# Patient Record
Sex: FEMALE | Race: WHITE | NOT HISPANIC OR LATINO | Employment: OTHER | URBAN - METROPOLITAN AREA
[De-identification: names, ages, dates, MRNs, and addresses within clinical notes are randomized per-mention and may not be internally consistent; named-entity substitution may affect disease eponyms.]

---

## 2017-03-14 ENCOUNTER — TELEPHONE (OUTPATIENT)
Dept: FAMILY MEDICINE | Facility: CLINIC | Age: 47
End: 2017-03-14

## 2017-03-14 ENCOUNTER — OFFICE VISIT (OUTPATIENT)
Dept: FAMILY MEDICINE | Facility: CLINIC | Age: 47
End: 2017-03-14
Payer: MEDICARE

## 2017-03-14 DIAGNOSIS — J01.90 ACUTE BACTERIAL SINUSITIS: Primary | ICD-10-CM

## 2017-03-14 DIAGNOSIS — B96.89 ACUTE BACTERIAL SINUSITIS: Primary | ICD-10-CM

## 2017-03-14 PROCEDURE — 99999 PR PBB SHADOW E&M-EST. PATIENT-LVL III: CPT | Mod: PBBFAC,,, | Performed by: FAMILY MEDICINE

## 2017-03-14 PROCEDURE — 99202 OFFICE O/P NEW SF 15 MIN: CPT | Mod: S$GLB,,, | Performed by: FAMILY MEDICINE

## 2017-03-14 RX ORDER — FLUTICASONE PROPIONATE 50 MCG
1 SPRAY, SUSPENSION (ML) NASAL
COMMUNITY
End: 2017-03-14 | Stop reason: ALTCHOICE

## 2017-03-14 RX ORDER — EXEMESTANE 25 MG/1
25 TABLET ORAL
COMMUNITY
Start: 2015-11-17 | End: 2018-07-01

## 2017-03-14 RX ORDER — MOMETASONE FUROATE 50 UG/1
2 SPRAY, METERED NASAL DAILY
Qty: 17 G | Refills: 0 | Status: SHIPPED | OUTPATIENT
Start: 2017-03-14 | End: 2017-04-06

## 2017-03-14 RX ORDER — DOXYCYCLINE 100 MG/1
100 CAPSULE ORAL EVERY 12 HOURS
Qty: 20 CAPSULE | Refills: 0 | Status: SHIPPED | OUTPATIENT
Start: 2017-03-14 | End: 2017-03-24

## 2017-03-14 RX ORDER — AMOXICILLIN AND CLAVULANATE POTASSIUM 875; 125 MG/1; MG/1
TABLET, FILM COATED ORAL
COMMUNITY
Start: 2017-01-14 | End: 2017-04-06

## 2017-03-14 NOTE — TELEPHONE ENCOUNTER
----- Message from Marlyn Rodrigues sent at 3/14/2017  3:52 PM CDT -----  Contact: Patient   Patient request a call back at [phone], Regards to insurance will cover one of these medication and she would like to know which one she can get called in. Rx Optivar, Aerospan     Thanks  Td

## 2017-03-14 NOTE — PROGRESS NOTES
Subjective:       Patient ID: Daniela Costa is a 47 y.o. female.    Chief Complaint: Otalgia      HPI  Ms. Costa presents to clinic today for complaints of cold symptoms.   She states she has had fever of 102, 101 , 100.6.   The symptoms have been going on for 1 week.   She has tried mucinex DM for this and she also tried some Augmentin for this and it is not helping.   She denies any sick contacts.     Review of Systems   Constitutional: Positive for fatigue and fever.   HENT: Positive for congestion, ear pain, sinus pressure and sore throat. Negative for rhinorrhea.    Respiratory: Positive for cough.    Gastrointestinal: Negative for abdominal pain, diarrhea, nausea and vomiting.   Musculoskeletal: Positive for myalgias.       Medication List with Changes/Refills   New Medications    AZELASTINE (ASTELIN) 137 MCG (0.1 %) NASAL SPRAY    1 spray (137 mcg total) by Nasal route 2 (two) times daily.    DOXYCYCLINE (VIBRAMYCIN) 100 MG CAP    Take 1 capsule (100 mg total) by mouth every 12 (twelve) hours.    MOMETASONE (NASONEX) 50 MCG/ACTUATION NASAL SPRAY    2 sprays by Nasal route once daily.   Current Medications    ALBUTEROL (ACCUNEB) 0.63 MG/3 ML NEBU    Take 0.63 mg by nebulization every 6 (six) hours as needed.    AMOXICILLIN-CLAVULANATE 875-125MG (AUGMENTIN) 875-125 MG PER TABLET    TAKE 1 TABLET BY MOUTH 2 TIMES PER DAY    BUDESONIDE-FORMOTEROL 160-4.5 MCG (SYMBICORT) 160-4.5 MCG/ACTUATION HFAA    Inhale into the lungs every 12 (twelve) hours.    EXEMESTANE (AROMASIN) 25 MG TABLET    Take 25 mg by mouth.    LAMOTRIGINE (LAMICTAL) 100 MG TABLET    Take 100 mg by mouth once daily.    LAMOTRIGINE (LAMICTAL) 200 MG TABLET    Take 200 mg by mouth once daily.    LEVALBUTEROL (XOPENEX) 1.25 MG/0.5 ML NEBULIZER SOLUTION    Take 1 ampule by nebulization every 4 (four) hours as needed for Wheezing.    TIOTROPIUM (SPIRIVA) 18 MCG INHALATION CAPSULE    Inhale 18 mcg into the lungs once daily.    VENLAFAXINE  (EFFEXOR-XR) 150 MG CP24    Take 75 mg by mouth once daily.   Discontinued Medications    FLUTICASONE (FLONASE) 50 MCG/ACTUATION NASAL SPRAY    1 spray by Nasal route.    LORAZEPAM (ATIVAN) 1 MG TABLET    Take 1 mg by mouth every 6 (six) hours as needed for Anxiety.    PREDNISONE (DELTASONE) 20 MG TABLET    3 tab for 5 days. 2 tab for 5 days. 1 tab for 5 days. 1/2 tab for 5 days    TAMOXIFEN (NOLVADEX) 20 MG TAB    Take 20 mg by mouth once daily.    TRAZODONE (DESYREL) 50 MG TABLET    Take 50 mg by mouth every evening.     Family History   Problem Relation Age of Onset    Diabetes Mother     Hyperlipidemia Mother     Diabetes Father     Hyperlipidemia Father      There is no problem list on file for this patient.    Social History     Social History    Marital status:      Spouse name: N/A    Number of children: N/A    Years of education: N/A     Social History Main Topics    Smoking status: Former Smoker    Smokeless tobacco: Never Used    Alcohol use Yes      Comment: twice a month - wine     Drug use: No    Sexual activity: Yes     Other Topics Concern    None     Social History Narrative    None         Objective:     Physical Exam   Constitutional: She is oriented to person, place, and time. She appears well-developed and well-nourished. No distress.   HENT:   Head: Normocephalic and atraumatic.   Right Ear: External ear normal.   Left Ear: External ear normal.   Mouth/Throat: Oropharynx is clear and moist. No oropharyngeal exudate.   Pain on palpation of sinuses   Turbinate hypertrophy    Eyes: EOM are normal. Right eye exhibits no discharge. Left eye exhibits no discharge.   Cardiovascular: Normal rate and regular rhythm.    Pulmonary/Chest: Effort normal and breath sounds normal. No respiratory distress. She has no wheezes.   Abdominal: Bowel sounds are normal.   Musculoskeletal: She exhibits no edema.   Neurological: She is alert and oriented to person, place, and time.   Skin: Skin is  warm and dry. She is not diaphoretic. No erythema.   Psychiatric: She has a normal mood and affect.   Vitals reviewed.    Vitals:    03/14/17 1323   BP: 116/80   Pulse: (!) 115   Resp: 18   Temp: 99.8 °F (37.7 °C)       Assessment/  PLAN     Acute bacterial sinusitis  -     doxycycline (VIBRAMYCIN) 100 MG Cap; Take 1 capsule (100 mg total) by mouth every 12 (twelve) hours.  Dispense: 20 capsule; Refill: 0  -     mometasone (NASONEX) 50 mcg/actuation nasal spray; 2 sprays by Nasal route once daily.  Dispense: 17 g; Refill: 0  - continue supportive care, rest, hydration     Plan as above   rtc if not better       Arianna Webster MD  [facility]

## 2017-03-14 NOTE — PATIENT INSTRUCTIONS

## 2017-03-14 NOTE — TELEPHONE ENCOUNTER
Patient states insurance will only cover medication once she has tried an preferred drug  Optivar or Aerospan  Would like to get a RX for one of the preferred drugs  Please advise

## 2017-03-15 DIAGNOSIS — J30.9 ALLERGIC RHINITIS, UNSPECIFIED ALLERGIC RHINITIS TRIGGER, UNSPECIFIED RHINITIS SEASONALITY: Primary | ICD-10-CM

## 2017-03-15 RX ORDER — AZELASTINE 1 MG/ML
1 SPRAY, METERED NASAL 2 TIMES DAILY
Qty: 30 ML | Refills: 0 | Status: SHIPPED | OUTPATIENT
Start: 2017-03-15 | End: 2017-04-06

## 2017-03-16 VITALS
HEIGHT: 66 IN | DIASTOLIC BLOOD PRESSURE: 80 MMHG | HEART RATE: 115 BPM | RESPIRATION RATE: 18 BRPM | BODY MASS INDEX: 31.36 KG/M2 | OXYGEN SATURATION: 98 % | SYSTOLIC BLOOD PRESSURE: 116 MMHG | WEIGHT: 195.13 LBS | TEMPERATURE: 100 F

## 2017-04-06 ENCOUNTER — LAB VISIT (OUTPATIENT)
Dept: LAB | Facility: HOSPITAL | Age: 47
End: 2017-04-06
Attending: FAMILY MEDICINE
Payer: MEDICARE

## 2017-04-06 ENCOUNTER — OFFICE VISIT (OUTPATIENT)
Dept: INTERNAL MEDICINE | Facility: CLINIC | Age: 47
End: 2017-04-06
Payer: MEDICARE

## 2017-04-06 VITALS
HEART RATE: 114 BPM | BODY MASS INDEX: 31.89 KG/M2 | WEIGHT: 191.38 LBS | DIASTOLIC BLOOD PRESSURE: 80 MMHG | TEMPERATURE: 98 F | HEIGHT: 65 IN | SYSTOLIC BLOOD PRESSURE: 126 MMHG

## 2017-04-06 DIAGNOSIS — E78.5 HYPERLIPIDEMIA, UNSPECIFIED HYPERLIPIDEMIA TYPE: ICD-10-CM

## 2017-04-06 DIAGNOSIS — H92.02 EAR PAIN, LEFT: ICD-10-CM

## 2017-04-06 DIAGNOSIS — F31.9 BIPOLAR I DISORDER: ICD-10-CM

## 2017-04-06 DIAGNOSIS — J30.9 ALLERGIC RHINITIS, UNSPECIFIED ALLERGIC RHINITIS TRIGGER, UNSPECIFIED RHINITIS SEASONALITY: ICD-10-CM

## 2017-04-06 DIAGNOSIS — E66.09 OBESITY DUE TO EXCESS CALORIES, UNSPECIFIED OBESITY SEVERITY: ICD-10-CM

## 2017-04-06 DIAGNOSIS — J98.9 REACTIVE AIRWAY DISEASE THAT IS NOT ASTHMA: ICD-10-CM

## 2017-04-06 DIAGNOSIS — C50.911 MALIGNANT NEOPLASM OF RIGHT FEMALE BREAST, UNSPECIFIED SITE OF BREAST: ICD-10-CM

## 2017-04-06 DIAGNOSIS — Z00.00 ROUTINE GENERAL MEDICAL EXAMINATION AT A HEALTH CARE FACILITY: ICD-10-CM

## 2017-04-06 DIAGNOSIS — Z00.00 ROUTINE GENERAL MEDICAL EXAMINATION AT A HEALTH CARE FACILITY: Primary | ICD-10-CM

## 2017-04-06 PROBLEM — C50.919 MALIGNANT NEOPLASM OF BREAST: Status: ACTIVE | Noted: 2017-04-06

## 2017-04-06 LAB
ALBUMIN SERPL BCP-MCNC: 4.1 G/DL
ALP SERPL-CCNC: 107 U/L
ALT SERPL W/O P-5'-P-CCNC: 29 U/L
ANION GAP SERPL CALC-SCNC: 10 MMOL/L
AST SERPL-CCNC: 22 U/L
BILIRUB SERPL-MCNC: 0.6 MG/DL
BUN SERPL-MCNC: 14 MG/DL
CALCIUM SERPL-MCNC: 9.7 MG/DL
CHLORIDE SERPL-SCNC: 103 MMOL/L
CHOLEST/HDLC SERPL: 3.8 {RATIO}
CO2 SERPL-SCNC: 29 MMOL/L
CREAT SERPL-MCNC: 1 MG/DL
EST. GFR  (AFRICAN AMERICAN): >60 ML/MIN/1.73 M^2
EST. GFR  (NON AFRICAN AMERICAN): >60 ML/MIN/1.73 M^2
GLUCOSE SERPL-MCNC: 113 MG/DL
HDL/CHOLESTEROL RATIO: 26.6 %
HDLC SERPL-MCNC: 177 MG/DL
HDLC SERPL-MCNC: 47 MG/DL
LDLC SERPL CALC-MCNC: 104.4 MG/DL
NONHDLC SERPL-MCNC: 130 MG/DL
POTASSIUM SERPL-SCNC: 4.6 MMOL/L
PROT SERPL-MCNC: 7.4 G/DL
SODIUM SERPL-SCNC: 142 MMOL/L
T4 FREE SERPL-MCNC: 0.77 NG/DL
TRIGL SERPL-MCNC: 128 MG/DL
TSH SERPL DL<=0.005 MIU/L-ACNC: 2.06 UIU/ML

## 2017-04-06 PROCEDURE — 84439 ASSAY OF FREE THYROXINE: CPT

## 2017-04-06 PROCEDURE — 80061 LIPID PANEL: CPT

## 2017-04-06 PROCEDURE — 99396 PREV VISIT EST AGE 40-64: CPT | Mod: S$GLB,,, | Performed by: FAMILY MEDICINE

## 2017-04-06 PROCEDURE — 84443 ASSAY THYROID STIM HORMONE: CPT

## 2017-04-06 PROCEDURE — 36415 COLL VENOUS BLD VENIPUNCTURE: CPT | Mod: PO

## 2017-04-06 PROCEDURE — 99999 PR PBB SHADOW E&M-EST. PATIENT-LVL III: CPT | Mod: PBBFAC,,, | Performed by: FAMILY MEDICINE

## 2017-04-06 PROCEDURE — 80053 COMPREHEN METABOLIC PANEL: CPT

## 2017-04-06 RX ORDER — FLUTICASONE PROPIONATE 50 MCG
2 SPRAY, SUSPENSION (ML) NASAL DAILY
Qty: 16 G | Refills: 12
Start: 2017-04-06 | End: 2017-05-06

## 2017-04-06 RX ORDER — MONTELUKAST SODIUM 10 MG/1
10 TABLET ORAL DAILY
Qty: 30 TABLET | Refills: 6 | Status: SHIPPED | OUTPATIENT
Start: 2017-04-06 | End: 2018-04-15 | Stop reason: SDUPTHER

## 2017-04-06 RX ORDER — CHLORHEXIDINE GLUCONATE ORAL RINSE 1.2 MG/ML
SOLUTION DENTAL
COMMUNITY
Start: 2017-04-01 | End: 2018-03-12

## 2017-04-06 NOTE — PROGRESS NOTES
Subjective:      Patient ID: Daniela Costa is a 47 y.o. female.    Chief Complaint: Establish Care and Annual Exam    HPI Comments: Patient's coming in today to establish care.  Her previous PCP was Dr. Martinez and then Dr. Mcdermott.  She states she really just didn't get along with Dr. Mcdermott into that she's switching primary care physicians.  She was diagnosed with breast cancer of the right breast multiple lesions in January 22, 2013.  She's currently followed by Dr. Larios and Dr. Love every 6 months.  She ended up having to have a right mastectomy and then prophylactically had a left mastectomy in January 2016.  She's currently on aromatase inhibitor therapy.  She sees her breast specialist every 6 months for chest x-rays.  She tested negative for the Brca gene.  She ended up with a diagnosis on June 1, 2012 had a port placed in July 2012 in August had for a CT treatments 3 weeks apart with 12 Taxol weekly treatments and in February 2013 through May 2013 had daily radiation..  Her mother and her maternal grandmother also had breast cancer.  She does go through hot flashes.  She has a gynecologist Dr. Alka Araiza her last Pap smear was in October 2016.  He was normal.  Dr. Servin was her reconstructive surgeon for plastic surgery.    She's been disabled for quite some time because of her back.  She currently sees Dr. Vignesh Maxwell as his specialist.  She had an neck C6-C7 fusion in 2005 by Dr. Harjit bates.  Then she had anterior posterior lumbar fusion of L4 L5 S1 in 2005 by Dr. Celina Birch.  She did a revision to the AP fusion again by Dr. dixon Birch in December 2005.  She has had another lumbar fusion and removal of the previous hardware done on August 26 a 2015 by Dr. Vignesh Maxwell.  She had a pneumonia shot in 2015 as well.    She also has a history of bipolar disorder for which she is followed by Dr. Flor.  She's been controlled on Lamictal and Effexor.  She has been seeing him for several  years now she believes she's been on Lamictal now for over since 2008.    She is stating that she needs to see an allergist and possibly an pulmonary specialist.  She has a history of some reactive airway disease.  She's been battling a lot of ALLERGY issues as well.  She does occasionally use albuterol but she's never been diagnosed with asthma.    I have reviewed her outside records from the [place] [facility] [place] most recent 2 years worth of notes from her primary care physician's through the [facility] everywhere.      Lab Results   Component Value Date    WBC 8.52 08/03/2006    HGB 12.7 08/03/2006    HCT 38.9 08/03/2006     08/03/2006       Review of Systems   Constitutional: Negative for chills, fatigue and fever.   HENT: Positive for congestion and ear pain. Negative for trouble swallowing.    Eyes: Negative for pain and visual disturbance.   Respiratory: Negative for cough and shortness of breath.    Cardiovascular: Negative for chest pain and leg swelling.   Gastrointestinal: Negative for abdominal pain, blood in stool, nausea and vomiting.   Endocrine: Negative for cold intolerance and heat intolerance.   Genitourinary: Negative for dysuria and frequency.   Musculoskeletal: Positive for arthralgias, back pain, neck pain and neck stiffness. Negative for joint swelling and myalgias.   Skin: Negative for color change and rash.   Neurological: Negative for dizziness, weakness and headaches.   Psychiatric/Behavioral: Negative for behavioral problems, decreased concentration, dysphoric mood and sleep disturbance. The patient is not nervous/anxious.      Objective:     Physical Exam   Constitutional: She is oriented to person, place, and time. She appears well-developed and well-nourished.   HENT:   Head: Normocephalic and atraumatic.   Right Ear: External ear normal. Tympanic membrane is injected.   Left Ear: External ear normal. Tympanic membrane is injected.   Mouth/Throat: Oropharynx is clear and moist.   Eyes:  EOM are normal.   Neck: Normal range of motion. Neck supple. No thyromegaly present.   Cardiovascular: Normal rate and regular rhythm.  Exam reveals no gallop and no friction rub.    No murmur heard.  Pulmonary/Chest: Effort normal. No respiratory distress. She has no wheezes. She has no rales.   Abdominal: Soft. Bowel sounds are normal. She exhibits no distension. There is no tenderness. There is no rebound.   Musculoskeletal: She exhibits no edema.        Cervical back: She exhibits decreased range of motion, tenderness and bony tenderness. She exhibits no pain and no spasm.        Lumbar back: She exhibits decreased range of motion, tenderness and bony tenderness. She exhibits no pain and no spasm.   Lymphadenopathy:     She has no cervical adenopathy.   Neurological: She is alert and oriented to person, place, and time.   Skin: Skin is warm and dry. No rash noted.   Psychiatric: She has a normal mood and affect. Her speech is normal and behavior is normal. Judgment and thought content normal. Cognition and memory are normal.   Vitals reviewed.    Assessment:     1. Routine general medical examination at a health care facility    2. Allergic rhinitis, unspecified allergic rhinitis trigger, unspecified rhinitis seasonality    3. Bipolar I disorder    4. Malignant neoplasm of right female breast, unspecified site of breast    5. Ear pain, left    6. Reactive airway disease that is not asthma    7. Hyperlipidemia, unspecified hyperlipidemia type    8. Obesity due to excess calories, unspecified obesity severity      Plan:   Daniela was seen today for establish care and annual exam.    Diagnoses and all orders for this visit:    Routine general medical examination at a health care facility-discussed health maintenance issues continue gynecology continue with breast specialist continue with back specialist continue with her psychiatrist.  Lab work today.  -     TSH; Future  -     T4, free; Future  -     Lipid panel;  Future  -     Comprehensive metabolic panel; Future    Allergic rhinitis, unspecified allergic rhinitis trigger, unspecified rhinitis seasonality-will do trial of Singulair will refer to ALLERGY and pulmonology as she has a history of some reactive airway disease and recurrent ALLERGY symptoms.  -     montelukast (SINGULAIR) 10 mg tablet; Take 1 tablet (10 mg total) by mouth once daily.  -     Ambulatory referral to Allergy  -     Ambulatory consult to Pulmonology  -     TSH; Future  -     T4, free; Future  -     Lipid panel; Future  -     Comprehensive metabolic panel; Future    Bipolar I disorder-continue to follow-up with her psychiatrist Dr. Flor currently managing her medications.  -     TSH; Future  -     T4, free; Future  -     Lipid panel; Future  -     Comprehensive metabolic panel; Future    Malignant neoplasm of right female breast, unspecified site of breast  Comments:  3 spots on Right breast, s/p mas, chem, rad, and then proph on left for masectomy, now in remission.  Still being followed by Dr. Love and Dr. Larios every 6 months.  Doing chest x-rays every 6 months for her prevention.  Orders:  -     TSH; Future  -     T4, free; Future  -     Lipid panel; Future  -     Comprehensive metabolic panel; Future    Ear pain, left-adding Singulair continue with Flonase, refer to ALLERGY specialist  -     montelukast (SINGULAIR) 10 mg tablet; Take 1 tablet (10 mg total) by mouth once daily.  -     Ambulatory referral to Allergy  -     TSH; Future  -     T4, free; Future  -     Lipid panel; Future  -     Comprehensive metabolic panel; Future    Reactive airway disease that is not asthma-recurrent adding Singulair refer to pulmonary  -     montelukast (SINGULAIR) 10 mg tablet; Take 1 tablet (10 mg total) by mouth once daily.  -     Ambulatory referral to Allergy  -     Ambulatory consult to Pulmonology  -     TSH; Future  -     T4, free; Future  -     Lipid panel; Future  -     Comprehensive metabolic panel;  Future    Hyperlipidemia, unspecified hyperlipidemia type-checking lab work today.  -     Lipid panel; Future    Other orders  -     fluticasone (FLONASE) 50 mcg/actuation nasal spray; 2 sprays by Each Nare route once daily.            Return in about 1 year (around 4/6/2018) for physical.

## 2017-04-06 NOTE — MR AVS SNAPSHOT
[facility]  [address] [place] [address]  Phone: [phone]  Fax: [phone]                  Daniela Costa   2017 1:20 PM   Office Visit    Description:  Female : 1970   Provider:  Lucía Terrell MD   Department:  [place]           Reason for Visit     Establish Care     Annual Exam           Diagnoses this Visit        Comments    Routine general medical examination at a health care facility    -  Primary     Allergic rhinitis, unspecified allergic rhinitis trigger, unspecified rhinitis seasonality         Bipolar I disorder         Malignant neoplasm of right female breast, unspecified site of breast     3 spots on Right breast, s/p mas, chem, rad, and then proph on left for masectomy    Ear pain, left         Reactive airway disease that is not asthma         Hyperlipidemia, unspecified hyperlipidemia type                To Do List           Future Appointments        Provider Department Dept Phone    4/10/2017 2:00 PM Leeroy Rosario MD [facility] - Allergy/ Immunology [phone]    2017 3:20 PM Everette John MD [facility] - Pulmonary Services [phone]      Goals (5 Years of Data)     None      Follow-Up and Disposition     Return in about 1 year (around 2018) for physical.       These Medications        Disp Refills Start End    fluticasone (FLONASE) 50 mcg/actuation nasal spray 16 g 12 2017    2 sprays by Each Nare route once daily. - Each Nare    Pharmacy: [facility]/pharmacy #[id number] - [place] - [address] [address] Ph #: [phone]       montelukast (SINGULAIR) 10 mg tablet 30 tablet 6 2017    Take 1 tablet (10 mg total) by mouth once daily. - Oral    Pharmacy: [facility]/pharmacy #[id number]  [place] - [address] [address] Ph #: [phone]         [facility]     [facility] Nurse Care Line -  Assistance  Unless otherwise directed by your provider, please contact  [facility], our nurse care line that is available for  assistance.     Registered nurses in the [facility] provide: appointment scheduling, clinical advisement, health education, and other advisory services.  Call: [phone] (toll free)               Medications           Message regarding Medications     Verify the changes and/or additions to your medication regime listed below are the same as discussed with your clinician today.  If any of these changes or additions are incorrect, please notify your healthcare provider.        START taking these NEW medications        Refills    fluticasone (FLONASE) 50 mcg/actuation nasal spray 12    Si sprays by Each Nare route once daily.    Class: No Print    Route: Each Nare    montelukast (SINGULAIR) 10 mg tablet 6    Sig: Take 1 tablet (10 mg total) by mouth once daily.    Class: Normal    Route: Oral      STOP taking these medications     amoxicillin-clavulanate 875-125mg (AUGMENTIN) 875-125 mg per tablet TAKE 1 TABLET BY MOUTH 2 TIMES PER DAY    budesonide-formoterol 160-4.5 mcg (SYMBICORT) 160-4.5 mcg/actuation HFAA Inhale into the lungs every 12 (twelve) hours.    tiotropium (SPIRIVA) 18 mcg inhalation capsule Inhale 18 mcg into the lungs once daily.    mometasone (NASONEX) 50 mcg/actuation nasal spray 2 sprays by Nasal route once daily.    levalbuterol (XOPENEX) 1.25 mg/0.5 mL nebulizer solution Take 1 ampule by nebulization every 4 (four) hours as needed for Wheezing.    azelastine (ASTELIN) 137 mcg (0.1 %) nasal spray 1 spray (137 mcg total) by Nasal route 2 (two) times daily.           Verify that the below list of medications is an accurate representation of the medications you are currently taking.  If none reported, the list may be blank. If incorrect, please contact your healthcare provider. Carry this list with you in case of emergency.           Current Medications     albuterol (ACCUNEB) 0.63 mg/3 mL Nebu Take 0.63 mg by  "nebulization every 6 (six) hours as needed.    chlorhexidine (PERIDEX) 0.12 % solution     exemestane (AROMASIN) 25 mg tablet Take 25 mg by mouth.    fluticasone (FLONASE) 50 mcg/actuation nasal spray 2 sprays by Each Nare route once daily.    lamotrigine (LAMICTAL) 100 MG tablet Take 100 mg by mouth once daily.    lamotrigine (LAMICTAL) 200 MG tablet Take 200 mg by mouth once daily.    montelukast (SINGULAIR) 10 mg tablet Take 1 tablet (10 mg total) by mouth once daily.    venlafaxine (EFFEXOR-XR) 150 MG Cp24 Take 75 mg by mouth once daily.           Clinical Reference Information           Your Vitals Were     BP Pulse Temp Height Weight Last Period    126/80 114 97.8 °F (36.6 °C) 5' 5" (1.651 m) 86.8 kg (191 lb 5.8 oz) 08/14/2012    BMI                31.84 kg/m2          Blood Pressure          Most Recent Value    BP  126/80      Allergies as of 4/6/2017     Cefdinir    Levofloxacin      Immunizations Administered on Date of Encounter - 4/6/2017     None      Orders Placed During Today's Visit      Normal Orders This Visit    Ambulatory consult to Pulmonology     Ambulatory referral to Allergy     Future Labs/Procedures Expected by Expires    Comprehensive metabolic panel  4/6/2017 6/5/2018    Lipid panel  4/6/2017 6/5/2018    T4, free  4/6/2017 6/5/2018    TSH  4/6/2017 6/5/2018      Language Assistance Services     ATTENTION: Language assistance services are available, free of charge. Please call [phone].      ATENCIÓN: Si habla español, tiene a montesinos disposición servicios gratuitos de asistencia lingüística. Llame al [phone].     [facility] Ý: N?u b?n nói Ti?ng Vi?t, có các d?ch v? h? tr? ngôn ng? mi?n phí dành cho b?n. G?i s? [phone].         [facility] complies with applicable Federal civil rights laws and does not discriminate on the basis of race, color, national origin, age, disability, or sex.        "

## 2017-04-07 ENCOUNTER — PATIENT MESSAGE (OUTPATIENT)
Dept: INTERNAL MEDICINE | Facility: CLINIC | Age: 47
End: 2017-04-07

## 2017-04-07 ENCOUNTER — TELEPHONE (OUTPATIENT)
Dept: INTERNAL MEDICINE | Facility: CLINIC | Age: 47
End: 2017-04-07

## 2017-04-07 DIAGNOSIS — R73.09 ABNORMAL GLUCOSE: Primary | ICD-10-CM

## 2017-04-07 NOTE — TELEPHONE ENCOUNTER
Let's get a1c first to see if it is only prediabetes vs diabetes before trying to get glucose monitor. Please schedule.

## 2017-04-10 ENCOUNTER — HOSPITAL ENCOUNTER (OUTPATIENT)
Dept: RADIOLOGY | Facility: HOSPITAL | Age: 47
Discharge: HOME | End: 2017-04-10
Attending: ALLERGY & IMMUNOLOGY
Payer: MEDICARE

## 2017-04-10 ENCOUNTER — OFFICE VISIT (OUTPATIENT)
Dept: ALLERGY | Facility: CLINIC | Age: 47
End: 2017-04-10
Payer: MEDICARE

## 2017-04-10 VITALS
TEMPERATURE: 98 F | SYSTOLIC BLOOD PRESSURE: 110 MMHG | HEIGHT: 65 IN | BODY MASS INDEX: 32.69 KG/M2 | WEIGHT: 196.19 LBS | HEART RATE: 74 BPM | RESPIRATION RATE: 15 BRPM | DIASTOLIC BLOOD PRESSURE: 62 MMHG

## 2017-04-10 DIAGNOSIS — H69.93 EUSTACHIAN TUBE DYSFUNCTION, BILATERAL: ICD-10-CM

## 2017-04-10 DIAGNOSIS — J31.0 CHRONIC RHINITIS: ICD-10-CM

## 2017-04-10 DIAGNOSIS — R51.9 FACIAL PAIN: ICD-10-CM

## 2017-04-10 DIAGNOSIS — J31.0 CHRONIC RHINITIS: Primary | ICD-10-CM

## 2017-04-10 PROCEDURE — 70220 X-RAY EXAM OF SINUSES: CPT | Mod: TC,PO

## 2017-04-10 PROCEDURE — 99204 OFFICE O/P NEW MOD 45 MIN: CPT | Mod: 25,S$GLB,, | Performed by: ALLERGY & IMMUNOLOGY

## 2017-04-10 PROCEDURE — 1160F RVW MEDS BY RX/DR IN RCRD: CPT | Mod: S$GLB,,, | Performed by: ALLERGY & IMMUNOLOGY

## 2017-04-10 PROCEDURE — 95004 PERQ TESTS W/ALRGNC XTRCS: CPT | Mod: S$GLB,,, | Performed by: ALLERGY & IMMUNOLOGY

## 2017-04-10 PROCEDURE — 99999 PR PBB SHADOW E&M-EST. PATIENT-LVL III: CPT | Mod: PBBFAC,,, | Performed by: ALLERGY & IMMUNOLOGY

## 2017-04-10 PROCEDURE — 70220 X-RAY EXAM OF SINUSES: CPT | Mod: 26,,, | Performed by: RADIOLOGY

## 2017-04-10 RX ORDER — VENLAFAXINE 75 MG/1
75 TABLET ORAL 2 TIMES DAILY
COMMUNITY
End: 2018-03-12

## 2017-04-10 NOTE — LETTER
April 11, 2017      Lucía Terrell MD  [address]  [address]  [place] [address]           [facility] - Allergy/ Immunology  [address] [place] [address]  Phone: [phone]  Fax: [phone]          Patient: Daniela Costa   MR Number: [id number]   YOB: 1970   Date of Visit: 4/10/2017       Dear Dr. Lucía Terrell:    Thank you for referring Daniela Costa to me for evaluation. Attached you will find relevant portions of my assessment and plan of care.    If you have questions, please do not hesitate to call me. I look forward to following Daniela Costa along with you.    Sincerely,    Leeroy Rosario MD    Enclosure  CC:  No Recipients    If you would like to receive this communication electronically, please contact [email or web address] or [phone] to request more information on [employer] Link access.    For providers and/or their staff who would like to refer a patient to [facility], please contact us through our one-stop-shop provider referral line, [facility], at [phone].    If you feel you have received this communication in error or would no longer like to receive these types of communications, please e-mail [email or web address]

## 2017-04-10 NOTE — MR AVS SNAPSHOT
[facility] - Allergy/ Immunology  [address] [facility] [address] [place] [address]  Phone: [phone]  Fax: [phone]                  Daniela Costa   4/10/2017 2:00 PM   Office Visit    Description:  Female : 1970   Provider:  Leeroy Rosario MD   Department:  [facility]           Reason for Visit     Other           Diagnoses this Visit        Comments    Chronic rhinitis    -  Primary     Facial pain         Eustachian tube dysfunction, bilateral                To Do List           Future Appointments        Provider Department Dept Phone    4/10/2017 3:45 PM [facility] XR2 [facility] [phone]    2017 3:20 PM Everette John MD [facility] [phone]      Goals (5 Years of Data)     None      [facility]     [facility] -  Assistance  Unless otherwise directed by your provider, please contact [facility], our nurse care line that is available for  assistance.     Registered nurses in the [facility] provide: appointment scheduling, clinical advisement, health education, and other advisory services.  Call: [phone] (toll free)               Medications           Message regarding Medications     Verify the changes and/or additions to your medication regime listed below are the same as discussed with your clinician today.  If any of these changes or additions are incorrect, please notify your healthcare provider.             Verify that the below list of medications is an accurate representation of the medications you are currently taking.  If none reported, the list may be blank. If incorrect, please contact your healthcare provider. Carry this list with you in case of emergency.           Current Medications     albuterol (ACCUNEB) 0.63 mg/3 mL Nebu Take 0.63 mg by nebulization every 6 (six) hours as needed.    chlorhexidine (PERIDEX) 0.12 % solution     exemestane (AROMASIN) 25 mg tablet Take 25  "mg by mouth.    fluticasone (FLONASE) 50 mcg/actuation nasal spray 2 sprays by Each Nare route once daily.    lamotrigine (LAMICTAL) 200 MG tablet Take 200 mg by mouth once daily.    montelukast (SINGULAIR) 10 mg tablet Take 1 tablet (10 mg total) by mouth once daily.    venlafaxine (EFFEXOR) 75 MG tablet Take 75 mg by mouth 2 (two) times daily.    venlafaxine (EFFEXOR-XR) 150 MG Cp24 Take 75 mg by mouth once daily.           Clinical Reference Information           Your Vitals Were     BP Pulse Temp Resp Height Weight    110/62 74 97.8 °F (36.6 °C) 15 5' 5" (1.651 m) 89 kg (196 lb 3.4 oz)    Last Period BMI             08/14/2012 32.65 kg/m2         Blood Pressure          Most Recent Value    BP  110/62      Allergies as of 4/10/2017     Cefdinir    Levofloxacin      Immunizations Administered on Date of Encounter - 4/10/2017     None      Orders Placed During Today's Visit     Future Labs/Procedures Expected by Expires    X-Ray Sinuses Min 3 Views  4/10/2017 4/10/2018      Instructions    Per discussion.       Language Assistance Services     ATTENTION: Language assistance services are available, free of charge. Please call [phone].      ATENCIÓN: Si shalonda goodwin, tiene a montesinos disposición servicios gratuitos de asistencia lingüística. Llame al [phone].     [facility] Ý: N?u b?n nói Ti?ng Vi?t, có các d?ch v? h? tr? ngôn ng? mi?n phí dành cho b?n. G?i s? [phone].         [facility] complies with applicable Federal civil rights laws and does not discriminate on the basis of race, color, national origin, age, disability, or sex.        "

## 2017-04-11 NOTE — PROGRESS NOTES
Chief complaint: ALLERGIES    This note was dictated using voice recognition software may contain errors.    History:    She had a 2 PM appointment on Monday, April 10, 2017    She was raised in the [facility].  She stated since childhood she is experienced perennial nasal symptoms.  She stated during the past 1-1/2 years she is been especially symptomatic.    Please read and refer to the note of  dated April 6, 2017.  Information in this note was read and reviewed while she was here today.  Significant additions if any are as noted above or below.  Please read and refer to the office note of Elizabeth Lejeune, NP, dated January 28, 2015.  Information in this note was read and reviewed while she was here today.  Significant additions if any are as noted above or below.    Information in her medical record regarding her past medical history family history and social history was reviewed and updated today.  Significant additions if any are as noted above or below.    In the past she did not considered herself to be ALLERGIC to any animals.  When she ate fresh strawberries she developed hives.    Past medical history she has no history of nose or sinus surgery or facial fractures.  She has no history of hypertension diabetes heart liver or kidney thyroid disease.  She has a history of PE tubes, 2 or 3 sets.  She had her tonsils and adenoids removed at about age 8.    Family history is positive for hayfever.    She lives in a house.  She is home with her children.  In her bedroom there is a HEPA filter.  No one smokes indoors.  There is carpeting in her bedroom.  She has 3 dogs indoors.  The dogs and only been present for the past 2 years.    She has a history of breast cancer status post treatment.  She is receiving ongoing treatment on a day-to-day basis with oral agents.    Review of systems: She experiences nasal obstruction anterior and posterior rhinorrhea, sneezing, itching of the nose and eyes  palate and pharynx.  She experiences symptoms suggestive of eustachian tube dysfunction.  She also develops facial pain and pressure and headache discomfort.    She stated in her childhood that an ALLERGY evaluation was performed.  She does not recall who supervised the evaluation.    Recently it was suggested she evaluate Singulair.  She had not previously evaluated Singulair.  In the past she is not evaluated Astelin nasal spray.  She has used Nasacort and Flonase nasal sprays.    She is interested in pursuing reevaluation as it relates to a possible ALLERGIC contribution to her ongoing symptoms.    Exam: In general she is in no distress she is alert oriented well-developed in good mood and attentive  Gait steady  Skin no rash noted  Head no swelling noted  Eyes sclera white conjunctiva pink  Nose patent no polyps seen  Ears not inflamed tympanic membranes not inflamed  Mouth no swelling or inflammation of the lips tongue or in the throat noted  No thyromegaly or masses noted lymph nodes no significant cervical or epitrochlear lymphadenopathy noted  Lungs clear to auscultation  Heart no murmurs heard regular rhythm  Extremities no swelling or inflammation of the hands or legs noted    ALLERGY testing:    She elected to have diagnostic immediate hypersensitivity skin tests performed.  Prick skin tests were performed on the volar forearms.  36 airborne allergens were tested along with histamine and saline.  These tests were personally evaluated and interpreted by myself 15 minutes after they were performed.  The histamine control was positive.  The saline control was negative.  All 36 allergens tested revealed negative results.  I reviewed the results of the tests with her.    Impression:    #1 chronic rhinitis  #2 eustachian tube dysfunction  #3 facial pain and pressure  #4 breast cancer  #5 other health concerns as noted in her medical record    Assessment and plan:    I recommended that sinus x-rays be performed  to evaluate for possible sinusitis or sinus disease.  This is agreeable with her.  Arrangements were made to have the x-rays performed after her appointment with me today.  When the results are available to review with her I will do so.  Additional recommendations may be made at that time.    I requested that she be certain that her HEPA air filter does not generate ozone in the course of its operation.    Based upon the negative skin test noted today for those allergens tested I told her the negative results would suggest that she is not highly ALLERGIC to the allergens evaluated at this time.    I was able to obtain the report of her sinus x-rays.  I called her at her residence at 7:17 PM on Monday, April 10, 2017 and spoke with her.  No air fluid levels were noted.  I did not recommend the institution of antibiotic treatment.  I did recommend that she resume taking Singulair therapy and evaluated.  It Singulair is not helpful consideration may be given to other options for treatment.    She was given the office phone number.  Should she have additional questions or concerns she was instructed to call.    Her appointment was 45 minutes in duration spent entirely in face-to-face contact.  More than 50% of the visit was spent in counseling and coordination of care.  An additional 15 minutes were required for the performance and evaluation of her immediate hypersensitivity skin tests.

## 2017-04-17 ENCOUNTER — LAB VISIT (OUTPATIENT)
Dept: LAB | Facility: HOSPITAL | Age: 47
End: 2017-04-17
Attending: FAMILY MEDICINE
Payer: MEDICARE

## 2017-04-17 DIAGNOSIS — R73.09 ABNORMAL GLUCOSE: ICD-10-CM

## 2017-04-17 PROCEDURE — 36415 COLL VENOUS BLD VENIPUNCTURE: CPT | Mod: PO

## 2017-04-17 PROCEDURE — 83036 HEMOGLOBIN GLYCOSYLATED A1C: CPT

## 2017-04-18 LAB
ESTIMATED AVG GLUCOSE: 111 MG/DL
HBA1C MFR BLD HPLC: 5.5 %

## 2017-05-01 ENCOUNTER — OFFICE VISIT (OUTPATIENT)
Dept: PULMONOLOGY | Facility: CLINIC | Age: 47
End: 2017-05-01
Payer: MEDICARE

## 2017-05-01 ENCOUNTER — TELEPHONE (OUTPATIENT)
Dept: PULMONOLOGY | Facility: CLINIC | Age: 47
End: 2017-05-01

## 2017-05-01 ENCOUNTER — HOSPITAL ENCOUNTER (OUTPATIENT)
Dept: RADIOLOGY | Facility: HOSPITAL | Age: 47
Discharge: HOME | End: 2017-05-01
Attending: INTERNAL MEDICINE
Payer: MEDICARE

## 2017-05-01 VITALS
OXYGEN SATURATION: 99 % | HEIGHT: 65 IN | WEIGHT: 199.94 LBS | HEART RATE: 94 BPM | DIASTOLIC BLOOD PRESSURE: 78 MMHG | BODY MASS INDEX: 33.31 KG/M2 | SYSTOLIC BLOOD PRESSURE: 122 MMHG

## 2017-05-01 DIAGNOSIS — G47.00 INSOMNIA, UNSPECIFIED TYPE: ICD-10-CM

## 2017-05-01 DIAGNOSIS — R07.89 CHEST TIGHTNESS: Primary | ICD-10-CM

## 2017-05-01 DIAGNOSIS — R07.89 CHEST TIGHTNESS: ICD-10-CM

## 2017-05-01 DIAGNOSIS — J45.20 MILD INTERMITTENT ASTHMA WITHOUT COMPLICATION: Primary | ICD-10-CM

## 2017-05-01 DIAGNOSIS — J70.1 RADIATION FIBROSIS OF LUNG: ICD-10-CM

## 2017-05-01 PROCEDURE — 71020 XR CHEST PA AND LATERAL: CPT | Mod: TC,PO

## 2017-05-01 PROCEDURE — 1160F RVW MEDS BY RX/DR IN RCRD: CPT | Mod: S$GLB,,, | Performed by: INTERNAL MEDICINE

## 2017-05-01 PROCEDURE — 71020 XR CHEST PA AND LATERAL: CPT | Mod: 26,,, | Performed by: RADIOLOGY

## 2017-05-01 PROCEDURE — 99999 PR PBB SHADOW E&M-EST. PATIENT-LVL III: CPT | Mod: PBBFAC,,, | Performed by: INTERNAL MEDICINE

## 2017-05-01 PROCEDURE — 99215 OFFICE O/P EST HI 40 MIN: CPT | Mod: S$GLB,,, | Performed by: INTERNAL MEDICINE

## 2017-05-01 RX ORDER — IBUPROFEN AND FAMOTIDINE 26.6; 8 MG/1; MG/1
TABLET ORAL 3 TIMES DAILY
COMMUNITY
End: 2018-01-26

## 2017-05-01 RX ORDER — ALBUTEROL SULFATE 90 UG/1
2 AEROSOL, METERED RESPIRATORY (INHALATION) EVERY 6 HOURS
Qty: 1 INHALER | Refills: 12 | Status: SHIPPED | OUTPATIENT
Start: 2017-05-01 | End: 2018-05-01

## 2017-05-01 RX ORDER — BUDESONIDE AND FORMOTEROL FUMARATE DIHYDRATE 160; 4.5 UG/1; UG/1
2 AEROSOL RESPIRATORY (INHALATION) EVERY 12 HOURS
Qty: 1 INHALER | Refills: 12 | Status: SHIPPED | OUTPATIENT
Start: 2017-05-01 | End: 2017-06-12 | Stop reason: SDUPTHER

## 2017-05-01 RX ORDER — CYCLOBENZAPRINE HCL 10 MG
TABLET ORAL
Refills: 0 | COMMUNITY
Start: 2017-04-18 | End: 2017-06-12 | Stop reason: ALTCHOICE

## 2017-05-01 NOTE — MR AVS SNAPSHOT
[facility]  [address] [place] [address]  Phone: [phone]  Fax: [phone]                  Daniela Costa   2017 3:20 PM   Office Visit    Description:  Female : 1970   Provider:  Everette John MD   Department:  [facility]           Diagnoses this Visit        Comments    Mild intermittent asthma without complication    -  Primary     Radiation fibrosis of lung                To Do List           Future Appointments        Provider Department Dept Phone    2017 1:00 PM PULMONARY LAB, [facility] Pulmonary Function [facility] [phone]    2017 3:10 PM [facility] CT1 LIMIT 500 LBS [facility] [phone]    2017 4:00 PM Everette John MD [facility] [phone]      Goals (5 Years of Data)     None      Follow-Up and Disposition     Return in about 4 weeks (around 2017) for review of CT and PFT.       These Medications        Disp Refills Start End    albuterol 90 mcg/actuation inhaler 1 Inhaler 12 2017    Inhale 2 puffs into the lungs every 6 (six) hours. - Inhalation    Pharmacy: [facility]/pharmacy #[facility] [place]  [address] [facility] Ph #: [phone]       budesonide-formoterol 160-4.5 mcg (SYMBICORT) 160-4.5 mcg/actuation HFAA 1 Inhaler 12 2017    Inhale 2 puffs into the lungs every 12 (twelve) hours. Wash out mouth after using - Inhalation    Pharmacy: [facility]/pharmacy #[id number]  [place] - [address] [facility] Ph #: [phone]       inhalation spacing device (BREATHERITE VALVED MDI CHAMBER) 1 Device prn 2017     Use as directed for inhalation.    Pharmacy: [facility]/pharmacy #[facility] [place] - [address] [facility] Ph #: [phone]         [facility]     [facility] Care [facility] -  Assistance  Unless otherwise directed by your provider, please contact [facility], our nurse care line that is available for  assistance.      Registered nurses in the [facility] provide: appointment scheduling, clinical advisement, health education, and other advisory services.  Call: [phone] (toll free)               Medications           Message regarding Medications     Verify the changes and/or additions to your medication regime listed below are the same as discussed with your clinician today.  If any of these changes or additions are incorrect, please notify your healthcare provider.        START taking these NEW medications        Refills    albuterol 90 mcg/actuation inhaler 12    Sig: Inhale 2 puffs into the lungs every 6 (six) hours.    Class: Normal    Route: Inhalation    budesonide-formoterol 160-4.5 mcg (SYMBICORT) 160-4.5 mcg/actuation HFAA 12    Sig: Inhale 2 puffs into the lungs every 12 (twelve) hours. Wash out mouth after using    Class: Normal    Route: Inhalation    inhalation spacing device (BREATHERITE VALVED MDI CHAMBER) prn    Sig: Use as directed for inhalation.    Class: Normal           Verify that the below list of medications is an accurate representation of the medications you are currently taking.  If none reported, the list may be blank. If incorrect, please contact your healthcare provider. Carry this list with you in case of emergency.           Current Medications     albuterol (ACCUNEB) 0.63 mg/3 mL Nebu Take 0.63 mg by nebulization every 6 (six) hours as needed.    chlorhexidine (PERIDEX) 0.12 % solution     cyclobenzaprine (FLEXERIL) 10 MG tablet TAKE 1 TABLET(S) BY MOUTH 3 TIMES A DAY    exemestane (AROMASIN) 25 mg tablet Take 25 mg by mouth.    fluticasone (FLONASE) 50 mcg/actuation nasal spray 2 sprays by Each Nare route once daily.    ibuprofen-famotidine (DUEXIS) 800-26.6 mg Tab Take by mouth 3 (three) times daily.    lamotrigine (LAMICTAL) 200 MG tablet Take 200 mg by mouth once daily.    montelukast (SINGULAIR) 10 mg tablet Take 1 tablet (10 mg total) by mouth once daily.    venlafaxine  "(EFFEXOR) 75 MG tablet Take 75 mg by mouth 2 (two) times daily.    venlafaxine (EFFEXOR-XR) 150 MG Cp24 Take 75 mg by mouth once daily.    albuterol 90 mcg/actuation inhaler Inhale 2 puffs into the lungs every 6 (six) hours.    budesonide-formoterol 160-4.5 mcg (SYMBICORT) 160-4.5 mcg/actuation HFAA Inhale 2 puffs into the lungs every 12 (twelve) hours. Wash out mouth after using    inhalation spacing device (BREATHERITE VALVED MDI CHAMBER) Use as directed for inhalation.           Clinical Reference Information           Your Vitals Were     BP Pulse Height Weight SpO2 BMI    122/78 94 5' 5" (1.651 m) 90.7 kg (199 lb 15.3 oz) 99% 33.27 kg/m2      Blood Pressure          Most Recent Value    BP  122/78      Allergies as of 5/1/2017     Cefdinir    Levofloxacin      Immunizations Administered on Date of Encounter - 5/1/2017     None      Orders Placed During Today's Visit     Future Labs/Procedures Expected by Expires    CT Chest Without Contrast  5/1/2017 5/1/2018    Complete PFT with bronchodilator  As directed 5/1/2018      Instructions      Budesonide; Formoterol Inhalation  What is this medicine?  BUDESONIDE; FORMOTEROL (byantonia haile; for [facility] ange quiroga) inhalation is a combination of two medicines that decrease inflammation and help to open up the airways in your lungs. It is used to treat asthma. Do NOT use for an acute asthma attack.  How should I use this medicine?  This medicine is inhaled through the mouth. Rinse your mouth with water after use. Make sure not to swallow the water. Follow the directions on your prescription label. Do not use more often than directed. Do not stop taking except on your doctor's advice. Make sure that you are using your inhaler correctly. Ask your doctor or health care provider if you have any questions.  A special MedGuide will be given to you by the pharmacist with each prescription and refill. Be sure to read this information carefully each time.  Talk to your pediatrician " regarding the use of this medicine in children. While this drug may be prescribed for children as young as 12 years of age for selected conditions, precautions do apply.  What side effects may I notice from receiving this medicine?  Side effects that you should report to your doctor or health care professional as soon as possible:  · allergic reactions such as skin rash or itching, hives, swelling of the face, lips or tongue  · breathing problems  · changes in vision  · chest pain  · fast, irregular heartbeat  · feeling faint or lightheaded, falls  · fever  · high blood pressure  · nervousness  · tremors  · white patches or sores in mouth  Side effects that usually do not require medical attention (report to your doctor or health care professional if they continue or are bothersome):  · cough  · different taste in mouth  · headache  · sore throat  · stuffy nose  · stomach upset  What may interact with this medicine?  Do not take this medicine with any of the following medications:  · MAOIs like Carbex, Eldepryl, Marplan, Nardil, and Parnate  · mifepristone  · probucol  · procarbazine  · some other medicines for asthma like formoterol, salmeterol  This medicine may also interact with the following medications:  · antibiotics like clarithromycin, erythromycin  · cimetidine  · diuretics  · grapefruit juice  · itraconazole  · ketoconazole  · medicines for depression, anxiety, or psychotic disturbances  · medicines for irregular heartbeat  · methadone  · some heart medicines like atenolol, metoprolol  · some other medicines for breathing problems  · some vaccines  What if I miss a dose?  If you miss a dose, use it as soon as you remember. If it is almost time for your next dose, use only that dose and continue with your regular schedule, spacing doses evenly. Do not use double or extra doses.  Where should I keep my medicine?  Keep out of the reach of children.  Store in a dry place at room temperature between 20 and 25  degrees C (68 and 77 degrees F). Do not get the inhaler wet. Keep track of the number of doses used. Throw away the inhaler after using the marked number of inhalations or after the expiration date, whichever comes first. Do not burn or puncture canister.  What should I tell my health care provider before I take this medicine?  They need to know if you have any of these conditions:  · bone problems  · diabetes  · heart disease or irregular heartbeat  · high blood pressure  · immune system problems  · infection  · liver disease  · worsening asthma  · an unusual or allergic reaction to budesonide, formoterol, medicines, foods, dyes, or preservatives  · pregnant or trying to get pregnant  · breast-feeding  What should I watch for while using this medicine?  Tell your doctor or health care professional if your symptoms do not improve or get worse. If you need to use your short-acting inhalers more often, call your doctor right away. Do not use more than every 12 hours.  If you have asthma, be aware that using this medicine may increase your risk of dying from asthma related problems. Talk to your doctor about the risks and benefits of taking this medicine. NEVER use this medicine for an acute asthma attack.  This medicine may increase your risk of getting an infection. Tell your doctor or health care professional if you are around anyone with measles or chickenpox, or if you develop sores or blisters that do not heal properly.  Date Last Reviewed:   NOTE:This sheet is a summary. It may not cover all possible information. If you have questions about this medicine, talk to your doctor, pharmacist, or health care provider. Copyright© 2016 [employer]        Albuterol inhalation aerosol  What is this medicine?  ALBUTEROL (al BYOO ter ole) is a bronchodilator. It helps open up the airways in your lungs to make it easier to breathe. This medicine is used to treat and to prevent bronchospasm.  How should I use this  medicine?  This medicine is for inhalation through the mouth. Follow the directions on your prescription label. Take your medicine at regular intervals. Do not use more often than directed. Make sure that you are using your inhaler correctly. Ask you doctor or health care provider if you have any questions.  Talk to your pediatrician regarding the use of this medicine in children. Special care may be needed.  What side effects may I notice from receiving this medicine?  Side effects that you should report to your doctor or health care professional as soon as possible:  · allergic reactions like skin rash, itching or hives, swelling of the face, lips, or tongue  · breathing problems  · chest pain  · feeling faint or lightheaded, falls  · high blood pressure  · irregular heartbeat  · fever  · muscle cramps or weakness  · pain, tingling, numbness in the hands or feet  · vomiting  Side effects that usually do not require medical attention (report to your doctor or health care professional if they continue or are bothersome):  · cough  · difficulty sleeping  · headache  · nervousness or trembling  · stomach upset  · stuffy or runny nose  · throat irritation  · unusual taste  What may interact with this medicine?  · anti-infectives like chloroquine and pentamidine  · caffeine  · cisapride  · diuretics  · medicines for colds  · medicines for depression or for emotional or psychotic conditions  · medicines for weight loss including some herbal products  · methadone  · some antibiotics like clarithromycin, erythromycin, levofloxacin, and linezolid  · some heart medicines  · steroid hormones like dexamethasone, cortisone, hydrocortisone  · theophylline  · thyroid hormones  What if I miss a dose?  If you miss a dose, use it as soon as you can. If it is almost time for your next dose, use only that dose. Do not use double or extra doses.  Where should I keep my medicine?  Keep out of the reach of children.  Store at room  temperature between 15 and 30 degrees C (59 and 86 degrees F). The contents are under pressure and may burst when exposed to heat or flame. Do not freeze. This medicine does not work as well if it is too cold. Throw away any unused medicine after the expiration date. Inhalers need to be thrown away after the labeled number of puffs have been used or by the expiration date; whichever comes first. Ventolin HFA should be thrown away 12 months after removing from foil pouch. Check the instructions that come with your medicine.  What should I tell my health care provider before I take this medicine?  They need to know if you have any of the following conditions:  · diabetes  · heart disease or irregular heartbeat  · high blood pressure  · pheochromocytoma  · seizures  · thyroid disease  · an unusual or allergic reaction to albuterol, levalbuterol, sulfites, other medicines, foods, dyes, or preservatives  · pregnant or trying to get pregnant  · breast-feeding  What should I watch for while using this medicine?  Tell your doctor or health care professional if your symptoms do not improve. Do not use extra albuterol. If your asthma or bronchitis gets worse while you are using this medicine, call your doctor right away.  If your mouth gets dry try chewing sugarless gum or sucking hard candy. Drink water as directed.  Date Last Reviewed:   NOTE:This sheet is a summary. It may not cover all possible information. If you have questions about this medicine, talk to your doctor, pharmacist, or health care provider. Copyright© 2016 [employer]        Step-by-Step:  Using a Steroid Inhaler    Date Last Reviewed: 10/1/2016  © 0764-6621 [employer]. [address] [place] [address], [place] [address]. All rights reserved. This information is not intended as a substitute for professional medical care. Always follow your healthcare professional's instructions.        Step-by-Step  Using an Inhaler (in Mouth) Without a  Spacer    Date Last Reviewed: 5/29/2015  © 8391-6970 [employer]. [address] [place] [address]. All rights reserved. This information is not intended as a substitute for professional medical care. Always follow your healthcare professional's instructions.        Step-by-Step  Using an Inhaler with a Spacer    Date Last Reviewed: 5/29/2015  © 3772-8099 [employer]. [address] [place] [address]. All rights reserved. This information is not intended as a substitute for professional medical care. Always follow your healthcare professional's instructions.        Using an Inhaler with a Spacer  To control asthma, you need to use your medicines the right way. Some medicines are inhaled using a device called a metered-dose inhaler (MDI). Metered-dose inhalers deliver medicine with a fine spray. You may be asked to use a spacer (holding tube) with your inhaler. The spacer helps make sure all the medicine you need goes into your lungs.   Steps for using an inhaler with a spacer  Step 1:  · Remove the caps from the inhaler and spacer.  · Shake the inhaler well and attach the spacer. If the inhaler is being used for the first time or has not been used for a while, prime it as directed by the product maker.  Step 2:  · Breathe out normally.  · Put the spacer between your teeth. Close your lips tightly around it.  · Keep your chin up.  Step 3:  · Spray 1 puff into the spacer by pressing down on the inhaler.  · Then breathe in through your mouth as slowly and deeply as you can. This should take about 5-10 seconds. If you breathe too quickly, you may hear a whistling sound in certain spacers.  Step 4:  · Take the spacer out of your mouth.  · Hold your breath for a count of 10.  · Then hold your lips together and slowly breathe out through your mouth.          If youre prescribed more than 1 puff of medicine at a time, wait at least 30 seconds between puffs. This number may be  different for different medicines. Shake the inhaler again. Then repeat steps 2 to 4.   Date Last Reviewed: 10/1/2016  © 9953-1217 [employer]. [address] [place] [address], [place] [address]. All rights reserved. This information is not intended as a substitute for professional medical care. Always follow your healthcare professional's instructions.             Language Assistance Services     ATTENTION: Language assistance services are available, free of charge. Please call [phone].      ATENCIÓN: Si shalonda goodwin, tiene a montesinos disposición servicios gratuitos de asistencia lingüística. Llame al [phone].     CHÚ Ý: N?u b?n nói Ti?ng Vi?t, có các d?ch v? h? tr? ngôn ng? mi?n phí dành cho b?n. G?i s? [phone].         [facility] complies with applicable Federal civil rights laws and does not discriminate on the basis of race, color, national origin, age, disability, or sex.

## 2017-05-01 NOTE — LETTER
May 4, 2017      Lucía Terrell MD  [address]  [address]  [place] [address]           [facility]  [address]  [place] [address]  Phone: [phone]  Fax: [phone]          Patient: Daniela Costa   MR Number: [id number]   YOB: 1970   Date of Visit: 5/1/2017       Dear Dr. Lucía Terrell:    Thank you for referring Daniela Costa to me for evaluation. Attached you will find relevant portions of my assessment and plan of care.    If you have questions, please do not hesitate to call me. I look forward to following Daniela Costa along with you.    Sincerely,    Everette John MD    Enclosure  CC:  No Recipients    If you would like to receive this communication electronically, please contact [email or web address] or [phone] to request more information on [employer] access.    For providers and/or their staff who would like to refer a patient to [facility], please contact us through our one-stop-shop provider referral line, [facility], at [phone].    If you feel you have received this communication in error or would no longer like to receive these types of communications, please e-mail [email or web address]

## 2017-05-01 NOTE — PATIENT INSTRUCTIONS
Budesonide; Formoterol Inhalation  What is this medicine?  BUDESONIDE; FORMOTEROL (byoo JEANNETTE oh nide; for [facility] ange quiroga) inhalation is a combination of two medicines that decrease inflammation and help to open up the airways in your lungs. It is used to treat asthma. Do NOT use for an acute asthma attack.  How should I use this medicine?  This medicine is inhaled through the mouth. Rinse your mouth with water after use. Make sure not to swallow the water. Follow the directions on your prescription label. Do not use more often than directed. Do not stop taking except on your doctor's advice. Make sure that you are using your inhaler correctly. Ask your doctor or health care provider if you have any questions.  A special MedGuide will be given to you by the pharmacist with each prescription and refill. Be sure to read this information carefully each time.  Talk to your pediatrician regarding the use of this medicine in children. While this drug may be prescribed for children as young as 12 years of age for selected conditions, precautions do apply.  What side effects may I notice from receiving this medicine?  Side effects that you should report to your doctor or health care professional as soon as possible:  · allergic reactions such as skin rash or itching, hives, swelling of the face, lips or tongue  · breathing problems  · changes in vision  · chest pain  · fast, irregular heartbeat  · feeling faint or lightheaded, falls  · fever  · high blood pressure  · nervousness  · tremors  · white patches or sores in mouth  Side effects that usually do not require medical attention (report to your doctor or health care professional if they continue or are bothersome):  · cough  · different taste in mouth  · headache  · sore throat  · stuffy nose  · stomach upset  What may interact with this medicine?  Do not take this medicine with any of the following medications:  · MAOIs like Carbex, Eldepryl, Marplan, Nardil, and  Parnate  · mifepristone  · probucol  · procarbazine  · some other medicines for asthma like formoterol, salmeterol  This medicine may also interact with the following medications:  · antibiotics like clarithromycin, erythromycin  · cimetidine  · diuretics  · grapefruit juice  · itraconazole  · ketoconazole  · medicines for depression, anxiety, or psychotic disturbances  · medicines for irregular heartbeat  · methadone  · some heart medicines like atenolol, metoprolol  · some other medicines for breathing problems  · some vaccines  What if I miss a dose?  If you miss a dose, use it as soon as you remember. If it is almost time for your next dose, use only that dose and continue with your regular schedule, spacing doses evenly. Do not use double or extra doses.  Where should I keep my medicine?  Keep out of the reach of children.  Store in a dry place at room temperature between 20 and 25 degrees C (68 and 77 degrees F). Do not get the inhaler wet. Keep track of the number of doses used. Throw away the inhaler after using the marked number of inhalations or after the expiration date, whichever comes first. Do not burn or puncture canister.  What should I tell my health care provider before I take this medicine?  They need to know if you have any of these conditions:  · bone problems  · diabetes  · heart disease or irregular heartbeat  · high blood pressure  · immune system problems  · infection  · liver disease  · worsening asthma  · an unusual or allergic reaction to budesonide, formoterol, medicines, foods, dyes, or preservatives  · pregnant or trying to get pregnant  · breast-feeding  What should I watch for while using this medicine?  Tell your doctor or health care professional if your symptoms do not improve or get worse. If you need to use your short-acting inhalers more often, call your doctor right away. Do not use more than every 12 hours.  If you have asthma, be aware that using this medicine may increase  your risk of dying from asthma related problems. Talk to your doctor about the risks and benefits of taking this medicine. NEVER use this medicine for an acute asthma attack.  This medicine may increase your risk of getting an infection. Tell your doctor or health care professional if you are around anyone with measles or chickenpox, or if you develop sores or blisters that do not heal properly.  Date Last Reviewed:   NOTE:This sheet is a summary. It may not cover all possible information. If you have questions about this medicine, talk to your doctor, pharmacist, or health care provider. Copyright© 2016 [employer]        Albuterol inhalation aerosol  What is this medicine?  ALBUTEROL (al BYOO ter ole) is a bronchodilator. It helps open up the airways in your lungs to make it easier to breathe. This medicine is used to treat and to prevent bronchospasm.  How should I use this medicine?  This medicine is for inhalation through the mouth. Follow the directions on your prescription label. Take your medicine at regular intervals. Do not use more often than directed. Make sure that you are using your inhaler correctly. Ask you doctor or health care provider if you have any questions.  Talk to your pediatrician regarding the use of this medicine in children. Special care may be needed.  What side effects may I notice from receiving this medicine?  Side effects that you should report to your doctor or health care professional as soon as possible:  · allergic reactions like skin rash, itching or hives, swelling of the face, lips, or tongue  · breathing problems  · chest pain  · feeling faint or lightheaded, falls  · high blood pressure  · irregular heartbeat  · fever  · muscle cramps or weakness  · pain, tingling, numbness in the hands or feet  · vomiting  Side effects that usually do not require medical attention (report to your doctor or health care professional if they continue or are  bothersome):  · cough  · difficulty sleeping  · headache  · nervousness or trembling  · stomach upset  · stuffy or runny nose  · throat irritation  · unusual taste  What may interact with this medicine?  · anti-infectives like chloroquine and pentamidine  · caffeine  · cisapride  · diuretics  · medicines for colds  · medicines for depression or for emotional or psychotic conditions  · medicines for weight loss including some herbal products  · methadone  · some antibiotics like clarithromycin, erythromycin, levofloxacin, and linezolid  · some heart medicines  · steroid hormones like dexamethasone, cortisone, hydrocortisone  · theophylline  · thyroid hormones  What if I miss a dose?  If you miss a dose, use it as soon as you can. If it is almost time for your next dose, use only that dose. Do not use double or extra doses.  Where should I keep my medicine?  Keep out of the reach of children.  Store at room temperature between 15 and 30 degrees C (59 and 86 degrees F). The contents are under pressure and may burst when exposed to heat or flame. Do not freeze. This medicine does not work as well if it is too cold. Throw away any unused medicine after the expiration date. Inhalers need to be thrown away after the labeled number of puffs have been used or by the expiration date; whichever comes first. Ventolin HFA should be thrown away 12 months after removing from foil pouch. Check the instructions that come with your medicine.  What should I tell my health care provider before I take this medicine?  They need to know if you have any of the following conditions:  · diabetes  · heart disease or irregular heartbeat  · high blood pressure  · pheochromocytoma  · seizures  · thyroid disease  · an unusual or allergic reaction to albuterol, levalbuterol, sulfites, other medicines, foods, dyes, or preservatives  · pregnant or trying to get pregnant  · breast-feeding  What should I watch for while using this medicine?  Tell your  doctor or health care professional if your symptoms do not improve. Do not use extra albuterol. If your asthma or bronchitis gets worse while you are using this medicine, call your doctor right away.  If your mouth gets dry try chewing sugarless gum or sucking hard candy. Drink water as directed.  Date Last Reviewed:   NOTE:This sheet is a summary. It may not cover all possible information. If you have questions about this medicine, talk to your doctor, pharmacist, or health care provider. Copyright© 2016 [employer]        Step-by-Step:  Using a Steroid Inhaler    Date Last Reviewed: 10/1/2016  © 0461-2203 [employer] [address] [place] [address]. All rights reserved. This information is not intended as a substitute for professional medical care. Always follow your healthcare professional's instructions.        Step-by-Step  Using an Inhaler (in Mouth) Without a Spacer    Date Last Reviewed: 5/29/2015  © 2062-5753 [employer] [address] [place] [address]. All rights reserved. This information is not intended as a substitute for professional medical care. Always follow your healthcare professional's instructions.        Step-by-Step  Using an Inhaler with a Spacer    Date Last Reviewed: 5/29/2015  © 2807-3823 [employer] [address] [place] [address]. All rights reserved. This information is not intended as a substitute for professional medical care. Always follow your healthcare professional's instructions.        Using an Inhaler with a Spacer  To control asthma, you need to use your medicines the right way. Some medicines are inhaled using a device called a metered-dose inhaler (MDI). Metered-dose inhalers deliver medicine with a fine spray. You may be asked to use a spacer (holding tube) with your inhaler. The spacer helps make sure all the medicine you need goes into your lungs.   Steps for using an inhaler with a spacer  Step  1:  · Remove the caps from the inhaler and spacer.  · Shake the inhaler well and attach the spacer. If the inhaler is being used for the first time or has not been used for a while, prime it as directed by the product maker.  Step 2:  · Breathe out normally.  · Put the spacer between your teeth. Close your lips tightly around it.  · Keep your chin up.  Step 3:  · Spray 1 puff into the spacer by pressing down on the inhaler.  · Then breathe in through your mouth as slowly and deeply as you can. This should take about 5-10 seconds. If you breathe too quickly, you may hear a whistling sound in certain spacers.  Step 4:  · Take the spacer out of your mouth.  · Hold your breath for a count of 10.  · Then hold your lips together and slowly breathe out through your mouth.          If youre prescribed more than 1 puff of medicine at a time, wait at least 30 seconds between puffs. This number may be different for different medicines. Shake the inhaler again. Then repeat steps 2 to 4.   Date Last Reviewed: 10/1/2016  © 5732-1516 [employer] [address] [place] [address], [place] [address]. All rights reserved. This information is not intended as a substitute for professional medical care. Always follow your healthcare professional's instructions.

## 2017-05-01 NOTE — PROGRESS NOTES
Subjective:       Patient ID: Daniela Costa is a 47 y.o. female.    Chief Complaint: She       No chief complaint on file.    HPI     Asthma  She presents for initial evaluation of a history of wheezing. The patient has not been previously diagnosed with asthma. The patient is currently having symptoms / an exacerbation. Current symptoms include dyspnea, non-productive cough and wheezing. Symptoms have been present since several months ago and have been gradually improving. She denies chest pain. Associated symptoms include poor exercise tolerance and shortness of breath.  This episode appears to have been triggered by no identifiable factor. Treatments tried for the current exacerbation include short-acting inhaled beta-adrenergic agonists, which have provided some relief of symptoms. The patient has been having similar episodes for approximately 2 years.    Current Disease Severity  The patient is having daytime symptoms daily. The patient is having daytime symptoms less than or equal to 2 times per month. The patient is using short-acting beta agonists for symptom control less than or equal to 2 days per week. She has exacerbations requiring oral systemic corticosteroids 2 times per year. Current limitations in activity from asthma: not walking as fast. Number of days of school or work missed in the last month: not applicable. Number of urgent/emergent visit in last year: 1.  The patient is not using a spacer with MDIs. Her best peak flow rate is na. She is not monitoring peak flow rates at home.    Insomnia - and RLS     Past Medical History:   Diagnosis Date    Allergy     Anxiety     Arthritis     Asthma     Breast cancer genetic susceptibility     Cancer     Depression     Lung disease     Pneumonia      Past Surgical History:   Procedure Laterality Date    ADENOIDECTOMY      BACK SURGERY      BREAST SURGERY      CERVICAL FUSION      CERVICAL FUSION      lumbar fusion      MASTECTOMY       MASTECTOMY      TONSILLECTOMY      TUBAL LIGATION       Social History     Social History    Marital status:      Spouse name: N/A    Number of children: 6    Years of education: N/A     Occupational History    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 Negative for fever.   HENT: Positive for postnasal drip, rhinorrhea and congestion.    Eyes: Negative for redness and itching.   Respiratory: Positive for cough, sputum production, shortness of breath, dyspnea on extertion, use of rescue inhaler and Paroxysmal Nocturnal Dyspnea.    Cardiovascular: Negative for chest pain, palpitations and leg swelling.   Genitourinary: Negative for difficulty urinating and hematuria.   Endocrine: Negative for cold intolerance and heat intolerance.    Musculoskeletal: Positive for arthralgias.        Left hip and toes   Skin: Negative for rash.   Gastrointestinal: Negative for nausea and abdominal pain.   Neurological: Negative for dizziness, syncope, weakness and light-headedness.   Hematological: Negative for adenopathy. Does not bruise/bleed easily.   Psychiatric/Behavioral: Negative for sleep disturbance. The patient is not nervous/anxious.        Objective:      Physical Exam   Constitutional: She is oriented to person, place, and time. She appears well-developed and well-nourished.   HENT:   Head: Normocephalic and atraumatic.   Eyes: Conjunctivae are normal. Pupils are equal, round, and reactive to light.   Neck: Neck supple. No JVD present. No tracheal  deviation present. No thyromegaly present.   Cardiovascular: Normal rate, regular rhythm and normal heart sounds.    Pulmonary/Chest: Effort normal and breath sounds normal. No respiratory distress. She has no wheezes. She has no rales. She exhibits no tenderness.   Abdominal: Soft. Bowel sounds are normal.   Musculoskeletal: Normal range of motion. She exhibits no edema.   Lymphadenopathy:     She has no cervical adenopathy.   Neurological: She is alert and oriented to person, place, and time.   Skin: Skin is warm and dry.   Nursing note and vitals reviewed.    Personal Diagnostic Review  Chest x-ray: scarring in right lung     No flowsheet data found.      Assessment:       1. Mild intermittent asthma without complication    2. Radiation fibrosis of lung    3. Insomnia, unspecified type        Outpatient Encounter Prescriptions as of 5/1/2017   Medication Sig Dispense Refill    chlorhexidine (PERIDEX) 0.12 % solution       cyclobenzaprine (FLEXERIL) 10 MG tablet TAKE 1 TABLET(S) BY MOUTH 3 TIMES A DAY  0    exemestane (AROMASIN) 25 mg tablet Take 25 mg by mouth.      fluticasone (FLONASE) 50 mcg/actuation nasal spray 2 sprays by Each Nare route once daily. 16 g 12    ibuprofen-famotidine (DUEXIS) 800-26.6 mg Tab Take by mouth 3 (three) times daily.      lamotrigine (LAMICTAL) 200 MG tablet Take 200 mg by mouth once daily.      montelukast (SINGULAIR) 10 mg tablet Take 1 tablet (10 mg total) by mouth once daily. 30 tablet 6    venlafaxine (EFFEXOR) 75 MG tablet Take 75 mg by mouth 2 (two) times daily.      venlafaxine (EFFEXOR-XR) 150 MG Cp24 Take 75 mg by mouth once daily.      [DISCONTINUED] albuterol (ACCUNEB) 0.63 mg/3 mL Nebu Take 0.63 mg by nebulization every 6 (six) hours as needed.      albuterol 90 mcg/actuation inhaler Inhale 2 puffs into the lungs every 6 (six) hours. 1 Inhaler 12    budesonide-formoterol 160-4.5 mcg (SYMBICORT) 160-4.5 mcg/actuation HFAA Inhale 2 puffs into the lungs every  12 (twelve) hours. Wash out mouth after using 1 Inhaler 12    inhalation spacing device (BREATHERITE VALVED MDI CHAMBER) Use as directed for inhalation. 1 Device prn     No facility-administered encounter medications on file as of 5/1/2017.      Orders Placed This Encounter   Procedures    CT Chest Without Contrast     Standing Status:   Future     Standing Expiration Date:   5/1/2018     Scheduling Instructions:      High resolution Protocol     Order Specific Question:   Reason for Exam:     Answer:   High resolution protocol    Complete PFT with bronchodilator     Standing Status:   Future     Number of Occurrences:   1     Standing Expiration Date:   5/1/2018     Plan:       Requested Prescriptions     Signed Prescriptions Disp Refills    albuterol 90 mcg/actuation inhaler 1 Inhaler 12     Sig: Inhale 2 puffs into the lungs every 6 (six) hours.    budesonide-formoterol 160-4.5 mcg (SYMBICORT) 160-4.5 mcg/actuation HFAA 1 Inhaler 12     Sig: Inhale 2 puffs into the lungs every 12 (twelve) hours. Wash out mouth after using    inhalation spacing device (BREATHERITE VALVED MDI CHAMBER) 1 Device prn     Sig: Use as directed for inhalation.     Mild intermittent asthma without complication  -     CT Chest Without Contrast; Future; Expected date: 5/1/17  -     Complete PFT with bronchodilator; Future  -     albuterol 90 mcg/actuation inhaler; Inhale 2 puffs into the lungs every 6 (six) hours.  Dispense: 1 Inhaler; Refill: 12  -     budesonide-formoterol 160-4.5 mcg (SYMBICORT) 160-4.5 mcg/actuation HFAA; Inhale 2 puffs into the lungs every 12 (twelve) hours. Wash out mouth after using  Dispense: 1 Inhaler; Refill: 12  -     inhalation spacing device (BREATHERITE VALVED MDI CHAMBER); Use as directed for inhalation.  Dispense: 1 Device; Refill: prn    Radiation fibrosis of lung  -     CT Chest Without Contrast; Future; Expected date: 5/1/17    Insomnia, unspecified type           Return in about 4 weeks (around  5/29/2017) for review of CT and PFT.    MEDICAL DECISION MAKING: Moderate to high complexity.  Overall, the multiple problems listed are of moderate to high severity that may impact quality of life and activities of daily living. Side effects of medications, treatment plan as well as options and alternatives reviewed and discussed with patient. There was counseling of patient concerning these issues.    Total time spent in face to face counseling and coordination of care - 40  minutes over 50% of time was used in discussion of prognosis, risks, benefits of treatment, instructions and compliance with regimen . Discussion with other physicians or health care providers (DME, NP, pharmacy, respiratory therapy) occurred.

## 2017-05-02 ENCOUNTER — PROCEDURE VISIT (OUTPATIENT)
Dept: PULMONOLOGY | Facility: CLINIC | Age: 47
End: 2017-05-02
Payer: MEDICARE

## 2017-05-02 DIAGNOSIS — J45.20 MILD INTERMITTENT ASTHMA WITHOUT COMPLICATION: ICD-10-CM

## 2017-05-02 PROCEDURE — 94726 PLETHYSMOGRAPHY LUNG VOLUMES: CPT | Mod: S$GLB,,, | Performed by: INTERNAL MEDICINE

## 2017-05-02 PROCEDURE — 94729 DIFFUSING CAPACITY: CPT | Mod: S$GLB,,, | Performed by: INTERNAL MEDICINE

## 2017-05-02 PROCEDURE — 94060 EVALUATION OF WHEEZING: CPT | Mod: S$GLB,,, | Performed by: INTERNAL MEDICINE

## 2017-05-03 NOTE — TELEPHONE ENCOUNTER
----- Message from Lydia Long sent at 5/3/2017  2:48 PM CDT -----  Contact: pt  Pt states she is coughing up yellow and green mucus, and she needs the nebulizer medication, pt can be reached at [phone]///[id number]

## 2017-05-03 NOTE — TELEPHONE ENCOUNTER
Spoke with pt, c/o productive cough with green mucous. She is requesting a refill on neb medications. She also requesting an Rx for an antibiotic. Pt was just in on 5/1. Please advise.

## 2017-05-04 RX ORDER — ALBUTEROL SULFATE 0.63 MG/3ML
0.63 SOLUTION RESPIRATORY (INHALATION) EVERY 6 HOURS PRN
Qty: 120 VIAL | Refills: 11 | Status: SHIPPED | OUTPATIENT
Start: 2017-05-04 | End: 2018-08-21

## 2017-05-19 ENCOUNTER — TELEPHONE (OUTPATIENT)
Dept: RADIOLOGY | Facility: HOSPITAL | Age: 47
End: 2017-05-19

## 2017-06-08 DIAGNOSIS — H92.02 EAR PAIN, LEFT: ICD-10-CM

## 2017-06-08 DIAGNOSIS — J98.9 REACTIVE AIRWAY DISEASE THAT IS NOT ASTHMA: ICD-10-CM

## 2017-06-08 DIAGNOSIS — J30.9 ALLERGIC RHINITIS, UNSPECIFIED ALLERGIC RHINITIS TRIGGER, UNSPECIFIED RHINITIS SEASONALITY: ICD-10-CM

## 2017-06-08 RX ORDER — MONTELUKAST SODIUM 10 MG/1
10 TABLET ORAL DAILY
Qty: 90 TABLET | Refills: 3 | Status: SHIPPED | OUTPATIENT
Start: 2017-06-08 | End: 2017-06-20 | Stop reason: SDUPTHER

## 2017-06-09 ENCOUNTER — TELEPHONE (OUTPATIENT)
Dept: RADIOLOGY | Facility: HOSPITAL | Age: 47
End: 2017-06-09

## 2017-06-12 ENCOUNTER — OFFICE VISIT (OUTPATIENT)
Dept: PULMONOLOGY | Facility: CLINIC | Age: 47
End: 2017-06-12
Payer: MEDICARE

## 2017-06-12 ENCOUNTER — HOSPITAL ENCOUNTER (OUTPATIENT)
Dept: RADIOLOGY | Facility: HOSPITAL | Age: 47
Discharge: HOME | End: 2017-06-12
Attending: INTERNAL MEDICINE
Payer: MEDICARE

## 2017-06-12 VITALS
SYSTOLIC BLOOD PRESSURE: 118 MMHG | WEIGHT: 203.06 LBS | DIASTOLIC BLOOD PRESSURE: 68 MMHG | OXYGEN SATURATION: 100 % | BODY MASS INDEX: 33.83 KG/M2 | HEIGHT: 65 IN | HEART RATE: 99 BPM

## 2017-06-12 DIAGNOSIS — J45.20 MILD INTERMITTENT ASTHMA WITHOUT COMPLICATION: ICD-10-CM

## 2017-06-12 DIAGNOSIS — J70.1 RADIATION FIBROSIS OF LUNG: ICD-10-CM

## 2017-06-12 DIAGNOSIS — J45.20 ASTHMA, ALLERGIC, MILD INTERMITTENT, UNCOMPLICATED: Primary | ICD-10-CM

## 2017-06-12 PROCEDURE — 71250 CT THORAX DX C-: CPT | Mod: 26,,, | Performed by: RADIOLOGY

## 2017-06-12 PROCEDURE — 99999 PR PBB SHADOW E&M-EST. PATIENT-LVL III: CPT | Mod: PBBFAC,,, | Performed by: INTERNAL MEDICINE

## 2017-06-12 PROCEDURE — 99215 OFFICE O/P EST HI 40 MIN: CPT | Mod: 25,S$GLB,, | Performed by: INTERNAL MEDICINE

## 2017-06-12 RX ORDER — TIZANIDINE 4 MG/1
TABLET ORAL
COMMUNITY
Start: 2017-06-08 | End: 2018-07-01

## 2017-06-12 RX ORDER — BUDESONIDE AND FORMOTEROL FUMARATE DIHYDRATE 160; 4.5 UG/1; UG/1
2 AEROSOL RESPIRATORY (INHALATION) EVERY 12 HOURS
Qty: 3 INHALER | Refills: 3 | Status: SHIPPED | OUTPATIENT
Start: 2017-06-12 | End: 2018-08-21 | Stop reason: ALTCHOICE

## 2017-06-12 NOTE — PROGRESS NOTES
Subjective:       Patient ID: Daniela Costa is a 47 y.o. female.    Chief Complaint: She       No chief complaint on file.    HPI       Asthma  She presents for initial evaluation of a history of wheezing. The patient has not been previously diagnosed with asthma. The patient is currently having symptoms / an exacerbation. Current symptoms include dyspnea, non-productive cough and wheezing. Symptoms have been present since several months ago and have been gradually improving. She denies chest pain. Associated symptoms include poor exercise tolerance and shortness of breath.  This episode appears to have been triggered by no identifiable factor. Treatments tried for the current exacerbation include short-acting inhaled beta-adrenergic agonists, which have provided some relief of symptoms. The patient has been having similar episodes for approximately 2 years.     Current Disease Severity  The patient is having daytime symptoms daily. The patient is having daytime symptoms less than or equal to 2 times per month. The patient is using short-acting beta agonists for symptom control less than or equal to 2 days per week. She has exacerbations requiring oral systemic corticosteroids 2 times per year. Current limitations in activity from asthma: not walking as fast. Number of days of school or work missed in the last month: not applicable. Number of urgent/emergent visit in last year: 1.  The patient is not using a spacer with MDIs. Her best peak flow rate is na. She is not monitoring peak flow rates at home.     Insomnia - and RLS     Past Medical History:   Diagnosis Date    Allergy     Anxiety     Arthritis     Asthma     Breast cancer genetic susceptibility     Cancer     Depression     Lung disease     Pneumonia      Past Surgical History:   Procedure Laterality Date    ADENOIDECTOMY      BACK SURGERY      BREAST SURGERY      CERVICAL FUSION      CERVICAL FUSION      lumbar fusion      MASTECTOMY       MASTECTOMY      TONSILLECTOMY      TUBAL LIGATION       Social History     Social History    Marital status:      Spouse name: N/A    Number of children: 6    Years of education: N/A     Occupational History    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 Negative for fever.   HENT: Positive for postnasal drip, rhinorrhea and congestion.    Eyes: Negative for redness and itching.   Respiratory: Positive for cough, sputum production, shortness of breath, dyspnea on extertion, use of rescue inhaler and Paroxysmal Nocturnal Dyspnea.    Cardiovascular: Negative for chest pain, palpitations and leg swelling.   Genitourinary: Negative for difficulty urinating and hematuria.   Endocrine: Negative for cold intolerance and heat intolerance.    Skin: Negative for rash.   Gastrointestinal: Negative for nausea and abdominal pain.   Neurological: Negative for dizziness, syncope, weakness and light-headedness.   Hematological: Negative for adenopathy. Does not bruise/bleed easily.   Psychiatric/Behavioral: Negative for sleep disturbance. The patient is not nervous/anxious.        Objective:      Physical Exam   Constitutional: She is oriented to person, place, and time. She appears well-developed and well-nourished.   HENT:   Head: Normocephalic and atraumatic.   Mouth/Throat: Oropharyngeal exudate present.   Eyes: Conjunctivae are normal. Pupils are equal, round, and reactive to light.   Neck: Neck supple. No JVD present. No tracheal deviation present. No  thyromegaly present.   Cardiovascular: Normal rate, regular rhythm and normal heart sounds.    Pulmonary/Chest: Effort normal. No respiratory distress. She has decreased breath sounds. She has wheezes in the right lower field and the left lower field. She has no rhonchi. She has no rales. She exhibits no tenderness.   Abdominal: Soft. Bowel sounds are normal.   Musculoskeletal: Normal range of motion. She exhibits no edema.   Lymphadenopathy:     She has no cervical adenopathy.   Neurological: She is alert and oriented to person, place, and time.   Skin: Skin is warm and dry.   Nursing note and vitals reviewed.    Personal Diagnostic Review  Chest x-ray: hyperinflation    Spirometry: mild obstruction    No flowsheet data found.      Assessment:       1. Asthma, allergic, mild intermittent, uncomplicated        Outpatient Encounter Prescriptions as of 6/12/2017   Medication Sig Dispense Refill    albuterol (ACCUNEB) 0.63 mg/3 mL Nebu Take 3 mLs (0.63 mg total) by nebulization every 6 (six) hours as needed. 120 vial 11    albuterol 90 mcg/actuation inhaler Inhale 2 puffs into the lungs every 6 (six) hours. 1 Inhaler 12    chlorhexidine (PERIDEX) 0.12 % solution       exemestane (AROMASIN) 25 mg tablet Take 25 mg by mouth.      ibuprofen-famotidine (DUEXIS) 800-26.6 mg Tab Take by mouth 3 (three) times daily.      inhalation spacing device (BREATHERITE VALVED MDI CHAMBER) Use as directed for inhalation. 1 Device prn    lamotrigine (LAMICTAL) 200 MG tablet Take 200 mg by mouth once daily.      montelukast (SINGULAIR) 10 mg tablet Take 1 tablet (10 mg total) by mouth once daily. 90 tablet 3    tizanidine (ZANAFLEX) 4 MG tablet       venlafaxine (EFFEXOR) 75 MG tablet Take 75 mg by mouth 2 (two) times daily.      venlafaxine (EFFEXOR-XR) 150 MG Cp24 Take 75 mg by mouth once daily.      [DISCONTINUED] budesonide-formoterol 160-4.5 mcg (SYMBICORT) 160-4.5 mcg/actuation HFAA Inhale 2 puffs into the lungs every 12  (twelve) hours. Wash out mouth after using 1 Inhaler 12    [DISCONTINUED] cyclobenzaprine (FLEXERIL) 10 MG tablet TAKE 1 TABLET(S) BY MOUTH 3 TIMES A DAY  0     No facility-administered encounter medications on file as of 6/12/2017.      Orders Placed This Encounter   Procedures    Spirometry with/without bronchodilator     Standing Status:   Future     Standing Expiration Date:   6/12/2018     Plan:       Requested Prescriptions      No prescriptions requested or ordered in this encounter     Asthma, allergic, mild intermittent, uncomplicated  -     Spirometry with/without bronchodilator; Future; Expected date: 12/13/2017           Return in about 5 months (around 11/6/2017) for ria.    MEDICAL DECISION MAKING: Moderate to high complexity.  Overall, the multiple problems listed are of moderate to high severity that may impact quality of life and activities of daily living. Side effects of medications, treatment plan as well as options and alternatives reviewed and discussed with patient. There was counseling of patient concerning these issues.    Total time spent in face to face counseling and coordination of care - 25  minutes over 50% of time was used in discussion of prognosis, risks, benefits of treatment, instructions and compliance with regimen . Discussion with other physicians or health care providers (DME, NP, pharmacy, respiratory therapy) occurred.

## 2017-06-20 DIAGNOSIS — H92.02 EAR PAIN, LEFT: ICD-10-CM

## 2017-06-20 DIAGNOSIS — J30.9 ALLERGIC RHINITIS, UNSPECIFIED ALLERGIC RHINITIS TRIGGER, UNSPECIFIED RHINITIS SEASONALITY: ICD-10-CM

## 2017-06-20 DIAGNOSIS — J98.9 REACTIVE AIRWAY DISEASE THAT IS NOT ASTHMA: ICD-10-CM

## 2017-06-20 RX ORDER — MONTELUKAST SODIUM 10 MG/1
10 TABLET ORAL DAILY
Qty: 90 TABLET | Refills: 3 | Status: SHIPPED | OUTPATIENT
Start: 2017-06-20 | End: 2018-06-20

## 2017-07-02 ENCOUNTER — NURSE TRIAGE (OUTPATIENT)
Dept: ADMINISTRATIVE | Facility: CLINIC | Age: 47
End: 2017-07-02

## 2017-07-02 ENCOUNTER — OFFICE VISIT (OUTPATIENT)
Dept: URGENT CARE | Facility: CLINIC | Age: 47
End: 2017-07-02
Payer: MEDICARE

## 2017-07-02 VITALS
WEIGHT: 200.63 LBS | RESPIRATION RATE: 17 BRPM | BODY MASS INDEX: 33.43 KG/M2 | HEART RATE: 92 BPM | TEMPERATURE: 99 F | OXYGEN SATURATION: 98 % | HEIGHT: 65 IN | SYSTOLIC BLOOD PRESSURE: 122 MMHG | DIASTOLIC BLOOD PRESSURE: 86 MMHG

## 2017-07-02 DIAGNOSIS — H57.89 IRRITATION OF LEFT EYE: Primary | ICD-10-CM

## 2017-07-02 PROCEDURE — 99499 UNLISTED E&M SERVICE: CPT | Mod: S$GLB,,, | Performed by: NURSE PRACTITIONER

## 2017-07-02 PROCEDURE — 99999 PR PBB SHADOW E&M-EST. PATIENT-LVL IV: CPT | Mod: PBBFAC,,, | Performed by: NURSE PRACTITIONER

## 2017-07-02 NOTE — PROGRESS NOTES
Subjective:       Patient ID: Daniela Costa is a 47 y.o. female.    Chief Complaint: Eye Pain    Patient presents with left eye redness and irritation that started on last night.  Thinks that she has a contact stuck in her eye.  Reported attempting to get contact out and wasn't successful.  Feels like something is in her eye.       Eye Pain    The left eye is affected. This is a new problem. The current episode started yesterday. The problem occurs rarely. The problem has been gradually worsening. The injury mechanism was contact lenses. The pain is at a severity of 8/10. The pain is moderate. There is no known exposure to pink eye. She wears contacts (first time). Associated symptoms include an eye discharge and eye redness. She has tried nothing for the symptoms.     Review of Systems   Constitutional: Negative for chills and fatigue.   Eyes: Positive for pain, discharge and redness.   Respiratory: Negative for cough and shortness of breath.    Musculoskeletal: Negative for gait problem.   Skin: Negative for color change and rash.   Neurological: Negative for dizziness.   Psychiatric/Behavioral: Positive for agitation. Negative for confusion.       Objective:      Physical Exam   Constitutional: She is oriented to person, place, and time. Vital signs are normal. She appears well-developed and well-nourished.   HENT:   Head: Normocephalic and atraumatic.   Eyes: EOM and lids are normal. Left eye exhibits discharge and exudate.   Erythematous (left eye) to the left side.  No contact lens present on my exam.  Fluorescent eye drops (2) placed in eye.  No abrasions noted with woods lamp.     Neck: Normal range of motion.   Cardiovascular: Normal rate.    Pulmonary/Chest: Effort normal.   Musculoskeletal: Normal range of motion.   Neurological: She is alert and oriented to person, place, and time.   Skin: Skin is warm.   Psychiatric: She has a normal mood and affect.       Assessment:       1. Irritation of left  eye        Plan:         Irritation of left eye  Comments:  Follow up with [facility] at 2:30 pm at the [facility].       Follow up with [employer] at 2:30 pm today.  Appointment scheduled.

## 2017-07-02 NOTE — TELEPHONE ENCOUNTER
"    Reason for Disposition   [1] Eye has been washed out > 30 minutes ago AND [2] pain persists AND [3] more than mild    Answer Assessment - Initial Assessment Questions  1. TYPE OF FOREIGN BODY: "What got in the eye?"      Trouble removing contact from left eye started last pm. Astignmatism , eye lid starting to swell. Left side of eye is red  2. ONSET: "When did it happen?"      Last pm   3. MECHANISM: "How did it happen?"      Left contact wont budge   4. VISION: "Do you have blurred vision?"      No   5. PAIN: "Is it painful?" If so, ask: "How bad is the pain?"  (Scale 1-10; or mild, moderate, severe)     Killing pt- severe   6. CONTACTS: "Do you wear contacts?"     Yes   7. OTHER SYMPTOMS: "Do you have any other symptoms?"     Tissues around eye starting to swell marcella eye lid. Lives near [facility]    Protocols used: ST EYE - FOREIGN BODY-A-  rec UC/ER due to pain level, poss abrasion, swelling of tissues around eye. Call back with questions.     "

## 2017-07-10 ENCOUNTER — LAB VISIT (OUTPATIENT)
Dept: LAB | Facility: HOSPITAL | Age: 47
End: 2017-07-10
Attending: ORTHOPAEDIC SURGERY
Payer: MEDICARE

## 2017-07-10 DIAGNOSIS — M54.16 LUMBAR RADICULOPATHY: Primary | ICD-10-CM

## 2017-07-10 LAB
APTT BLDCRRT: 24.1 SEC
BASOPHILS # BLD AUTO: 0.02 K/UL
BASOPHILS NFR BLD: 0.4 %
DIFFERENTIAL METHOD: ABNORMAL
EOSINOPHIL # BLD AUTO: 0.2 K/UL
EOSINOPHIL NFR BLD: 3.9 %
ERYTHROCYTE [DISTWIDTH] IN BLOOD BY AUTOMATED COUNT: 13.6 %
HCT VFR BLD AUTO: 45.7 %
HGB BLD-MCNC: 15 G/DL
INR PPP: 1
LYMPHOCYTES # BLD AUTO: 1 K/UL
LYMPHOCYTES NFR BLD: 21.8 %
MCH RBC QN AUTO: 31 PG
MCHC RBC AUTO-ENTMCNC: 32.8 %
MCV RBC AUTO: 94 FL
MONOCYTES # BLD AUTO: 0.5 K/UL
MONOCYTES NFR BLD: 10.6 %
NEUTROPHILS # BLD AUTO: 2.9 K/UL
NEUTROPHILS NFR BLD: 63.1 %
PLATELET # BLD AUTO: 148 K/UL
PMV BLD AUTO: 10.3 FL
PROTHROMBIN TIME: 10.4 SEC
RBC # BLD AUTO: 4.84 M/UL
WBC # BLD AUTO: 4.63 K/UL

## 2017-07-10 PROCEDURE — 85730 THROMBOPLASTIN TIME PARTIAL: CPT

## 2017-07-10 PROCEDURE — 85025 COMPLETE CBC W/AUTO DIFF WBC: CPT

## 2017-07-10 PROCEDURE — 85610 PROTHROMBIN TIME: CPT

## 2017-07-10 PROCEDURE — 36415 COLL VENOUS BLD VENIPUNCTURE: CPT | Mod: PO

## 2017-07-31 ENCOUNTER — TELEPHONE (OUTPATIENT)
Dept: PULMONOLOGY | Facility: CLINIC | Age: 47
End: 2017-07-31

## 2017-07-31 DIAGNOSIS — J44.1 COPD EXACERBATION: Primary | ICD-10-CM

## 2017-07-31 RX ORDER — METHYLPREDNISOLONE 4 MG/1
TABLET ORAL
Qty: 1 PACKAGE | Refills: 1 | Status: SHIPPED | OUTPATIENT
Start: 2017-07-31 | End: 2017-08-21

## 2017-07-31 RX ORDER — AZITHROMYCIN 250 MG/1
250 TABLET, FILM COATED ORAL DAILY
Qty: 6 TABLET | Refills: 1 | Status: SHIPPED | OUTPATIENT
Start: 2017-07-31 | End: 2018-01-26 | Stop reason: ALTCHOICE

## 2017-07-31 RX ORDER — FLUCONAZOLE 100 MG/1
100 TABLET ORAL DAILY
Qty: 7 TABLET | Refills: 1 | Status: SHIPPED | OUTPATIENT
Start: 2017-07-31 | End: 2017-08-07

## 2017-07-31 NOTE — TELEPHONE ENCOUNTER
----- Message from Lydia Long sent at 7/31/2017  4:40 PM CDT -----  Contact: pt  The pt state she needs a antibiotic called in no additional info given, pt reached at [phone]///[id number]    Patient report increased cough, thick yellow sputum, low grade fever, x 2 days request anitbiotic  Pharmacy [facility] in [facility]

## 2017-07-31 NOTE — TELEPHONE ENCOUNTER
Returned call  Patient with cough and phlegm production  SOB  No high fever or chills  No pleurisy  DX: COPD exacerbation  PLAN:  Antibiotics  Office appointment if not improved of fever  Greater than 101

## 2018-01-26 ENCOUNTER — OFFICE VISIT (OUTPATIENT)
Dept: INTERNAL MEDICINE | Facility: CLINIC | Age: 48
End: 2018-01-26
Payer: MEDICARE

## 2018-01-26 VITALS
HEART RATE: 119 BPM | WEIGHT: 192.69 LBS | BODY MASS INDEX: 32.1 KG/M2 | TEMPERATURE: 99 F | OXYGEN SATURATION: 99 % | SYSTOLIC BLOOD PRESSURE: 124 MMHG | DIASTOLIC BLOOD PRESSURE: 72 MMHG | HEIGHT: 65 IN

## 2018-01-26 DIAGNOSIS — J40 BRONCHITIS: ICD-10-CM

## 2018-01-26 DIAGNOSIS — R05.9 COUGH: ICD-10-CM

## 2018-01-26 DIAGNOSIS — F31.9 BIPOLAR I DISORDER: Primary | ICD-10-CM

## 2018-01-26 PROCEDURE — 99213 OFFICE O/P EST LOW 20 MIN: CPT | Mod: S$GLB,,, | Performed by: PHYSICIAN ASSISTANT

## 2018-01-26 PROCEDURE — 99999 PR PBB SHADOW E&M-EST. PATIENT-LVL IV: CPT | Mod: PBBFAC,,, | Performed by: PHYSICIAN ASSISTANT

## 2018-01-26 RX ORDER — IBUPROFEN 800 MG/1
800 TABLET ORAL
COMMUNITY
End: 2018-08-21

## 2018-01-26 RX ORDER — AZITHROMYCIN 250 MG/1
TABLET, FILM COATED ORAL
Qty: 6 TABLET | Refills: 0 | Status: SHIPPED | OUTPATIENT
Start: 2018-01-26 | End: 2018-02-25

## 2018-01-26 RX ORDER — VENLAFAXINE HYDROCHLORIDE 150 MG/1
150 CAPSULE, EXTENDED RELEASE ORAL DAILY
Qty: 30 CAPSULE | Refills: 0 | Status: SHIPPED | OUTPATIENT
Start: 2018-01-26 | End: 2018-05-07 | Stop reason: SDUPTHER

## 2018-01-26 RX ORDER — PROMETHAZINE HYDROCHLORIDE AND DEXTROMETHORPHAN HYDROBROMIDE 6.25; 15 MG/5ML; MG/5ML
5 SYRUP ORAL EVERY 6 HOURS PRN
Qty: 118 ML | Refills: 0 | Status: SHIPPED | OUTPATIENT
Start: 2018-01-26 | End: 2018-02-05

## 2018-01-26 RX ORDER — FAMOTIDINE 40 MG/1
40 TABLET, FILM COATED ORAL DAILY
COMMUNITY
End: 2018-07-01

## 2018-01-26 NOTE — PROGRESS NOTES
Subjective:       Patient ID: Daniela Costa is a 47 y.o. female.    Chief Complaint: Sore Throat    HPI  Patient comes in today for cough and sore throat   Has been going on for over a week, gradually worsening , having on/off fevers, fatigue. No appetite x 1 week   Also in need of effexor refill. She takes 225mg as rx'd by Dr. Flor at [facility]   She is looking for new provider, has calls out to several places, just waiting for one to accept insurance         Health Maintenance Due   Topic Date Due    Pneumococcal PCV13 (High Risk) (1 - PCV13 Required) 02[birth year]02/1973    Pneumococcal PPSV23 (High Risk) (1) 02/02/1990    Influenza Vaccine  08/01/2017       Past Medical History:   Diagnosis Date    Allergy     Anxiety     Arthritis     Asthma     Breast cancer genetic susceptibility     Cancer     Depression     Lung disease     Pneumonia        Current Outpatient Prescriptions   Medication Sig Dispense Refill    albuterol (ACCUNEB) 0.63 mg/3 mL Nebu Take 3 mLs (0.63 mg total) by nebulization every 6 (six) hours as needed. 120 vial 11    albuterol 90 mcg/actuation inhaler Inhale 2 puffs into the lungs every 6 (six) hours. 1 Inhaler 12    budesonide-formoterol 160-4.5 mcg (SYMBICORT) 160-4.5 mcg/actuation HFAA Inhale 2 puffs into the lungs every 12 (twelve) hours. Wash out mouth after using 3 Inhaler 3    chlorhexidine (PERIDEX) 0.12 % solution       exemestane (AROMASIN) 25 mg tablet Take 25 mg by mouth.      famotidine (PEPCID) 40 MG tablet Take 40 mg by mouth once daily.      ibuprofen (ADVIL,MOTRIN) 800 MG tablet Take 800 mg by mouth 3 (three) times daily.      inhalation spacing device (BREATHERITE VALVED MDI CHAMBER) Use as directed for inhalation. 1 Device prn    lamotrigine (LAMICTAL) 200 MG tablet Take 200 mg by mouth once daily.      montelukast (SINGULAIR) 10 mg tablet Take 1 tablet (10 mg total) by mouth once daily. 90 tablet 3    tizanidine (ZANAFLEX) 4 MG tablet     "   venlafaxine (EFFEXOR) 75 MG tablet Take 75 mg by mouth 2 (two) times daily.      venlafaxine (EFFEXOR-XR) 150 MG Cp24 Take 1 capsule (150 mg total) by mouth once daily. 30 capsule 0    azithromycin (ZITHROMAX Z-LISA) 250 MG tablet Take as directed, 2 tab on Day 1, 1 tab each additional day 2-4 6 tablet 0    promethazine-dextromethorphan (PROMETHAZINE-DM) 6.25-15 mg/5 mL Syrp Take 5 mLs by mouth every 6 (six) hours as needed. 118 mL 0     No current facility-administered medications for this visit.        Review of Systems   Constitutional: Positive for chills and fever.   HENT: Positive for congestion, sinus pressure and sore throat. Negative for trouble swallowing and voice change.    Eyes: Negative for photophobia and visual disturbance.   Respiratory: Positive for cough and wheezing. Negative for shortness of breath.    Cardiovascular: Negative for chest pain, palpitations and leg swelling.   Gastrointestinal: Negative for abdominal distention and abdominal pain.   Neurological: Negative for tremors and weakness.   Hematological: Negative for adenopathy. Does not bruise/bleed easily.       Objective:   /72   Pulse (!) 119   Temp 98.8 °F (37.1 °C)   Ht 5' 5" (1.651 m)   Wt 87.4 kg (192 lb 10.9 oz)   SpO2 99%   BMI 32.06 kg/m²      Physical Exam   Constitutional: She is oriented to person, place, and time. She appears well-developed and well-nourished. No distress.   HENT:   Head: Normocephalic and atraumatic.   Right Ear: Hearing, tympanic membrane, external ear and ear canal normal.   Left Ear: Hearing, tympanic membrane, external ear and ear canal normal.   Nose: No sinus tenderness. Right sinus exhibits no maxillary sinus tenderness and no frontal sinus tenderness. Left sinus exhibits no maxillary sinus tenderness and no frontal sinus tenderness.   Mouth/Throat: Uvula is midline, oropharynx is clear and moist and mucous membranes are normal. No oropharyngeal exudate, posterior oropharyngeal " edema, posterior oropharyngeal erythema or tonsillar abscesses.   Eyes: Conjunctivae are normal. Pupils are equal, round, and reactive to light.   Neck: Normal range of motion. Neck supple.   Cardiovascular: Normal rate, regular rhythm and normal heart sounds.    Pulmonary/Chest: Effort normal and breath sounds normal. No respiratory distress.   Neurological: She is alert and oriented to person, place, and time.   Skin: Skin is warm.   Psychiatric: She has a normal mood and affect. Her behavior is normal. Judgment and thought content normal.   Vitals reviewed.        Lab Results   Component Value Date    WBC 4.63 07/10/2017    HGB 15.0 07/10/2017    HCT 45.7 07/10/2017     (L) 07/10/2017    CHOL 177 04/06/2017    TRIG 128 04/06/2017    HDL 47 04/06/2017    ALT 29 04/06/2017    AST 22 04/06/2017     04/06/2017    K 4.6 04/06/2017     04/06/2017    CREATININE 1.0 04/06/2017    BUN 14 04/06/2017    CO2 29 04/06/2017    TSH 2.057 04/06/2017    INR 1.0 07/10/2017    HGBA1C 5.5 04/17/2017       Assessment:       1. Bipolar I disorder    2. Cough    3. Bronchitis        Plan:   Bipolar I disorder  -     venlafaxine (EFFEXOR-XR) 150 MG Cp24; Take 1 capsule (150 mg total) by mouth once daily.  Dispense: 30 capsule; Refill: 0   30d supply sent, rx dosage confirmed, she has enough of her 75mg dose    Further refills have to come from psych   She verbalized understanding   bronchitis   HR elevated, clinical concern for PNA so will go ahead and treat  -     azithromycin (ZITHROMAX Z-LISA) 250 MG tablet; Take as directed, 2 tab on Day 1, 1 tab each additional day 2-4  Dispense: 6 tablet; Refill: 0  -     promethazine-dextromethorphan (PROMETHAZINE-DM) 6.25-15 mg/5 mL Syrp; Take 5 mLs by mouth every 6 (six) hours as needed.  Dispense: 118 mL; Refill: 0        No Follow-up on file.

## 2018-01-26 NOTE — PATIENT INSTRUCTIONS
Bronchitis, Antibiotic Treatment (Adult)    Bronchitis is an infection of the air passages (bronchial tubes) in your lungs. It often occurs when you have a cold. This illness is contagious during the first few days and is spread through the air by coughing and sneezing, or by direct contact (touching the sick person and then touching your own eyes, nose, or mouth).  Symptoms of bronchitis include cough with mucus (phlegm) and low-grade fever. Bronchitis usually lasts 7 to 14 days. Mild cases can be treated with simple home remedies. More severe infection is treated with an antibiotic.  Home care  Follow these guidelines when caring for yourself at home:  · If your symptoms are severe, rest at home for the first 2 to 3 days. When you go back to your usual activities, don't let yourself get too tired.  · Do not smoke. Also avoid being exposed to secondhand smoke.  · You may use over-the-counter medicines to control fever or pain, unless another medicine was prescribed. (Note: If you have chronic liver or kidney disease or have ever had a stomach ulcer or gastrointestinal bleeding, talk with your healthcare provider before using these medicines. Also talk to your provider if you are taking medicine to prevent blood clots.) Aspirin should never be given to anyone younger than 18 years of age who is ill with a viral infection or fever. It may cause severe liver or brain damage.  · Your appetite may be poor, so a light diet is fine. Avoid dehydration by drinking 6 to 8 glasses of fluids per day (such as water, soft drinks, sports drinks, juices, tea, or soup). Extra fluids will help loosen secretions in the nose and lungs.  · Over-the-counter cough, cold, and sore-throat medicines will not shorten the length of the illness, but they may be helpful to reduce symptoms. (Note: Do not use decongestants if you have high blood pressure.)  · Finish all antibiotic medicine. Do this even if you are feeling better after only a  few days.  Follow-up care  Follow up with your healthcare provider, or as advised. If you had an X-ray or ECG (electrocardiogram), a specialist will review it. You will be notified of any new findings that may affect your care.  Note: If you are age 65 or older, or if you have a chronic lung disease or condition that affects your immune system, or you smoke, talk to your healthcare provider about having pneumococcal vaccinations and a yearly influenza vaccination (flu shot).  When to seek medical advice  Call your healthcare provider right away if any of these occur:  · Fever of 100.4°F (38°C) or higher  · Coughing up increased amounts of colored sputum  · Weakness, drowsiness, headache, facial pain, ear pain, or a stiff neck  Call 911, or get immediate medical care  Contact emergency services right away if any of these occur.  · Coughing up blood  · Worsening weakness, drowsiness, headache, or stiff neck  · Trouble breathing, wheezing, or pain with breathing  Date Last Reviewed: 9/13/2015  © 9391-5976 [employer] [address] [place] [address], [place] [address]. All rights reserved. This information is not intended as a substitute for professional medical care. Always follow your healthcare professional's instructions.

## 2018-02-25 ENCOUNTER — OFFICE VISIT (OUTPATIENT)
Dept: URGENT CARE | Facility: CLINIC | Age: 48
End: 2018-02-25
Payer: MEDICARE

## 2018-02-25 VITALS
DIASTOLIC BLOOD PRESSURE: 82 MMHG | HEART RATE: 114 BPM | WEIGHT: 192 LBS | BODY MASS INDEX: 31.99 KG/M2 | OXYGEN SATURATION: 97 % | SYSTOLIC BLOOD PRESSURE: 118 MMHG | RESPIRATION RATE: 17 BRPM | HEIGHT: 65 IN | TEMPERATURE: 100 F

## 2018-02-25 DIAGNOSIS — J06.9 ACUTE URI: ICD-10-CM

## 2018-02-25 DIAGNOSIS — J02.9 SORE THROAT: Primary | ICD-10-CM

## 2018-02-25 DIAGNOSIS — R50.9 FEVER, UNSPECIFIED FEVER CAUSE: ICD-10-CM

## 2018-02-25 DIAGNOSIS — H92.01 RIGHT EAR PAIN: ICD-10-CM

## 2018-02-25 DIAGNOSIS — H60.311 DIFFUSE OTITIS EXTERNA OF RIGHT EAR, UNSPECIFIED CHRONICITY: ICD-10-CM

## 2018-02-25 LAB
CTP QC/QA: YES
S PYO RRNA THROAT QL PROBE: NEGATIVE

## 2018-02-25 PROCEDURE — 3008F BODY MASS INDEX DOCD: CPT | Mod: S$GLB,,, | Performed by: NURSE PRACTITIONER

## 2018-02-25 PROCEDURE — 87880 STREP A ASSAY W/OPTIC: CPT | Mod: QW,S$GLB,, | Performed by: NURSE PRACTITIONER

## 2018-02-25 PROCEDURE — 99999 PR PBB SHADOW E&M-EST. PATIENT-LVL V: CPT | Mod: PBBFAC,,, | Performed by: NURSE PRACTITIONER

## 2018-02-25 PROCEDURE — 99214 OFFICE O/P EST MOD 30 MIN: CPT | Mod: S$GLB,,, | Performed by: NURSE PRACTITIONER

## 2018-02-25 PROCEDURE — 87081 CULTURE SCREEN ONLY: CPT

## 2018-02-25 RX ORDER — CYCLOBENZAPRINE HCL 10 MG
10 TABLET ORAL 3 TIMES DAILY PRN
COMMUNITY
End: 2018-07-01

## 2018-02-25 RX ORDER — TIZANIDINE 4 MG/1
4 TABLET ORAL EVERY 6 HOURS PRN
COMMUNITY
End: 2018-03-12

## 2018-02-25 RX ORDER — BENZONATATE 100 MG/1
200 CAPSULE ORAL 3 TIMES DAILY PRN
Qty: 60 CAPSULE | Refills: 1 | Status: SHIPPED | OUTPATIENT
Start: 2018-02-25 | End: 2018-03-07

## 2018-02-25 RX ORDER — CYANOCOBALAMIN (VITAMIN B-12) 500 MCG
1 TABLET ORAL NIGHTLY
COMMUNITY
End: 2019-04-02

## 2018-02-25 RX ORDER — NEOMYCIN SULFATE, POLYMYXIN B SULFATE AND HYDROCORTISONE 10; 3.5; 1 MG/ML; MG/ML; [USP'U]/ML
3 SUSPENSION/ DROPS AURICULAR (OTIC) 3 TIMES DAILY
Qty: 10 ML | Refills: 0 | Status: SHIPPED | OUTPATIENT
Start: 2018-02-25 | End: 2018-03-12

## 2018-02-25 NOTE — PATIENT INSTRUCTIONS
PLAN: Lab work POCT rapid strep screen, C&S  Advise increase p.o. fluids--at least 64 ounces of water/juice & rest  Meds: Tessalon Perles, Polymyxin  / no refills  Advise use of Flonase with Sensmist  Simply saline nasal wash to irrigate sinuses and for congestion/runny nose.  Cool mist humidifier/vaporizer.  Practice good handwashing.  Tylenol or Ibuprofen for fever, headache and body aches.  Warm salt water gargles for throat comfort.  Chloraseptic spray or lozenges for throat comfort.  Advise follow up with PCP  Advise go to ER if symptoms worsen or fail to improve with treatment.  Your symptoms are likely due to a viral infection.  Viral infections will not improve with antibiotics.

## 2018-02-25 NOTE — PROGRESS NOTES
CHIEF COMPLAINT/REASON FOR VISIT: sore  throat,  runny nose, nasal congestion, hoarseness    HISTORY OF PRESENT ILLNESS:   48 year-old female  complains of sore  throat,  runny nose, nasal congestion, hoarseness onset 1-2 days ago. Patient admits tried over-the-counter medications with no relief. Patient concerned about strep throat. Discussed further evaluation with strep screen.  Patient agrees with plan therapy.   Patient deferred any oral antibiotics. Patient denies chest pain, shortness, cough, nausea, vomiting,diarrhea, urinary discomfort.       Past Medical History:   Diagnosis Date    Allergy     Anxiety     Arthritis     Asthma     Breast cancer genetic susceptibility     Cancer     Depression     Lung disease     Pneumonia          .  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       Social History Main Topics    Smoking status: Former Smoker     Packs/day: 0.50     Years: 16.00     Types: Cigarettes     Start date: 11/1/1990     Quit date: 1/1/2014    Smokeless tobacco: Never Used    Alcohol use Yes      Comment: twice a month - wine     Drug use: No    Sexual activity: Yes     Other Topics Concern    Not on file     Social History Narrative    Patient has 4 biological children and 2 step       Family History   Problem Relation Age of Onset    Diabetes Mother     Hyperlipidemia Mother     Diabetes Father     Hyperlipidemia Father          ROS:  GENERAL:  fever, chills  SKIN: No rashes, itching or changes in color or texture of skin.   HEENT:  Reports sore  throat,  runny nose, nasal congestion,  hoarseness  NODES: No masses or lesions. Denies swollen glands.   CHEST: reports slight cough    CARDIOVASCULAR: Denies chest pain, shortness of breath.  ABDOMEN: Appetite fine. No weight loss. Denies diarrhea, abdominal pain,   MUSCULOSKELETAL: No joint stiffness or swelling. Denies back pain.  NEUROLOGIC: No history of seizures, paralysis, alteration of gait or coordination.  PSYCHIATRIC: Denies mood swings, depression or suicidal thoughts.    PE:   APPEARANCE: Well nourished, well developed, in mild distress. hoarseness  V/S: Reviewed.  SKIN: Normal skin turgor, no lesions.  HEENT: Turbinates injected, minimal red pharynx. Right tragus with minimal tenderness, TM's poor light reflex bilateral, no facial tenderness.  CHEST: Lungs clear to auscultation. No wheezing  CARDIOVASCULAR: Regular rate and rhythm.  NEUROLOGIC: No sensory deficits. Gait & Posture: Normal, No cerebellar signs.  MENTAL STATUS: Patient alert, oriented x 3 & conversant.    PLAN: Lab work POCT rapid strep screen, C&S  Advise increase p.o. fluids--at least 64 ounces of water/juice & rest  Meds: Tessalon Perles, Polymyxin  / no refills  Advise use of Flonase with Sensmist  Simply saline nasal wash to irrigate sinuses and for congestion/runny nose.  Cool mist humidifier/vaporizer.  Practice good handwashing.  Tylenol or Ibuprofen for fever, headache and body aches.  Warm salt water gargles for throat comfort.  Chloraseptic spray or lozenges for throat comfort.  Advise follow up with PCP  Advise go to ER if symptoms worsen or fail to improve with treatment.  Your symptoms are likely due to a viral infection.  Viral infections will not improve with antibiotics.       DIAGNOSIS:  URI  Fever  Pharyngitis  Right otalgia vs otitis externa

## 2018-03-01 LAB — BACTERIA THROAT CULT: NORMAL

## 2018-03-12 ENCOUNTER — HOSPITAL ENCOUNTER (OUTPATIENT)
Dept: RADIOLOGY | Facility: HOSPITAL | Age: 48
Discharge: HOME | End: 2018-03-12
Attending: FAMILY MEDICINE
Payer: MEDICARE

## 2018-03-12 ENCOUNTER — OFFICE VISIT (OUTPATIENT)
Dept: INTERNAL MEDICINE | Facility: CLINIC | Age: 48
End: 2018-03-12
Payer: MEDICARE

## 2018-03-12 VITALS
DIASTOLIC BLOOD PRESSURE: 80 MMHG | HEART RATE: 104 BPM | TEMPERATURE: 98 F | HEIGHT: 65 IN | BODY MASS INDEX: 31.92 KG/M2 | SYSTOLIC BLOOD PRESSURE: 120 MMHG | OXYGEN SATURATION: 98 % | WEIGHT: 191.56 LBS

## 2018-03-12 DIAGNOSIS — G89.29 CHRONIC SI JOINT PAIN: ICD-10-CM

## 2018-03-12 DIAGNOSIS — J98.9 REACTIVE AIRWAY DISEASE THAT IS NOT ASTHMA: ICD-10-CM

## 2018-03-12 DIAGNOSIS — F31.9 BIPOLAR I DISORDER: ICD-10-CM

## 2018-03-12 DIAGNOSIS — Z01.818 PREOP EXAM FOR INTERNAL MEDICINE: Primary | ICD-10-CM

## 2018-03-12 DIAGNOSIS — Z01.818 PREOP EXAM FOR INTERNAL MEDICINE: ICD-10-CM

## 2018-03-12 DIAGNOSIS — M53.3 CHRONIC SI JOINT PAIN: ICD-10-CM

## 2018-03-12 DIAGNOSIS — C50.911 MALIGNANT NEOPLASM OF RIGHT FEMALE BREAST, UNSPECIFIED ESTROGEN RECEPTOR STATUS, UNSPECIFIED SITE OF BREAST: ICD-10-CM

## 2018-03-12 DIAGNOSIS — J30.9 CHRONIC ALLERGIC RHINITIS, UNSPECIFIED SEASONALITY, UNSPECIFIED TRIGGER: ICD-10-CM

## 2018-03-12 DIAGNOSIS — G47.00 INSOMNIA, UNSPECIFIED TYPE: ICD-10-CM

## 2018-03-12 PROCEDURE — 71046 X-RAY EXAM CHEST 2 VIEWS: CPT | Mod: TC,FY,PO

## 2018-03-12 PROCEDURE — 81001 URINALYSIS AUTO W/SCOPE: CPT

## 2018-03-12 PROCEDURE — 99999 PR PBB SHADOW E&M-EST. PATIENT-LVL III: CPT | Mod: PBBFAC,,, | Performed by: FAMILY MEDICINE

## 2018-03-12 PROCEDURE — 71046 X-RAY EXAM CHEST 2 VIEWS: CPT | Mod: 26,,, | Performed by: RADIOLOGY

## 2018-03-12 PROCEDURE — 87081 CULTURE SCREEN ONLY: CPT | Mod: 59

## 2018-03-12 PROCEDURE — 93005 ELECTROCARDIOGRAM TRACING: CPT | Mod: S$GLB,,, | Performed by: FAMILY MEDICINE

## 2018-03-12 PROCEDURE — 99214 OFFICE O/P EST MOD 30 MIN: CPT | Mod: S$GLB,,, | Performed by: FAMILY MEDICINE

## 2018-03-12 PROCEDURE — 93010 ELECTROCARDIOGRAM REPORT: CPT | Mod: S$GLB,,, | Performed by: INTERNAL MEDICINE

## 2018-03-12 PROCEDURE — 87086 URINE CULTURE/COLONY COUNT: CPT

## 2018-03-12 RX ORDER — VENLAFAXINE HYDROCHLORIDE 75 MG/1
75 CAPSULE, EXTENDED RELEASE ORAL DAILY
Qty: 90 CAPSULE | Refills: 3
Start: 2018-03-12 | End: 2018-07-13 | Stop reason: SDUPTHER

## 2018-03-12 NOTE — PROGRESS NOTES
Subjective:      Patient ID: Daniela Costa is a 48 y.o. female.    Chief Complaint: Pre-op Exam (3/26/18 si joint fusion Dr[facility])    Disclaimer:  This note is prepared using voice recognition software and as such is likely to have errors and has not been proof read. Please contact me for questions.     Daniela Costa is a 48 y.o. female who presents today for preop exam.     Patient Active Problem List:     Bipolar I disorder     Malignant neoplasm of breast     Allergic rhinitis     Obesity      Reactive airway disease that is not asthma     Insomnia    Patient's coming in today for preop clearance for Dr. Maxwell on March 26 but she's been have a left SI joint fusion performed.  She has had surgeries in the past without any complications with general anesthesia.  She's never had a blood clot.  No DVTs.  She does know that she needs to hold any anti-inflammatories 1 week prior to her surgery.  She's planning on going to see Dr. Priest her cardiologist for her cardiac clearance.  She's needing to do her blood work today as well as urinalysis and chest x-ray today.  EKGs obtained today which shows mild possible left atrial enlargement with possible prolonged QT been in normal sinus rhythm.  QRS duration is 86 ms NC interval 132 ventricular rate 96 bpm corrected QT is 482 regular QT is 382.            Lab Results   Component Value Date    WBC 4.63 07/10/2017    HGB 15.0 07/10/2017    HCT 45.7 07/10/2017     (L) 07/10/2017    CHOL 177 04/06/2017    TRIG 128 04/06/2017    HDL 47 04/06/2017    ALT 29 04/06/2017    AST 22 04/06/2017     04/06/2017    K 4.6 04/06/2017     04/06/2017    CREATININE 1.0 04/06/2017    BUN 14 04/06/2017    CO2 29 04/06/2017    TSH 2.057 04/06/2017    INR 1.0 07/10/2017    HGBA1C 5.5 04/17/2017       Review of Systems   Constitutional: Negative for activity change, appetite change, chills, fatigue and fever.   HENT: Negative for ear pain and  "trouble swallowing.    Eyes: Negative for pain and visual disturbance.   Respiratory: Negative for cough and shortness of breath.    Cardiovascular: Negative for chest pain and leg swelling.   Gastrointestinal: Negative for abdominal pain, blood in stool, nausea and vomiting.   Endocrine: Negative for cold intolerance and heat intolerance.   Genitourinary: Negative for dysuria and frequency.   Musculoskeletal: Positive for arthralgias and back pain. Negative for joint swelling, myalgias and neck pain.   Skin: Negative for color change and rash.   Neurological: Negative for dizziness and headaches.   Psychiatric/Behavioral: Negative for behavioral problems and sleep disturbance.     Objective:     Vitals:    03/12/18 0834   BP: 120/80   Pulse: 104   Temp: 97.8 °F (36.6 °C)   SpO2: 98%   Weight: 86.9 kg (191 lb 9.3 oz)   Height: 5' 5" (1.651 m)     Physical Exam   Constitutional: She appears well-developed and well-nourished.   HENT:   Head: Normocephalic and atraumatic.   Right Ear: Tympanic membrane normal.   Left Ear: Tympanic membrane normal.   Mouth/Throat: Oropharynx is clear and moist.   Eyes: Conjunctivae and EOM are normal.   Neck: Normal range of motion. Neck supple.   Cardiovascular: Normal rate and regular rhythm.    Pulmonary/Chest: Effort normal and breath sounds normal.   Psychiatric: She has a normal mood and affect. Her behavior is normal.   Nursing note and vitals reviewed.    Assessment:     1. Preop exam for internal medicine    2. Chronic SI joint pain    3. Chronic allergic rhinitis, unspecified seasonality, unspecified trigger    4. Reactive airway disease that is not asthma    5. Bipolar I disorder    6. Insomnia, unspecified type    7. Malignant neoplasm of right female breast, unspecified estrogen receptor status, unspecified site of breast      Plan:   Daniela was seen today for pre-op exam.    Diagnoses and all orders for this visit:    Preop exam for internal medicine I reviewed the " patient's past medical, surgical, social and family history and with  physical exam findings and the proposed surgery and she may proceed with surgery with the following recommendations:     From a cardiac standpoint the patient she will be cleared by Dr. Lemus.     From a pulmonary standpoint the patient presents as a good candidate as well. The patient has reactive airway disease without asthma.  She has her Singulair and her inhalers.  She will use these prior to procedure and after with recovery.  Chest x-ray is been ordered today.  Good pulmonary toilet, incentive spirometry, early ambulation are all recommended to improve the pulmonary outcome. No further pulmonary workup as noted prior to surgery.     DVT prophylaxis should be per standard. Venous compression devices are recommended. Early ambulation however the patient reports she'll be limited on what she can do with the left leg.  This will be at the discretion of the orthopedist however will be recommended that she do anticoagulation due to a significant change in her activities. Patient has been educated on signs and symptoms of both DVT and pulmonary embolus and instructed on what to do if there are symptoms postop.      Avoid any aspirin or anti-inflammatories for 1 week prior to surgery.     If there is any further I can do to assist in the care of this patient please not hesitate to contact me. I will forward the lab results upon my receipt.      -     CBC auto differential; Future  -     EKG 12-lead; Future  -     Culture, MRSA; Future  -     APTT; Future  -     Protime-INR; Future  -     Urinalysis; Future  -     Urine culture; Future  -     X-Ray Chest PA And Lateral; Future  -     Basic metabolic panel; Future  -     C-reactive protein; Future  -     Urine culture  -     Urinalysis  -     Culture, MRSA    Chronic SI joint pain  Comments:  of left, on 3/26 by Dr Maxwell.   Orders:  -     CBC auto differential; Future  -     EKG 12-lead;  Future  -     Culture, MRSA; Future  -     APTT; Future  -     Protime-INR; Future  -     Urinalysis; Future  -     Urine culture; Future  -     X-Ray Chest PA And Lateral; Future  -     Basic metabolic panel; Future  -     C-reactive protein; Future  -     Urine culture  -     Urinalysis  -     Culture, MRSA    Chronic allergic rhinitis, unspecified seasonality, unspecified trigger  Comments:  on singulair.     Reactive airway disease that is not asthma  Comments:  stable with symbicort and singulair.     Bipolar I disorder  Comments:  seeing Dr Sheryl Anders [facility] on effexor 225mg xr, lamictal 200mg daily. stable.     Insomnia, unspecified type  Comments:  on melatonin for sleep.     Malignant neoplasm of right female breast, unspecified estrogen receptor status, unspecified site of breast  Comments:  right breast, + ERPR, HER2 neg    Other orders  -     venlafaxine (EFFEXOR-XR) 75 MG 24 hr capsule; Take 1 capsule (75 mg total) by mouth once daily.            Follow-up in about 4 months (around 7/12/2018) for physical with Dr HUERTA.

## 2018-03-14 LAB
BACTERIA #/AREA URNS AUTO: NORMAL /HPF
BILIRUB UR QL STRIP: NEGATIVE
CLARITY UR REFRACT.AUTO: CLEAR
COLOR UR AUTO: YELLOW
GLUCOSE UR QL STRIP: NEGATIVE
HGB UR QL STRIP: NEGATIVE
HYALINE CASTS UR QL AUTO: 1 /LPF
KETONES UR QL STRIP: NEGATIVE
LEUKOCYTE ESTERASE UR QL STRIP: ABNORMAL
MICROSCOPIC COMMENT: NORMAL
MRSA SPEC QL CULT: NORMAL
NITRITE UR QL STRIP: NEGATIVE
PH UR STRIP: 6 [PH] (ref 5–8)
PROT UR QL STRIP: NEGATIVE
SP GR UR STRIP: 1.01 (ref 1–1.03)
URN SPEC COLLECT METH UR: ABNORMAL
UROBILINOGEN UR STRIP-ACNC: NEGATIVE EU/DL
WBC #/AREA URNS AUTO: 3 /HPF (ref 0–5)

## 2018-03-15 ENCOUNTER — PATIENT MESSAGE (OUTPATIENT)
Dept: INTERNAL MEDICINE | Facility: CLINIC | Age: 48
End: 2018-03-15

## 2018-03-15 LAB — BACTERIA UR CULT: NO GROWTH

## 2018-03-15 NOTE — TELEPHONE ENCOUNTER
Please print out my last OV/preop note. Labs, urine cxr are reviewed . She is ok to proceed with surgery. Fax to her surgeon please.

## 2018-03-20 ENCOUNTER — TELEPHONE (OUTPATIENT)
Dept: INTERNAL MEDICINE | Facility: CLINIC | Age: 48
End: 2018-03-20

## 2018-03-20 NOTE — TELEPHONE ENCOUNTER
----- Message from Francie Woody sent at 3/20/2018  2:41 PM CDT -----  Contact: Blanca/Dr Maxwell  [phone] ext [phone]  States that she is calling for clarification on surgery clearance. Please call back at [phone] ext [phone]//thank you acc

## 2018-03-20 NOTE — TELEPHONE ENCOUNTER
They did receive all of the pre op paperwork on pt but stated that it does not actually say she is cleared from us. They would like for you to put on your note that she is cleared. She has been cleared by cardiology and they received all of there paperwork from Dr. Pacheco.     Fax: [phone]

## 2018-04-15 DIAGNOSIS — H92.02 EAR PAIN, LEFT: ICD-10-CM

## 2018-04-15 DIAGNOSIS — J30.9 ALLERGIC RHINITIS: ICD-10-CM

## 2018-04-15 DIAGNOSIS — J98.9 REACTIVE AIRWAY DISEASE THAT IS NOT ASTHMA: ICD-10-CM

## 2018-04-15 RX ORDER — MONTELUKAST SODIUM 10 MG/1
10 TABLET ORAL DAILY
Qty: 30 TABLET | Refills: 3 | Status: SHIPPED | OUTPATIENT
Start: 2018-04-15 | End: 2018-08-10 | Stop reason: SDUPTHER
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
## 2018-05-07 ENCOUNTER — OFFICE VISIT (OUTPATIENT)
Dept: PSYCHIATRY | Facility: CLINIC | Age: 48
End: 2018-05-07
Payer: MEDICARE

## 2018-05-07 DIAGNOSIS — F41.9 ANXIETY: ICD-10-CM

## 2018-05-07 DIAGNOSIS — F31.9 BIPOLAR I DISORDER: ICD-10-CM

## 2018-05-07 PROCEDURE — 90792 PSYCH DIAG EVAL W/MED SRVCS: CPT | Mod: S$GLB,,, | Performed by: PSYCHIATRY & NEUROLOGY

## 2018-05-07 RX ORDER — CLONAZEPAM 0.5 MG/1
0.5 TABLET ORAL DAILY PRN
Qty: 30 TABLET | Refills: 2 | Status: SHIPPED | OUTPATIENT
Start: 2018-05-07 | End: 2018-07-13 | Stop reason: SDUPTHER

## 2018-05-07 RX ORDER — TOPIRAMATE 100 MG/1
TABLET, FILM COATED ORAL
Qty: 60 TABLET | Refills: 2 | Status: SHIPPED | OUTPATIENT
Start: 2018-05-07 | End: 2018-07-13 | Stop reason: ALTCHOICE

## 2018-05-07 RX ORDER — VENLAFAXINE HYDROCHLORIDE 150 MG/1
150 CAPSULE, EXTENDED RELEASE ORAL DAILY
Qty: 30 CAPSULE | Refills: 2 | Status: SHIPPED | OUTPATIENT
Start: 2018-05-07 | End: 2018-07-13 | Stop reason: SDUPTHER

## 2018-06-03 PROBLEM — F41.9 ANXIETY: Status: ACTIVE | Noted: 2018-06-03

## 2018-06-26 ENCOUNTER — OFFICE VISIT (OUTPATIENT)
Dept: PSYCHIATRY | Facility: CLINIC | Age: 48
End: 2018-06-26
Payer: MEDICARE

## 2018-06-26 DIAGNOSIS — F31.30 BIPOLAR I DISORDER, MOST RECENT EPISODE (OR CURRENT) DEPRESSED: Primary | ICD-10-CM

## 2018-06-26 PROCEDURE — 90791 PSYCH DIAGNOSTIC EVALUATION: CPT | Mod: S$GLB,,, | Performed by: SOCIAL WORKER
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
## 2018-06-27 ENCOUNTER — PATIENT OUTREACH (OUTPATIENT)
Dept: ADMINISTRATIVE | Facility: HOSPITAL | Age: 48
End: 2018-06-27

## 2018-06-27 DIAGNOSIS — Z00.00 BLOOD TESTS FOR ROUTINE GENERAL PHYSICAL EXAMINATION: Primary | ICD-10-CM

## 2018-07-01 ENCOUNTER — OFFICE VISIT (OUTPATIENT)
Dept: URGENT CARE | Facility: CLINIC | Age: 48
End: 2018-07-01
Payer: MEDICARE

## 2018-07-01 VITALS
HEIGHT: 65 IN | WEIGHT: 194.44 LBS | SYSTOLIC BLOOD PRESSURE: 120 MMHG | BODY MASS INDEX: 32.4 KG/M2 | TEMPERATURE: 99 F | OXYGEN SATURATION: 100 % | HEART RATE: 102 BPM | DIASTOLIC BLOOD PRESSURE: 68 MMHG

## 2018-07-01 DIAGNOSIS — H60.501 ACUTE OTITIS EXTERNA OF RIGHT EAR, UNSPECIFIED TYPE: Primary | ICD-10-CM

## 2018-07-01 DIAGNOSIS — J32.9 SINUSITIS, UNSPECIFIED CHRONICITY, UNSPECIFIED LOCATION: ICD-10-CM

## 2018-07-01 PROCEDURE — 99214 OFFICE O/P EST MOD 30 MIN: CPT | Mod: PBBFAC,PO | Performed by: NURSE PRACTITIONER

## 2018-07-01 PROCEDURE — 99214 OFFICE O/P EST MOD 30 MIN: CPT | Mod: S$GLB,,, | Performed by: NURSE PRACTITIONER

## 2018-07-01 PROCEDURE — 99999 PR PBB SHADOW E&M-EST. PATIENT-LVL IV: CPT | Mod: PBBFAC,,, | Performed by: NURSE PRACTITIONER

## 2018-07-01 RX ORDER — AMOXICILLIN AND CLAVULANATE POTASSIUM 875; 125 MG/1; MG/1
1 TABLET, FILM COATED ORAL 2 TIMES DAILY
Qty: 20 TABLET | Refills: 0 | Status: SHIPPED | OUTPATIENT
Start: 2018-07-01 | End: 2018-07-11

## 2018-07-01 RX ORDER — DEXAMETHASONE SODIUM PHOSPHATE 1 MG/ML
SOLUTION/ DROPS OPHTHALMIC
Qty: 5 ML | Refills: 0 | Status: SHIPPED | OUTPATIENT
Start: 2018-07-01 | End: 2018-08-21

## 2018-07-01 NOTE — PATIENT INSTRUCTIONS
External Ear Infection (Adult)    External otitis (also called swimmers ear) is an infection in the ear canal. It is often caused by bacteria or fungus. It can occur a few days after water gets trapped in the ear canal (from swimming or bathing). It can also occur after cleaning too deeply in the ear canal with a cotton swab or other object. Sometimes, hair care products get into the ear canal and cause this problem.  Symptoms can include pain, fever, itching, redness, drainage, or swelling of the ear canal. Temporary hearing loss may also occur.  Home care  · Do not try to clean the ear canal. This can push pus and bacteria deeper into the canal.  · Use prescribed ear drops as directed. These help reduce swelling and fight the infection. If an ear wick was placed in the ear canal, apply drops right onto the end of the wick. The wick will draw the medication into the ear canal even if it is swollen closed.  · A cotton ball may be loosely placed in the outer ear to absorb any drainage.  · You may use acetaminophen or ibuprofen to control pain, unless another medication was prescribed. Note: If you have chronic liver or kidney disease or ever had a stomach ulcer or GI bleeding, talk to your health care provider before taking any of these medications.  · Do not allow water to get into your ear when bathing. Also, avoid swimming until the infection has cleared.  Prevention  · Keep your ears dry. This helps lower the risk of infection. Dry your ears with a towel or hair dryer after getting wet. Also, use ear plugs when swimming.  · Do not stick any objects in the ear to remove wax.  · If you feel water trapped in your ear, use ear drops right away. You can get these drops over the counter at most drugstores. They work by removing water from the ear canal.  Follow-up care  Follow up with your health care provider in one week, or as advised.  When to seek medical advice  Call your health care provider right away if  any of these occur:  · Ear pain becomes worse or doesnt improve after 3 days of treatment  · Redness or swelling of the outer ear occurs or gets worse  · Headache  · Painful or stiff neck  · Drowsiness or confusion  · Fever of 100.4ºF (38ºC) or higher, or as directed by your health care provider  · Seizure  Date Last Reviewed: 3/22/2015  © 7721-6659 [employer]. [address] [place] [address], [place] [address]. All rights reserved. This information is not intended as a substitute for professional medical care. Always follow your healthcare professional's instructions.        Sinusitis (Antibiotic Treatment)    The sinuses are air-filled spaces within the bones of the face. They connect to the inside of the nose. Sinusitis is an inflammation of the tissue lining the sinus cavity. Sinus inflammation can occur during a cold. It can also be due to allergies to pollens and other particles in the air. Sinusitis can cause symptoms of sinus congestion and fullness. A sinus infection causes fever, headache and facial pain. There is often green or yellow drainage from the nose or into the back of the throat (post-nasal drip). You have been given antibiotics to treat this condition.  Home care:  · Take the full course of antibiotics as instructed. Do not stop taking them, even if you feel better.  · Drink plenty of water, hot tea, and other liquids. This may help thin mucus. It also may promote sinus drainage.  · Heat may help soothe painful areas of the face. Use a towel soaked in hot water. Or,  the shower and direct the hot spray onto your face. Using a vaporizer along with a menthol rub at night may also help.   · An expectorant containing guaifenesin may help thin the mucus and promote drainage from the sinuses.  · Over-the-counter decongestants may be used unless a similar medicine was prescribed. Nasal sprays work the fastest. Use one that contains phenylephrine or oxymetazoline. First blow the nose  gently. Then use the spray. Do not use these medicines more often than directed on the label or symptoms may get worse. You may also use tablets containing pseudoephedrine. Avoid products that combine ingredients, because side effects may be increased. Read labels. You can also ask the pharmacist for help. (NOTE: Persons with high blood pressure should not use decongestants. They can raise blood pressure.)  · Over-the-counter antihistamines may help if allergies contributed to your sinusitis.    · Do not use nasal rinses or irrigation during an acute sinus infection, unless told to by your health care provider. Rinsing may spread the infection to other sinuses.  · Use acetaminophen or ibuprofen to control pain, unless another pain medicine was prescribed. (If you have chronic liver or kidney disease or ever had a stomach ulcer, talk with your doctor before using these medicines. Aspirin should never be used in anyone under 18 years of age who is ill with a fever. It may cause severe liver damage.)  · Don't smoke. This can worsen symptoms.  Follow-up care  Follow up with your healthcare provider or our staff if you are not improving within the next week.  When to seek medical advice  Call your healthcare provider if any of these occur:  · Facial pain or headache becoming more severe  · Stiff neck  · Unusual drowsiness or confusion  · Swelling of the forehead or eyelids  · Vision problems, including blurred or double vision  · Fever of 100.4ºF (38ºC) or higher, or as directed by your healthcare provider  · Seizure  · Breathing problems  · Symptoms not resolving within 10 days  Date Last Reviewed: 4/13/2015  © 7857-5222 [employer]. [address] [place] [address], [place] [address]. All rights reserved. This information is not intended as a substitute for professional medical care. Always follow your healthcare professional's instructions.

## 2018-07-01 NOTE — PROGRESS NOTES
"Subjective:      Patient ID: Daniela Costa is a 48 y.o. female.    Chief Complaint: Otalgia (patient complains of right ear pain. started friday. throbbing feeling in righ rear. sensitive to touch. )    Mrs. Costa presents to Urgent Care today with complaints of right ear pain x 3 days and sinus symptoms for a few days. She noticed right ear itching and irritation. Tried peroxide drops but didn't have any improvement. Her  put some neosporin in her ear and pain started afterwards. No drainage. She used some benzocaine ear drops from one of her children with some improvement. For the sinus symptoms she takes singulair daily.       Otalgia    There is pain in the right ear. This is a new problem. The current episode started in the past 7 days. The problem occurs constantly. The problem has been gradually worsening. There has been no fever. Associated symptoms include a sore throat (mild, started today). Pertinent negatives include no coughing. Treatments tried: Ibuprofen, Neosporin, peroxide drops. The treatment provided no relief.     Review of Systems   Constitutional: Negative.    HENT: Positive for congestion, ear pain and sore throat (mild, started today).    Respiratory: Positive for wheezing (intermittent, improves with albuterol). Negative for cough.    Cardiovascular: Negative.    Gastrointestinal: Negative.    Musculoskeletal: Negative.    Skin: Negative.    Neurological: Negative.    Hematological: Negative.        Objective:   /68 (BP Location: Left arm, Patient Position: Sitting, BP Method: Medium (Manual))   Pulse 102   Temp 98.9 °F (37.2 °C) (Tympanic)   Ht 5' 5" (1.651 m)   Wt 88.2 kg (194 lb 7.1 oz)   SpO2 100%   BMI 32.36 kg/m²   Physical Exam   Constitutional: She is oriented to person, place, and time. She appears well-developed and well-nourished. No distress.   HENT:   Head: Normocephalic and atraumatic.   Left Ear: Tympanic membrane normal.   Ears:    Eyes: Conjunctivae are " normal.   Neck: Normal range of motion. Neck supple.   Cardiovascular: Normal rate, regular rhythm and normal heart sounds.    Pulmonary/Chest: Effort normal and breath sounds normal. No respiratory distress.   Lymphadenopathy:     She has no cervical adenopathy.   Neurological: She is alert and oriented to person, place, and time.   Skin: Skin is warm and dry. She is not diaphoretic.   Nursing note and vitals reviewed.    Assessment:      1. Acute otitis externa of right ear, unspecified type    2. Sinusitis, unspecified chronicity, unspecified location       Plan:   Acute otitis externa of right ear, unspecified type  -     dexamethasone (DECADRON) 0.1 % ophthalmic solution; One drop in the right ear every 6-8 hours x 5 days.  Dispense: 5 mL; Refill: 0    Sinusitis, unspecified chronicity, unspecified location  -     amoxicillin-clavulanate 875-125mg (AUGMENTIN) 875-125 mg per tablet; Take 1 tablet by mouth 2 (two) times daily. for 10 days  Dispense: 20 tablet; Refill: 0    Due to possible neomycin sensitivity, will avoid cortisporin. Also, unable to verify intact TM, so will avoid potentially ototoxic gtts. Past reaction to levaquin, therefore, cipro and floxin drops are not good options. I discussed with Mrs. Costa and we opted to do oral antibiotics with steroid drops to help with the inflammation. If her symptoms don't improve in the next 24-48 hrs, will defer to ENT.  Instructions, follow up, and supportive care as per AVS.

## 2018-07-13 ENCOUNTER — OFFICE VISIT (OUTPATIENT)
Dept: PSYCHIATRY | Facility: CLINIC | Age: 48
End: 2018-07-13
Payer: MEDICARE

## 2018-07-13 VITALS — WEIGHT: 194 LBS | BODY MASS INDEX: 32.28 KG/M2 | DIASTOLIC BLOOD PRESSURE: 70 MMHG | SYSTOLIC BLOOD PRESSURE: 120 MMHG

## 2018-07-13 DIAGNOSIS — F31.9 BIPOLAR I DISORDER: ICD-10-CM

## 2018-07-13 DIAGNOSIS — F41.9 ANXIETY: Primary | ICD-10-CM

## 2018-07-13 PROCEDURE — 3008F BODY MASS INDEX DOCD: CPT | Mod: S$GLB,,, | Performed by: PSYCHIATRY & NEUROLOGY

## 2018-07-13 PROCEDURE — 99214 OFFICE O/P EST MOD 30 MIN: CPT | Mod: S$GLB,,, | Performed by: PSYCHIATRY & NEUROLOGY

## 2018-07-13 PROCEDURE — 99999 PR PBB SHADOW E&M-EST. PATIENT-LVL I: CPT | Mod: PBBFAC,,, | Performed by: PSYCHIATRY & NEUROLOGY

## 2018-07-13 RX ORDER — CLONAZEPAM 0.5 MG/1
0.5 TABLET ORAL DAILY PRN
Qty: 30 TABLET | Refills: 1 | Status: SHIPPED | OUTPATIENT
Start: 2018-07-13 | End: 2018-09-11 | Stop reason: DRUGHIGH

## 2018-07-13 RX ORDER — VENLAFAXINE HYDROCHLORIDE 75 MG/1
75 CAPSULE, EXTENDED RELEASE ORAL DAILY
Qty: 90 CAPSULE | Refills: 2
Start: 2018-07-13 | End: 2018-09-11 | Stop reason: SDUPTHER

## 2018-07-13 RX ORDER — VENLAFAXINE HYDROCHLORIDE 150 MG/1
150 CAPSULE, EXTENDED RELEASE ORAL DAILY
Qty: 30 CAPSULE | Refills: 2 | Status: SHIPPED | OUTPATIENT
Start: 2018-07-13 | End: 2018-09-11 | Stop reason: SDUPTHER

## 2018-07-13 RX ORDER — OXCARBAZEPINE 150 MG/1
150 TABLET, FILM COATED ORAL 2 TIMES DAILY
Qty: 60 TABLET | Refills: 2 | Status: SHIPPED | OUTPATIENT
Start: 2018-07-13 | End: 2018-09-11 | Stop reason: ALTCHOICE

## 2018-07-16 ENCOUNTER — OFFICE VISIT (OUTPATIENT)
Dept: PSYCHIATRY | Facility: CLINIC | Age: 48
End: 2018-07-16
Payer: MEDICARE

## 2018-07-16 DIAGNOSIS — F31.30 BIPOLAR I DISORDER, MOST RECENT EPISODE DEPRESSED: Primary | ICD-10-CM

## 2018-07-16 PROCEDURE — 90834 PSYTX W PT 45 MINUTES: CPT | Mod: S$GLB,,, | Performed by: SOCIAL WORKER
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
## 2018-07-27 ENCOUNTER — OFFICE VISIT (OUTPATIENT)
Dept: PSYCHIATRY | Facility: CLINIC | Age: 48
End: 2018-07-27
Payer: MEDICARE

## 2018-07-27 DIAGNOSIS — F41.1 ANXIETY STATE: ICD-10-CM

## 2018-07-27 DIAGNOSIS — F31.9 BIPOLAR I DISORDER WITH DEPRESSION: Primary | ICD-10-CM

## 2018-07-27 PROCEDURE — 90834 PSYTX W PT 45 MINUTES: CPT | Mod: S$GLB,,, | Performed by: SOCIAL WORKER

## 2018-07-28 RX ORDER — TOPIRAMATE 100 MG/1
TABLET, FILM COATED ORAL
Qty: 60 TABLET | Refills: 2 | OUTPATIENT
Start: 2018-07-28

## 2018-08-06 DIAGNOSIS — R06.02 SOB (SHORTNESS OF BREATH): Primary | ICD-10-CM

## 2018-08-10 DIAGNOSIS — J30.9 ALLERGIC RHINITIS: ICD-10-CM

## 2018-08-10 DIAGNOSIS — J98.9 REACTIVE AIRWAY DISEASE THAT IS NOT ASTHMA: ICD-10-CM

## 2018-08-10 DIAGNOSIS — H92.02 EAR PAIN, LEFT: ICD-10-CM

## 2018-08-10 RX ORDER — MONTELUKAST SODIUM 10 MG/1
TABLET ORAL
Qty: 30 TABLET | Refills: 3 | Status: SHIPPED | OUTPATIENT
Start: 2018-08-10 | End: 2018-08-21 | Stop reason: SDUPTHER

## 2018-08-17 ENCOUNTER — OFFICE VISIT (OUTPATIENT)
Dept: PSYCHIATRY | Facility: CLINIC | Age: 48
End: 2018-08-17
Payer: MEDICARE

## 2018-08-17 DIAGNOSIS — F31.9 BIPOLAR I DISORDER, CURRENT EPISODE DEPRESSED: Primary | ICD-10-CM

## 2018-08-17 PROCEDURE — 90834 PSYTX W PT 45 MINUTES: CPT | Mod: S$GLB,,, | Performed by: SOCIAL WORKER

## 2018-08-17 NOTE — PROGRESS NOTES
"Individual Psychotherapy (PhD/LCSW)    7/27/2018    [facility]         Therapeutic Intervention: Met with patient.  Outpatient - Insight oriented psychotherapy 45 min - CPT code 48515 and Outpatient - Supportive psychotherapy 45 min - CPT Code 69993    Chief complaint/reason for encounter: depression, mood swings, somatic and interpersonal     Interval history and content of current session:   Previous session 7/16/18.  48 year old female patient presented for follow up psychotherapy to address mood swings and stress management struggles, both anxious and depressive symptoms.  Stressors include family interpersonal conflict.  Patient presented alert and oriented, casually dressed.  She said she has been worried about her grandmother, who is in hospice and no longer conscious.  Patient saying it feels she is just "waiting for the inevitable."  She received a text from her aunt during the session, updating her on logistics of visiting the aunt.  Patient talkeda bout her empty nest anticipation worries about her "kids not needing [her] anymore."  Endorsed the idea that she has been lacking a sense of self outside of her family/parental role.  Persona time activities include reading in her  tub bubble bath, but since her depression hit, she has had trouble focusing enough to enjoy a book.  Could not think of any other interests.  She expressed frustration and aggravation with her , whom she characterized as "selfish and sex-obsessed" and said there is a big emotional disconnect between them.  Patient denied any suicidal or homicidal ideation, psychosis, cognitive deficit, or substance abuse.  Supportive therapy provided.  Plan is to follow up in clinic as scheduled, 8/17/18.                                                                                                                                                                                                                                      "                                                                                                                                                                                                                                                                                                                                                                                                                                                                                                                                                                                                                                                                                      Treatment plan:  · Target symptoms: recurrent depression, anxiety , mood swings, intpersonal conflict  · Why chosen therapy is appropriate versus another modality: relevant to diagnosis, patient responds to this modality  · Outcome monitoring methods: self-report, observation  · Therapeutic intervention type: insight oriented psychotherapy, supportive psychotherapy    Risk parameters:  Patient reports no suicidal ideation  Patient reports no homicidal ideation  Patient reports no self-injurious behavior  Patient reports no violent behavior    Verbal deficits: None    Patient's response to intervention:  The patient's response to intervention is accepting.    Progress toward goals and other mental status changes:  The patient's progress toward goals is fair .    Diagnosis:     ICD-10-CM ICD-9-CM   1. Bipolar I disorder with depression F31.9 296.50   2. Anxiety state F41.1 300.00       Plan:  individual psychotherapy and medication management by physician    Return to clinic: as scheduled    Length of Service (minutes): 45

## 2018-08-21 ENCOUNTER — OFFICE VISIT (OUTPATIENT)
Dept: PULMONOLOGY | Facility: CLINIC | Age: 48
End: 2018-08-21
Payer: MEDICARE

## 2018-08-21 ENCOUNTER — PROCEDURE VISIT (OUTPATIENT)
Dept: PULMONOLOGY | Facility: CLINIC | Age: 48
End: 2018-08-21
Payer: MEDICARE

## 2018-08-21 ENCOUNTER — HOSPITAL ENCOUNTER (OUTPATIENT)
Dept: RADIOLOGY | Facility: HOSPITAL | Age: 48
Discharge: HOME | End: 2018-08-21
Attending: INTERNAL MEDICINE
Payer: MEDICARE

## 2018-08-21 VITALS
OXYGEN SATURATION: 98 % | BODY MASS INDEX: 32.32 KG/M2 | HEIGHT: 65 IN | RESPIRATION RATE: 16 BRPM | DIASTOLIC BLOOD PRESSURE: 76 MMHG | SYSTOLIC BLOOD PRESSURE: 120 MMHG | HEART RATE: 99 BPM | WEIGHT: 194 LBS

## 2018-08-21 DIAGNOSIS — J98.9 REACTIVE AIRWAY DISEASE THAT IS NOT ASTHMA: ICD-10-CM

## 2018-08-21 DIAGNOSIS — J45.20 MILD INTERMITTENT ASTHMA, UNSPECIFIED WHETHER COMPLICATED: Primary | ICD-10-CM

## 2018-08-21 DIAGNOSIS — R06.02 SOB (SHORTNESS OF BREATH): ICD-10-CM

## 2018-08-21 DIAGNOSIS — H92.02 EAR PAIN, LEFT: ICD-10-CM

## 2018-08-21 DIAGNOSIS — J30.89 SEASONAL ALLERGIC RHINITIS DUE TO OTHER ALLERGIC TRIGGER: ICD-10-CM

## 2018-08-21 DIAGNOSIS — J45.20 MILD INTERMITTENT ASTHMA WITHOUT COMPLICATION: ICD-10-CM

## 2018-08-21 LAB
BRPFT: ABNORMAL
FEF 25 75 CHG: 40.4 %
FEF 25 75 LLN: 1.72
FEF 25 75 POST REF: 83.1 %
FEF 25 75 POST: 2.47 L/S (ref 1.72–4.23)
FEF 25 75 PRE REF: 59.1 %
FEF 25 75 PRE: 1.76 L/S (ref 1.72–4.23)
FEF 25 75 REF: 2.98
FET100 CHG: 13.4 %
FET100 POST: 11.15 SEC
FET100 PRE: 9.83 SEC
FEV1 CHG: 18.4 %
FEV1 FVC CHG: 9.3 %
FEV1 FVC LLN: 70
FEV1 FVC POST REF: 91.2 %
FEV1 FVC POST: 73.78 % (ref 69.88–91.84)
FEV1 FVC PRE REF: 83.5 %
FEV1 FVC PRE: 67.5 % (ref 69.88–91.84)
FEV1 FVC REF: 81
FEV1 LLN: 2.35
FEV1 POST REF: 102.2 %
FEV1 POST: 3.05 L (ref 2.35–3.62)
FEV1 PRE REF: 86.3 %
FEV1 PRE: 2.58 L (ref 2.35–3.62)
FEV1 REF: 2.98
FEV6 CHG: 8.7 %
FEV6 LLN: 2.98
FEV6 POST REF: 108.3 %
FEV6 POST: 4 L (ref 2.98–4.4)
FEV6 PRE REF: 99.6 %
FEV6 PRE: 3.67 L (ref 2.98–4.4)
FEV6 REF: 3.69
FVC CHG: 8.4 %
FVC LLN: 2.93
FVC POST REF: 111.4 %
FVC POST: 4.13 L (ref 2.93–4.49)
FVC PRE REF: 102.8 %
FVC PRE: 3.81 L (ref 2.93–4.49)
FVC REF: 3.71
MVV LLN: 95
MVV REF: 112
PEF CHG: 26.7 %
PEF LLN: 5.28
PEF POST REF: 68.6 %
PEF POST: 4.85 L/S (ref 5.28–8.85)
PEF PRE REF: 54.1 %
PEF PRE: 3.82 L/S (ref 5.28–8.85)
PEF REF: 7.06

## 2018-08-21 PROCEDURE — 99214 OFFICE O/P EST MOD 30 MIN: CPT | Mod: 25,S$GLB,, | Performed by: INTERNAL MEDICINE

## 2018-08-21 PROCEDURE — 3008F BODY MASS INDEX DOCD: CPT | Mod: S$GLB,,, | Performed by: INTERNAL MEDICINE

## 2018-08-21 PROCEDURE — 99999 PR PBB SHADOW E&M-EST. PATIENT-LVL III: CPT | Mod: PBBFAC,,, | Performed by: INTERNAL MEDICINE

## 2018-08-21 PROCEDURE — 71046 X-RAY EXAM CHEST 2 VIEWS: CPT | Mod: TC

## 2018-08-21 PROCEDURE — 71046 X-RAY EXAM CHEST 2 VIEWS: CPT | Mod: 26,,, | Performed by: RADIOLOGY

## 2018-08-21 PROCEDURE — 94060 EVALUATION OF WHEEZING: CPT | Mod: S$GLB,,, | Performed by: INTERNAL MEDICINE

## 2018-08-21 RX ORDER — FLUTICASONE PROPIONATE 50 MCG
1 SPRAY, SUSPENSION (ML) NASAL 2 TIMES DAILY
Qty: 3 BOTTLE | Refills: 3 | Status: SHIPPED | OUTPATIENT
Start: 2018-08-21 | End: 2019-09-03 | Stop reason: SDUPTHER

## 2018-08-21 RX ORDER — ALBUTEROL SULFATE 90 UG/1
2 AEROSOL, METERED RESPIRATORY (INHALATION) EVERY 6 HOURS
Qty: 3 INHALER | Refills: 3 | Status: SHIPPED | OUTPATIENT
Start: 2018-08-21 | End: 2019-05-01 | Stop reason: SDUPTHER

## 2018-08-21 RX ORDER — MONTELUKAST SODIUM 10 MG/1
10 TABLET ORAL DAILY
Qty: 90 TABLET | Refills: 3 | Status: SHIPPED | OUTPATIENT
Start: 2018-08-21 | End: 2019-09-02 | Stop reason: SDUPTHER

## 2018-08-21 RX ORDER — FLUTICASONE PROPIONATE 50 MCG
1 SPRAY, SUSPENSION (ML) NASAL 2 TIMES DAILY
COMMUNITY
End: 2018-08-21 | Stop reason: SDUPTHER

## 2018-08-21 RX ORDER — FLUTICASONE FUROATE AND VILANTEROL 200; 25 UG/1; UG/1
1 POWDER RESPIRATORY (INHALATION) DAILY
Qty: 180 EACH | Refills: 3 | Status: SHIPPED | OUTPATIENT
Start: 2018-08-21 | End: 2022-09-14 | Stop reason: SDUPTHER

## 2018-08-21 NOTE — PROGRESS NOTES
Subjective:    Patient ID: Daniela Costa is a 48 y.o. female.    Chief Complaint: She     Asthma Follow-up  The patient has previously been evaluated here for asthma and presents for an asthma follow-up. The patient is not currently have symptoms / an exacerbation. The patient has been having episodes for approximately 3 years. Symptoms in previous episodes have included dyspnea, non-productive cough and wheezing, and typically last 2 weeks. Previous episodes have been triggered by dust, exercise and pollens. Treatments tried during prior episodes include inhaled corticosteroids and short-acting inhaled beta-adrenergic agonists, which usually provides some relief of symptoms.      Current Disease Severity  The patient is having daytime symptoms less than or equal to 2 days per week. The patient is having daytime symptoms less than or equal to 2 times per month. The patient is using short-acting beta agonists for symptom control less than or equal to 2 days per week. She has exacerbations requiring oral systemic corticosteroids 0 times per year. Current limitations in activity from asthma: none. Number of days of school or work missed in the last month: not applicable. Number of urgent/emergent visit in last year: 0.  The patient is not using a spacer with MDIs. Her best peak flow rate is na. She is not monitoring peak flow rates at home.    Asthma    HPI  Past Medical History:   Diagnosis Date    Allergy     Anxiety     Arthritis     Asthma     Breast cancer genetic susceptibility     Cancer     Depression     Lung disease     Pneumonia      Past Surgical History:   Procedure Laterality Date    ADENOIDECTOMY      BACK SURGERY      BREAST SURGERY      CERVICAL FUSION      CERVICAL FUSION      lumbar fusion      MASTECTOMY      MASTECTOMY      SI joint fusion   03/26/2018    TONSILLECTOMY      TUBAL LIGATION       Social History     Socioeconomic History    Marital status:      Spouse  name: Not on file    Number of children: 6    Years of education: Not on file    Highest education level: Not on file   Social Needs    Financial resource strain: Not on file    Food insecurity - worry: Not on file    Food insecurity - inability: Not on file    Transportation needs - medical: Not on file    Transportation needs - non-medical: Not on file   Occupational History    Occupation: stay home mom    Tobacco Use    Smoking status: Former Smoker     Packs/day: 0.50     Years: 16.00     Pack years: 8.00     Types: Cigarettes     Start date: 1990     Last attempt to quit: 2014     Years since quittin.6    Smokeless tobacco: Never Used   Substance and Sexual Activity    Alcohol use: Yes     Comment: twice a month - wine     Drug use: No    Sexual activity: Yes   Other Topics Concern    Not on file   Social History Narrative    Patient has 4 biological children and 2 step     Review of Systems   Constitutional: Negative for fever and fatigue.   HENT: Positive for postnasal drip. Negative for rhinorrhea.    Eyes: Negative for redness and itching.   Respiratory: Positive for shortness of breath. Negative for cough, wheezing, dyspnea on extertion and Paroxysmal Nocturnal Dyspnea.    Cardiovascular: Negative for chest pain.   Genitourinary: Negative for difficulty urinating and hematuria.   Endocrine: Negative for polyphagia, cold intolerance and heat intolerance.    Musculoskeletal: Negative for arthralgias.   Skin: Negative for rash.   Gastrointestinal: Negative for nausea, vomiting, abdominal pain and abdominal distention.   Neurological: Negative for dizziness and headaches.   Hematological: Negative for adenopathy. Does not bruise/bleed easily and no excessive bruising.   Psychiatric/Behavioral: The patient is not nervous/anxious.        Objective:      Physical Exam   Constitutional: She is oriented to person, place, and time. She appears well-developed and well-nourished.   HENT:    Head: Normocephalic and atraumatic.   Eyes: Conjunctivae are normal. Pupils are equal, round, and reactive to light.   Neck: Neck supple. No JVD present. No tracheal deviation present. No thyromegaly present.   Cardiovascular: Normal rate, regular rhythm and normal heart sounds.   Pulmonary/Chest: Effort normal. No respiratory distress. She has decreased breath sounds. She has no wheezes. She has no rales. She exhibits no tenderness.   Abdominal: Soft. Bowel sounds are normal.   Musculoskeletal: Normal range of motion. She exhibits no edema.   Lymphadenopathy:     She has no cervical adenopathy.   Neurological: She is alert and oriented to person, place, and time.   Skin: Skin is warm and dry.   Nursing note and vitals reviewed.    Personal Diagnostic Review  Chest x-ray: normal / stable  Spirometry: slight obstruction  Office Spirometry Results:       No flowsheet data found.      Assessment:       1. Mild intermittent asthma, unspecified whether complicated    2. Allergic rhinitis    3. Ear pain, left    4. Reactive airway disease that is not asthma    5. Mild intermittent asthma without complication        Outpatient Encounter Medications as of 8/21/2018   Medication Sig Dispense Refill    ASCORBATE CALCIUM (VITAMIN C ORAL) Take by mouth.      clonazePAM (KLONOPIN) 0.5 MG tablet Take 1 tablet (0.5 mg total) by mouth daily as needed for Anxiety. 30 tablet 1    fluticasone (FLONASE) 50 mcg/actuation nasal spray 1 spray (50 mcg total) by Each Nare route 2 (two) times daily. 3 Bottle 3    inhalation spacing device (BREATHERITE VALVED MDI CHAMBER) Use as directed for inhalation. 1 Device prn    melatonin 1 mg Tab Take 1 tablet by mouth nightly.       montelukast (SINGULAIR) 10 mg tablet Take 1 tablet (10 mg total) by mouth once daily. 90 tablet 3    OXcarbazepine (TRILEPTAL) 150 MG Tab Take 1 tablet (150 mg total) by mouth 2 (two) times daily. 60 tablet 2    venlafaxine (EFFEXOR-XR) 150 MG Cp24 Take 1  capsule (150 mg total) by mouth once daily. 30 capsule 2    venlafaxine (EFFEXOR-XR) 75 MG 24 hr capsule Take 1 capsule (75 mg total) by mouth once daily. 90 capsule 2    [DISCONTINUED] albuterol (ACCUNEB) 0.63 mg/3 mL Nebu Take 3 mLs (0.63 mg total) by nebulization every 6 (six) hours as needed. 120 vial 11    [DISCONTINUED] budesonide-formoterol 160-4.5 mcg (SYMBICORT) 160-4.5 mcg/actuation HFAA Inhale 2 puffs into the lungs every 12 (twelve) hours. Wash out mouth after using 3 Inhaler 3    [DISCONTINUED] fluticasone (FLONASE) 50 mcg/actuation nasal spray 1 spray by Each Nare route 2 (two) times daily.      [DISCONTINUED] ibuprofen (ADVIL,MOTRIN) 800 MG tablet Take 800 mg by mouth as needed.       [DISCONTINUED] montelukast (SINGULAIR) 10 mg tablet TAKE 1 TABLET BY MOUTH ONCE DAILY 30 tablet 3    albuterol 90 mcg/actuation inhaler Inhale 2 puffs into the lungs every 6 (six) hours. 3 Inhaler 3    fluticasone-vilanterol (BREO ELLIPTA) 200-25 mcg/dose DsDv diskus inhaler Inhale 1 puff into the lungs once daily. Controller 180 each 3    [DISCONTINUED] dexamethasone (DECADRON) 0.1 % ophthalmic solution One drop in the right ear every 6-8 hours x 5 days. 5 mL 0     No facility-administered encounter medications on file as of 8/21/2018.      Orders Placed This Encounter   Procedures    X-Ray Chest PA And Lateral     Standing Status:   Future     Standing Expiration Date:   8/21/2019     Order Specific Question:   May the Radiologist modify the order per protocol to meet the clinical needs of the patient?     Answer:   Yes    Spirometry with/without bronchodilator     Standing Status:   Future     Standing Expiration Date:   8/21/2019     Plan:       Requested Prescriptions     Signed Prescriptions Disp Refills    fluticasone-vilanterol (BREO ELLIPTA) 200-25 mcg/dose DsDv diskus inhaler 180 each 3     Sig: Inhale 1 puff into the lungs once daily. Controller    albuterol 90 mcg/actuation inhaler 3 Inhaler 3      Sig: Inhale 2 puffs into the lungs every 6 (six) hours.    fluticasone (FLONASE) 50 mcg/actuation nasal spray 3 Bottle 3     Si spray (50 mcg total) by Each Nare route 2 (two) times daily.    montelukast (SINGULAIR) 10 mg tablet 90 tablet 3     Sig: Take 1 tablet (10 mg total) by mouth once daily.     Mild intermittent asthma, unspecified whether complicated  -     fluticasone-vilanterol (BREO ELLIPTA) 200-25 mcg/dose DsDv diskus inhaler; Inhale 1 puff into the lungs once daily. Controller  Dispense: 180 each; Refill: 3  -     albuterol 90 mcg/actuation inhaler; Inhale 2 puffs into the lungs every 6 (six) hours.  Dispense: 3 Inhaler; Refill: 3  -     Spirometry with/without bronchodilator; Future; Expected date: 2019  -     X-Ray Chest PA And Lateral; Future; Expected date: 2018  -     montelukast (SINGULAIR) 10 mg tablet; Take 1 tablet (10 mg total) by mouth once daily.  Dispense: 90 tablet; Refill: 3    Allergic rhinitis  -     fluticasone (FLONASE) 50 mcg/actuation nasal spray; 1 spray (50 mcg total) by Each Nare route 2 (two) times daily.  Dispense: 3 Bottle; Refill: 3  -     montelukast (SINGULAIR) 10 mg tablet; Take 1 tablet (10 mg total) by mouth once daily.  Dispense: 90 tablet; Refill: 3    Ear pain, left    Reactive airway disease that is not asthma    Mild intermittent asthma without complication           Follow-up in about 1 year (around 2019) for Review ria and CXR.    MEDICAL DECISION MAKING: Moderate to high complexity.  Overall, the multiple problems listed are of moderate to high severity that may impact quality of life and activities of daily living. Side effects of medications, treatment plan as well as options and alternatives reviewed and discussed with patient. There was counseling of patient concerning these issues.    Total time spent in face to face counseling and coordination of care - 25  minutes over 50% of time was used in discussion of prognosis, risks,  benefits of treatment, instructions and compliance with regimen . Discussion with other physicians or health care providers (DME, NP, pharmacy, respiratory therapy) occurred.

## 2018-08-21 NOTE — PATIENT INSTRUCTIONS
Fluticasone; Vilanterol inhalation powder  What is this medicine?  FLUTICASONE; VILANTEROL (floo TIK a sone; vye LUIS ter ol) inhalation is a combination of two medicines that decrease inflammation and help to open up the airways of your lungs. It is for chronic obstructive pulmonary disease (COPD), including chronic bronchitis or emphysema. It is also used for asthma in adults to help control symptoms. Do NOT use for an acute asthma attack or COPD attack.  How should I use this medicine?  This medicine is inhaled through the mouth. It is used once per day. Follow the directions on the prescription label. Do not use a spacer device with this inhaler. Take your medicine at regular intervals. Do not take your medicine more often than directed. Do not stop taking except on your doctor's advice. Make sure that you are using your inhaler correctly. Ask you doctor or health care provider if you have any questions.  A special MedGuide will be given to you by the pharmacist with each prescription and refill. Be sure to read this information carefully each time.  Talk to your pediatrician regarding the use of this medicine in children. Special care may be needed. This medicine is not approved for use in children under 18 years of age.  What side effects may I notice from receiving this medicine?  Side effects that you should report to your doctor or health care professional as soon as possible:  · allergic reactions like skin rash or hives, swelling of the face, lips, or tongue  · breathing problems right after inhaling your medicine  · changes in vision  · chest pain  · fast, irregular heartbeat  · feeling faint or lightheaded, falls  · fever or chills  · nausea, vomiting  · tiredness  Side effects that usually do not require medical attention (Report these to your doctor or health care professional if they continue or are bothersome.):  · cough  · headache  · nervousness  · sore throat  · tremor  What may interact with  this medicine?  Do not take this medicine with any of the following medications:  · cisapride  · dofetilide  · dronedarone  · MAOIs like Carbex, Eldepryl, Marplan, Nardil, and Parnate  · pimozide  · thioridazine  · ziprasidone  This medicine may also interact with the following medications:  · antiviral medicines for HIV or AIDS  · beta-blockers like metoprolol and propranolol  · certain medicines for depression, anxiety, or psychotic disturbances  · diuretics  · medicines for colds  · medicines for fungal infections like ketoconazole and itraconazole  · other medicines for breathing problems  · other medicines that prolong the QT interval (cause an abnormal heart rhythm)  What if I miss a dose?  If you miss a dose, use it as soon as you can. If it is almost time for your next dose, use only that dose and continue with your regular schedule. Do not use double or extra doses.  Where should I keep my medicine?  Keep out of the reach of children.  Store at room temperature between 15 and 30 degrees C (59 and 86 degrees F). Store in a dry place away from direct heat or sunlight. Throw away 6 weeks after you remove the inhaler from the foil tray, or after the dose indicator reads 0, whichever comes first. Throw away any unopened packages after the expiration date.  What should I tell my health care provider before I take this medicine?  They need to know if you have any of these conditions:  · bone problems  · immune system problems  · diabetes  · heart disease or irregular heartbeat  · high blood pressure  · infection  · pheochromocytoma  · seizures  · thyroid disease  · an unusual or allergic reaction to fluticasone, vilanterol, milk proteins, corticosteroids, other medicines, foods, dyes, or preservatives  · pregnant or trying to get pregnant  · breast-feeding  What should I watch for while using this medicine?  Visit your doctor or health care professional for regular checkups. Tell your doctor or health care  professional if your symptoms do not get better.  If your symptoms get worse or if you need your short-acting inhalers more often, call your doctor right away. Do not use this medicine more than every 24 hours.  If you are going to have surgery tell your doctor or health care professional that you are using this medicine. Try not to come in contact with people with the chicken pox or measles. If you do, call your doctor.  NOTE:This sheet is a summary. It may not cover all possible information. If you have questions about this medicine, talk to your doctor, pharmacist, or health care provider. Copyright© 2017 [employer]
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
## 2018-09-10 ENCOUNTER — OFFICE VISIT (OUTPATIENT)
Dept: OBSTETRICS AND GYNECOLOGY | Facility: CLINIC | Age: 48
End: 2018-09-10
Payer: MEDICARE

## 2018-09-10 ENCOUNTER — LAB VISIT (OUTPATIENT)
Dept: LAB | Facility: HOSPITAL | Age: 48
End: 2018-09-10
Attending: OBSTETRICS & GYNECOLOGY
Payer: MEDICARE

## 2018-09-10 VITALS — BODY MASS INDEX: 32.29 KG/M2 | HEIGHT: 65 IN | WEIGHT: 193.81 LBS

## 2018-09-10 DIAGNOSIS — Z01.419 WELL WOMAN EXAM WITH ROUTINE GYNECOLOGICAL EXAM: ICD-10-CM

## 2018-09-10 DIAGNOSIS — Z01.419 WELL WOMAN EXAM WITH ROUTINE GYNECOLOGICAL EXAM: Primary | ICD-10-CM

## 2018-09-10 PROCEDURE — 86694 HERPES SIMPLEX NES ANTBDY: CPT

## 2018-09-10 PROCEDURE — 86703 HIV-1/HIV-2 1 RESULT ANTBDY: CPT

## 2018-09-10 PROCEDURE — 99999 PR PBB SHADOW E&M-EST. PATIENT-LVL III: CPT | Mod: PBBFAC,,, | Performed by: OBSTETRICS & GYNECOLOGY

## 2018-09-10 PROCEDURE — 86592 SYPHILIS TEST NON-TREP QUAL: CPT

## 2018-09-10 PROCEDURE — 86696 HERPES SIMPLEX TYPE 2 TEST: CPT

## 2018-09-10 PROCEDURE — 99213 OFFICE O/P EST LOW 20 MIN: CPT | Mod: PBBFAC | Performed by: OBSTETRICS & GYNECOLOGY

## 2018-09-10 PROCEDURE — 87491 CHLMYD TRACH DNA AMP PROBE: CPT

## 2018-09-10 PROCEDURE — 99386 PREV VISIT NEW AGE 40-64: CPT | Mod: S$PBB,,, | Performed by: OBSTETRICS & GYNECOLOGY

## 2018-09-10 PROCEDURE — 88175 CYTOPATH C/V AUTO FLUID REDO: CPT | Performed by: PATHOLOGY

## 2018-09-10 PROCEDURE — 88141 CYTOPATH C/V INTERPRET: CPT | Mod: ,,, | Performed by: PATHOLOGY

## 2018-09-10 PROCEDURE — 80074 ACUTE HEPATITIS PANEL: CPT

## 2018-09-10 NOTE — PROGRESS NOTES
CHIEF COMPLAINT:   Gynecologic Exam  Chief Complaint   Patient presents with    Well Woman       HISTORY OF PRESENT ILLNESS  Patient presents for annual exam. The patient has no complaints today.   However she would like a full STD ck as she just found out her  was cheating on her.   Requests HSV bloodwork as well as she did have monthly itch on R side of labia for past 3mo - not sure if had lesions also.  There is no physical or sexual abuse.   She has a hx of depression, treated by a psychiatrist - currently upset at her , and feels sad (and recently resorted to old habit of cutting herself on her leg, however, stopped as she felt she needed to be there for her 3 kids ; feels hope and looks forward to her activities w them), however does not have thoughts of suicide or homicide. She has an appt tomorrow w her psychiatrist and therapist.  She is sexually active.  Contraception:  BTL     Postmenopausal since her first chemotherapy for BRCA  Patient's last menstrual period was 08/01/2012 (within days).    GYN screening history: last Pap: was normal.      Health Maintenance   Topic Date Due    Pneumococcal PCV13 (High Risk) (1 - PCV13 Required) 02/02/1974    Pneumococcal PPSV23 (High Risk) (1) 02/02/1990    Influenza Vaccine  08/01/2018    Pap Smear with HPV Cotest  10/01/2019    Lipid Panel  04/06/2022    TETANUS VACCINE  01/01/2025    Mammogram  Discontinued       HISTORY  Patient Active Problem List   Diagnosis    Bipolar I disorder    Malignant neoplasm of breast    Allergic rhinitis    Obesity due to excess calories    Reactive airway disease that is not asthma    Airway hyperreactivity    Insomnia    Anxiety    Mild intermittent asthma without complication       Past Medical History:   Diagnosis Date    Allergy     Anxiety     Arthritis     Asthma     Breast cancer genetic susceptibility     Cancer     Depression     Lung disease     Pneumonia        Past Surgical History:    Procedure Laterality Date    ADENOIDECTOMY      BACK SURGERY      BREAST SURGERY      CERVICAL FUSION      CERVICAL FUSION      lumbar fusion      MASTECTOMY      MASTECTOMY      SI joint fusion   2018    TONSILLECTOMY      TUBAL LIGATION         Family History   Problem Relation Age of Onset    Diabetes Mother     Hyperlipidemia Mother     Diabetes Father     Hyperlipidemia Father        Social History     Socioeconomic History    Marital status:      Spouse name: None    Number of children: 6    Years of education: None    Highest education level: None   Social Needs    Financial resource strain: None    Food insecurity - worry: None    Food insecurity - inability: None    Transportation needs - medical: None    Transportation needs - non-medical: None   Occupational History    Occupation: stay home mom    Tobacco Use    Smoking status: Former Smoker     Packs/day: 0.50     Years: 16.00     Pack years: 8.00     Types: Cigarettes     Start date: 1990     Last attempt to quit: 2014     Years since quittin.6    Smokeless tobacco: Never Used   Substance and Sexual Activity    Alcohol use: Yes     Comment: twice a month - wine     Drug use: No    Sexual activity: No   Other Topics Concern    None   Social History Narrative    Patient has 4 biological children and 2 step       Current Outpatient Medications   Medication Sig Dispense Refill    albuterol 90 mcg/actuation inhaler Inhale 2 puffs into the lungs every 6 (six) hours. 3 Inhaler 3    ASCORBATE CALCIUM (VITAMIN C ORAL) Take by mouth.      clonazePAM (KLONOPIN) 0.5 MG tablet Take 1 tablet (0.5 mg total) by mouth daily as needed for Anxiety. 30 tablet 1    fluticasone (FLONASE) 50 mcg/actuation nasal spray 1 spray (50 mcg total) by Each Nare route 2 (two) times daily. 3 Bottle 3    fluticasone-vilanterol (BREO ELLIPTA) 200-25 mcg/dose DsDv diskus inhaler Inhale 1 puff into the lungs once daily.  "Controller 180 each 3    inhalation spacing device (BREATHERITE VALVED MDI CHAMBER) Use as directed for inhalation. 1 Device prn    melatonin 1 mg Tab Take 1 tablet by mouth nightly.       montelukast (SINGULAIR) 10 mg tablet Take 1 tablet (10 mg total) by mouth once daily. 90 tablet 3    OXcarbazepine (TRILEPTAL) 150 MG Tab Take 1 tablet (150 mg total) by mouth 2 (two) times daily. 60 tablet 2    venlafaxine (EFFEXOR-XR) 150 MG Cp24 Take 1 capsule (150 mg total) by mouth once daily. 30 capsule 2    venlafaxine (EFFEXOR-XR) 75 MG 24 hr capsule Take 1 capsule (75 mg total) by mouth once daily. 90 capsule 2     No current facility-administered medications for this visit.        Review of patient's allergies indicates:   Allergen Reactions    Cefdinir     Levofloxacin Hives    Strawberry Hives           PHYSICAL EXAM     Vitals:    09/10/18 1353   Weight: 87.9 kg (193 lb 12.6 oz)   Height: 5' 5" (1.651 m)   PainSc: 0-No pain      PAIN SCALE: 0/10 None    ROS:  GENERAL: No fever, chills, fatigability or weight loss.  ABDOMEN: Appetite fine. No weight loss. Denies diarrhea, abdominal pain, hematemesis or blood in stool.  No change in bowel movement pattern.  URINARY: No flank pain, dysuria or hematuria.  REPRODUCTIVE: No abnormal vaginal bleeding.  BREASTS: Breasts symmetric, nontender and no lumps detected.    PE:   APPEARANCE: Well nourished, well developed, in no acute distress.  NECK: Neck symmetric without masses or thyromegaly.     NODES: No inguinal lymph node enlargement.  ABDOMEN: Soft. No tenderness or masses. No hepatosplenomegaly. No hernias.  BREASTS: implants from reconstruction noted. No palpable masses  or adenopathy bilaterally.    PELVIC:   VULVA: No lesions. atrohpic female genitalia.  URETHRAL MEATUS: Normal size and location, no lesions, no prolapse.  URETHRA: No masses, tenderness, prolapse or scarring.  VAGINA: dry, no discharge, no significant cystocele or rectocele.  CERVIX: No lesions, " normal diameter, no stenosis, no cervical motion tenderness.  UTERUS: 8 week size, regular shape, mobile, non-tender, normal position, good support.  ADNEXA: No masses, tenderness or CDS nodularity.  ANUS PERINEUM: No lesions, no relaxation, external hemorrhoids noted.     SKIN: on L medial thigh an area of healing cuts noted      DIAGNOSIS:   1. Normal gyn exam  2. Screening pap smear  3. STD screen  4. History depression  5. Recent cutting  6. Hx BRCA      PLAN:   Mammogram   Std scr incl HSV per pt request            COUNSELING:  Patient was counseled today on A.C.S. Pap guidelines and recommendations for yearly pelvic exams, mammograms and monthly self breast exams; to see her PCP for other health maintenance.   Discussed anger, grieving, depression, and risk of suicide/homicide marcella in the situtaion she is in. Encouraged pt to have an 'escape bag' in case her situation changes and she feels in any danger. Told her this bag can likewise be used to bring to the ER in case she feels any thought suicide come on. She verbalized understanding but confirms she has no htoughts/plans of such. She is looking forward to her mental health appts tomorrow.    FOLLOW-UP: With me for f/u on her depression/cutting if she needs between her therapy and psychiatrist sessions.

## 2018-09-11 ENCOUNTER — OFFICE VISIT (OUTPATIENT)
Dept: PSYCHIATRY | Facility: CLINIC | Age: 48
End: 2018-09-11
Payer: MEDICARE

## 2018-09-11 ENCOUNTER — OFFICE VISIT (OUTPATIENT)
Dept: PSYCHIATRY | Facility: CLINIC | Age: 48
End: 2018-09-11
Payer: MEDICAID

## 2018-09-11 DIAGNOSIS — F41.9 ANXIETY: Primary | ICD-10-CM

## 2018-09-11 DIAGNOSIS — F10.21 MODERATE ALCOHOL USE DISORDER, IN SUSTAINED REMISSION: ICD-10-CM

## 2018-09-11 DIAGNOSIS — F31.30 BIPOLAR I DISORDER, MOST RECENT EPISODE (OR CURRENT) DEPRESSED: Primary | ICD-10-CM

## 2018-09-11 DIAGNOSIS — F31.9 BIPOLAR I DISORDER: ICD-10-CM

## 2018-09-11 LAB
C TRACH DNA SPEC QL NAA+PROBE: NOT DETECTED
HAV IGM SERPL QL IA: NEGATIVE
HBV CORE IGM SERPL QL IA: NEGATIVE
HBV SURFACE AG SERPL QL IA: NEGATIVE
HCV AB SERPL QL IA: NEGATIVE
HIV 1+2 AB+HIV1 P24 AG SERPL QL IA: NEGATIVE
HSV1 IGG SERPL QL IA: NEGATIVE
HSV2 IGG SERPL QL IA: NEGATIVE
N GONORRHOEA DNA SPEC QL NAA+PROBE: NOT DETECTED
RPR SER QL: NORMAL

## 2018-09-11 PROCEDURE — 99214 OFFICE O/P EST MOD 30 MIN: CPT | Mod: S$GLB,,, | Performed by: PSYCHIATRY & NEUROLOGY

## 2018-09-11 PROCEDURE — 90834 PSYTX W PT 45 MINUTES: CPT | Mod: PBBFAC,PO | Performed by: SOCIAL WORKER

## 2018-09-11 PROCEDURE — 90834 PSYTX W PT 45 MINUTES: CPT | Mod: S$GLB,,, | Performed by: SOCIAL WORKER

## 2018-09-11 RX ORDER — QUETIAPINE FUMARATE 100 MG/1
TABLET, FILM COATED ORAL
Qty: 45 TABLET | Refills: 2 | Status: SHIPPED | OUTPATIENT
Start: 2018-09-11 | End: 2018-12-30 | Stop reason: SDUPTHER

## 2018-09-11 RX ORDER — CLONAZEPAM 1 MG/1
1 TABLET ORAL DAILY PRN
Qty: 30 TABLET | Refills: 2 | Status: SHIPPED | OUTPATIENT
Start: 2018-09-11 | End: 2018-11-07 | Stop reason: SDUPTHER

## 2018-09-11 RX ORDER — VENLAFAXINE HYDROCHLORIDE 150 MG/1
150 CAPSULE, EXTENDED RELEASE ORAL DAILY
Qty: 30 CAPSULE | Refills: 1 | Status: SHIPPED | OUTPATIENT
Start: 2018-09-11 | End: 2019-01-04

## 2018-09-11 RX ORDER — VENLAFAXINE HYDROCHLORIDE 75 MG/1
75 CAPSULE, EXTENDED RELEASE ORAL DAILY
Qty: 90 CAPSULE | Refills: 1
Start: 2018-09-11 | End: 2018-09-11 | Stop reason: SDUPTHER
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
## 2018-09-12 ENCOUNTER — PATIENT MESSAGE (OUTPATIENT)
Dept: OBSTETRICS AND GYNECOLOGY | Facility: CLINIC | Age: 48
End: 2018-09-12

## 2018-09-12 LAB — HSV AB, IGM BY EIA: NEGATIVE

## 2018-09-12 RX ORDER — VENLAFAXINE HYDROCHLORIDE 75 MG/1
CAPSULE, EXTENDED RELEASE ORAL
Qty: 30 CAPSULE | Refills: 4 | Status: SHIPPED | OUTPATIENT
Start: 2018-09-12 | End: 2019-01-04

## 2018-09-17 ENCOUNTER — TELEPHONE (OUTPATIENT)
Dept: OBSTETRICS AND GYNECOLOGY | Facility: CLINIC | Age: 48
End: 2018-09-17

## 2018-09-17 NOTE — TELEPHONE ENCOUNTER
Attempted to contact patient regarding pap results, no answer. Left patient voice mail to return call to clinic.

## 2018-09-17 NOTE — PROGRESS NOTES
Please notify pt her pap smear is abnormal and requires further testing with colposcopy.   Please schedule colpo and send colpo pamphlet.

## 2018-09-17 NOTE — TELEPHONE ENCOUNTER
----- Message from Meka Reich MD sent at 9/17/2018  7:37 AM CDT -----  Please notify pt her pap smear is abnormal and requires further testing with colposcopy.   Please schedule colpo and send colpo pamphlet.

## 2018-09-21 ENCOUNTER — TELEPHONE (OUTPATIENT)
Dept: OBSTETRICS AND GYNECOLOGY | Facility: CLINIC | Age: 48
End: 2018-09-21

## 2018-09-21 NOTE — TELEPHONE ENCOUNTER
Spoke with pt. Notified of abnormal pap. Explained colposcopy procedure. Scheduled colpo for 10/15/18 at 1:30 with Dr. Reich at the [facility] location. Pt verbalized understanding.

## 2018-09-21 NOTE — TELEPHONE ENCOUNTER
----- Message from Lu Izquierdo sent at 9/21/2018  1:07 PM CDT -----  Contact: pt   Pt is requesting a call back from the nurse in regards to pt pap test  [phone] (home)

## 2018-09-27 NOTE — PROGRESS NOTES
Individual Psychotherapy (PhD/LCSW)    9/11/2018    [facility]         Therapeutic Intervention: Met with patient.  Outpatient - Insight oriented psychotherapy 45 min - CPT code 84496 and Outpatient - Supportive psychotherapy 45 min - CPT Code 91587    Chief complaint/reason for encounter: depression, mood swings, somatic and interpersonal     Interval history and content of current session:   Previous session 8/17/18.  48 year old female patient presented for follow up psychotherapy to address mood swings and stress management struggles, both anxious and depressive symptoms in the context of family tensions.  Patient presented alert and oriented, appropriate.  She had just come directly from her medication management appointment, in which she revealed she had caught her  in an extramarital affair.  Talked to her about the revelation and her emotional response to it.  She reported having been quite emotional the preceding hour, but now more calm.  She endorsed resolve to leave him. Stated his reaction to being caught in an affair was to insist on an open marriage.  Patient is having none of it.  She talked about plans working on herself and her own happiness and of simplifying her life.  She endorsed feeling good about her financial ability to support herself.  Realizes she has some logistical tasks to get through.  Patient verbally mused over her life events relating to personal relationships, stating she feels good and ready to be on her own for the foreseeable future.  Endorsed disappointment in this present relationship dashing her hopes for it, but also voiced understanding that she is in the 's seat for her own satisfaction with her life.  Patient denied any suicidal or homicidal ideation, psychosis, cognitive deficit, or substance abuse.  Supportive therapy provided.  Plan is to follow up in clinic as scheduled,  10/11/18.                                                                                                                                                                                                                                                                                                                                                                                                                                                                                                                                                                                                                                                                                                                                                                                                                                                                                                                            Treatment plan:  · Target symptoms: recurrent depression, anxiety , mood swings, intpersonal conflict  · Why chosen therapy is appropriate versus another modality: relevant to diagnosis, patient responds to this modality  · Outcome monitoring methods: self-report, observation  · Therapeutic intervention type: insight oriented psychotherapy, supportive psychotherapy    Risk parameters:  Patient reports no suicidal ideation  Patient reports no homicidal ideation  Patient reports no self-injurious behavior  Patient reports no violent behavior    Verbal deficits: None    Patient's response to intervention:  The patient's response to intervention is accepting.    Progress toward goals and other mental status changes:  The patient's progress toward goals is fair .    Diagnosis:     ICD-10-CM ICD-9-CM   1. Bipolar I disorder, most recent episode (or current) depressed F31.30 296.50   2. Moderate alcohol use disorder, in sustained remission F10.21 305.03       Plan:  individual psychotherapy and medication management by physician    Return to clinic: as scheduled    Length of Service (minutes): 45

## 2018-10-11 ENCOUNTER — OFFICE VISIT (OUTPATIENT)
Dept: PSYCHIATRY | Facility: CLINIC | Age: 48
End: 2018-10-11
Payer: MEDICARE

## 2018-10-11 DIAGNOSIS — F41.9 ANXIETY DISORDER, UNSPECIFIED TYPE: Primary | ICD-10-CM

## 2018-10-11 DIAGNOSIS — F31.30 BIPOLAR DISORDER, MOST RECENT EPISODE DEPRESSED: ICD-10-CM

## 2018-10-11 PROCEDURE — 90834 PSYTX W PT 45 MINUTES: CPT | Mod: S$PBB,,, | Performed by: SOCIAL WORKER

## 2018-10-11 PROCEDURE — 90834 PSYTX W PT 45 MINUTES: CPT | Mod: PBBFAC,PO | Performed by: SOCIAL WORKER

## 2018-10-14 ENCOUNTER — OFFICE VISIT (OUTPATIENT)
Dept: URGENT CARE | Facility: CLINIC | Age: 48
End: 2018-10-14
Payer: MEDICARE

## 2018-10-14 VITALS
HEART RATE: 104 BPM | DIASTOLIC BLOOD PRESSURE: 80 MMHG | SYSTOLIC BLOOD PRESSURE: 102 MMHG | WEIGHT: 183.63 LBS | OXYGEN SATURATION: 98 % | TEMPERATURE: 98 F | BODY MASS INDEX: 30.59 KG/M2 | HEIGHT: 65 IN

## 2018-10-14 DIAGNOSIS — H66.91 RIGHT OTITIS MEDIA, UNSPECIFIED OTITIS MEDIA TYPE: Primary | ICD-10-CM

## 2018-10-14 DIAGNOSIS — T36.95XA ANTIBIOTIC-INDUCED YEAST INFECTION: ICD-10-CM

## 2018-10-14 DIAGNOSIS — H60.501 ACUTE OTITIS EXTERNA OF RIGHT EAR, UNSPECIFIED TYPE: ICD-10-CM

## 2018-10-14 DIAGNOSIS — B37.9 ANTIBIOTIC-INDUCED YEAST INFECTION: ICD-10-CM

## 2018-10-14 PROCEDURE — 99214 OFFICE O/P EST MOD 30 MIN: CPT | Mod: S$PBB,,, | Performed by: PHYSICIAN ASSISTANT

## 2018-10-14 PROCEDURE — 99213 OFFICE O/P EST LOW 20 MIN: CPT | Mod: PBBFAC,PO | Performed by: PHYSICIAN ASSISTANT

## 2018-10-14 PROCEDURE — 3008F BODY MASS INDEX DOCD: CPT | Mod: ,,, | Performed by: PHYSICIAN ASSISTANT

## 2018-10-14 PROCEDURE — 99999 PR PBB SHADOW E&M-EST. PATIENT-LVL III: CPT | Mod: PBBFAC,,, | Performed by: PHYSICIAN ASSISTANT

## 2018-10-14 RX ORDER — FLUCONAZOLE 150 MG/1
150 TABLET ORAL ONCE
Qty: 2 TABLET | Refills: 0 | Status: SHIPPED | OUTPATIENT
Start: 2018-10-14 | End: 2018-10-14

## 2018-10-14 RX ORDER — IBUPROFEN 800 MG/1
800 TABLET ORAL 3 TIMES DAILY
COMMUNITY
End: 2022-09-22

## 2018-10-14 RX ORDER — NEOMYCIN SULFATE, POLYMYXIN B SULFATE AND HYDROCORTISONE 10; 3.5; 1 MG/ML; MG/ML; [USP'U]/ML
4 SUSPENSION/ DROPS AURICULAR (OTIC) 3 TIMES DAILY
Qty: 8 ML | Refills: 0 | Status: SHIPPED | OUTPATIENT
Start: 2018-10-14 | End: 2018-10-24

## 2018-10-14 RX ORDER — AMOXICILLIN AND CLAVULANATE POTASSIUM 875; 125 MG/1; MG/1
1 TABLET, FILM COATED ORAL 2 TIMES DAILY
Qty: 14 TABLET | Refills: 0 | Status: SHIPPED | OUTPATIENT
Start: 2018-10-14 | End: 2018-10-21

## 2018-10-14 NOTE — PATIENT INSTRUCTIONS
Middle Ear Infection (Adult)  You have an infection of the middle ear, the space behind the eardrum. This is also called acute otitis media (AOM). Sometimes it is caused by the common cold. This is because congestion can block the internal passage (eustachian tube) that drains fluid from the middle ear. When the middle ear fills with fluid, bacteria can grow there and cause an infection. Oral antibiotics are used to treat this illness, not ear drops. Symptoms usually start to improve within 1 to 2 days of treatment.    Home care  The following are general care guidelines:  · Finish all of the antibiotic medicine given, even though you may feel better after the first few days.  · You may use over-the-counter medicine, such as acetaminophen or ibuprofen, to control pain and fever, unless something else was prescribed. If you have chronic liver or kidney disease or have ever had a stomach ulcer or gastrointestinal bleeding, talk with your healthcare provider before using these medicines. Do not give aspirin to anyone under 18 years of age who has a fever. It may cause severe illness or death.  Follow-up care  Follow up with your healthcare provider, or as advised, in 2 weeks if all symptoms have not gotten better, or if hearing doesn't go back to normal within 1 month.  When to seek medical advice  Call your healthcare provider right away if any of these occur:  · Ear pain gets worse or does not improve after 3 days of treatment  · Unusual drowsiness or confusion  · Neck pain, stiff neck, or headache  · Fluid or blood draining from the ear canal  · Fever of 100.4°F (38°C) or as advised   · Seizure  Date Last Reviewed: 6/1/2016 © 2000-2017 [employer]. [address] [place] [address], [place] [address]. All rights reserved. This information is not intended as a substitute for professional medical care. Always follow your healthcare professional's instructions.        External Ear Infection (Adult)    External  otitis (also called swimmers ear) is an infection in the ear canal. It is often caused by bacteria or fungus. It can occur a few days after water gets trapped in the ear canal (from swimming or bathing). It can also occur after cleaning too deeply in the ear canal with a cotton swab or other object. Sometimes, hair care products get into the ear canal and cause this problem.  Symptoms can include pain, fever, itching, redness, drainage, or swelling of the ear canal. Temporary hearing loss may also occur.  Home care  · Do not try to clean the ear canal. This can push pus and bacteria deeper into the canal.  · Use prescribed ear drops as directed. These help reduce swelling and fight the infection. If an ear wick was placed in the ear canal, apply drops right onto the end of the wick. The wick will draw the medication into the ear canal even if it is swollen closed.  · A cotton ball may be loosely placed in the outer ear to absorb any drainage.  · You may use acetaminophen or ibuprofen to control pain, unless another medication was prescribed. Note: If you have chronic liver or kidney disease or ever had a stomach ulcer or GI bleeding, talk to your health care provider before taking any of these medications.  · Do not allow water to get into your ear when bathing. Also, avoid swimming until the infection has cleared.  Prevention  · Keep your ears dry. This helps lower the risk of infection. Dry your ears with a towel or hair dryer after getting wet. Also, use ear plugs when swimming.  · Do not stick any objects in the ear to remove wax.  · If you feel water trapped in your ear, use ear drops right away. You can get these drops over the counter at most drugstores. They work by removing water from the ear canal.  Follow-up care  Follow up with your health care provider in one week, or as advised.  When to seek medical advice  Call your health care provider right away if any of these occur:  · Ear pain becomes worse  or doesnt improve after 3 days of treatment  · Redness or swelling of the outer ear occurs or gets worse  · Headache  · Painful or stiff neck  · Drowsiness or confusion  · Fever of 100.4ºF (38ºC) or higher, or as directed by your health care provider  · Seizure  Date Last Reviewed: 3/22/2015  © 5421-5059 [employer]. [address] [place] [address]. All rights reserved. This information is not intended as a substitute for professional medical care. Always follow your healthcare professional's instructions.

## 2018-10-14 NOTE — PROGRESS NOTES
"Subjective:      Patient ID: Daniela Costa is a 48 y.o. female.    Chief Complaint: Otalgia (bilateral x 1 day)    Otalgia    There is pain in both (R > L) ears. The current episode started yesterday. Associated symptoms include ear discharge (R, yellow drainage  ) and headaches. Pertinent negatives include no abdominal pain, coughing, diarrhea, rhinorrhea, sore throat or vomiting. Treatments tried: Ibuprofen 800, homeopathic ear drops. Her past medical history is significant for a chronic ear infection.     Review of Systems   Constitutional: Negative for chills, diaphoresis and fever.   HENT: Positive for ear discharge (R, yellow drainage  ), ear pain and tinnitus. Negative for congestion, rhinorrhea and sore throat.    Respiratory: Negative for cough, shortness of breath and wheezing.    Gastrointestinal: Negative for abdominal pain, constipation, diarrhea, nausea and vomiting.   Neurological: Positive for headaches. Negative for dizziness and light-headedness.       Objective:   /80 (BP Location: Left arm, Patient Position: Sitting, BP Method: Large (Manual))   Pulse 104   Temp 97.9 °F (36.6 °C) (Tympanic)   Ht 5' 5" (1.651 m)   Wt 83.3 kg (183 lb 10.3 oz)   SpO2 98%   BMI 30.56 kg/m²   Physical Exam   Constitutional: She appears well-developed and well-nourished. She does not appear ill. No distress.   HENT:   Head: Normocephalic and atraumatic.   Right Ear: There is tenderness. Tympanic membrane is erythematous. Tympanic membrane is not perforated. No middle ear effusion.   Left Ear: Tympanic membrane and ear canal normal. Tympanic membrane is not erythematous.  No middle ear effusion.   Nose: No mucosal edema or rhinorrhea. Right sinus exhibits maxillary sinus tenderness and frontal sinus tenderness. Left sinus exhibits maxillary sinus tenderness and frontal sinus tenderness.   Mouth/Throat: Uvula is midline and oropharynx is clear and moist. No posterior oropharyngeal erythema.   Erythematous R " ear canal   Cardiovascular: Normal rate, regular rhythm and normal heart sounds.   No murmur heard.  Pulmonary/Chest: Effort normal and breath sounds normal. No respiratory distress. She has no decreased breath sounds. She has no wheezes. She has no rhonchi. She has no rales.   Lymphadenopathy:     She has no cervical adenopathy.   Skin: Skin is warm and dry. No rash noted. She is not diaphoretic.   Psychiatric: She has a normal mood and affect. Her speech is normal and behavior is normal. Thought content normal.     Assessment:      1. Right otitis media, unspecified otitis media type    2. Antibiotic-induced yeast infection    3. Acute otitis externa of right ear, unspecified type       Plan:   Right otitis media, unspecified otitis media type  -     amoxicillin-clavulanate 875-125mg (AUGMENTIN) 875-125 mg per tablet; Take 1 tablet by mouth 2 (two) times daily. for 7 days  Dispense: 14 tablet; Refill: 0    Antibiotic-induced yeast infection  -     fluconazole (DIFLUCAN) 150 MG Tab; Take 1 tablet (150 mg total) by mouth once. Repeat in 3 days if symptoms still persist. for 1 dose  Dispense: 2 tablet; Refill: 0    Acute otitis externa of right ear, unspecified type  -     neomycin-polymyxin-hydrocortisone (CORTISPORIN) 3.5-10,000-1 mg/mL-unit/mL-% otic suspension; Place 4 drops into the right ear 3 (three) times daily. for 10 days  Dispense: 8 mL; Refill: 0    Motrin/Tylenol as needed for pain    Gave handout on OM and OE.  Printed AVS and reviewed treatment plan in detail.    Discussed worsening signs/symptoms and when to return to clinic or go to ED.   Patient expresses understanding and agrees with treatment plan.

## 2018-10-15 ENCOUNTER — PROCEDURE VISIT (OUTPATIENT)
Dept: OBSTETRICS AND GYNECOLOGY | Facility: CLINIC | Age: 48
End: 2018-10-15
Payer: MEDICARE

## 2018-10-15 VITALS
WEIGHT: 188.94 LBS | SYSTOLIC BLOOD PRESSURE: 120 MMHG | DIASTOLIC BLOOD PRESSURE: 76 MMHG | HEIGHT: 65 IN | BODY MASS INDEX: 31.48 KG/M2

## 2018-10-15 DIAGNOSIS — R87.612 LGSIL ON PAP SMEAR OF CERVIX: Primary | ICD-10-CM

## 2018-10-15 PROCEDURE — 88305 TISSUE EXAM BY PATHOLOGIST: CPT | Mod: 59 | Performed by: PATHOLOGY

## 2018-10-15 PROCEDURE — 57454 BX/CURETT OF CERVIX W/SCOPE: CPT | Mod: S$PBB,,, | Performed by: OBSTETRICS & GYNECOLOGY

## 2018-10-15 PROCEDURE — 88305 TISSUE EXAM BY PATHOLOGIST: CPT | Mod: 26,,, | Performed by: PATHOLOGY

## 2018-10-15 PROCEDURE — 57454 BX/CURETT OF CERVIX W/SCOPE: CPT | Mod: PBBFAC | Performed by: OBSTETRICS & GYNECOLOGY

## 2018-10-16 NOTE — PROCEDURES
COLPOSCOPY:    Daniela Costa is a 48 y.o. female   presents for colposcopy.  No LMP recorded (lmp unknown). Patient is not currently having periods (Reason: Other)..  Her most recent pap smear shows low-grade squamous intraepithelial neoplasia (LGSIL - encompassing HPV,mild dysplasia,DAV I).      The abnormal test findings were discussed, as well as HPV infection, need for colposcopy and possible biopsies to determine the plan of care, treatments available, the minimal risk of bleeding and infection with colposcopy, and alternatives to colposcopy and she agrees to proceed.      UPT is negative    COLPOSCOPY EXAM:   TIME OUT PERFORMED.     There were no vulvar, vaginal or cervical lesions.  The transformation zone was visualized.     acetowhite lesion(s) noted at 6, 11, 200 o'clock    Biopsy was taken at 6, 11, 2:00 o'clock.  ECC was performed    Hemostasis was adequate with application of Monsel's solution.  The speculum was removed.  The patient did tolerate the procedure well.    All collected specimens sent to pathology for histologic analysis.    ebl <2cc    Post-colposcopy counseling:  The patient was instructed to manage post-colposcopy cramping with NSAIDs or Tylenol, or with a prescription per the medication card.  Avoid intercourse, douching, or tampons in the vagina for at least 2-3 days.  Expect a clumpy blackish discharge due to Monsel's solution application for several days.  Report heavy bleeding, worsening pain or pain that does not respond to above medications, or foul-smelling vaginal discharge. HPV vaccine recommended according to FDA age guidelines.  Importance of follow-up stressed.      Follow up based on colposcopy results.  Pt interested in hysterectomy. Will discuss more at next visit.

## 2018-10-22 ENCOUNTER — TELEPHONE (OUTPATIENT)
Dept: OBSTETRICS AND GYNECOLOGY | Facility: CLINIC | Age: 48
End: 2018-10-22

## 2018-10-22 NOTE — TELEPHONE ENCOUNTER
Spoke with pt, explained to pt to keep her appt on 11/7/18 because that is her consult appt with Dr. Black. Explained that if surgery decision is made at this appt, her pre-op appt will be scheduled at that time.

## 2018-10-22 NOTE — TELEPHONE ENCOUNTER
----- Message from Lydia Long sent at 10/22/2018  1:33 PM CDT -----  Contact: pt  Request a call to schedule a pre op appt, the pt wants to speak and schedule with a nurse only, the pt also wants to know if she should keep her 11/7/18 appt, can be reached at [phone]///[email or web address]

## 2018-10-23 ENCOUNTER — TELEPHONE (OUTPATIENT)
Dept: OBSTETRICS AND GYNECOLOGY | Facility: CLINIC | Age: 48
End: 2018-10-23

## 2018-10-23 DIAGNOSIS — N87.9 DYSPLASIA OF CERVIX: Primary | ICD-10-CM

## 2018-10-29 NOTE — PROGRESS NOTES
Individual Psychotherapy (PhD/LCSW)    10/11/2018    [facility]         Therapeutic Intervention: Met with patient.  Outpatient - Insight oriented psychotherapy 45 min - CPT code 01117 and Outpatient - Supportive psychotherapy 45 min - CPT Code 38302    Chief complaint/reason for encounter: depression, mood swings, somatic and interpersonal     Interval history and content of current session:   Previous session 9/11/18.  48 year old female patient presented for follow up psychotherapy concerning stress management and management of bipolar mood swings.  Patient reporting also significant marital turmoil.  Patient previously recovering from breast cancer diagnosis and treatment, uncomfortable after-effects of breast reconstructive surgery that she only did at the urging of her , now since his infidelity and negative marital attitude revealed, she has been refocuing her priorities for herself.  Talked about recent medical concerns; louis recent lab work showing some gynecological pre-cancerous results and some that were unclear.  She is supposed to undergo further tests; that is all cause for concern and a stressor in her mind.  She talked about trying to keep some priority given to getting her kids through school.  She described her husbnad Delta activing as if they are still a romantic couple, despite continuing to go out and see other women, while trying to hide it.  She described how she spots warning signs and corroborates the suspicions by checking on the factual questions.  She regards her marriage as, at this point, a purely economic necessity and unfortunately joint housing arrangement she would rather not be in with him.  Patient denied any suicidal or homicidal ideation, psychosis, cognitive deficit, or substance abuse.  Supportive therapy provided.  Plan is to follow up in clinic 11/8/18.                                                                                                                                                                                                                                                                                                                                                                                                                                                                                                                                                                                                                                                                                                                                                                                                                                                                                                                        Treatment plan:  · Target symptoms: recurrent depression, anxiety , mood swings, intpersonal conflict  · Why chosen therapy is appropriate versus another modality: relevant to diagnosis, patient responds to this modality  · Outcome monitoring methods: self-report, observation  · Therapeutic intervention type: insight oriented psychotherapy, supportive psychotherapy    Risk parameters:  Patient reports no suicidal ideation  Patient reports no homicidal ideation  Patient reports no self-injurious behavior  Patient reports no violent behavior    Verbal deficits: None    Patient's response to intervention:  The patient's response to intervention is accepting.    Progress toward goals and other mental status changes:  The patient's progress toward goals is fair .    Diagnosis:     ICD-10-CM ICD-9-CM   1. Anxiety disorder, unspecified type F41.9 300.00   2. Bipolar disorder, most recent episode depressed F31.30 296.50       Plan:  individual psychotherapy and medication management by physician    Return to clinic: as scheduled    Length of Service (minutes): 45

## 2018-10-31 ENCOUNTER — OFFICE VISIT (OUTPATIENT)
Dept: INTERNAL MEDICINE | Facility: CLINIC | Age: 48
End: 2018-10-31
Payer: MEDICARE

## 2018-10-31 VITALS
TEMPERATURE: 98 F | HEIGHT: 65 IN | HEART RATE: 70 BPM | BODY MASS INDEX: 30.85 KG/M2 | DIASTOLIC BLOOD PRESSURE: 80 MMHG | SYSTOLIC BLOOD PRESSURE: 110 MMHG | WEIGHT: 185.19 LBS

## 2018-10-31 DIAGNOSIS — C50.911 MALIGNANT NEOPLASM OF RIGHT FEMALE BREAST, UNSPECIFIED ESTROGEN RECEPTOR STATUS, UNSPECIFIED SITE OF BREAST: ICD-10-CM

## 2018-10-31 DIAGNOSIS — F41.9 ANXIETY: ICD-10-CM

## 2018-10-31 DIAGNOSIS — F31.9 BIPOLAR I DISORDER: ICD-10-CM

## 2018-10-31 DIAGNOSIS — Z00.00 ROUTINE ADULT HEALTH MAINTENANCE: Primary | ICD-10-CM

## 2018-10-31 DIAGNOSIS — J98.9 REACTIVE AIRWAY DISEASE THAT IS NOT ASTHMA: ICD-10-CM

## 2018-10-31 PROCEDURE — 99213 OFFICE O/P EST LOW 20 MIN: CPT | Mod: PBBFAC,PO,25 | Performed by: FAMILY MEDICINE

## 2018-10-31 PROCEDURE — 99999 PR PBB SHADOW E&M-EST. PATIENT-LVL III: CPT | Mod: PBBFAC,,, | Performed by: FAMILY MEDICINE

## 2018-10-31 PROCEDURE — 99396 PREV VISIT EST AGE 40-64: CPT | Mod: S$PBB,,, | Performed by: FAMILY MEDICINE

## 2018-10-31 PROCEDURE — 90686 IIV4 VACC NO PRSV 0.5 ML IM: CPT | Mod: PBBFAC,PO

## 2018-10-31 NOTE — PROGRESS NOTES
Subjective:      Patient ID: Daniela Costa is a 48 y.o. female.    Chief Complaint: Annual Exam    Disclaimer:  This note is prepared using voice recognition software and as such is likely to have errors and has not been proof read. Please contact me for questions.     Daniela Costa is a 48 y.o. female who presents today for prevention exam.     Left si fusion went well without pain on left side now. Right side some discomort but going to do PT. Going for [facility] in Dec 5th Dr Reich for severe cervical dysplasia.     Mood is ok.  Sees Dr ENCARNACION in psychiatry and Fabricio Brewster for counseling.     Seeing Dr Padilla in Pulm for RAD, needing flu shot.  Did receive 1 pneumonia shot about 3-4 years ago when she was doing breast cancer surgeries.  Uncertain which strain though.  Patient Active Problem List:     Bipolar I disorder     Malignant neoplasm of breast     Allergic rhinitis     Obesity      Reactive airway disease that is not asthma     Insomnia          Lab Results   Component Value Date    WBC 4.66 03/12/2018    HGB 16.3 (H) 03/12/2018    HCT 50.4 (H) 03/12/2018     03/12/2018    CHOL 177 04/06/2017    TRIG 128 04/06/2017    HDL 47 04/06/2017    ALT 29 04/06/2017    AST 22 04/06/2017     03/12/2018    K 4.2 03/12/2018     03/12/2018    CREATININE 0.9 03/12/2018    BUN 9 03/12/2018    CO2 32 (H) 03/12/2018    TSH 2.057 04/06/2017    INR 1.1 03/12/2018    HGBA1C 5.5 04/17/2017       X-Ray Chest PA And Lateral  Narrative: EXAMINATION:  XR CHEST PA AND LATERAL    CLINICAL HISTORY:  Shortness of breath    TECHNIQUE:  PA and lateral views of the chest were performed.    COMPARISON:  None    FINDINGS:  The lungs are clear and free of infiltrate.  No pleural effusion or pneumothorax. The heart is not enlarged. Surgical clips noted in the region of the right axilla.  Bilateral breast implants are noted.  Postoperative changes noted in the region of the lower cervical spine.  Impression: 1.  No acute  "cardiopulmonary process.    Electronically signed by: Jeffrey Parra DO  Date:    08/21/2018  Time:    13:42        Review of Systems   Constitutional: Negative for activity change, appetite change, chills, fatigue and fever.   HENT: Negative for ear pain and trouble swallowing.    Eyes: Negative for pain and visual disturbance.   Respiratory: Negative for cough and shortness of breath.    Cardiovascular: Negative for chest pain and leg swelling.   Gastrointestinal: Negative for abdominal pain, blood in stool, nausea and vomiting.   Endocrine: Negative for cold intolerance and heat intolerance.   Genitourinary: Negative for dysuria and frequency.   Musculoskeletal: Negative for joint swelling, myalgias and neck pain.   Skin: Negative for color change and rash.   Neurological: Negative for dizziness and headaches.   Psychiatric/Behavioral: Negative for behavioral problems and sleep disturbance.     Objective:     Vitals:    10/31/18 0912   BP: 110/80   Pulse: 70   Temp: 98 °F (36.7 °C)   Weight: 84 kg (185 lb 3 oz)   Height: 5' 5" (1.651 m)     Physical Exam   Constitutional: She is oriented to person, place, and time. She appears well-developed and well-nourished.   HENT:   Head: Normocephalic and atraumatic.   Right Ear: External ear normal.   Left Ear: External ear normal.   Mouth/Throat: Oropharynx is clear and moist.   Eyes: EOM are normal.   Neck: Normal range of motion. Neck supple. No thyromegaly present.   Cardiovascular: Normal rate and regular rhythm. Exam reveals no gallop and no friction rub.   No murmur heard.  Pulmonary/Chest: Effort normal. No respiratory distress. She has no wheezes. She has no rales.   Abdominal: Soft. Bowel sounds are normal. She exhibits no distension. There is no tenderness. There is no rebound.   Musculoskeletal: Normal range of motion. She exhibits no edema.   Lymphadenopathy:     She has no cervical adenopathy.   Neurological: She is alert and oriented to person, place, and " time.   Skin: Skin is warm and dry. No rash noted.   Psychiatric: She has a normal mood and affect. Her behavior is normal. Judgment and thought content normal.   Vitals reviewed.    Assessment:     1. Routine adult health maintenance    2. Reactive airway disease that is not asthma    3. Bipolar I disorder    4. Malignant neoplasm of right female breast, unspecified estrogen receptor status, unspecified site of breast    5. Anxiety      Plan:   Daniela was seen today for annual exam.    Diagnoses and all orders for this visit:    Routine adult health maintenance-labs ordered for prior to preop clearance when she is getting stuck with her additional blood work.  Flu shot updated today.  Will try to obtain copies of her last pneumonia vaccines we can update it further.  -     Lipid panel; Future  -     T4, free; Future  -     TSH; Future    Reactive airway disease that is not asthma-stable at this time seeing pulmonary  -     Lipid panel; Future  -     T4, free; Future  -     TSH; Future    Bipolar I disorder-stable at this time seeing Psychiatry  -     Lipid panel; Future  -     T4, free; Future  -     TSH; Future    Malignant neoplasm of right female breast, unspecified estrogen receptor status, unspecified site of breast-status post breast reconstruction surgery with mastectomy now going for total abdominal hysterectomy and needing with Gyne on due to cervical dysplasia    Anxiety-stable at this time    Other orders  -     Influenza - Quadrivalent (3 years & older) (PF)            Follow-up in about 1 year (around 10/31/2019) for physical with Dr HUERTA.    There are no Patient Instructions on file for this visit.
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
## 2018-11-07 ENCOUNTER — OFFICE VISIT (OUTPATIENT)
Dept: PSYCHIATRY | Facility: CLINIC | Age: 48
End: 2018-11-07
Payer: MEDICARE

## 2018-11-07 DIAGNOSIS — F41.9 ANXIETY: ICD-10-CM

## 2018-11-07 DIAGNOSIS — F31.9 BIPOLAR I DISORDER: Primary | ICD-10-CM

## 2018-11-07 PROCEDURE — 99214 OFFICE O/P EST MOD 30 MIN: CPT | Mod: S$GLB,,, | Performed by: PSYCHIATRY & NEUROLOGY

## 2018-11-07 PROCEDURE — 99999 PR PBB SHADOW E&M-EST. PATIENT-LVL I: CPT | Mod: PBBFAC,,, | Performed by: PSYCHIATRY & NEUROLOGY

## 2018-11-07 RX ORDER — CLONAZEPAM 1 MG/1
1 TABLET ORAL DAILY PRN
Qty: 30 TABLET | Refills: 1 | Status: SHIPPED | OUTPATIENT
Start: 2018-11-07 | End: 2019-01-28 | Stop reason: SDUPTHER

## 2018-11-07 RX ORDER — QUETIAPINE FUMARATE 200 MG/1
TABLET, FILM COATED ORAL
Qty: 45 TABLET | Refills: 2 | Status: SHIPPED | OUTPATIENT
Start: 2018-11-07 | End: 2019-01-28 | Stop reason: SDUPTHER

## 2018-11-12 ENCOUNTER — OFFICE VISIT (OUTPATIENT)
Dept: PSYCHIATRY | Facility: CLINIC | Age: 48
End: 2018-11-12
Payer: MEDICARE

## 2018-11-12 DIAGNOSIS — F31.9 BIPOLAR I DISORDER, CURRENT EPISODE DEPRESSED: Primary | ICD-10-CM

## 2018-11-12 DIAGNOSIS — Z63.0 MARITAL STRESS: ICD-10-CM

## 2018-11-12 DIAGNOSIS — F41.1 ANXIETY STATE: ICD-10-CM

## 2018-11-12 PROCEDURE — 90834 PSYTX W PT 45 MINUTES: CPT | Mod: S$GLB,,, | Performed by: SOCIAL WORKER

## 2018-11-12 SDOH — SOCIAL DETERMINANTS OF HEALTH (SDOH): PROBLEMS IN RELATIONSHIP WITH SPOUSE OR PARTNER: Z63.0
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
## 2018-11-23 ENCOUNTER — PES CALL (OUTPATIENT)
Dept: ADMINISTRATIVE | Facility: CLINIC | Age: 48
End: 2018-11-23

## 2018-11-26 ENCOUNTER — CLINICAL SUPPORT (OUTPATIENT)
Dept: CARDIOLOGY | Facility: CLINIC | Age: 48
End: 2018-11-26
Payer: MEDICARE

## 2018-11-26 ENCOUNTER — OFFICE VISIT (OUTPATIENT)
Dept: OBSTETRICS AND GYNECOLOGY | Facility: CLINIC | Age: 48
End: 2018-11-26
Payer: MEDICARE

## 2018-11-26 ENCOUNTER — HOSPITAL ENCOUNTER (OUTPATIENT)
Dept: PREADMISSION TESTING | Facility: HOSPITAL | Age: 48
Discharge: HOME | End: 2018-11-26
Attending: OBSTETRICS & GYNECOLOGY
Payer: MEDICARE

## 2018-11-26 VITALS
BODY MASS INDEX: 30.49 KG/M2 | HEIGHT: 65 IN | WEIGHT: 189.81 LBS | SYSTOLIC BLOOD PRESSURE: 110 MMHG | RESPIRATION RATE: 16 BRPM | DIASTOLIC BLOOD PRESSURE: 77 MMHG | SYSTOLIC BLOOD PRESSURE: 101 MMHG | WEIGHT: 183 LBS | HEIGHT: 65 IN | TEMPERATURE: 98 F | BODY MASS INDEX: 31.63 KG/M2 | DIASTOLIC BLOOD PRESSURE: 68 MMHG

## 2018-11-26 DIAGNOSIS — Z01.818 PREOP EXAMINATION: ICD-10-CM

## 2018-11-26 DIAGNOSIS — Z01.818 PREOP EXAMINATION: Primary | ICD-10-CM

## 2018-11-26 DIAGNOSIS — N87.9 DYSPLASIA OF CERVIX: ICD-10-CM

## 2018-11-26 PROCEDURE — 3008F BODY MASS INDEX DOCD: CPT | Mod: S$GLB,,, | Performed by: OBSTETRICS & GYNECOLOGY

## 2018-11-26 PROCEDURE — 99213 OFFICE O/P EST LOW 20 MIN: CPT | Mod: S$GLB,,, | Performed by: OBSTETRICS & GYNECOLOGY

## 2018-11-26 PROCEDURE — 99999 PR PBB SHADOW E&M-EST. PATIENT-LVL III: CPT | Mod: PBBFAC,,, | Performed by: OBSTETRICS & GYNECOLOGY

## 2018-11-26 PROCEDURE — 93000 ELECTROCARDIOGRAM COMPLETE: CPT | Mod: S$GLB,,, | Performed by: INTERNAL MEDICINE
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
## 2018-11-26 NOTE — DISCHARGE INSTRUCTIONS
To confirm, Your doctor has instructed you that surgery is scheduled for 12/5/18 at 11:15 am.       Please report to [facility], [address], 1st floor, main lobby by 09:45.    Pre admit office will call afternoon prior to surgery with final arrival time    INSTRUCTIONS IMPORTANT!!!   Do not eat, drink, or smoke after 12 midnight-including water. OK to brush teeth, no gum, candy or mints!    ¨ Take only these medicines with a small swallow of water-morning of surgery.  Breo Ellipta      Pre operative instructions:  Please review the Pre-Operative Instruction booklet that you were given.        Bathing Instructions--See page 6 in the Pre-operative booklet.      Prevention of surgical site infections:     -Keep incisions clean and dry.   -Do not soak/submerge incisions in water until completely healed.   -Do not apply lotions, powders, creams, or deodorants to site.   -Always make sure hands are cleaned with antibacterial soap/ alcohol-based                 prior to touching the surgical site.  (This includes doctors,                 nurses, staff, and yourself.)    Signs and symptoms:   -Redness and pain around the area where you had surgery   -Drainage of cloudy fluid from your surgical wound   -Fever over 100.4       I have read or had read and explained to me, and understand the above information.  Additional comments or instructions:  Received a copy of Pre-operative instructions booklet, FAQ surgical site infection sheet, and packets of hibiclens (if indicated).
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
## 2018-11-27 ENCOUNTER — PATIENT MESSAGE (OUTPATIENT)
Dept: INTERNAL MEDICINE | Facility: CLINIC | Age: 48
End: 2018-11-27

## 2018-11-27 DIAGNOSIS — R79.89 ELEVATED TSH: Primary | ICD-10-CM

## 2018-11-28 ENCOUNTER — PATIENT MESSAGE (OUTPATIENT)
Dept: INTERNAL MEDICINE | Facility: CLINIC | Age: 48
End: 2018-11-28

## 2018-11-28 NOTE — TELEPHONE ENCOUNTER
Please schedule thyroid ultrasound.  Please schedule additional testing to be done in 6 weeks.  Can set up office visit to discuss afterwards.

## 2018-11-29 ENCOUNTER — PES CALL (OUTPATIENT)
Dept: ADMINISTRATIVE | Facility: CLINIC | Age: 48
End: 2018-11-29

## 2018-11-29 ENCOUNTER — HOSPITAL ENCOUNTER (OUTPATIENT)
Dept: RADIOLOGY | Facility: HOSPITAL | Age: 48
Discharge: HOME | End: 2018-11-29
Attending: FAMILY MEDICINE
Payer: MEDICARE

## 2018-11-29 DIAGNOSIS — R79.89 ELEVATED TSH: ICD-10-CM

## 2018-11-29 PROCEDURE — 76536 US EXAM OF HEAD AND NECK: CPT | Mod: TC

## 2018-11-30 ENCOUNTER — PATIENT MESSAGE (OUTPATIENT)
Dept: INTERNAL MEDICINE | Facility: CLINIC | Age: 48
End: 2018-11-30

## 2018-11-30 DIAGNOSIS — E03.9 ACQUIRED HYPOTHYROIDISM: Primary | ICD-10-CM

## 2018-11-30 RX ORDER — LEVOTHYROXINE SODIUM 50 UG/1
50 TABLET ORAL DAILY
Qty: 90 TABLET | Refills: 3 | Status: SHIPPED | OUTPATIENT
Start: 2018-11-30 | End: 2019-11-29 | Stop reason: SDUPTHER

## 2018-11-30 NOTE — TELEPHONE ENCOUNTER
Because of the TSH elevation, she should start daily thyroid meds. Will send in. Repeat thyroid labs in 3 months.

## 2018-12-04 ENCOUNTER — ANESTHESIA EVENT (OUTPATIENT)
Dept: SURGERY | Facility: HOSPITAL | Age: 48
End: 2018-12-04
Payer: MEDICARE

## 2018-12-04 ENCOUNTER — TELEPHONE (OUTPATIENT)
Dept: OBSTETRICS AND GYNECOLOGY | Facility: CLINIC | Age: 48
End: 2018-12-04

## 2018-12-04 ENCOUNTER — PATIENT MESSAGE (OUTPATIENT)
Dept: SURGERY | Facility: HOSPITAL | Age: 48
End: 2018-12-04

## 2018-12-04 NOTE — TELEPHONE ENCOUNTER
Spoke with pt. Pt would like to post pone her hysterectomy. Notified pt I would forward the message.

## 2018-12-04 NOTE — TELEPHONE ENCOUNTER
Spoke with pt. Pt now would like to keep her scheduled surgery. Notified pt I would notify Dr. Reich and Niko

## 2018-12-04 NOTE — TELEPHONE ENCOUNTER
----- Message from Lydia Long sent at 12/4/2018  1:37 PM CST -----  Contact: pt  The pt has question concerning her surgery appt, no additional info given, the pt can be reached at [phone]///[id number]

## 2018-12-04 NOTE — TELEPHONE ENCOUNTER
----- Message from Bandar Bryant sent at 12/4/2018  1:59 PM CST -----  Contact: pt   Pt calling to confirm appt and let dr know definitely was able to find transportation.              ..[phone] (Martinez)

## 2018-12-05 ENCOUNTER — ANESTHESIA (OUTPATIENT)
Dept: SURGERY | Facility: HOSPITAL | Age: 48
End: 2018-12-05
Payer: MEDICARE

## 2018-12-05 ENCOUNTER — HOSPITAL ENCOUNTER (OUTPATIENT)
Facility: HOSPITAL | Age: 48
Discharge: HOME | End: 2018-12-06
Attending: OBSTETRICS & GYNECOLOGY | Admitting: OBSTETRICS & GYNECOLOGY
Payer: MEDICARE

## 2018-12-05 DIAGNOSIS — Z90.710 S/P HYSTERECTOMY: Primary | ICD-10-CM

## 2018-12-05 PROBLEM — N87.9 DYSPLASIA OF CERVIX: Status: ACTIVE | Noted: 2018-12-05

## 2018-12-05 PROBLEM — Z14.8 CARRIER OF HIGH RISK CANCER GENE MUTATION: Status: ACTIVE | Noted: 2018-12-05

## 2018-12-05 LAB
B-HCG UR QL: NEGATIVE
CTP QC/QA: YES
POCT GLUCOSE: 137 MG/DL (ref 70–110)

## 2018-12-05 PROCEDURE — C1765 ADHESION BARRIER: HCPCS | Performed by: OBSTETRICS & GYNECOLOGY

## 2018-12-05 PROCEDURE — 25000003 PHARM REV CODE 250: Performed by: OBSTETRICS & GYNECOLOGY

## 2018-12-05 PROCEDURE — 88305 TISSUE EXAM BY PATHOLOGIST: CPT | Mod: 26,,, | Performed by: PATHOLOGY

## 2018-12-05 PROCEDURE — S0077 INJECTION, CLINDAMYCIN PHOSP: HCPCS | Performed by: OBSTETRICS & GYNECOLOGY

## 2018-12-05 PROCEDURE — 37000008 HC ANESTHESIA 1ST 15 MINUTES: Performed by: OBSTETRICS & GYNECOLOGY

## 2018-12-05 PROCEDURE — 36000713 HC OR TIME LEV V EA ADD 15 MIN: Performed by: OBSTETRICS & GYNECOLOGY

## 2018-12-05 PROCEDURE — 25000003 PHARM REV CODE 250: Performed by: ANESTHESIOLOGY

## 2018-12-05 PROCEDURE — 94799 UNLISTED PULMONARY SVC/PX: CPT

## 2018-12-05 PROCEDURE — 36000712 HC OR TIME LEV V 1ST 15 MIN: Performed by: OBSTETRICS & GYNECOLOGY

## 2018-12-05 PROCEDURE — 88305 TISSUE EXAM BY PATHOLOGIST: CPT | Performed by: PATHOLOGY

## 2018-12-05 PROCEDURE — 71000039 HC RECOVERY, EACH ADD'L HOUR: Performed by: OBSTETRICS & GYNECOLOGY

## 2018-12-05 PROCEDURE — 37000009 HC ANESTHESIA EA ADD 15 MINS: Performed by: OBSTETRICS & GYNECOLOGY

## 2018-12-05 PROCEDURE — 27201423 OPTIME MED/SURG SUP & DEVICES STERILE SUPPLY: Performed by: OBSTETRICS & GYNECOLOGY

## 2018-12-05 PROCEDURE — 71000033 HC RECOVERY, INTIAL HOUR: Performed by: OBSTETRICS & GYNECOLOGY

## 2018-12-05 PROCEDURE — 25000003 PHARM REV CODE 250: Performed by: NURSE ANESTHETIST, CERTIFIED REGISTERED

## 2018-12-05 PROCEDURE — 58571 TLH W/T/O 250 G OR LESS: CPT | Mod: 22,,, | Performed by: OBSTETRICS & GYNECOLOGY

## 2018-12-05 PROCEDURE — C2628 CATHETER, OCCLUSION: HCPCS | Performed by: OBSTETRICS & GYNECOLOGY

## 2018-12-05 PROCEDURE — 88307 TISSUE EXAM BY PATHOLOGIST: CPT | Performed by: PATHOLOGY

## 2018-12-05 PROCEDURE — 99900035 HC TECH TIME PER 15 MIN (STAT)

## 2018-12-05 PROCEDURE — 63600175 PHARM REV CODE 636 W HCPCS: Performed by: NURSE ANESTHETIST, CERTIFIED REGISTERED

## 2018-12-05 PROCEDURE — 63600175 PHARM REV CODE 636 W HCPCS: Performed by: ANESTHESIOLOGY

## 2018-12-05 PROCEDURE — 88307 TISSUE EXAM BY PATHOLOGIST: CPT | Mod: 26,,, | Performed by: PATHOLOGY

## 2018-12-05 PROCEDURE — 81025 URINE PREGNANCY TEST: CPT | Performed by: OBSTETRICS & GYNECOLOGY

## 2018-12-05 DEVICE — BARRIER INTERCEED 3X4 ST DIS: Type: IMPLANTABLE DEVICE | Site: ABDOMEN | Status: FUNCTIONAL

## 2018-12-05 RX ORDER — PROPOFOL 10 MG/ML
VIAL (ML) INTRAVENOUS
Status: DISCONTINUED | OUTPATIENT
Start: 2018-12-05 | End: 2018-12-05

## 2018-12-05 RX ORDER — BUPIVACAINE HYDROCHLORIDE 2.5 MG/ML
INJECTION, SOLUTION EPIDURAL; INFILTRATION; INTRACAUDAL
Status: DISCONTINUED | OUTPATIENT
Start: 2018-12-05 | End: 2018-12-05 | Stop reason: HOSPADM

## 2018-12-05 RX ORDER — OXYCODONE HYDROCHLORIDE 5 MG/1
5 TABLET ORAL
Status: DISCONTINUED | OUTPATIENT
Start: 2018-12-05 | End: 2018-12-05 | Stop reason: HOSPADM

## 2018-12-05 RX ORDER — FENTANYL CITRATE 50 UG/ML
INJECTION, SOLUTION INTRAMUSCULAR; INTRAVENOUS
Status: DISCONTINUED | OUTPATIENT
Start: 2018-12-05 | End: 2018-12-05

## 2018-12-05 RX ORDER — HYDROCODONE BITARTRATE AND ACETAMINOPHEN 5; 325 MG/1; MG/1
1 TABLET ORAL EVERY 4 HOURS PRN
Status: DISCONTINUED | OUTPATIENT
Start: 2018-12-05 | End: 2018-12-06 | Stop reason: HOSPADM

## 2018-12-05 RX ORDER — SODIUM CHLORIDE, SODIUM LACTATE, POTASSIUM CHLORIDE, CALCIUM CHLORIDE 600; 310; 30; 20 MG/100ML; MG/100ML; MG/100ML; MG/100ML
INJECTION, SOLUTION INTRAVENOUS CONTINUOUS
Status: DISCONTINUED | OUTPATIENT
Start: 2018-12-05 | End: 2018-12-06 | Stop reason: HOSPADM

## 2018-12-05 RX ORDER — MIDAZOLAM HYDROCHLORIDE 1 MG/ML
INJECTION, SOLUTION INTRAMUSCULAR; INTRAVENOUS
Status: DISCONTINUED | OUTPATIENT
Start: 2018-12-05 | End: 2018-12-05

## 2018-12-05 RX ORDER — CLONAZEPAM 1 MG/1
1 TABLET ORAL NIGHTLY
Status: DISCONTINUED | OUTPATIENT
Start: 2018-12-05 | End: 2018-12-06 | Stop reason: HOSPADM

## 2018-12-05 RX ORDER — CHLORHEXIDINE GLUCONATE ORAL RINSE 1.2 MG/ML
10 SOLUTION DENTAL 2 TIMES DAILY
Status: DISCONTINUED | OUTPATIENT
Start: 2018-12-05 | End: 2018-12-06 | Stop reason: HOSPADM

## 2018-12-05 RX ORDER — SIMETHICONE 80 MG
80 TABLET,CHEWABLE ORAL EVERY 4 HOURS PRN
Status: DISCONTINUED | OUTPATIENT
Start: 2018-12-05 | End: 2018-12-06 | Stop reason: HOSPADM

## 2018-12-05 RX ORDER — LIDOCAINE HCL/PF 100 MG/5ML
SYRINGE (ML) INTRAVENOUS
Status: DISCONTINUED | OUTPATIENT
Start: 2018-12-05 | End: 2018-12-05

## 2018-12-05 RX ORDER — HYDROCODONE BITARTRATE AND ACETAMINOPHEN 5; 325 MG/1; MG/1
1 TABLET ORAL EVERY 8 HOURS PRN
Qty: 20 TABLET | Refills: 0 | Status: SHIPPED | OUTPATIENT
Start: 2018-12-05 | End: 2019-01-07

## 2018-12-05 RX ORDER — ROCURONIUM BROMIDE 10 MG/ML
INJECTION, SOLUTION INTRAVENOUS
Status: DISCONTINUED | OUTPATIENT
Start: 2018-12-05 | End: 2018-12-05

## 2018-12-05 RX ORDER — FENTANYL CITRATE 50 UG/ML
25 INJECTION, SOLUTION INTRAMUSCULAR; INTRAVENOUS EVERY 5 MIN PRN
Status: COMPLETED | OUTPATIENT
Start: 2018-12-05 | End: 2018-12-05

## 2018-12-05 RX ORDER — LIDOCAINE HYDROCHLORIDE 10 MG/ML
1 INJECTION, SOLUTION EPIDURAL; INFILTRATION; INTRACAUDAL; PERINEURAL ONCE
Status: DISCONTINUED | OUTPATIENT
Start: 2018-12-05 | End: 2018-12-05 | Stop reason: HOSPADM

## 2018-12-05 RX ORDER — SODIUM CHLORIDE 0.9 % (FLUSH) 0.9 %
3 SYRINGE (ML) INJECTION
Status: DISCONTINUED | OUTPATIENT
Start: 2018-12-05 | End: 2018-12-06 | Stop reason: HOSPADM

## 2018-12-05 RX ORDER — DIPHENHYDRAMINE HCL 25 MG
25 CAPSULE ORAL EVERY 4 HOURS PRN
Status: DISCONTINUED | OUTPATIENT
Start: 2018-12-05 | End: 2018-12-06 | Stop reason: HOSPADM

## 2018-12-05 RX ORDER — SODIUM CHLORIDE 0.9 % (FLUSH) 0.9 %
3 SYRINGE (ML) INJECTION EVERY 8 HOURS
Status: DISCONTINUED | OUTPATIENT
Start: 2018-12-05 | End: 2018-12-05 | Stop reason: HOSPADM

## 2018-12-05 RX ORDER — CLINDAMYCIN PHOSPHATE 900 MG/50ML
900 INJECTION, SOLUTION INTRAVENOUS ONCE
Status: COMPLETED | OUTPATIENT
Start: 2018-12-05 | End: 2018-12-05

## 2018-12-05 RX ORDER — MEPERIDINE HYDROCHLORIDE 50 MG/ML
12.5 INJECTION INTRAMUSCULAR; INTRAVENOUS; SUBCUTANEOUS ONCE
Status: COMPLETED | OUTPATIENT
Start: 2018-12-05 | End: 2018-12-05

## 2018-12-05 RX ORDER — MORPHINE SULFATE 4 MG/ML
3 INJECTION, SOLUTION INTRAMUSCULAR; INTRAVENOUS
Status: DISCONTINUED | OUTPATIENT
Start: 2018-12-05 | End: 2018-12-06 | Stop reason: HOSPADM

## 2018-12-05 RX ORDER — HYDROCODONE BITARTRATE AND ACETAMINOPHEN 10; 325 MG/1; MG/1
1 TABLET ORAL EVERY 4 HOURS PRN
Status: DISCONTINUED | OUTPATIENT
Start: 2018-12-05 | End: 2018-12-06 | Stop reason: HOSPADM

## 2018-12-05 RX ORDER — QUETIAPINE FUMARATE 25 MG/1
50 TABLET, FILM COATED ORAL NIGHTLY
Status: DISCONTINUED | OUTPATIENT
Start: 2018-12-05 | End: 2018-12-06 | Stop reason: HOSPADM

## 2018-12-05 RX ORDER — MORPHINE SULFATE 4 MG/ML
3 INJECTION, SOLUTION INTRAMUSCULAR; INTRAVENOUS
Status: DISCONTINUED | OUTPATIENT
Start: 2018-12-05 | End: 2018-12-05

## 2018-12-05 RX ORDER — ONDANSETRON 2 MG/ML
4 INJECTION INTRAMUSCULAR; INTRAVENOUS DAILY PRN
Status: DISCONTINUED | OUTPATIENT
Start: 2018-12-05 | End: 2018-12-05 | Stop reason: HOSPADM

## 2018-12-05 RX ORDER — ONDANSETRON 8 MG/1
8 TABLET, ORALLY DISINTEGRATING ORAL EVERY 8 HOURS PRN
Status: DISCONTINUED | OUTPATIENT
Start: 2018-12-05 | End: 2018-12-06 | Stop reason: HOSPADM

## 2018-12-05 RX ORDER — SODIUM CHLORIDE 9 MG/ML
INJECTION, SOLUTION INTRAVENOUS CONTINUOUS
Status: DISCONTINUED | OUTPATIENT
Start: 2018-12-05 | End: 2018-12-06 | Stop reason: HOSPADM

## 2018-12-05 RX ORDER — HYDROCODONE BITARTRATE AND ACETAMINOPHEN 5; 325 MG/1; MG/1
1 TABLET ORAL EVERY 4 HOURS PRN
Status: DISCONTINUED | OUTPATIENT
Start: 2018-12-05 | End: 2018-12-05

## 2018-12-05 RX ADMIN — PROPOFOL 130 MG: 10 INJECTION, EMULSION INTRAVENOUS at 12:12

## 2018-12-05 RX ADMIN — MIDAZOLAM 2 MG: 1 INJECTION INTRAMUSCULAR; INTRAVENOUS at 12:12

## 2018-12-05 RX ADMIN — FENTANYL CITRATE 25 MCG: 50 INJECTION INTRAMUSCULAR; INTRAVENOUS at 03:12

## 2018-12-05 RX ADMIN — FENTANYL CITRATE 100 MCG: 50 INJECTION, SOLUTION INTRAMUSCULAR; INTRAVENOUS at 12:12

## 2018-12-05 RX ADMIN — MEPERIDINE HYDROCHLORIDE 12.5 MG: 50 INJECTION, SOLUTION INTRAMUSCULAR; INTRAVENOUS; SUBCUTANEOUS at 03:12

## 2018-12-05 RX ADMIN — QUETIAPINE FUMARATE 50 MG: 25 TABLET ORAL at 10:12

## 2018-12-05 RX ADMIN — SUGAMMADEX 200 MG: 100 INJECTION, SOLUTION INTRAVENOUS at 03:12

## 2018-12-05 RX ADMIN — HYDROCODONE BITARTRATE AND ACETAMINOPHEN 1 TABLET: 10; 325 TABLET ORAL at 05:12

## 2018-12-05 RX ADMIN — SODIUM CHLORIDE: 0.9 INJECTION, SOLUTION INTRAVENOUS at 05:12

## 2018-12-05 RX ADMIN — CLINDAMYCIN IN 5 PERCENT DEXTROSE 900 MG: 18 INJECTION, SOLUTION INTRAVENOUS at 01:12

## 2018-12-05 RX ADMIN — CLONAZEPAM 1 MG: 1 TABLET ORAL at 10:12

## 2018-12-05 RX ADMIN — SODIUM CHLORIDE, SODIUM LACTATE, POTASSIUM CHLORIDE, AND CALCIUM CHLORIDE: 600; 310; 30; 20 INJECTION, SOLUTION INTRAVENOUS at 12:12

## 2018-12-05 RX ADMIN — LIDOCAINE HYDROCHLORIDE 100 MG: 20 INJECTION, SOLUTION INTRAVENOUS at 12:12

## 2018-12-05 RX ADMIN — CHLORHEXIDINE GLUCONATE 10 ML: 1.2 RINSE ORAL at 08:12

## 2018-12-05 RX ADMIN — ROCURONIUM BROMIDE 50 MG: 10 INJECTION, SOLUTION INTRAVENOUS at 12:12

## 2018-12-05 RX ADMIN — HYDROCODONE BITARTRATE AND ACETAMINOPHEN 1 TABLET: 10; 325 TABLET ORAL at 10:12

## 2018-12-05 NOTE — DISCHARGE INSTRUCTIONS
What to expect during recovery    Pain  · You will experience some level of pain after surgery.  Pain medication should help with the pain, but may not be able to eliminate it entirely.  Pain will decrease with time, and most pain will be gone by 4 to 6 weeks after surgery.  · Ice packs may help with pain and can reduce swelling.  · Your prescription pain medication may contain acetaminophen (Tylenol).  If so, you should not take additional acetaminophen (Tylenol) at the same time as your pain medication.   · Do not drive, operate machinery or power tools, or sign legal papers for 24 hours or as long as you are on your postoperative pain medication.   · Prescription pain medication should be taken only as directed.  We are not able to replace pain medication that has been lost or stolen.    Nausea/vomiting  · You may experience nausea or vomiting as a result of anesthesia or pain medication.  · If you experience severe nausea or you are unable to keep fluids down, contact your doctor.    Bleeding  · A small amount of clear or reddish drainage from the incision is normal after surgery.  · For mild bleeding from the incision, apply pressure for five minutes.  · If bleeding is severe or does not stop with pressure, contact your doctor.    Signs of infection  · Notify your doctor if you have the following signs of infection:  · Fever over 101 degrees  · Worsening redness around incsion  · Thick drainage from incision  · Foul smell from incision  · You may experience low fever/chills, this is normal after surgery.    Other post-operative symptoms  · It is safe to take over-the-counter medications for constipation, heartburn, sleep, or itching if needed.    · You may experience light-headedness, dizziness, and sleepiness following surgery. Please do not stay alone. A responsible adult should be with you for this 24 hour period.     Activity  · Try to rest and avoid strenuous activity, but also get out of bed regularly  unless your doctor has ordered strict bedrest.  · Several times every hour while you are awake, pump and flex your feet 5-6 times and do foot circles. This will help prevent blood clots.  · Several times every hour while you are awake, take 2 to 3 deep breaths and cough. If you had stomach surgery, hold a pillow or rolled towel firmly against your stomach before you cough. This will help with any pain the cough might cause.  · Do not smoke after surgery, it decreases your ability to heal and increases the risk of infection and pneumonia.    Nozin: Nasal   · Nozin reduces nasal germs to help decrease the risk of infection after surgery.  · Continue Nozin provided at discharge twice daily for 7 days or until the incision is healed.    · Place 4 drops to cotton swab tip and swab both nostril rims 6 times in each direction.  · See pamphlet for more information.     Diet  · Drink lots of fluids after surgery, unless otherwise instructed.  · You might not have much appetite at first.  Progress slowly to a normal diet unless given other specific diet instructions by your doctor.  Begin with liquids, then soup and crackers, working up to solid foods.  · Do not drink alcoholic beverages including beer for 24 hours or as long as you are on post-operative pain medication.    Follow-up after surgery  · You can contact your doctor through the patient portal using the [email or web address] or at [email or web address].  · You can also contact your doctor at any time by calling [phone] for the [facility] on [facility], or [phone] for the [facility] on [facility].  · A nurse will be calling you sometime after surgery. Do not be alarmed. This is our way of finding out how you are doing.
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
## 2018-12-05 NOTE — ANESTHESIA PREPROCEDURE EVALUATION
12/05/2018  Daniela Costa is a 48 y.o., female.    Anesthesia Evaluation    I have reviewed the Patient Summary Reports.    I have reviewed the Nursing Notes.   I have reviewed the Medications.     Review of Systems  Anesthesia Hx:  No problems with previous Anesthesia Denies Hx of Anesthetic complications  History of prior surgery of interest to airway management or planning: cervical fusion. Previous anesthesia: General Denies Family Hx of Anesthesia complications.   Denies Personal Hx of Anesthesia complications.   Social:  Former Smoker    Hematology/Oncology:         -- Cancer in past history: Breast right surgery    Cardiovascular:   Denies MI.  Denies CAD.    ECG has been reviewed.    Pulmonary:   Pneumonia Asthma asymptomatic    Neurological:   Denies TIA. Denies CVA.    Endocrine:   Hypothyroidism    Psych:   Psychiatric History          Physical Exam  General:  Well nourished    Airway/Jaw/Neck:  Airway Findings: Mouth Opening: Normal      Chest/Lungs:  Chest/Lungs Findings: Clear to auscultation, Normal Respiratory Rate     Heart/Vascular:  Heart Findings: Rate: Normal  Rhythm: Regular Rhythm        Mental Status:  Mental Status Findings:  Cooperative, Alert and Oriented         Anesthesia Plan  Type of Anesthesia, risks & benefits discussed:  Anesthesia Type:  general  Patient's Preference:   Intra-op Monitoring Plan: standard ASA monitors  Intra-op Monitoring Plan Comments:   Post Op Pain Control Plan: multimodal analgesia and per primary service following discharge from PACU  Post Op Pain Control Plan Comments:   Induction:   IV  Beta Blocker:         Informed Consent: Patient understands risks and agrees with Anesthesia plan.  Questions answered.   ASA Score: 2     Day of Surgery Review of History & Physical: I have interviewed and examined the patient. I have reviewed the patient's H&P  dated:  There are no significant changes.          Ready For Surgery From Anesthesia Perspective.

## 2018-12-05 NOTE — PLAN OF CARE
Pt prepared for surgery. Case management consulted. Sons number and aunts number in the chart. No complaints at this time.

## 2018-12-05 NOTE — ANESTHESIA RELEASE NOTE
"Anesthesia Release from PACU Note    Patient: Daniela Costa    Procedure(s) Performed: Procedure(s) (LRB):  XI ROBOTIC HYSTERECTOMY (Bilateral)  XI ROBOTIC SALPINGO-OOPHORECTOMY (Bilateral)    Anesthesia type: general    Post pain: Adequate analgesia    Post assessment: no apparent anesthetic complications    Last Vitals:   Visit Vitals  /73   Pulse (!) 129   Temp 36.6 °C (97.9 °F) (Temporal)   Resp 16   Ht 5' 5" (1.651 m)   Wt 83.7 kg (184 lb 8.4 oz)   SpO2 99%   Breastfeeding? No   BMI 30.71 kg/m²       Post vital signs: stable    Level of consciousness: awake    Nausea/Vomiting: no nausea/no vomiting    Complications: none    Airway Patency: patent    Respiratory: unassisted    Cardiovascular: stable and blood pressure at baseline    Hydration: euvolemic  "

## 2018-12-05 NOTE — OP NOTE
OPERATIVE NOTE    12/5/2018     PRE-PROCEDURE COUNSELING  Patient counseled on the risks, benefits, and alternatives to procedure.  Please see preoperative consents.                                                    SURGEON:  Meka Reich M.D.                                                                                                                         ASSISTANT:  IMELDA Rowe                                                                                                                           PREOPERATIVE DIAGNOSES:  History breast cancer, breast cancer Genetic susceptibility, cervical dysplasia                                                                                                    POSTOPERATIVE DIAGNOSES:  Same, adhesive disease of the right ovary                                                                                                                            PROCEDURE:    Robotic-assisted laparoscopic hysterectomy, bilateral salpingo-oophorectomy, and lysis of adhesions 20min,  Cystoscopy             EBL: 50mL                      FINDINGS: benign appearing pelvic organs, R adnexal adhesions - see below    Cystoscopy was performed and showed normal efflux of urine from both ureteral orfices, as noted in a D50 instilled bladder. No bladder injuries. Benign appearing mucosa.                                                                                                                                      PROCEDURE IN DETAIL:    After discussion of the risks, benefits and alternatives, the patient understood the potential risks and complications and signed consents were reviewed. Patient was given preoperative sequential compression devices and antibiotics and was taken to the Operating Room where the general endotracheal anesthesia was administered and found to be adequate.  She was prepped and draped in routine fashion. The DEBI manipulator was applied in routine fashion.   Attention was turned to the abdomen where the anterior abdominal wall was grasped with towel clamps and elevated.  Veress needle was introduced through the umbilicus and abdomen was insufflated with CO2 gas to 15 mmHg.  An 8mm incision was made in the umbilicus and an 8mm trocar was placed into the abdomen. Under direct visualization, ancillary ports were placed.  This included 8 mm lateral ports and 5 mm left upper quadrant port.  The patient was noted to be in correct anatomical position, placed in Trendelenburg, and robotic operating system was advanced towards the patient. Robotic arms were attached to trocar ends and the operating instruments were placed into the abdomen under direct visualization from the console.This included PK Gyrus for cautery and EndoShears for cold-cut transection.  A survey of the pelvis was made. All pedicles were surgically transected in a similar fashion: with PK cautery followed by a cold cut transection.      The path of the ureter was visualized. Next, the right infundibulopelvic ligament was cauterized and transected followed by successive pedicles of the posterior broad ligament, round ligament and carried through the anterior broad ligament to create a bladder flap.   Same was performed on the other side and the uterine arteries were skeletonized on both sides.   Of note, the right adnexa were deeply adhesed to the right pelvic gutter, almost melted into it the peritoneum (?adhesions vs retained retroperitoneal position). It was  by sharp and blunt dissection. The ureter was identified prior to this dissecftion.    Anterior followed by posterior colpotomies were made.  Next, the left uterine artery was then cauterized and transected followed by successive pedicles of the cardinal ligament to reach the colpotomy site. The same was performed on the other side and the uterus, cervix, tubes and ovaries were removed through the vagina and sent to pathology. (Right adnexa  was sent separately).  The vaginal incision was then reapproximated with 0 Vicryl in a running locked fashion.  Lapra-Tys were applied at the ends of the incision, and an intracorporal knot was tied in the center of the incision.  Irrigation, desufflation and reinsufflation confirmed hemostasis.      The fascia of the abdominal incisions closed under direct visualization with 0-vicryl. The skin was reapproximated with 4-0 vicryl and supported with Dermabond.     Lap, needle and instrument counts were  correct x2.       Patient was sent to the recovery room in stable condition.

## 2018-12-05 NOTE — TRANSFER OF CARE
"Anesthesia Transfer of Care Note    Patient: Daniela Costa    Procedure(s) Performed: Procedure(s) (LRB):  XI ROBOTIC HYSTERECTOMY (Bilateral)  XI ROBOTIC SALPINGO-OOPHORECTOMY (Bilateral)    Patient location: PACU    Anesthesia Type: general    Transport from OR: Transported from OR on room air with adequate spontaneous ventilation    Post pain: adequate analgesia    Post assessment: no apparent anesthetic complications    Post vital signs: stable    Level of consciousness: awake    Nausea/Vomiting: no nausea/vomiting    Complications: none    Transfer of care protocol was followed      Last vitals:   Visit Vitals  /73   Pulse (!) 129   Temp 36.6 °C (97.9 °F) (Temporal)   Resp 16   Ht 5' 5" (1.651 m)   Wt 83.7 kg (184 lb 8.4 oz)   SpO2 99%   Breastfeeding? No   BMI 30.71 kg/m²     "
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
## 2018-12-06 ENCOUNTER — PATIENT MESSAGE (OUTPATIENT)
Dept: SURGERY | Facility: HOSPITAL | Age: 48
End: 2018-12-06

## 2018-12-06 ENCOUNTER — PATIENT MESSAGE (OUTPATIENT)
Dept: OBSTETRICS AND GYNECOLOGY | Facility: CLINIC | Age: 48
End: 2018-12-06

## 2018-12-06 VITALS
TEMPERATURE: 99 F | OXYGEN SATURATION: 99 % | SYSTOLIC BLOOD PRESSURE: 113 MMHG | HEIGHT: 65 IN | RESPIRATION RATE: 20 BRPM | DIASTOLIC BLOOD PRESSURE: 67 MMHG | BODY MASS INDEX: 30.74 KG/M2 | WEIGHT: 184.5 LBS | HEART RATE: 100 BPM

## 2018-12-06 PROBLEM — Z65.4 HISTORY OF STRANGULATION ASSAULT: Status: ACTIVE | Noted: 2018-12-06

## 2018-12-06 PROBLEM — N89.8 VAGINAL IRRITATION: Status: ACTIVE | Noted: 2018-12-06

## 2018-12-06 PROBLEM — N87.9 DYSPLASIA OF CERVIX: Status: RESOLVED | Noted: 2018-12-05 | Resolved: 2018-12-06

## 2018-12-06 PROCEDURE — 25000003 PHARM REV CODE 250: Performed by: OBSTETRICS & GYNECOLOGY

## 2018-12-06 PROCEDURE — 94799 UNLISTED PULMONARY SVC/PX: CPT

## 2018-12-06 PROCEDURE — 94761 N-INVAS EAR/PLS OXIMETRY MLT: CPT

## 2018-12-06 PROCEDURE — 99900035 HC TECH TIME PER 15 MIN (STAT)

## 2018-12-06 RX ORDER — LEVOTHYROXINE SODIUM 50 UG/1
50 TABLET ORAL
Status: DISCONTINUED | OUTPATIENT
Start: 2018-12-06 | End: 2018-12-06 | Stop reason: HOSPADM

## 2018-12-06 RX ORDER — CYCLOBENZAPRINE HCL 10 MG
10 TABLET ORAL 3 TIMES DAILY PRN
Qty: 15 TABLET | Refills: 0 | Status: SHIPPED | OUTPATIENT
Start: 2018-12-06 | End: 2018-12-16

## 2018-12-06 RX ORDER — FLUCONAZOLE 150 MG/1
150 TABLET ORAL DAILY
Qty: 1 TABLET | Refills: 0 | Status: SHIPPED | OUTPATIENT
Start: 2018-12-06 | End: 2018-12-07

## 2018-12-06 RX ORDER — CYCLOBENZAPRINE HCL 10 MG
10 TABLET ORAL 3 TIMES DAILY PRN
Status: DISCONTINUED | OUTPATIENT
Start: 2018-12-06 | End: 2018-12-06 | Stop reason: HOSPADM

## 2018-12-06 RX ADMIN — HYDROCODONE BITARTRATE AND ACETAMINOPHEN 1 TABLET: 10; 325 TABLET ORAL at 01:12

## 2018-12-06 RX ADMIN — LEVOTHYROXINE SODIUM 50 MCG: 50 TABLET ORAL at 07:12

## 2018-12-06 RX ADMIN — HYDROCODONE BITARTRATE AND ACETAMINOPHEN 1 TABLET: 10; 325 TABLET ORAL at 02:12

## 2018-12-06 RX ADMIN — CHLORHEXIDINE GLUCONATE 10 ML: 1.2 RINSE ORAL at 09:12

## 2018-12-06 RX ADMIN — SODIUM CHLORIDE: 0.9 INJECTION, SOLUTION INTRAVENOUS at 02:12

## 2018-12-06 RX ADMIN — HYDROCODONE BITARTRATE AND ACETAMINOPHEN 1 TABLET: 5; 325 TABLET ORAL at 07:12

## 2018-12-06 NOTE — PLAN OF CARE
Problem: Patient Care Overview  Goal: Plan of Care Review  Outcome: Ongoing (interventions implemented as appropriate)  Patient complained of neck and shoulder pain that was relieved with PRN medication. X-rays showed no fracture. DMV paperwork given to patient for temporary handicap license. Patient adequate for discharge. No falls/adverse events. No S/S of acute distress.
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
## 2018-12-06 NOTE — ANESTHESIA POSTPROCEDURE EVALUATION
"Anesthesia Post Evaluation    Patient: Daniela Costa    Procedure(s) Performed: Procedure(s) (LRB):  XI ROBOTIC HYSTERECTOMY (Bilateral)  XI ROBOTIC SALPINGO-OOPHORECTOMY (Bilateral)    Final Anesthesia Type: general  Patient location during evaluation: PACU  Patient participation: Yes- Able to Participate  Level of consciousness: awake and alert  Post-procedure vital signs: reviewed and stable  Pain management: adequate  Airway patency: patent  PONV status at discharge: No PONV  Anesthetic complications: no      Cardiovascular status: blood pressure returned to baseline  Respiratory status: unassisted  Hydration status: euvolemic  Follow-up not needed.        Visit Vitals  /67   Pulse 97   Temp 36.6 °C (97.8 °F) (Temporal)   Resp 15   Ht 5' 5" (1.651 m)   Wt 83.7 kg (184 lb 8.4 oz)   SpO2 100%   Breastfeeding? No   BMI 30.71 kg/m²       Pain/Shanon Score: Pain Assessment Performed: Yes (12/5/2018  5:00 PM)  Presence of Pain: complains of pain/discomfort (12/5/2018  5:00 PM)  Pain Rating Prior to Med Admin: 8 (12/5/2018  5:32 PM)  Pain Rating Post Med Admin: 5 (12/5/2018  4:30 PM)  Shanon Score: 10 (12/5/2018  4:30 PM)        "

## 2018-12-06 NOTE — SUBJECTIVE & OBJECTIVE
Scheduled Meds:   chlorhexidine  10 mL Mouth/Throat BID    clonazePAM  1 mg Oral QHS    levothyroxine  50 mcg Oral Before breakfast    nozaseptin   Each Nare BID    QUEtiapine  50 mg Oral QHS     Continuous Infusions:   sodium chloride 0.9% 125 mL/hr at 12/06/18 0222    lactated ringers       PRN Meds:cyclobenzaprine, diphenhydrAMINE, HYDROcodone-acetaminophen, HYDROcodone-acetaminophen, morphine, ondansetron, promethazine (PHENERGAN) IVPB, simethicone, sodium chloride 0.9%    Review of patient's allergies indicates:   Allergen Reactions    Cefdinir      Radiation Recall, everywhere she had radiation it looked like blisters and very hot to touch    Levofloxacin      Went into deep depression. Messed with Pych meds    Strawberry Hives       Objective:     Vital Signs (Most Recent):  Temp: 98.4 °F (36.9 °C) (12/06/18 0722)  Pulse: 100 (12/06/18 0836)  Resp: 18 (12/06/18 0836)  BP: 113/68 (12/06/18 0722)  SpO2: 100 % (12/06/18 0836) Vital Signs (24h Range):  Temp:  [97.1 °F (36.2 °C)-98.4 °F (36.9 °C)] 98.4 °F (36.9 °C)  Pulse:  [] 100  Resp:  [14-20] 18  SpO2:  [98 %-100 %] 100 %  BP: (103-132)/(60-92) 113/68     Weight: 83.7 kg (184 lb 8.4 oz)  Body mass index is 30.71 kg/m².  No LMP recorded. Patient is not currently having periods (Reason: Other).    I&O (Last 24H):    Intake/Output Summary (Last 24 hours) at 12/6/2018 0915  Last data filed at 12/6/2018 0600  Gross per 24 hour   Intake 1905.83 ml   Output 1450 ml   Net 455.83 ml       Physical Exam:   Constitutional: She is oriented to person, place, and time. She appears well-developed and well-nourished.    HENT:   Head: Normocephalic.     Neck: Normal range of motion. No thyromegaly present.    Cardiovascular: Normal rate and regular rhythm.     Pulmonary/Chest: Effort normal and breath sounds normal.        Abdominal: Soft. Bowel sounds are normal. She exhibits abdominal incision (trochar incisions clean/dry/intact, topical dermabond on  each).     Genitourinary:   Genitourinary Comments:   Vaginal area red, no discharge on exam             Musculoskeletal: Normal range of motion and moves all extremeties.       Neurological: She is alert and oriented to person, place, and time.    Skin: Skin is warm.    Psychiatric: She has a normal mood and affect.       Laboratory:  I have personallly reviewed all pertinent lab results from the last 24 hours.    Diagnostic Results:  Labs: Reviewed

## 2018-12-06 NOTE — PLAN OF CARE
Problem: Patient Care Overview  Goal: Plan of Care Review  Outcome: Ongoing (interventions implemented as appropriate)  Pt complains of abdominal pain. Pain medication is effective. IV fluids are infusing. Lap sites are intact. No injuries. Will continue to monitor. 12 hour chart check is completed.

## 2018-12-06 NOTE — HPI
Patient admitted for elective [facility] BSO by Dr. YUE Reich on 12/04/18.
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
## 2018-12-06 NOTE — ASSESSMENT & PLAN NOTE
Pain control.  Tolerating diet.  Voiding spontaneously.  Passing flatus.      Will discharge home pending xray studies for neck pain.

## 2018-12-07 RX ORDER — QUETIAPINE FUMARATE 100 MG/1
TABLET, FILM COATED ORAL
Qty: 45 TABLET | Refills: 2 | OUTPATIENT
Start: 2018-12-07

## 2018-12-10 ENCOUNTER — PATIENT MESSAGE (OUTPATIENT)
Dept: PSYCHIATRY | Facility: CLINIC | Age: 48
End: 2018-12-10

## 2018-12-10 RX ORDER — HYDROXYZINE HYDROCHLORIDE 50 MG/1
TABLET, FILM COATED ORAL
Qty: 60 TABLET | Refills: 2 | Status: SHIPPED | OUTPATIENT
Start: 2018-12-10 | End: 2019-01-28 | Stop reason: SDUPTHER

## 2018-12-10 NOTE — TELEPHONE ENCOUNTER
Please see pt urgent msg.  She also sent a request to Dr ENCARNACION for minerva to help with sleep and asked him to speak with you about it.

## 2018-12-12 ENCOUNTER — PATIENT MESSAGE (OUTPATIENT)
Dept: OBSTETRICS AND GYNECOLOGY | Facility: CLINIC | Age: 48
End: 2018-12-12

## 2018-12-12 NOTE — TELEPHONE ENCOUNTER
Reviewed pathology and surgery.  Patient with no complaints.  Has flatus and normal daily bowel movements, no nausea, vomiting, fever, chest pain, shortness of breath or palpitations. There are no range of movement or sensory problems.

## 2018-12-13 ENCOUNTER — TELEPHONE (OUTPATIENT)
Dept: PSYCHIATRY | Facility: CLINIC | Age: 48
End: 2018-12-13

## 2018-12-13 NOTE — TELEPHONE ENCOUNTER
Called to offer pt an appt today or tomorrow but she does not have transportation.  I offerred to call her next week if Fortunato has any opening come up and she said she would appreciate it.

## 2018-12-18 ENCOUNTER — NURSE TRIAGE (OUTPATIENT)
Dept: ADMINISTRATIVE | Facility: CLINIC | Age: 48
End: 2018-12-18

## 2018-12-19 ENCOUNTER — HOSPITAL ENCOUNTER (EMERGENCY)
Facility: HOSPITAL | Age: 48
Discharge: HOME | End: 2018-12-19
Attending: EMERGENCY MEDICINE
Payer: MEDICARE

## 2018-12-19 VITALS
SYSTOLIC BLOOD PRESSURE: 115 MMHG | BODY MASS INDEX: 30.42 KG/M2 | HEIGHT: 65 IN | RESPIRATION RATE: 19 BRPM | TEMPERATURE: 99 F | DIASTOLIC BLOOD PRESSURE: 77 MMHG | WEIGHT: 182.56 LBS | OXYGEN SATURATION: 96 % | HEART RATE: 90 BPM

## 2018-12-19 DIAGNOSIS — R31.9 URINARY TRACT INFECTION WITH HEMATURIA, SITE UNSPECIFIED: Primary | ICD-10-CM

## 2018-12-19 DIAGNOSIS — N39.0 URINARY TRACT INFECTION WITH HEMATURIA, SITE UNSPECIFIED: Primary | ICD-10-CM

## 2018-12-19 LAB
ALBUMIN SERPL BCP-MCNC: 4.3 G/DL
ALP SERPL-CCNC: 120 U/L
ALT SERPL W/O P-5'-P-CCNC: 19 U/L
ANION GAP SERPL CALC-SCNC: 12 MMOL/L
AST SERPL-CCNC: 17 U/L
BACTERIA #/AREA URNS HPF: ABNORMAL /HPF
BASOPHILS # BLD AUTO: 0.06 K/UL
BASOPHILS NFR BLD: 0.7 %
BILIRUB SERPL-MCNC: 0.7 MG/DL
BILIRUB UR QL STRIP: ABNORMAL
BUN SERPL-MCNC: 16 MG/DL
CALCIUM SERPL-MCNC: 9.8 MG/DL
CAOX CRY URNS QL MICRO: ABNORMAL
CHLORIDE SERPL-SCNC: 104 MMOL/L
CLARITY UR: CLEAR
CO2 SERPL-SCNC: 23 MMOL/L
COLOR UR: YELLOW
CREAT SERPL-MCNC: 1.1 MG/DL
DIFFERENTIAL METHOD: ABNORMAL
EOSINOPHIL # BLD AUTO: 0.2 K/UL
EOSINOPHIL NFR BLD: 2.9 %
ERYTHROCYTE [DISTWIDTH] IN BLOOD BY AUTOMATED COUNT: 14.1 %
EST. GFR  (AFRICAN AMERICAN): >60 ML/MIN/1.73 M^2
EST. GFR  (NON AFRICAN AMERICAN): >60 ML/MIN/1.73 M^2
GLUCOSE SERPL-MCNC: 113 MG/DL
GLUCOSE UR QL STRIP: NEGATIVE
HCT VFR BLD AUTO: 48.9 %
HGB BLD-MCNC: 16.3 G/DL
HGB UR QL STRIP: ABNORMAL
KETONES UR QL STRIP: ABNORMAL
LACTATE SERPL-SCNC: 1.4 MMOL/L
LEUKOCYTE ESTERASE UR QL STRIP: ABNORMAL
LYMPHOCYTES # BLD AUTO: 1.9 K/UL
LYMPHOCYTES NFR BLD: 22.7 %
MCH RBC QN AUTO: 29.9 PG
MCHC RBC AUTO-ENTMCNC: 33.3 G/DL
MCV RBC AUTO: 90 FL
MICROSCOPIC COMMENT: ABNORMAL
MONOCYTES # BLD AUTO: 0.5 K/UL
MONOCYTES NFR BLD: 6.5 %
NEUTROPHILS # BLD AUTO: 5.5 K/UL
NEUTROPHILS NFR BLD: 67.2 %
NITRITE UR QL STRIP: NEGATIVE
PH UR STRIP: 6 [PH] (ref 5–8)
PLATELET # BLD AUTO: 219 K/UL
PMV BLD AUTO: 9.8 FL
POTASSIUM SERPL-SCNC: 3.4 MMOL/L
PROT SERPL-MCNC: 7.8 G/DL
PROT UR QL STRIP: ABNORMAL
RBC # BLD AUTO: 5.46 M/UL
RBC #/AREA URNS HPF: 2 /HPF (ref 0–4)
SODIUM SERPL-SCNC: 139 MMOL/L
SP GR UR STRIP: >=1.03 (ref 1–1.03)
SQUAMOUS #/AREA URNS HPF: 15 /HPF
URN SPEC COLLECT METH UR: ABNORMAL
UROBILINOGEN UR STRIP-ACNC: NEGATIVE EU/DL
WBC # BLD AUTO: 8.21 K/UL
WBC #/AREA URNS HPF: 10 /HPF (ref 0–5)

## 2018-12-19 PROCEDURE — 81000 URINALYSIS NONAUTO W/SCOPE: CPT

## 2018-12-19 PROCEDURE — 99284 EMERGENCY DEPT VISIT MOD MDM: CPT | Mod: 25

## 2018-12-19 PROCEDURE — 80053 COMPREHEN METABOLIC PANEL: CPT

## 2018-12-19 PROCEDURE — 87040 BLOOD CULTURE FOR BACTERIA: CPT

## 2018-12-19 PROCEDURE — 85025 COMPLETE CBC W/AUTO DIFF WBC: CPT

## 2018-12-19 PROCEDURE — 83605 ASSAY OF LACTIC ACID: CPT

## 2018-12-19 PROCEDURE — 25000003 PHARM REV CODE 250: Performed by: EMERGENCY MEDICINE

## 2018-12-19 PROCEDURE — 96361 HYDRATE IV INFUSION ADD-ON: CPT

## 2018-12-19 PROCEDURE — 36415 COLL VENOUS BLD VENIPUNCTURE: CPT

## 2018-12-19 PROCEDURE — 96360 HYDRATION IV INFUSION INIT: CPT

## 2018-12-19 RX ORDER — NITROFURANTOIN 25; 75 MG/1; MG/1
100 CAPSULE ORAL 2 TIMES DAILY
Qty: 10 CAPSULE | Refills: 0 | Status: SHIPPED | OUTPATIENT
Start: 2018-12-19 | End: 2018-12-21

## 2018-12-19 RX ADMIN — SODIUM CHLORIDE 2484 ML: 0.9 INJECTION, SOLUTION INTRAVENOUS at 01:12
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
## 2018-12-19 NOTE — TELEPHONE ENCOUNTER
Patient called to report the following:     -hx of hysterectomy  12/5/18   -fever 100.8  -headache since 12/2/18  -patient states that she is taking a good deal of ibuprofen   -still spotting, under a lot of stress and not sleeping well  -denies drainage from incision   -denies chest pain, difficulty breathing   -advised to follow up with provider within 4 hours     Reason for Disposition   Fever > 100.5 F (38.1 C)    Protocols used: ST POST-OP SYMPTOMS AND [id number]

## 2018-12-21 ENCOUNTER — OFFICE VISIT (OUTPATIENT)
Dept: INTERNAL MEDICINE | Facility: CLINIC | Age: 48
End: 2018-12-21
Payer: MEDICARE

## 2018-12-21 VITALS
TEMPERATURE: 98 F | BODY MASS INDEX: 31 KG/M2 | HEIGHT: 65 IN | WEIGHT: 186.06 LBS | HEART RATE: 100 BPM | DIASTOLIC BLOOD PRESSURE: 90 MMHG | SYSTOLIC BLOOD PRESSURE: 142 MMHG

## 2018-12-21 DIAGNOSIS — T74.91XD DOMESTIC VIOLENCE OF ADULT, SUBSEQUENT ENCOUNTER: ICD-10-CM

## 2018-12-21 DIAGNOSIS — Z90.710 S/P HYSTERECTOMY: Primary | ICD-10-CM

## 2018-12-21 DIAGNOSIS — N30.00 ACUTE CYSTITIS WITHOUT HEMATURIA: ICD-10-CM

## 2018-12-21 DIAGNOSIS — R10.2 PELVIC PAIN IN FEMALE: ICD-10-CM

## 2018-12-21 PROCEDURE — 99214 OFFICE O/P EST MOD 30 MIN: CPT | Mod: S$GLB,,, | Performed by: PHYSICIAN ASSISTANT

## 2018-12-21 PROCEDURE — 99999 PR PBB SHADOW E&M-EST. PATIENT-LVL IV: CPT | Mod: PBBFAC,,, | Performed by: PHYSICIAN ASSISTANT

## 2018-12-21 PROCEDURE — 99214 OFFICE O/P EST MOD 30 MIN: CPT | Mod: PBBFAC,PO | Performed by: PHYSICIAN ASSISTANT

## 2018-12-21 RX ORDER — SULFAMETHOXAZOLE AND TRIMETHOPRIM 800; 160 MG/1; MG/1
1 TABLET ORAL 2 TIMES DAILY
Qty: 14 TABLET | Refills: 0 | Status: SHIPPED | OUTPATIENT
Start: 2018-12-21 | End: 2018-12-28

## 2018-12-21 RX ORDER — FLUCONAZOLE 150 MG/1
150 TABLET ORAL DAILY
Qty: 2 TABLET | Refills: 0 | Status: SHIPPED | OUTPATIENT
Start: 2018-12-21 | End: 2018-12-23
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
## 2018-12-24 LAB
BACTERIA BLD CULT: NORMAL
BACTERIA BLD CULT: NORMAL

## 2018-12-25 ENCOUNTER — OFFICE VISIT (OUTPATIENT)
Dept: URGENT CARE | Facility: CLINIC | Age: 48
End: 2018-12-25
Payer: MEDICARE

## 2018-12-25 ENCOUNTER — NURSE TRIAGE (OUTPATIENT)
Dept: ADMINISTRATIVE | Facility: CLINIC | Age: 48
End: 2018-12-25

## 2018-12-25 VITALS
WEIGHT: 181.88 LBS | HEART RATE: 120 BPM | BODY MASS INDEX: 30.3 KG/M2 | RESPIRATION RATE: 18 BRPM | OXYGEN SATURATION: 99 % | HEIGHT: 65 IN | SYSTOLIC BLOOD PRESSURE: 130 MMHG | DIASTOLIC BLOOD PRESSURE: 64 MMHG | TEMPERATURE: 99 F

## 2018-12-25 DIAGNOSIS — R00.0 TACHYCARDIA: Primary | ICD-10-CM

## 2018-12-25 DIAGNOSIS — B34.9 VIRAL SYNDROME: ICD-10-CM

## 2018-12-25 LAB
CTP QC/QA: YES
POC MOLECULAR INFLUENZA A AGN: NEGATIVE
POC MOLECULAR INFLUENZA B AGN: NEGATIVE

## 2018-12-25 PROCEDURE — 99214 OFFICE O/P EST MOD 30 MIN: CPT | Mod: S$GLB,,, | Performed by: FAMILY MEDICINE

## 2018-12-25 PROCEDURE — 99213 OFFICE O/P EST LOW 20 MIN: CPT | Mod: PBBFAC,PO

## 2018-12-25 PROCEDURE — 99999 PR PBB SHADOW E&M-EST. PATIENT-LVL III: CPT | Mod: PBBFAC,,,

## 2018-12-25 PROCEDURE — 87502 INFLUENZA DNA AMP PROBE: CPT | Mod: PBBFAC,PO

## 2018-12-25 NOTE — PROGRESS NOTES
"Subjective:       Patient ID: Daniela Costa is a 48 y.o. female.    Chief Complaint: No chief complaint on file.    /64   Pulse (!) 135   Temp 98.8 °F (37.1 °C)   Resp 18   Ht 5' 5" (1.651 m)   Wt 82.5 kg (181 lb 14.1 oz)   SpO2 99%   BMI 30.27 kg/m²     HPI  Fever, headaches, body aches and heart racing since 4-5 days ago on 12/19. Seen in ER and clinic, diagnosed as UTI, on bactrim, not feeling better. Took motrin one hour ago. Fever was 100.9 then  S/p laparoscopic hysterectomy 12/5.   Hx of asthma, coughs now and then    Review of Systems   Constitutional: Positive for chills and fever.   HENT: Negative for congestion, ear pain, facial swelling, rhinorrhea, sinus pressure, sinus pain and sore throat.    Eyes: Negative.    Respiratory: Positive for cough.    Cardiovascular: Negative.    Gastrointestinal: Positive for abdominal pain (lower abd pain post op).   Musculoskeletal: Positive for myalgias.   Neurological: Positive for headaches.       Objective:      Physical Exam   Constitutional: She is oriented to person, place, and time. She appears well-developed and well-nourished.   HENT:   Head: Normocephalic and atraumatic.   Mouth/Throat: No oropharyngeal exudate.   Eyes: EOM are normal. Pupils are equal, round, and reactive to light. Right eye exhibits no discharge. Left eye exhibits no discharge.   Neck: Normal range of motion. Neck supple.   Cardiovascular:   Tachycardia , repeat 120   Pulmonary/Chest: Effort normal and breath sounds normal. She has no wheezes. She has no rales.   Abdominal: Soft. She exhibits no distension.   Musculoskeletal: Normal range of motion.   Lymphadenopathy:     She has no cervical adenopathy.   Neurological: She is alert and oriented to person, place, and time. No cranial nerve deficit.   Skin: Skin is warm and dry. She is not diaphoretic.   Nursing note and vitals reviewed.      Assessment:       1. Tachycardia    2. Viral syndrome        Plan: "     Diagnoses and all orders for this visit:    Tachycardia  -     POCT Influenza A/B Molecular    Viral syndrome      1. Fluids, rest, OTC ibuprofen or tylenol for fever and aches  2. Your flu test is negative today  3. Follow up with your regular doctor tomorrow  4. Go to ER if symptom worsens
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
## 2018-12-25 NOTE — PATIENT INSTRUCTIONS
"1. Fluids, rest, OTC ibuprofen or tylenol for fever and aches  2. Your flu test is negative today  3. Follow up with your regular doctor tomorrow  4. Go to ER if symptom worsens      Viral Syndrome (Adult)  A viral illness may cause a number of symptoms. The symptoms depend on the part of the body that the virus affects. If it settles in your nose, throat, and lungs, it may cause cough, sore throat, congestion, and sometimes headache. If it settles in your stomach and intestinal tract, it may cause vomiting and diarrhea. Sometimes it causes vague symptoms like "aching all over," feeling tired, loss of appetite, or fever.  A viral illness usually lasts 1 to 2 weeks, but sometimes it lasts longer. In some cases, a more serious infection can look like a viral syndrome in the first few days of the illness. You may need another exam and additional tests to know the difference. Watch for the warning signs listed below.  Home care  Follow these guidelines for taking care of yourself at home:  · If symptoms are severe, rest at home for the first 2 to 3 days.  · Stay away from cigarette smoke - both your smoke and the smoke from others.  · You may use over-the-counter acetaminophen or ibuprofen for fever, muscle aching, and headache, unless another medicine was prescribed for this. If you have chronic liver or kidney disease or ever had a stomach ulcer or GI bleeding, talk with your doctor before using these medicines. No one who is younger than 18 and ill with a fever should take aspirin. It may cause severe disease or death.  · Your appetite may be poor, so a light diet is fine. Avoid dehydration by drinking 8 to 12 8-ounce glasses of fluids each day. This may include water; orange juice; lemonade; apple, grape, and cranberry juice; clear fruit drinks; electrolyte replacement and sports drinks; and decaffeinated teas and coffee. If you have been diagnosed with a kidney disease, ask your doctor how much and what types of " fluids you should drink to prevent dehydration. If you have kidney disease, drinking too much fluid can cause it build up in the your body and be dangerous to your health.  · Over-the-counter remedies won't shorten the length of the illness but may be helpful for cough, sore throat; and nasal and sinus congestion. Don't use decongestants if you have high blood pressure.  Follow-up care  Follow up with your healthcare provider if you do not improve over the next week.  Call 911  Get emergency medical care if any of the following occur:  · Convulsion  · Feeling weak, dizzy, or like you are going to faint  · Chest pain, shortness of breath, wheezing, or difficulty breathing  When to seek medical advice  Call your healthcare provider right away if any of these occur:  · Cough with lots of colored sputum (mucus) or blood in your sputum  · Chest pain, shortness of breath, wheezing, or difficulty breathing  · Severe headache; face, neck, or ear pain  · Severe, constant pain in the lower right side of your belly (abdominal)  · Continued vomiting (cant keep liquids down)  · Frequent diarrhea (more than 5 times a day); blood (red or black color) or mucus in diarrhea  · Feeling weak, dizzy, or like you are going to faint  · Extreme thirst  · Fever of 100.4°F (38°C) or higher, or as directed by your healthcare provider  Date Last Reviewed: 9/25/2015  © 3873-9124 [employer]. [address] [place] [address], [place] [address]. All rights reserved. This information is not intended as a substitute for professional medical care. Always follow your healthcare professional's instructions.

## 2018-12-26 ENCOUNTER — OFFICE VISIT (OUTPATIENT)
Dept: URGENT CARE | Facility: CLINIC | Age: 48
End: 2018-12-26
Payer: MEDICARE

## 2018-12-26 VITALS
TEMPERATURE: 100 F | RESPIRATION RATE: 16 BRPM | OXYGEN SATURATION: 97 % | BODY MASS INDEX: 30.3 KG/M2 | WEIGHT: 181.88 LBS | HEIGHT: 65 IN | HEART RATE: 70 BPM

## 2018-12-26 DIAGNOSIS — L50.9 URTICARIA: Primary | ICD-10-CM

## 2018-12-26 PROCEDURE — 99999 PR PBB SHADOW E&M-EST. PATIENT-LVL V: CPT | Mod: PBBFAC,,, | Performed by: FAMILY MEDICINE

## 2018-12-26 PROCEDURE — 99214 OFFICE O/P EST MOD 30 MIN: CPT | Mod: S$GLB,,, | Performed by: FAMILY MEDICINE

## 2018-12-26 PROCEDURE — 99215 OFFICE O/P EST HI 40 MIN: CPT | Mod: PBBFAC,PO | Performed by: FAMILY MEDICINE

## 2018-12-26 RX ORDER — LORATADINE 10 MG/1
10 TABLET ORAL DAILY
Qty: 30 TABLET | Refills: 0 | Status: SHIPPED | OUTPATIENT
Start: 2018-12-26

## 2018-12-26 RX ORDER — FAMOTIDINE 20 MG/1
20 TABLET, FILM COATED ORAL 2 TIMES DAILY
Qty: 60 TABLET | Refills: 0 | Status: SHIPPED | OUTPATIENT
Start: 2018-12-26 | End: 2023-09-26

## 2018-12-26 RX ORDER — BETAMETHASONE SODIUM PHOSPHATE AND BETAMETHASONE ACETATE 3; 3 MG/ML; MG/ML
12 INJECTION, SUSPENSION INTRA-ARTICULAR; INTRALESIONAL; INTRAMUSCULAR; SOFT TISSUE
Status: COMPLETED | OUTPATIENT
Start: 2018-12-26 | End: 2018-12-26

## 2018-12-26 RX ORDER — METHYLPREDNISOLONE 4 MG/1
TABLET ORAL
Qty: 1 PACKAGE | Refills: 0 | Status: SHIPPED | OUTPATIENT
Start: 2018-12-26 | End: 2022-09-08 | Stop reason: ALTCHOICE

## 2018-12-26 RX ADMIN — BETAMETHASONE SODIUM PHOSPHATE AND BETAMETHASONE ACETATE 12 MG: 3; 3 INJECTION, SUSPENSION INTRA-ARTICULAR; INTRALESIONAL; INTRAMUSCULAR at 06:12

## 2018-12-26 NOTE — PROGRESS NOTES
"Subjective:       Patient ID: Daniela Costa is a 48 y.o. female.    Chief Complaint: Urticaria    Pulse 70   Temp 99.7 °F (37.6 °C) (Tympanic)   Resp 16   Ht 5' 5" (1.651 m)   Wt 82.5 kg (181 lb 14.1 oz)   SpO2 97%   BMI 30.27 kg/m²     HPI  Rash developed since this morning, spreading all over the body. Can't take benedryl- itch    Review of Systems   HENT: Negative for trouble swallowing.    Respiratory: Positive for cough (baseline asthma). Negative for shortness of breath.    Gastrointestinal: Negative for abdominal pain and nausea.       Objective:      Physical Exam   Constitutional: She is oriented to person, place, and time. She appears well-developed and well-nourished. No distress.   HENT:   Head: Normocephalic and atraumatic.   Eyes: EOM are normal. Pupils are equal, round, and reactive to light.   Neurological: She is alert and oriented to person, place, and time. No cranial nerve deficit.   Skin: Skin is warm and dry. She is not diaphoretic.   Diffuse raised confluent macular lesions all over trunk and limbs. Intensely pruritic   Nursing note and vitals reviewed.      Assessment:       1. Urticaria        Plan:     Daniela was seen today for urticaria.    Diagnoses and all orders for this visit:    Urticaria  -     methylPREDNISolone (MEDROL DOSEPACK) 4 mg tablet; use as directed  -     loratadine (CLARITIN) 10 mg tablet; Take 1 tablet (10 mg total) by mouth once daily.  -     famotidine (PEPCID) 20 MG tablet; Take 1 tablet (20 mg total) by mouth 2 (two) times daily.  -     betamethasone acetate-betamethasone sodium phosphate injection 12 mg      1. Take claritin daily  2. Take pepcid twice daily  3. Take medrol dose pack as instructed, starting tomorrow  4. Go to ER if symptoms worsens  5. Follow up with PCP in 1-2 week     "

## 2018-12-26 NOTE — PATIENT INSTRUCTIONS
1. Take claritin daily  2. Take pepcid twice daily  3. Take medrol dose pack as instructed, starting tomorrow  4. Go to ER if symptoms worsens  5. Follow up with PCP in 1-2 week

## 2019-01-01 RX ORDER — QUETIAPINE FUMARATE 100 MG/1
TABLET, FILM COATED ORAL
Qty: 45 TABLET | Refills: 0 | Status: SHIPPED | OUTPATIENT
Start: 2019-01-01 | End: 2019-01-28 | Stop reason: SDUPTHER

## 2019-01-02 ENCOUNTER — OFFICE VISIT (OUTPATIENT)
Dept: PSYCHIATRY | Facility: CLINIC | Age: 49
End: 2019-01-02
Payer: MEDICARE

## 2019-01-02 DIAGNOSIS — Z63.0 STRESS DUE TO MARITAL PROBLEMS: ICD-10-CM

## 2019-01-02 DIAGNOSIS — F43.0 ACUTE STRESS REACTION: ICD-10-CM

## 2019-01-02 DIAGNOSIS — F31.9 BIPOLAR 1 DISORDER, DEPRESSED: Primary | ICD-10-CM

## 2019-01-02 PROCEDURE — 99499 UNLISTED E&M SERVICE: CPT | Mod: S$GLB,,, | Performed by: SOCIAL WORKER

## 2019-01-02 PROCEDURE — 99999 PR PBB SHADOW E&M-EST. PATIENT-LVL I: ICD-10-PCS | Mod: PBBFAC,,, | Performed by: SOCIAL WORKER

## 2019-01-02 PROCEDURE — 90834 PR PSYCHOTHERAPY W/PATIENT, 45 MIN: ICD-10-PCS | Mod: S$GLB,,, | Performed by: SOCIAL WORKER

## 2019-01-02 PROCEDURE — 99999 PR PBB SHADOW E&M-EST. PATIENT-LVL I: CPT | Mod: PBBFAC,,, | Performed by: SOCIAL WORKER

## 2019-01-02 PROCEDURE — 99499 RISK ADDL DX/OHS AUDIT: ICD-10-PCS | Mod: S$GLB,,, | Performed by: SOCIAL WORKER

## 2019-01-02 PROCEDURE — 90834 PSYTX W PT 45 MINUTES: CPT | Mod: S$GLB,,, | Performed by: SOCIAL WORKER

## 2019-01-02 SDOH — SOCIAL DETERMINANTS OF HEALTH (SDOH): PROBLEMS IN RELATIONSHIP WITH SPOUSE OR PARTNER: Z63.0

## 2019-01-04 ENCOUNTER — TELEPHONE (OUTPATIENT)
Dept: INTERNAL MEDICINE | Facility: CLINIC | Age: 49
End: 2019-01-04

## 2019-01-04 ENCOUNTER — OFFICE VISIT (OUTPATIENT)
Dept: INTERNAL MEDICINE | Facility: CLINIC | Age: 49
End: 2019-01-04
Payer: MEDICARE

## 2019-01-04 ENCOUNTER — OFFICE VISIT (OUTPATIENT)
Dept: PSYCHIATRY | Facility: CLINIC | Age: 49
End: 2019-01-04
Payer: MEDICARE

## 2019-01-04 ENCOUNTER — LAB VISIT (OUTPATIENT)
Dept: LAB | Facility: HOSPITAL | Age: 49
End: 2019-01-04
Attending: INTERNAL MEDICINE
Payer: MEDICARE

## 2019-01-04 ENCOUNTER — CLINICAL SUPPORT (OUTPATIENT)
Dept: CARDIOLOGY | Facility: CLINIC | Age: 49
End: 2019-01-04
Attending: PHYSICIAN ASSISTANT
Payer: MEDICARE

## 2019-01-04 VITALS
HEIGHT: 65 IN | TEMPERATURE: 99 F | WEIGHT: 185.88 LBS | DIASTOLIC BLOOD PRESSURE: 88 MMHG | BODY MASS INDEX: 30.97 KG/M2 | HEART RATE: 124 BPM | SYSTOLIC BLOOD PRESSURE: 126 MMHG

## 2019-01-04 DIAGNOSIS — R00.0 TACHYCARDIA: ICD-10-CM

## 2019-01-04 DIAGNOSIS — R00.0 TACHYCARDIA: Primary | ICD-10-CM

## 2019-01-04 DIAGNOSIS — L50.9 HIVES: ICD-10-CM

## 2019-01-04 DIAGNOSIS — F43.0 ACUTE STRESS REACTION: ICD-10-CM

## 2019-01-04 DIAGNOSIS — F31.9 BIPOLAR I DISORDER WITH DEPRESSION: Primary | ICD-10-CM

## 2019-01-04 DIAGNOSIS — Z90.710 S/P HYSTERECTOMY: ICD-10-CM

## 2019-01-04 LAB
ALBUMIN SERPL BCP-MCNC: 4.2 G/DL
ALP SERPL-CCNC: 103 U/L
ALT SERPL W/O P-5'-P-CCNC: 25 U/L
ANION GAP SERPL CALC-SCNC: 11 MMOL/L
AST SERPL-CCNC: 25 U/L
BASOPHILS # BLD AUTO: 0.03 K/UL
BASOPHILS NFR BLD: 0.4 %
BILIRUB SERPL-MCNC: 0.4 MG/DL
BILIRUB UR QL STRIP: NEGATIVE
BUN SERPL-MCNC: 15 MG/DL
CALCIUM SERPL-MCNC: 9.8 MG/DL
CHLORIDE SERPL-SCNC: 103 MMOL/L
CLARITY UR REFRACT.AUTO: CLEAR
CO2 SERPL-SCNC: 27 MMOL/L
COLOR UR AUTO: NORMAL
CREAT SERPL-MCNC: 0.8 MG/DL
DIFFERENTIAL METHOD: ABNORMAL
EOSINOPHIL # BLD AUTO: 0.3 K/UL
EOSINOPHIL NFR BLD: 4 %
ERYTHROCYTE [DISTWIDTH] IN BLOOD BY AUTOMATED COUNT: 15 %
EST. GFR  (AFRICAN AMERICAN): >60 ML/MIN/1.73 M^2
EST. GFR  (NON AFRICAN AMERICAN): >60 ML/MIN/1.73 M^2
GLUCOSE SERPL-MCNC: 120 MG/DL
GLUCOSE UR QL STRIP: NEGATIVE
HCT VFR BLD AUTO: 46.7 %
HGB BLD-MCNC: 15.1 G/DL
HGB UR QL STRIP: NEGATIVE
KETONES UR QL STRIP: NEGATIVE
LEUKOCYTE ESTERASE UR QL STRIP: NEGATIVE
LYMPHOCYTES # BLD AUTO: 1.4 K/UL
LYMPHOCYTES NFR BLD: 17.9 %
MCH RBC QN AUTO: 30.4 PG
MCHC RBC AUTO-ENTMCNC: 32.3 G/DL
MCV RBC AUTO: 94 FL
MONOCYTES # BLD AUTO: 0.5 K/UL
MONOCYTES NFR BLD: 6.5 %
NEUTROPHILS # BLD AUTO: 5.7 K/UL
NEUTROPHILS NFR BLD: 71.2 %
NITRITE UR QL STRIP: NEGATIVE
PH UR STRIP: 6 [PH] (ref 5–8)
PLATELET # BLD AUTO: 218 K/UL
PMV BLD AUTO: 10.2 FL
POTASSIUM SERPL-SCNC: 4.4 MMOL/L
PROT SERPL-MCNC: 7.4 G/DL
PROT UR QL STRIP: NEGATIVE
RBC # BLD AUTO: 4.96 M/UL
SODIUM SERPL-SCNC: 141 MMOL/L
SP GR UR STRIP: 1 (ref 1–1.03)
URN SPEC COLLECT METH UR: NORMAL
WBC # BLD AUTO: 7.99 K/UL

## 2019-01-04 PROCEDURE — 90834 PR PSYCHOTHERAPY W/PATIENT, 45 MIN: ICD-10-PCS | Mod: S$GLB,,, | Performed by: SOCIAL WORKER

## 2019-01-04 PROCEDURE — 99214 OFFICE O/P EST MOD 30 MIN: CPT | Mod: S$GLB,,, | Performed by: PHYSICIAN ASSISTANT

## 2019-01-04 PROCEDURE — 99999 PR PBB SHADOW E&M-EST. PATIENT-LVL IV: ICD-10-PCS | Mod: PBBFAC,,, | Performed by: PHYSICIAN ASSISTANT

## 2019-01-04 PROCEDURE — 3008F BODY MASS INDEX DOCD: CPT | Mod: CPTII,S$GLB,, | Performed by: PHYSICIAN ASSISTANT

## 2019-01-04 PROCEDURE — 93224 XTRNL ECG REC UP TO 48 HRS: CPT | Mod: S$GLB,,, | Performed by: INTERNAL MEDICINE

## 2019-01-04 PROCEDURE — 99214 PR OFFICE/OUTPT VISIT, EST, LEVL IV, 30-39 MIN: ICD-10-PCS | Mod: S$GLB,,, | Performed by: PHYSICIAN ASSISTANT

## 2019-01-04 PROCEDURE — 93224 HOLTER MONITOR - 48 HOUR: ICD-10-PCS | Mod: S$GLB,,, | Performed by: INTERNAL MEDICINE

## 2019-01-04 PROCEDURE — 85025 COMPLETE CBC W/AUTO DIFF WBC: CPT

## 2019-01-04 PROCEDURE — 87086 URINE CULTURE/COLONY COUNT: CPT

## 2019-01-04 PROCEDURE — 99499 RISK ADDL DX/OHS AUDIT: ICD-10-PCS | Mod: S$GLB,,, | Performed by: SOCIAL WORKER

## 2019-01-04 PROCEDURE — 99499 UNLISTED E&M SERVICE: CPT | Mod: S$GLB,,, | Performed by: SOCIAL WORKER

## 2019-01-04 PROCEDURE — 36415 COLL VENOUS BLD VENIPUNCTURE: CPT | Mod: PO

## 2019-01-04 PROCEDURE — 99999 PR PBB SHADOW E&M-EST. PATIENT-LVL IV: CPT | Mod: PBBFAC,,, | Performed by: PHYSICIAN ASSISTANT

## 2019-01-04 PROCEDURE — 90834 PSYTX W PT 45 MINUTES: CPT | Mod: S$GLB,,, | Performed by: SOCIAL WORKER

## 2019-01-04 PROCEDURE — 3008F PR BODY MASS INDEX (BMI) DOCUMENTED: ICD-10-PCS | Mod: CPTII,S$GLB,, | Performed by: PHYSICIAN ASSISTANT

## 2019-01-04 PROCEDURE — 80053 COMPREHEN METABOLIC PANEL: CPT

## 2019-01-04 PROCEDURE — 81003 URINALYSIS AUTO W/O SCOPE: CPT
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
## 2019-01-04 NOTE — PROGRESS NOTES
Subjective:       Patient ID: Daniela Costa is a 48 y.o. female.    Chief Complaint: Follow-up; Headache; and Diarrhea    Headache    This is a recurrent problem. The current episode started 1 to 4 weeks ago. The pain is at a severity of 3/10. The pain is moderate. Associated symptoms include muscle aches, nausea and weakness. Pertinent negatives include no back pain, blurred vision, dizziness, drainage, ear pain, eye pain, hearing loss, neck pain, numbness, phonophobia, rhinorrhea, sinus pressure, tingling, tinnitus or vomiting. Associated symptoms comments: Vaginal bleeding from recent surgery    Generalized fatigue and malaise . The symptoms are aggravated by activity. Treatments tried: was on abx for UTI and then had viurs per         Health Maintenance Due   Topic Date Due    Pneumococcal Vaccine (Highest Risk) (1 of 3 - PCV13) 02/02/1991       Past Medical History:   Diagnosis Date    Allergy     Anxiety     Arthritis     Asthma     Breast cancer     june 2012; BRCA 1 and 2 negative    Breast cancer genetic susceptibility     Cancer     Depression     Hypothyroidism     Lung disease     Pneumonia        Current Outpatient Medications   Medication Sig Dispense Refill    albuterol 90 mcg/actuation inhaler Inhale 2 puffs into the lungs every 6 (six) hours. 3 Inhaler 3    ASCORBATE CALCIUM (VITAMIN C ORAL) Take by mouth.      clonazePAM (KLONOPIN) 1 MG tablet Take 1 tablet (1 mg total) by mouth daily as needed for Anxiety. 30 tablet 1    famotidine (PEPCID) 20 MG tablet Take 1 tablet (20 mg total) by mouth 2 (two) times daily. 60 tablet 0    fluticasone (FLONASE) 50 mcg/actuation nasal spray 1 spray (50 mcg total) by Each Nare route 2 (two) times daily. 3 Bottle 3    fluticasone-vilanterol (BREO ELLIPTA) 200-25 mcg/dose DsDv diskus inhaler Inhale 1 puff into the lungs once daily. Controller 180 each 3    HYDROcodone-acetaminophen (NORCO) 5-325 mg per tablet Take 1 tablet by mouth every 8  (eight) hours as needed for Pain. 20 tablet 0    hydrOXYzine (ATARAX) 50 MG tablet Take 1 to 2 tablets at bedtime as needed for sleep 60 tablet 2    ibuprofen (ADVIL,MOTRIN) 800 MG tablet Take 800 mg by mouth 3 (three) times daily.      inhalation spacing device (BREATHERITE VALVED MDI CHAMBER) Use as directed for inhalation. 1 Device prn    levothyroxine (SYNTHROID) 50 MCG tablet Take 1 tablet (50 mcg total) by mouth once daily. 90 tablet 3    loratadine (CLARITIN) 10 mg tablet Take 1 tablet (10 mg total) by mouth once daily. 30 tablet 0    melatonin 1 mg Tab Take 1 tablet by mouth nightly.       methylPREDNISolone (MEDROL DOSEPACK) 4 mg tablet use as directed 1 Package 0    montelukast (SINGULAIR) 10 mg tablet Take 1 tablet (10 mg total) by mouth once daily. (Patient taking differently: Take 10 mg by mouth every evening. ) 90 tablet 3    multivitamin capsule Take 1 capsule by mouth once daily.      QUEtiapine (SEROQUEL) 100 MG Tab TAKE 1/2 TABLET AT BEDTIME FOR 2 DAYS, THEN 1 TABLET FOR 2 DAYS, THEN 1&1/2 TAB THEREAFTER 45 tablet 0    QUEtiapine (SEROQUEL) 200 MG Tab Take 1 tablet at bedtime for 3 days then 1 and 1/2 tablets at bedtime thereafter 45 tablet 2     No current facility-administered medications for this visit.        Review of Systems   Constitutional: Positive for activity change and unexpected weight change.   HENT: Negative for ear pain, hearing loss, rhinorrhea, sinus pressure, tinnitus and trouble swallowing.    Eyes: Positive for visual disturbance. Negative for blurred vision, pain and discharge.   Respiratory: Negative for chest tightness and wheezing.    Cardiovascular: Positive for palpitations. Negative for chest pain.   Gastrointestinal: Positive for nausea. Negative for blood in stool, constipation and vomiting.   Endocrine: Positive for polydipsia and polyuria.   Genitourinary: Positive for difficulty urinating, dysuria and hematuria. Negative for menstrual problem.  "  Musculoskeletal: Positive for arthralgias. Negative for back pain, joint swelling and neck pain.   Neurological: Positive for weakness. Negative for dizziness, tingling and numbness.   Psychiatric/Behavioral: Positive for confusion and dysphoric mood.       Objective:   /88   Pulse (!) 124   Temp 98.5 °F (36.9 °C) (Oral)   Ht 5' 5" (1.651 m)   Wt 84.3 kg (185 lb 13.6 oz)   BMI 30.93 kg/m²      Physical Exam   Constitutional: She is oriented to person, place, and time. She appears well-developed and well-nourished. No distress.   Patient is dressed, wearing makeup. This is improved since last visit where she was in [facility]   HENT:   Head: Normocephalic and atraumatic.   Right Ear: Hearing, tympanic membrane, external ear and ear canal normal.   Left Ear: Hearing, tympanic membrane, external ear and ear canal normal.   Nose: Nose normal.   Mouth/Throat: Oropharynx is clear and moist. No oropharyngeal exudate.   Eyes: Conjunctivae and EOM are normal. Pupils are equal, round, and reactive to light.   Neck: Normal range of motion. No thyromegaly present.   Cardiovascular: Normal rate, regular rhythm, normal heart sounds and intact distal pulses.   Pulmonary/Chest: Effort normal and breath sounds normal.   Abdominal: Soft. Bowel sounds are normal.   Pevlic/pubic region pain    Musculoskeletal: Normal range of motion.   Neurological: She is alert and oriented to person, place, and time. She has normal reflexes.   Skin: Skin is warm.   Psychiatric: She has a normal mood and affect. Her behavior is normal. Judgment and thought content normal.   Vitals reviewed.        Lab Results   Component Value Date    WBC 7.99 01/04/2019    HGB 15.1 01/04/2019    HCT 46.7 01/04/2019     01/04/2019    CHOL 265 (H) 11/26/2018    CHOL 265 (H) 11/26/2018    TRIG 115 11/26/2018    TRIG 115 11/26/2018    HDL 63 11/26/2018    HDL 63 11/26/2018    ALT 25 01/04/2019    AST 25 01/04/2019     01/04/2019    K 4.4 01/04/2019    "  01/04/2019    CREATININE 0.8 01/04/2019    BUN 15 01/04/2019    CO2 27 01/04/2019    TSH 7.056 (H) 11/29/2018    INR 1.1 03/12/2018    HGBA1C 5.5 04/17/2017       Assessment:       1. Tachycardia    2. Hives    3. S/P hysterectomy        Plan:     Daniela was seen today for follow-up, headache and diarrhea.  Diagnoses and all orders for this visit:    Tachycardia  -     Comprehensive metabolic panel; Future  -     CBC auto differential; Future  -     Urinalysis; Future  -     Urine culture; Future  -     Holter monitor - 48 hour; Future  -     Urine culture  -     Urinalysis    Hives  Recently had hives, state they come and go still. None present today   S/P hysterectomy  Has follow-up on Monday with gyn for vaginal bleeding and continuing to feel bad.  The left today to check for significant anemia or infection patient to go to the hospital if not improving or worsening over the weekend.

## 2019-01-04 NOTE — TELEPHONE ENCOUNTER
----- Message from Kizzy Avelar sent at 1/4/2019  3:14 PM CST -----  Contact: [phone]   Returning call,please call back at [phone]. Thx-AH

## 2019-01-06 LAB — BACTERIA UR CULT: NO GROWTH

## 2019-01-07 ENCOUNTER — PATIENT MESSAGE (OUTPATIENT)
Dept: INTERNAL MEDICINE | Facility: CLINIC | Age: 49
End: 2019-01-07

## 2019-01-07 ENCOUNTER — OFFICE VISIT (OUTPATIENT)
Dept: OBSTETRICS AND GYNECOLOGY | Facility: CLINIC | Age: 49
End: 2019-01-07
Payer: MEDICARE

## 2019-01-07 VITALS
BODY MASS INDEX: 30.42 KG/M2 | WEIGHT: 182.56 LBS | HEIGHT: 65 IN | DIASTOLIC BLOOD PRESSURE: 68 MMHG | SYSTOLIC BLOOD PRESSURE: 102 MMHG

## 2019-01-07 DIAGNOSIS — Z09 POSTOP CHECK: Primary | ICD-10-CM

## 2019-01-07 PROCEDURE — 99024 POSTOP FOLLOW-UP VISIT: CPT | Mod: S$GLB,,, | Performed by: OBSTETRICS & GYNECOLOGY

## 2019-01-07 PROCEDURE — 99999 PR PBB SHADOW E&M-EST. PATIENT-LVL III: ICD-10-PCS | Mod: PBBFAC,,, | Performed by: OBSTETRICS & GYNECOLOGY

## 2019-01-07 PROCEDURE — 99024 PR POST-OP FOLLOW-UP VISIT: ICD-10-PCS | Mod: S$GLB,,, | Performed by: OBSTETRICS & GYNECOLOGY

## 2019-01-07 PROCEDURE — 99999 PR PBB SHADOW E&M-EST. PATIENT-LVL III: CPT | Mod: PBBFAC,,, | Performed by: OBSTETRICS & GYNECOLOGY

## 2019-01-08 NOTE — PROGRESS NOTES
Daniela Costa is a 48 y.o. female  post-op from a       2018                                                      PREOPERATIVE DIAGNOSES:  History breast cancer, breast cancer Genetic susceptibility, cervical dysplasia                                                                                             POSTOPERATIVE DIAGNOSES:  Same, adhesive disease of the right ovary                                                                                                                       PROCEDURE:    Robotic-assisted laparoscopic hysterectomy, bilateral salpingo-oophorectomy, and lysis of adhesions 20min,  Cystoscopy  EBL: 50mL  FINDINGS: benign appearing pelvic organs, R adnexal adhesions - see below    Cystoscopy was performed and showed normal efflux of urine from both ureteral orfices, as noted in a D50 instilled bladder. No bladder injuries. Benign appearing mucosa.        There are no complaints and the procedure and hospitalization occured without complications.     The operative findings and pathology were reviewed with the patient.    PreOperative Diagnosis  Dysplasia of cervix, breast cancer and ovarian gene traits.  SPECIMEN  1) Cervix, uterus, bilateral fallopian tubes, left ovary.  2) Right ovary with adhesion.  FINAL PATHOLOGIC DIAGNOSIS  1. Uterus, cervix, bilateral fallopian tubes and left ovary:  Bilateral fallopian tubes: No significant histologic abnormalities.  Left ovary: Normal physiologic structures.  Cervix: Focal moderate squamous dysplasia (DAV 2, HGSIL) in a background mild squamous dysplasia (CIN1,  LGSIL); previous biopsy site changes.  Endometrium: Atrophy.  Myometrium: No significant histologic abnormalities.  Serosa: No significant histologic abnormalities.  2. Right ovary with adhesion:  Ovary with normal physiologic strucutres and fibrous adhesions.    PE:  APPEARANCE: Well nourished, well developed, in no acute distress.   ABDOMEN: Soft. No tenderness or  masses. No hepatosplenomegaly. No hernias. Incisions intact, clean and dry  PELVIC: vaginal cuff intact no masses or bleeding.    Assessment: Routine postoperative follow-up exam      Follow-up with me as needed.

## 2019-01-09 ENCOUNTER — PATIENT MESSAGE (OUTPATIENT)
Dept: OBSTETRICS AND GYNECOLOGY | Facility: CLINIC | Age: 49
End: 2019-01-09

## 2019-01-09 ENCOUNTER — TELEPHONE (OUTPATIENT)
Dept: OBSTETRICS AND GYNECOLOGY | Facility: CLINIC | Age: 49
End: 2019-01-09

## 2019-01-09 ENCOUNTER — HOSPITAL ENCOUNTER (EMERGENCY)
Facility: HOSPITAL | Age: 49
Discharge: HOME | End: 2019-01-09
Attending: FAMILY MEDICINE
Payer: MEDICARE

## 2019-01-09 ENCOUNTER — TELEPHONE (OUTPATIENT)
Dept: INTERNAL MEDICINE | Facility: CLINIC | Age: 49
End: 2019-01-09

## 2019-01-09 VITALS
DIASTOLIC BLOOD PRESSURE: 71 MMHG | WEIGHT: 180.75 LBS | SYSTOLIC BLOOD PRESSURE: 131 MMHG | RESPIRATION RATE: 20 BRPM | BODY MASS INDEX: 30.12 KG/M2 | HEIGHT: 65 IN | OXYGEN SATURATION: 98 % | HEART RATE: 100 BPM | TEMPERATURE: 98 F

## 2019-01-09 DIAGNOSIS — R00.0 TACHYCARDIA: Primary | ICD-10-CM

## 2019-01-09 DIAGNOSIS — R07.9 CHEST PAIN: ICD-10-CM

## 2019-01-09 DIAGNOSIS — R10.32 LLQ ABDOMINAL PAIN: Primary | ICD-10-CM

## 2019-01-09 DIAGNOSIS — R07.9 ACUTE CHEST PAIN: ICD-10-CM

## 2019-01-09 DIAGNOSIS — R00.0 TACHYCARDIA: ICD-10-CM

## 2019-01-09 LAB
ALBUMIN SERPL BCP-MCNC: 4.3 G/DL
ALP SERPL-CCNC: 87 U/L
ALT SERPL W/O P-5'-P-CCNC: 22 U/L
AMPHET+METHAMPHET UR QL: NEGATIVE
ANION GAP SERPL CALC-SCNC: 14 MMOL/L
APTT BLDCRRT: 26.8 SEC
AST SERPL-CCNC: 19 U/L
BACTERIA #/AREA URNS HPF: ABNORMAL /HPF
BARBITURATES UR QL SCN>200 NG/ML: NEGATIVE
BASOPHILS # BLD AUTO: 0.03 K/UL
BASOPHILS NFR BLD: 0.4 %
BENZODIAZ UR QL SCN>200 NG/ML: NEGATIVE
BILIRUB SERPL-MCNC: 0.7 MG/DL
BILIRUB UR QL STRIP: NEGATIVE
BNP SERPL-MCNC: <10 PG/ML
BUN SERPL-MCNC: 11 MG/DL
BZE UR QL SCN: NEGATIVE
CALCIUM SERPL-MCNC: 9.3 MG/DL
CANNABINOIDS UR QL SCN: NEGATIVE
CHLORIDE SERPL-SCNC: 105 MMOL/L
CLARITY UR: CLEAR
CO2 SERPL-SCNC: 22 MMOL/L
COLOR UR: YELLOW
CREAT SERPL-MCNC: 0.8 MG/DL
CREAT UR-MCNC: 10.7 MG/DL
D DIMER PPP IA.FEU-MCNC: 1.21 MG/L FEU
DIFFERENTIAL METHOD: ABNORMAL
EOSINOPHIL # BLD AUTO: 0.1 K/UL
EOSINOPHIL NFR BLD: 1.2 %
ERYTHROCYTE [DISTWIDTH] IN BLOOD BY AUTOMATED COUNT: 14.5 %
EST. GFR  (AFRICAN AMERICAN): >60 ML/MIN/1.73 M^2
EST. GFR  (NON AFRICAN AMERICAN): >60 ML/MIN/1.73 M^2
GLUCOSE SERPL-MCNC: 101 MG/DL
GLUCOSE UR QL STRIP: NEGATIVE
HCT VFR BLD AUTO: 45.1 %
HGB BLD-MCNC: 15.2 G/DL
HGB UR QL STRIP: ABNORMAL
INR PPP: 0.9
KETONES UR QL STRIP: NEGATIVE
LEUKOCYTE ESTERASE UR QL STRIP: ABNORMAL
LYMPHOCYTES # BLD AUTO: 1.2 K/UL
LYMPHOCYTES NFR BLD: 17.6 %
MAGNESIUM SERPL-MCNC: 2.3 MG/DL
MCH RBC QN AUTO: 31.2 PG
MCHC RBC AUTO-ENTMCNC: 33.7 G/DL
MCV RBC AUTO: 93 FL
METHADONE UR QL SCN>300 NG/ML: NEGATIVE
MICROSCOPIC COMMENT: ABNORMAL
MONOCYTES # BLD AUTO: 0.5 K/UL
MONOCYTES NFR BLD: 7.8 %
NEUTROPHILS # BLD AUTO: 5.1 K/UL
NEUTROPHILS NFR BLD: 73 %
NITRITE UR QL STRIP: NEGATIVE
OPIATES UR QL SCN: NEGATIVE
PCP UR QL SCN>25 NG/ML: NEGATIVE
PH UR STRIP: 7 [PH] (ref 5–8)
PLATELET # BLD AUTO: 176 K/UL
PMV BLD AUTO: 10.1 FL
POTASSIUM SERPL-SCNC: 3.5 MMOL/L
PROT SERPL-MCNC: 7.4 G/DL
PROT UR QL STRIP: NEGATIVE
PROTHROMBIN TIME: 10.3 SEC
RBC # BLD AUTO: 4.87 M/UL
RBC #/AREA URNS HPF: 0 /HPF (ref 0–4)
SODIUM SERPL-SCNC: 141 MMOL/L
SP GR UR STRIP: <=1.005 (ref 1–1.03)
SQUAMOUS #/AREA URNS HPF: 1 /HPF
TOXICOLOGY INFORMATION: ABNORMAL
TROPONIN I SERPL DL<=0.01 NG/ML-MCNC: <0.006 NG/ML
TSH SERPL DL<=0.005 MIU/L-ACNC: 2.43 UIU/ML
URN SPEC COLLECT METH UR: ABNORMAL
UROBILINOGEN UR STRIP-ACNC: NEGATIVE EU/DL
WBC # BLD AUTO: 6.94 K/UL
WBC #/AREA URNS HPF: 15 /HPF (ref 0–5)

## 2019-01-09 PROCEDURE — 80307 DRUG TEST PRSMV CHEM ANLYZR: CPT

## 2019-01-09 PROCEDURE — 25000003 PHARM REV CODE 250: Performed by: EMERGENCY MEDICINE

## 2019-01-09 PROCEDURE — 85610 PROTHROMBIN TIME: CPT

## 2019-01-09 PROCEDURE — 99285 EMERGENCY DEPT VISIT HI MDM: CPT | Mod: 25

## 2019-01-09 PROCEDURE — 83735 ASSAY OF MAGNESIUM: CPT

## 2019-01-09 PROCEDURE — 25000003 PHARM REV CODE 250: Performed by: FAMILY MEDICINE

## 2019-01-09 PROCEDURE — 85379 FIBRIN DEGRADATION QUANT: CPT

## 2019-01-09 PROCEDURE — 93010 ELECTROCARDIOGRAM REPORT: CPT | Mod: ,,, | Performed by: INTERNAL MEDICINE

## 2019-01-09 PROCEDURE — 81000 URINALYSIS NONAUTO W/SCOPE: CPT | Mod: 59

## 2019-01-09 PROCEDURE — 93010 EKG 12-LEAD: ICD-10-PCS | Mod: ,,, | Performed by: INTERNAL MEDICINE

## 2019-01-09 PROCEDURE — 25500020 PHARM REV CODE 255: Performed by: FAMILY MEDICINE

## 2019-01-09 PROCEDURE — 87086 URINE CULTURE/COLONY COUNT: CPT

## 2019-01-09 PROCEDURE — 83880 ASSAY OF NATRIURETIC PEPTIDE: CPT

## 2019-01-09 PROCEDURE — 85730 THROMBOPLASTIN TIME PARTIAL: CPT

## 2019-01-09 PROCEDURE — 85025 COMPLETE CBC W/AUTO DIFF WBC: CPT

## 2019-01-09 PROCEDURE — 96365 THER/PROPH/DIAG IV INF INIT: CPT

## 2019-01-09 PROCEDURE — 80053 COMPREHEN METABOLIC PANEL: CPT

## 2019-01-09 PROCEDURE — 36415 COLL VENOUS BLD VENIPUNCTURE: CPT

## 2019-01-09 PROCEDURE — 63600175 PHARM REV CODE 636 W HCPCS: Performed by: EMERGENCY MEDICINE

## 2019-01-09 PROCEDURE — 84484 ASSAY OF TROPONIN QUANT: CPT

## 2019-01-09 PROCEDURE — 84443 ASSAY THYROID STIM HORMONE: CPT

## 2019-01-09 RX ORDER — HYDROCODONE BITARTRATE AND ACETAMINOPHEN 10; 325 MG/1; MG/1
1 TABLET ORAL
Status: COMPLETED | OUTPATIENT
Start: 2019-01-09 | End: 2019-01-09

## 2019-01-09 RX ORDER — ACETAMINOPHEN 10 MG/ML
1000 INJECTION, SOLUTION INTRAVENOUS ONCE
Status: COMPLETED | OUTPATIENT
Start: 2019-01-09 | End: 2019-01-09

## 2019-01-09 RX ORDER — ASPIRIN 325 MG
325 TABLET ORAL
Status: COMPLETED | OUTPATIENT
Start: 2019-01-09 | End: 2019-01-09

## 2019-01-09 RX ADMIN — ASPIRIN 325 MG ORAL TABLET 325 MG: 325 PILL ORAL at 07:01

## 2019-01-09 RX ADMIN — HYDROCODONE BITARTRATE AND ACETAMINOPHEN 1 TABLET: 10; 325 TABLET ORAL at 10:01

## 2019-01-09 RX ADMIN — ACETAMINOPHEN 1000 MG: 10 INJECTION, SOLUTION INTRAVENOUS at 09:01

## 2019-01-09 RX ADMIN — IOHEXOL 100 ML: 350 INJECTION, SOLUTION INTRAVENOUS at 08:01

## 2019-01-09 NOTE — TELEPHONE ENCOUNTER
----- Message from Serge Gilmore sent at 1/9/2019  1:58 PM CST -----  Contact: pt  She's calling in regards to a missed call, [phone] (home)

## 2019-01-09 NOTE — TELEPHONE ENCOUNTER
----- Message from Bandar Bryant sent at 1/9/2019  1:31 PM CST -----  Contact: pt   Pt returning missed call, pls call back.       ..[phone] ([place])

## 2019-01-09 NOTE — TELEPHONE ENCOUNTER
----- Message from Francie Woody sent at 1/9/2019  1:42 PM CST -----  Contact: self [phone]  States that she is returning call. Please call back at [phone]//thank you acc

## 2019-01-09 NOTE — TELEPHONE ENCOUNTER
Patient stated that she was told to take it easy due to her elevated heart rate by [facility] Dang and has been placed on light duty by her GYN (Dr. Reich) because she is post op from surgery. Patient would like a letter written for her to turn in to court stating this so that she can miss/reschedule her court date. Patient also wants provider to know that her HR has still been elevated and it feels like she is running a marathon.

## 2019-01-09 NOTE — TELEPHONE ENCOUNTER
Informed pt of providers recommendations to see cardiology for tachycardia and as for her letter she would have to get it from her GYN since they are who put her on light duty. Pt verbalized understanding. Pt has also been scheduled with cardio and is aware of date, time and location.
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
## 2019-01-09 NOTE — TELEPHONE ENCOUNTER
----- Message from Kimberly Schmitt sent at 1/9/2019 11:12 AM CST -----  Contact: pt  She's calling to see if a excuse can be given so that she doesn't have to go to court due to her being sick and needing bedrest, also stated that her heart rate is still high, please advise [phone] (home)

## 2019-01-09 NOTE — TELEPHONE ENCOUNTER
----- Message from Ching Pollard sent at 1/9/2019 11:56 AM CST -----  Pt is requesting a call from nurse to get a written statement for court light duty bed rest sameer  ( states she under Dr care) .          Please call pt back at [phone]

## 2019-01-10 ENCOUNTER — TELEPHONE (OUTPATIENT)
Dept: INTERNAL MEDICINE | Facility: CLINIC | Age: 49
End: 2019-01-10

## 2019-01-10 ENCOUNTER — PATIENT MESSAGE (OUTPATIENT)
Dept: ADMINISTRATIVE | Facility: OTHER | Age: 49
End: 2019-01-10

## 2019-01-10 ENCOUNTER — OFFICE VISIT (OUTPATIENT)
Dept: CARDIOLOGY | Facility: CLINIC | Age: 49
End: 2019-01-10
Payer: MEDICARE

## 2019-01-10 VITALS
RESPIRATION RATE: 20 BRPM | BODY MASS INDEX: 30.27 KG/M2 | SYSTOLIC BLOOD PRESSURE: 114 MMHG | DIASTOLIC BLOOD PRESSURE: 78 MMHG | WEIGHT: 181.69 LBS | HEART RATE: 116 BPM | HEIGHT: 65 IN

## 2019-01-10 DIAGNOSIS — I10 HYPERTENSION, UNSPECIFIED TYPE: ICD-10-CM

## 2019-01-10 DIAGNOSIS — R00.2 PALPITATIONS: ICD-10-CM

## 2019-01-10 DIAGNOSIS — R00.0 TACHYCARDIA: ICD-10-CM

## 2019-01-10 PROCEDURE — 3074F SYST BP LT 130 MM HG: CPT | Mod: CPTII,S$GLB,, | Performed by: INTERNAL MEDICINE

## 2019-01-10 PROCEDURE — 99204 OFFICE O/P NEW MOD 45 MIN: CPT | Mod: S$GLB,,, | Performed by: INTERNAL MEDICINE

## 2019-01-10 PROCEDURE — 3074F PR MOST RECENT SYSTOLIC BLOOD PRESSURE < 130 MM HG: ICD-10-PCS | Mod: CPTII,S$GLB,, | Performed by: INTERNAL MEDICINE

## 2019-01-10 PROCEDURE — 99204 PR OFFICE/OUTPT VISIT, NEW, LEVL IV, 45-59 MIN: ICD-10-PCS | Mod: S$GLB,,, | Performed by: INTERNAL MEDICINE

## 2019-01-10 PROCEDURE — 3078F PR MOST RECENT DIASTOLIC BLOOD PRESSURE < 80 MM HG: ICD-10-PCS | Mod: CPTII,S$GLB,, | Performed by: INTERNAL MEDICINE

## 2019-01-10 PROCEDURE — 3008F PR BODY MASS INDEX (BMI) DOCUMENTED: ICD-10-PCS | Mod: CPTII,S$GLB,, | Performed by: INTERNAL MEDICINE

## 2019-01-10 PROCEDURE — 3008F BODY MASS INDEX DOCD: CPT | Mod: CPTII,S$GLB,, | Performed by: INTERNAL MEDICINE

## 2019-01-10 PROCEDURE — 99999 PR PBB SHADOW E&M-EST. PATIENT-LVL III: CPT | Mod: PBBFAC,,, | Performed by: INTERNAL MEDICINE

## 2019-01-10 PROCEDURE — 3078F DIAST BP <80 MM HG: CPT | Mod: CPTII,S$GLB,, | Performed by: INTERNAL MEDICINE

## 2019-01-10 PROCEDURE — 99999 PR PBB SHADOW E&M-EST. PATIENT-LVL III: ICD-10-PCS | Mod: PBBFAC,,, | Performed by: INTERNAL MEDICINE

## 2019-01-10 RX ORDER — METOPROLOL SUCCINATE 25 MG/1
25 TABLET, EXTENDED RELEASE ORAL DAILY
Qty: 30 TABLET | Refills: 11 | Status: SHIPPED | OUTPATIENT
Start: 2019-01-10 | End: 2019-08-08 | Stop reason: SINTOL

## 2019-01-10 NOTE — PROGRESS NOTES
Subjective:   Patient ID:  Daniela Costa is a 48 y.o. female who presents for follow-up of Tachycardia  Pt with sx of palpitations. Holter shows NSR, avg  . Rare PVCs and PACs . Pt with much stress/anxiety at home.   TSH wnl on meds  Palpitations    This is a recurrent problem. The problem occurs intermittently. The problem has been waxing and waning. Nothing aggravates the symptoms. Pertinent negatives include no chest pain, dizziness or shortness of breath. She has tried nothing for the symptoms. The treatment provided moderate relief.       Review of Systems   Constitution: Negative. Negative for weight gain.   HENT: Negative.    Eyes: Negative.    Cardiovascular: Positive for palpitations. Negative for chest pain and leg swelling.   Respiratory: Negative.  Negative for shortness of breath.    Endocrine: Negative.    Hematologic/Lymphatic: Negative.    Skin: Negative.    Musculoskeletal: Negative for muscle weakness.   Gastrointestinal: Negative.    Genitourinary: Negative.    Neurological: Negative.  Negative for dizziness.   Psychiatric/Behavioral: Negative.    Allergic/Immunologic: Negative.      Family History   Problem Relation Age of Onset    Diabetes Mother     Hyperlipidemia Mother     Diabetes Father     Hyperlipidemia Father      Past Medical History:   Diagnosis Date    Allergy     Anxiety     Arthritis     Asthma     Breast cancer     june 2012; BRCA 1 and 2 negative    Breast cancer genetic susceptibility     Cancer     Depression     Hypothyroidism     Lung disease     Pneumonia      Current Outpatient Medications on File Prior to Visit   Medication Sig Dispense Refill    albuterol 90 mcg/actuation inhaler Inhale 2 puffs into the lungs every 6 (six) hours. 3 Inhaler 3    ASCORBATE CALCIUM (VITAMIN C ORAL) Take by mouth.      clonazePAM (KLONOPIN) 1 MG tablet Take 1 tablet (1 mg total) by mouth daily as needed for Anxiety. 30 tablet 1    famotidine (PEPCID) 20 MG tablet  Take 1 tablet (20 mg total) by mouth 2 (two) times daily. 60 tablet 0    fluticasone (FLONASE) 50 mcg/actuation nasal spray 1 spray (50 mcg total) by Each Nare route 2 (two) times daily. 3 Bottle 3    fluticasone-vilanterol (BREO ELLIPTA) 200-25 mcg/dose DsDv diskus inhaler Inhale 1 puff into the lungs once daily. Controller 180 each 3    hydrOXYzine (ATARAX) 50 MG tablet Take 1 to 2 tablets at bedtime as needed for sleep 60 tablet 2    ibuprofen (ADVIL,MOTRIN) 800 MG tablet Take 800 mg by mouth 3 (three) times daily.      inhalation spacing device (BREATHERITE VALVED MDI CHAMBER) Use as directed for inhalation. 1 Device prn    levothyroxine (SYNTHROID) 50 MCG tablet Take 1 tablet (50 mcg total) by mouth once daily. 90 tablet 3    loratadine (CLARITIN) 10 mg tablet Take 1 tablet (10 mg total) by mouth once daily. 30 tablet 0    melatonin 1 mg Tab Take 1 tablet by mouth nightly.       methylPREDNISolone (MEDROL DOSEPACK) 4 mg tablet use as directed 1 Package 0    montelukast (SINGULAIR) 10 mg tablet Take 1 tablet (10 mg total) by mouth once daily. (Patient taking differently: Take 10 mg by mouth every evening. ) 90 tablet 3    multivitamin capsule Take 1 capsule by mouth once daily.      QUEtiapine (SEROQUEL) 100 MG Tab TAKE 1/2 TABLET AT BEDTIME FOR 2 DAYS, THEN 1 TABLET FOR 2 DAYS, THEN 1&1/2 TAB THEREAFTER 45 tablet 0    QUEtiapine (SEROQUEL) 200 MG Tab Take 1 tablet at bedtime for 3 days then 1 and 1/2 tablets at bedtime thereafter 45 tablet 2     Current Facility-Administered Medications on File Prior to Visit   Medication Dose Route Frequency Provider Last Rate Last Dose    [COMPLETED] acetaminophen (10 mg/mL) injection 1,000 mg  1,000 mg Intravenous Once Si TALHA Hills MD   Stopped at 01/09/19 2150    [COMPLETED] aspirin tablet 325 mg  325 mg Oral ED 1 Time Arlin Gonzalez MD   325 mg at 01/09/19 1922    [COMPLETED] HYDROcodone-acetaminophen  mg per tablet 1 tablet  1 tablet Oral ED 1 Time  Lisa Hills MD   1 tablet at 01/09/19 2214    [COMPLETED] omnipaque 350 iohexol 100 mL  100 mL Intravenous ONCE PRN Arlin Gonzalez MD   100 mL at 01/09/19 2026     Review of patient's allergies indicates:   Allergen Reactions    Cefdinir      Radiation Recall, everywhere she had radiation it looked like blisters and very hot to touch    Levofloxacin      Went into deep depression. Messed with Pych meds    Strawberry Hives       Objective:     Physical Exam   Constitutional: She is oriented to person, place, and time. She appears well-developed and well-nourished.   HENT:   Head: Normocephalic and atraumatic.   Eyes: Conjunctivae and EOM are normal. Pupils are equal, round, and reactive to light.   Neck: Normal range of motion. Neck supple.   Cardiovascular: Normal rate, regular rhythm, normal heart sounds and intact distal pulses.   Pulmonary/Chest: Effort normal and breath sounds normal.   Abdominal: Soft. Bowel sounds are normal.   Musculoskeletal: Normal range of motion.   Neurological: She is alert and oriented to person, place, and time.   Skin: Skin is warm and dry.   Psychiatric: She has a normal mood and affect.   Nursing note and vitals reviewed.      Assessment:     1. Palpitations    2. Tachycardia        Plan:     Palpitations    Tachycardia      Start toprol  echo

## 2019-01-10 NOTE — ED PROVIDER NOTES
"SCRIBE #1 NOTE: I, Pilar Flores, am scribing for, and in the presence of, Arlin Gonzalez MD. I have scribed the HPI, ROS, and PEx.     SCRIBE #2 NOTE: I, Christina Yuri, am scribing for, and in the presence of,  Lisa Hills MD. I have scribed the remaining portions of the note not scribed by Scribe #1.     History      Chief Complaint   Patient presents with    Tachycardia     Pt states, "I was sent here by my PA because I have tachycardia and I am starting to feel short of breath, he wanted me to get a CT scan, I had to use my inhaler twice today." Reports wearing holter monitor recently and has appointment with sheba tomorrow."       Review of patient's allergies indicates:   Allergen Reactions    Cefdinir      Radiation Recall, everywhere she had radiation it looked like blisters and very hot to touch    Levofloxacin      Went into deep depression. Messed with Pych meds    Hayward Hives        HPI   HPI    1/9/2019, 6:31 PM   History obtained from the patient      History of Present Illness: Daniela Costa is a 48 y.o. female patient with a PMHx anxiety, asthma, breast cancer, hypothyroidism, s/p XI Robotic Hysterectomy who presents to the Emergency Department for palpitations (HR: 160s) which onset 2 weeks after hysterectomy surgery. Pt spoke to CORA Spence earlier today who referred pt to ED for further evaluation, CT, and labs. Symptoms are episodic and moderate in severity.  No mitigating or exacerbating factors reported. Associated sxs include generalized myalgias, SOB, productive cough with yellow sputum, and intermittent chest tightness. Patient denies any CP, diaphoresis, extremity weakness/numbness, leg swelling, dizziness, n/v, fever, chills, abd pain, and all other sxs at this time. Prior tx includes klonopin with no improvement. Pt reports recently wearing halter monitor for 48 hours. Pt has an appointment with Dr. Diaz (Cardiology). No further complaints or concerns at this time. "         Arrival mode: Personal vehicle     PCP: Lucía Terrell MD       Past Medical History:  Past Medical History:   Diagnosis Date    Allergy     Anxiety     Arthritis     Asthma     Breast cancer     2012; BRCA 1 and 2 negative    Breast cancer genetic susceptibility     Cancer     Depression     Hypothyroidism     Lung disease     Pneumonia        Past Surgical History:  Past Surgical History:   Procedure Laterality Date    ADENOIDECTOMY      BACK SURGERY      SI joint fusion    Breast Reconstruction  Bilateral     BREAST SURGERY      CERVICAL FUSION      CERVICAL FUSION      HYSTERECTOMY      lumbar fusion      MASTECTOMY Right     MASTECTOMY Left     SI joint fusion   2018    TONSILLECTOMY      TUBAL LIGATION      XI ROBOTIC HYSTERECTOMY N/A 2018    [facility] [place]    XI ROBOTIC LYSIS, ADHESIONS N/A 2018    [facility] [place]    XI ROBOTIC SALPINGO-OOPHORECTOMY Bilateral 2018    [facility] [place]         Family History:  Family History   Problem Relation Age of Onset    Diabetes Mother     Hyperlipidemia Mother     Diabetes Father     Hyperlipidemia Father        Social History:  Social History     Tobacco Use    Smoking status: Former Smoker     Packs/day: 0.50     Years: 16.00     Pack years: 8.00     Types: Cigarettes     Start date: 1990     Last attempt to quit: 2014     Years since quittin.0    Smokeless tobacco: Never Used   Substance and Sexual Activity    Alcohol use: Yes     Frequency: 2-4 times a month     Drinks per session: 1 or 2     Binge frequency: Never     Comment: twice a month - wine     Drug use: No    Sexual activity: Not Currently     Partners: Male     Birth control/protection: See Surgical Hx       ROS   Review of Systems   Constitutional: Negative for activity change, appetite change, chills, diaphoresis and fever.   HENT:  Negative for congestion, drooling, ear pain, mouth sores, rhinorrhea, sinus pain, sore throat and trouble swallowing.    Eyes: Negative for pain and discharge.   Respiratory: Positive for cough, chest tightness (intermittent) and shortness of breath. Negative for wheezing and stridor.    Cardiovascular: Positive for palpitations. Negative for chest pain and leg swelling.   Gastrointestinal: Negative for abdominal distention, abdominal pain, blood in stool, constipation, diarrhea, nausea and vomiting.   Genitourinary: Negative for difficulty urinating, dysuria, flank pain, frequency, hematuria and urgency.   Musculoskeletal: Positive for myalgias (generalized). Negative for arthralgias and back pain.   Skin: Negative for pallor, rash and wound.   Neurological: Negative for dizziness, syncope, weakness, light-headedness and numbness.   Hematological: Does not bruise/bleed easily.   All other systems reviewed and are negative.      Physical Exam      Initial Vitals [01/09/19 1710]   BP Pulse Resp Temp SpO2   121/65 (!) 123 18 98.3 °F (36.8 °C) 100 %      MAP       --          Physical Exam  Nursing Notes and Vital Signs Reviewed.  Constitutional: Patient is in no acute distress. Well-developed and well-nourished.  Head: Atraumatic. Normocephalic.  Eyes: PERRL. EOM intact. Conjunctivae are not pale. No scleral icterus.  ENT: Mucous membranes are moist. Oropharynx is clear and symmetric.    Neck: Supple. Full ROM. No lymphadenopathy.  Cardiovascular: Tachycardic. Regular rhythm. No murmurs, rubs, or gallops. Distal pulses are 2+ and symmetric.  Pulmonary/Chest: No respiratory distress. Clear to auscultation bilaterally. No wheezing or rales.  Abdominal: Soft and non-distended.  There is no tenderness.  No rebound, guarding, or rigidity.   Musculoskeletal: Moves all extremities. No obvious deformities. No edema.  Skin: Warm and dry. Flushed.  Neurological:  Alert, awake, and appropriate.  Normal speech.  No acute focal  "neurological deficits are appreciated.  Psychiatric: Normal affect. Good eye contact. Appropriate in content.    ED Course    Procedures  ED Vital Signs:  Vitals:    01/09/19 1710 01/09/19 1918 01/09/19 1930 01/09/19 2030   BP: 121/65  123/82 110/64   Pulse: (!) 123 105 100 103   Resp: 18  18 (!) 22   Temp: 98.3 °F (36.8 °C)      TempSrc: Oral      SpO2: 100%  98% 98%   Weight: 82 kg (180 lb 12.4 oz)      Height: 5' 5" (1.651 m)       01/09/19 2130 01/09/19 2156   BP: (!) 137/95 131/71   Pulse: 102 100   Resp: 20 20   Temp:     TempSrc:     SpO2: 98% 98%   Weight:     Height:         Abnormal Lab Results:  Labs Reviewed   CBC W/ AUTO DIFFERENTIAL - Abnormal; Notable for the following components:       Result Value    MCH 31.2 (*)     Lymph% 17.6 (*)     All other components within normal limits   COMPREHENSIVE METABOLIC PANEL - Abnormal; Notable for the following components:    CO2 22 (*)     All other components within normal limits   DRUG SCREEN PANEL, URINE EMERGENCY - Abnormal; Notable for the following components:    Creatinine, Random Ur 10.7 (*)     All other components within normal limits    Narrative:     Preferred Collection Type->Urine, Clean Catch   URINALYSIS, REFLEX TO URINE CULTURE - Abnormal; Notable for the following components:    Specific Gravity, UA <=1.005 (*)     Occult Blood UA 2+ (*)     Leukocytes, UA 2+ (*)     All other components within normal limits    Narrative:     Preferred Collection Type->Urine, Clean Catch   D DIMER, QUANTITATIVE - Abnormal; Notable for the following components:    D-Dimer 1.21 (*)     All other components within normal limits   URINALYSIS MICROSCOPIC - Abnormal; Notable for the following components:    WBC, UA 15 (*)     All other components within normal limits    Narrative:     Preferred Collection Type->Urine, Clean Catch   CULTURE, URINE    Narrative:     Preferred Collection Type->Urine, Clean Catch   TROPONIN I   B-TYPE NATRIURETIC PEPTIDE   APTT "   MAGNESIUM   PROTIME-INR   TSH        All Lab Results:  Results for orders placed or performed during the hospital encounter of 01/09/19   Urine culture   Result Value Ref Range    Urine Culture, Routine No growth    CBC auto differential   Result Value Ref Range    WBC 6.94 3.90 - 12.70 K/uL    RBC 4.87 4.00 - 5.40 M/uL    Hemoglobin 15.2 12.0 - 16.0 g/dL    Hematocrit 45.1 37.0 - 48.5 %    MCV 93 82 - 98 fL    MCH 31.2 (H) 27.0 - 31.0 pg    MCHC 33.7 32.0 - 36.0 g/dL    RDW 14.5 11.5 - 14.5 %    Platelets 176 150 - 350 K/uL    MPV 10.1 9.2 - 12.9 fL    Gran # (ANC) 5.1 1.8 - 7.7 K/uL    Lymph # 1.2 1.0 - 4.8 K/uL    Mono # 0.5 0.3 - 1.0 K/uL    Eos # 0.1 0.0 - 0.5 K/uL    Baso # 0.03 0.00 - 0.20 K/uL    Gran% 73.0 38.0 - 73.0 %    Lymph% 17.6 (L) 18.0 - 48.0 %    Mono% 7.8 4.0 - 15.0 %    Eosinophil% 1.2 0.0 - 8.0 %    Basophil% 0.4 0.0 - 1.9 %    Differential Method Automated    Comprehensive metabolic panel   Result Value Ref Range    Sodium 141 136 - 145 mmol/L    Potassium 3.5 3.5 - 5.1 mmol/L    Chloride 105 95 - 110 mmol/L    CO2 22 (L) 23 - 29 mmol/L    Glucose 101 70 - 110 mg/dL    BUN, Bld 11 6 - 20 mg/dL    Creatinine 0.8 0.5 - 1.4 mg/dL    Calcium 9.3 8.7 - 10.5 mg/dL    Total Protein 7.4 6.0 - 8.4 g/dL    Albumin 4.3 3.5 - 5.2 g/dL    Total Bilirubin 0.7 0.1 - 1.0 mg/dL    Alkaline Phosphatase 87 55 - 135 U/L    AST 19 10 - 40 U/L    ALT 22 10 - 44 U/L    Anion Gap 14 8 - 16 mmol/L    eGFR if African American >60 >60 mL/min/1.73 m^2    eGFR if non African American >60 >60 mL/min/1.73 m^2   Troponin I #1   Result Value Ref Range    Troponin I <0.006 0.000 - 0.026 ng/mL   B-Type natriuretic peptide (BNP)   Result Value Ref Range    BNP <10 0 - 99 pg/mL   APTT   Result Value Ref Range    aPTT 26.8 21.0 - 32.0 sec   Drug screen panel, emergency   Result Value Ref Range    Benzodiazepines Negative     Methadone metabolites Negative     Cocaine (Metab.) Negative     Opiate Scrn, Ur Negative     Barbiturate  Screen, Ur Negative     Amphetamine Screen, Ur Negative     THC Negative     Phencyclidine Negative     Creatinine, Random Ur 10.7 (L) 15.0 - 325.0 mg/dL    Toxicology Information SEE COMMENT    Magnesium   Result Value Ref Range    Magnesium 2.3 1.6 - 2.6 mg/dL   Protime-INR   Result Value Ref Range    Prothrombin Time 10.3 9.0 - 12.5 sec    INR 0.9 0.8 - 1.2   TSH   Result Value Ref Range    TSH 2.429 0.400 - 4.000 uIU/mL   Urinalysis, Reflex to Urine Culture Urine, Clean Catch   Result Value Ref Range    Specimen UA Urine, Clean Catch     Color, UA Yellow Yellow, Straw, Mitali    Appearance, UA Clear Clear    pH, UA 7.0 5.0 - 8.0    Specific Gravity, UA <=1.005 (A) 1.005 - 1.030    Protein, UA Negative Negative    Glucose, UA Negative Negative    Ketones, UA Negative Negative    Bilirubin (UA) Negative Negative    Occult Blood UA 2+ (A) Negative    Nitrite, UA Negative Negative    Urobilinogen, UA Negative <2.0 EU/dL    Leukocytes, UA 2+ (A) Negative   D dimer, quantitative   Result Value Ref Range    D-Dimer 1.21 (H) <0.50 mg/L FEU   Urinalysis Microscopic   Result Value Ref Range    RBC, UA 0 0 - 4 /hpf    WBC, UA 15 (H) 0 - 5 /hpf    Bacteria, UA Occasional None-Occ /hpf    Squam Epithel, UA 1 /hpf    Microscopic Comment SEE COMMENT        Imaging Results:  Imaging Results          CT Abdomen Pelvis  Without Contrast (Final result)  Result time 01/09/19 20:53:36    Final result by Winston Napier MD (01/09/19 20:53:36)                 Impression:      CT scan of the abdomen and pelvis demonstrates no acute findings.    All CT scans at (this location) are performed using dose modulation techniques as appropriate to a performed exam including the following:  automated exposure control; adjustment of the mA and /or kV according to patient size (this includes techniques or standardized protocols for targeted exams where dose is matched to indication/reason for exam: i.e. extremities or head); use of iterative  reconstruction technique.      Electronically signed by: Mustapha Hurt MD  Date:    01/09/2019  Time:    20:53             Narrative:    EXAMINATION:  CT ABDOMEN PELVIS WITHOUT CONTRAST    CLINICAL HISTORY:  Abdominal pain, unspecified;    TECHNIQUE:  Limited noncontrast CT scan of the abdomen and pelvis utilizing renal stone protocol.    COMPARISON:  Concurrent CT of the chest is reviewed.    FINDINGS:  Lung bases are clear.    Within the abdomen there is contrast within the kidneys, ureters and bladder from recent CT angiogram contrast administration.    Liver, gallbladder, spleen, pancreas, and adrenal glands appear free of focal abnormality.  The kidneys are free of focal abnormality.  The ureters and bladder are normal in appearance.    Within the pelvis, no suspicious mass or fluid collection is demonstrated.  The appendix, colon, small bowel, and stomach appear free of acute abnormality.  There is postsurgical change of the spine with plates and screws from the level of L4 through S1.  Fusion of left sacroiliac joint is present.                               CTA Chest Non-Coronary (PE Study) (Final result)  Result time 01/09/19 20:43:03    Final result by Winston Hurt MD (01/09/19 20:43:03)                 Impression:      Negative CTA of the chest. No evidence of pulmonary embolism.    All CT scans at (this location) are performed using dose modulation techniques as appropriate to a performed exam including the following:  automated exposure control; adjustment of the mA and /or kV according to patient size (this includes techniques or standardized protocols for targeted exams where dose is matched to indication/reason for exam: i.e. extremities or head); use of iterative reconstruction technique.      Electronically signed by: Mustapha Hurt MD  Date:    01/09/2019  Time:    20:43             Narrative:    EXAMINATION:  CTA CHEST NON CORONARY    CLINICAL HISTORY:  Chest pain, acute, PE suspected,  high pretest prob;    TECHNIQUE:  Standard CTA of the chest performed with 100 cc Omnipaque 350 utilizing 3-D MIP reformations.    COMPARISON:  Comparison is made with previous studies including chest CT scan of 06/12/2017    FINDINGS:  Cardiac size is normal. Major pulmonary arteries are normal.    Lung fields are clear.  Bilateral breast implants are present.  The bones appear intact.  Postsurgical changes lower cervical spine is noted.    Upper abdominal images are negative.                               X-Ray Chest AP Portable (Final result)  Result time 01/09/19 19:07:55    Final result by Winston Hurt MD (01/09/19 19:07:55)                 Impression:      Stable chest x-ray.      Electronically signed by: Mustapha Hurt MD  Date:    01/09/2019  Time:    19:07             Narrative:    EXAMINATION:  XR CHEST AP PORTABLE    CLINICAL HISTORY:  Chest Pain;    TECHNIQUE:  Single frontal view of the chest was performed.    COMPARISON:  12/19/2018    FINDINGS:  The heart is normal in size.  Lungs appear free of any active infiltrates or effusion.  Surgical clips are present in the right axillary region.                               The EKG was ordered, reviewed, and independently interpreted by the ED provider.  Interpretation time: 1803  Rate: 121 BPM  Rhythm: sinus tachycardia  Interpretation: Possible left atrial enlargement. No STEMI.             The Emergency Provider reviewed the vital signs and test results, which are outlined above.    ED Discussion     8:00 PM: Dr. Gonzalez transfers care of pt to Dr. Hills, pending lab results.    8:30 PM: Dr. Hills evaluated pt. Patient is c/o LLQ abd onset at 6pm after coughing. Patient reports having hysterectomy in December. Patient states she continues to be spotting. Patient also c/o CP, palpitations, and SOB. Patient states she has been dealing with tachycardia since December and states her HR is usually not under 110. Patient reports using her rescue inhaler  20 minutes PTA.  Vital signs and nursing notes reviewed.  Constitutional: Patient is in no acute distress. Awake and alert. Well-developed and well-nourished.  Head: Atraumatic. Normocephalic.  Eyes: PERRL. EOM intact. Conjunctivae nl. No scleral icterus.  ENT: Mucous membranes are moist. Oropharynx is clear.  Neck: Supple. Full ROM. No lymphadenopathy.  Cardiovascular: Regular rate and rhythm. No murmurs, rubs, or gallops. Distal pulses are 2+ and symmetric .  Pulmonary/Chest: No respiratory distress. Clear to auscultation bilaterally. No wheezing, rales, or rhonchi.  Abdominal: Soft. Non-distended. LLQ TTP. No rebound, guarding, or rigidity. Good bowel sounds.  Genitourinary: No CVA tenderness  Musculoskeletal: Moves all extremities.   Skin: Warm and dry.  Neurological: Awake and alert. No acute focal neurological deficits are appreciated.  Psychiatric: Normal affect. Good eye contact. Appropriate in content.    9:26 PM: Reassessed pt at this time.  Pt states her condition has improved at this time. Discussed with pt all pertinent ED information and results. Discussed pt dx and plan of tx. Gave pt all f/u and return to the ED instructions. All questions and concerns were addressed at this time. Pt expresses understanding of information and instructions, and is comfortable with plan to discharge. Pt is stable for discharge.    I discussed with patient and/or family/caretaker that evaluation in the ED does not suggest any emergent or life threatening medical conditions requiring immediate intervention beyond what was provided in the ED, and I believe patient is safe for discharge.  Regardless, an unremarkable evaluation in the ED does not preclude the development or presence of a serious of life threatening condition. As such, patient was instructed to return immediately for any worsening or change in current symptoms.      ED Medication(s):  Medications   aspirin tablet 325 mg (325 mg Oral Given 1/9/19 1922)    omnipaque 350 iohexol 100 mL (100 mLs Intravenous Given 1/9/19 2026)   acetaminophen (10 mg/mL) injection 1,000 mg (0 mg Intravenous Stopped 1/9/19 2150)   HYDROcodone-acetaminophen  mg per tablet 1 tablet (1 tablet Oral Given 1/9/19 2214)     Current Discharge Medication List   None     Follow-up Information     Lucía Terrell MD In 2 days.    Specialty:  Family Medicine  Contact information:  [address]  [address]  [facility] [address]  [phone]             [facility] - .    Specialty:  Emergency Medicine  Why:  As needed, If symptoms worsen  Contact information:  [address]  [facility] [address] [phone]                   Medical Decision Making    Medical Decision Making:   Clinical Tests:   Lab Tests: Ordered and Reviewed  Radiological Study: Ordered and Reviewed  Medical Tests: Ordered and Reviewed           Scribe Attestation:   Scribe #1: I performed the above scribed service and the documentation accurately describes the services I performed. I attest to the accuracy of the note.    Attending:   Physician Attestation Statement for Scribe #1: I, Arlin Gonzalez MD, personally performed the services described in this documentation, as scribed by Pilar Flores, in my presence, and it is both accurate and complete.       Scribe Attestation:   Scribe #2: I performed the above scribed service and the documentation accurately describes the services I performed. I attest to the accuracy of the note.    Attending Attestation:           Physician Attestation for Scribe:    Physician Attestation Statement for Scribe #2: I, Lisa Hills MD, reviewed documentation, as scribed by Christina Welch in my presence, and it is both accurate and complete. I also acknowledge and confirm the content of the note done by Scribe #1.          Clinical Impression       ICD-10-CM ICD-9-CM   1. LLQ abdominal pain R10.32 789.04   2. Tachycardia R00.0 785.0   3. Chest pain  R07.9 786.50       Disposition:   Disposition: Discharged  Condition: Stable         Lisa Hills MD  01/24/19 0303

## 2019-01-10 NOTE — ED PROVIDER NOTES
48 year old female that presents to the ER for an evaluation of a PE. Patient has a cough and fatigue. She had a hysterectomy less than 3 months ago.     1800 Pt undersatands that a workup will begin in the treatment lounge/results waiting areas due to there being no available beds. Pt also undertsants they will be placed in the next available bed where they will be seen and dispositioned by a physician. I am removing myself from the care of pt. Pt will be assigned to next available physician.         Ric Donald NP  01/09/19 6768

## 2019-01-10 NOTE — LETTER
January 11, 2019      CORA Aguilar  [address] [place] [address]           [facility]  [address] [place] [address]  Phone: [phone]  Fax: [phone]          Patient: Daniela Costa   MR Number: [id number]   YOB: 1970   Date of Visit: 1/10/2019       Dear Laurence Armas:    Thank you for referring Daniela Costa to me for evaluation. Attached you will find relevant portions of my assessment and plan of care.    If you have questions, please do not hesitate to call me. I look forward to following Daniela Costa along with you.    Sincerely,    Anshul Diaz MD    Enclosure  CC:  No Recipients    If you would like to receive this communication electronically, please contact [email or web address] or [phone] to request more information on [employer] access.    For providers and/or their staff who would like to refer a patient to [facility], please contact us through our one-stop-shop provider referral line, [facility], at [phone].    If you feel you have received this communication in error or would no longer like to receive these types of communications, please e-mail [email or web address]

## 2019-01-11 LAB — BACTERIA UR CULT: NO GROWTH

## 2019-01-14 ENCOUNTER — CLINICAL SUPPORT (OUTPATIENT)
Dept: CARDIOLOGY | Facility: CLINIC | Age: 49
End: 2019-01-14
Attending: INTERNAL MEDICINE
Payer: MEDICARE

## 2019-01-14 LAB
DIASTOLIC DYSFUNCTION: NO
RETIRED EF AND QEF - SEE NOTES: 65 (ref 55–65)

## 2019-01-14 PROCEDURE — 93306 2D ECHO WITH COLOR FLOW DOPPLER: ICD-10-PCS | Mod: S$GLB,,, | Performed by: NUCLEAR MEDICINE

## 2019-01-14 PROCEDURE — 93306 TTE W/DOPPLER COMPLETE: CPT | Mod: S$GLB,,, | Performed by: NUCLEAR MEDICINE

## 2019-01-15 ENCOUNTER — TELEPHONE (OUTPATIENT)
Dept: CARDIOLOGY | Facility: CLINIC | Age: 49
End: 2019-01-15

## 2019-01-15 NOTE — TELEPHONE ENCOUNTER
Unable to reach patient with test results.Left message echo WNL and to contact office if she had any questions.
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
## 2019-01-28 ENCOUNTER — OFFICE VISIT (OUTPATIENT)
Dept: PSYCHIATRY | Facility: CLINIC | Age: 49
End: 2019-01-28
Payer: MEDICARE

## 2019-01-28 VITALS
SYSTOLIC BLOOD PRESSURE: 117 MMHG | DIASTOLIC BLOOD PRESSURE: 80 MMHG | WEIGHT: 179.69 LBS | BODY MASS INDEX: 29.9 KG/M2 | HEART RATE: 100 BPM

## 2019-01-28 DIAGNOSIS — F43.22 ADJUSTMENT REACTION WITH ANXIETY: ICD-10-CM

## 2019-01-28 DIAGNOSIS — F41.9 ANXIETY: ICD-10-CM

## 2019-01-28 DIAGNOSIS — F31.9 BIPOLAR I DISORDER, CURRENT EPISODE DEPRESSED: Primary | ICD-10-CM

## 2019-01-28 DIAGNOSIS — F31.9 BIPOLAR I DISORDER: Primary | ICD-10-CM

## 2019-01-28 PROCEDURE — 99999 PR PBB SHADOW E&M-EST. PATIENT-LVL II: CPT | Mod: PBBFAC,,, | Performed by: PSYCHIATRY & NEUROLOGY

## 2019-01-28 PROCEDURE — 90834 PSYTX W PT 45 MINUTES: CPT | Mod: S$GLB,,, | Performed by: SOCIAL WORKER

## 2019-01-28 PROCEDURE — 3008F PR BODY MASS INDEX (BMI) DOCUMENTED: ICD-10-PCS | Mod: CPTII,S$GLB,, | Performed by: PSYCHIATRY & NEUROLOGY

## 2019-01-28 PROCEDURE — 99499 UNLISTED E&M SERVICE: CPT | Mod: S$GLB,,, | Performed by: PSYCHIATRY & NEUROLOGY

## 2019-01-28 PROCEDURE — 3074F SYST BP LT 130 MM HG: CPT | Mod: CPTII,S$GLB,, | Performed by: PSYCHIATRY & NEUROLOGY

## 2019-01-28 PROCEDURE — 3079F PR MOST RECENT DIASTOLIC BLOOD PRESSURE 80-89 MM HG: ICD-10-PCS | Mod: CPTII,S$GLB,, | Performed by: PSYCHIATRY & NEUROLOGY

## 2019-01-28 PROCEDURE — 99999 PR PBB SHADOW E&M-EST. PATIENT-LVL II: ICD-10-PCS | Mod: PBBFAC,,, | Performed by: PSYCHIATRY & NEUROLOGY

## 2019-01-28 PROCEDURE — 90834 PR PSYCHOTHERAPY W/PATIENT, 45 MIN: ICD-10-PCS | Mod: S$GLB,,, | Performed by: SOCIAL WORKER

## 2019-01-28 PROCEDURE — 99499 RISK ADDL DX/OHS AUDIT: ICD-10-PCS | Mod: S$GLB,,, | Performed by: PSYCHIATRY & NEUROLOGY

## 2019-01-28 PROCEDURE — 99999 PR PBB SHADOW E&M-EST. PATIENT-LVL I: CPT | Mod: PBBFAC,,, | Performed by: SOCIAL WORKER

## 2019-01-28 PROCEDURE — 99213 PR OFFICE/OUTPT VISIT, EST, LEVL III, 20-29 MIN: ICD-10-PCS | Mod: S$GLB,,, | Performed by: PSYCHIATRY & NEUROLOGY

## 2019-01-28 PROCEDURE — 3074F PR MOST RECENT SYSTOLIC BLOOD PRESSURE < 130 MM HG: ICD-10-PCS | Mod: CPTII,S$GLB,, | Performed by: PSYCHIATRY & NEUROLOGY

## 2019-01-28 PROCEDURE — 99999 PR PBB SHADOW E&M-EST. PATIENT-LVL I: ICD-10-PCS | Mod: PBBFAC,,, | Performed by: SOCIAL WORKER

## 2019-01-28 PROCEDURE — 99213 OFFICE O/P EST LOW 20 MIN: CPT | Mod: S$GLB,,, | Performed by: PSYCHIATRY & NEUROLOGY

## 2019-01-28 PROCEDURE — 3079F DIAST BP 80-89 MM HG: CPT | Mod: CPTII,S$GLB,, | Performed by: PSYCHIATRY & NEUROLOGY

## 2019-01-28 PROCEDURE — 3008F BODY MASS INDEX DOCD: CPT | Mod: CPTII,S$GLB,, | Performed by: PSYCHIATRY & NEUROLOGY

## 2019-01-28 RX ORDER — CLONAZEPAM 1 MG/1
1 TABLET ORAL DAILY PRN
Qty: 30 TABLET | Refills: 1 | Status: SHIPPED | OUTPATIENT
Start: 2019-01-28 | End: 2019-04-15 | Stop reason: SDUPTHER

## 2019-01-28 RX ORDER — HYDROXYZINE HYDROCHLORIDE 50 MG/1
TABLET, FILM COATED ORAL
Qty: 60 TABLET | Refills: 1 | Status: SHIPPED | OUTPATIENT
Start: 2019-01-28 | End: 2023-03-28

## 2019-01-28 RX ORDER — QUETIAPINE FUMARATE 200 MG/1
TABLET, FILM COATED ORAL
Qty: 45 TABLET | Refills: 1 | Status: SHIPPED | OUTPATIENT
Start: 2019-01-28 | End: 2019-04-04 | Stop reason: SDUPTHER

## 2019-01-28 RX ORDER — QUETIAPINE FUMARATE 100 MG/1
TABLET, FILM COATED ORAL
Qty: 45 TABLET | Refills: 1 | Status: SHIPPED | OUTPATIENT
Start: 2019-01-28 | End: 2019-04-04 | Stop reason: SDUPTHER
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
## 2019-02-12 ENCOUNTER — OFFICE VISIT (OUTPATIENT)
Dept: PSYCHIATRY | Facility: CLINIC | Age: 49
End: 2019-02-12
Payer: MEDICARE

## 2019-02-12 ENCOUNTER — TELEPHONE (OUTPATIENT)
Dept: PSYCHIATRY | Facility: CLINIC | Age: 49
End: 2019-02-12

## 2019-02-12 DIAGNOSIS — F43.23 ADJUSTMENT DISORDER WITH MIXED ANXIETY AND DEPRESSED MOOD: ICD-10-CM

## 2019-02-12 DIAGNOSIS — F31.30 BIPOLAR I DISORDER, MOST RECENT EPISODE (OR CURRENT) DEPRESSED: Primary | ICD-10-CM

## 2019-02-12 PROCEDURE — 90834 PR PSYCHOTHERAPY W/PATIENT, 45 MIN: ICD-10-PCS | Mod: S$GLB,,, | Performed by: SOCIAL WORKER

## 2019-02-12 PROCEDURE — 90834 PSYTX W PT 45 MINUTES: CPT | Mod: S$GLB,,, | Performed by: SOCIAL WORKER
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
## 2019-02-21 ENCOUNTER — OFFICE VISIT (OUTPATIENT)
Dept: PSYCHIATRY | Facility: CLINIC | Age: 49
End: 2019-02-21
Payer: MEDICARE

## 2019-02-21 DIAGNOSIS — F43.22 ADJUSTMENT REACTION WITH ANXIETY: ICD-10-CM

## 2019-02-21 DIAGNOSIS — F31.9 BIPOLAR I DISORDER, CURRENT EPISODE DEPRESSED: Primary | ICD-10-CM

## 2019-02-21 DIAGNOSIS — Z63.9 FAMILY CIRCUMSTANCE: ICD-10-CM

## 2019-02-21 PROCEDURE — 90834 PR PSYCHOTHERAPY W/PATIENT, 45 MIN: ICD-10-PCS | Mod: S$GLB,,, | Performed by: SOCIAL WORKER

## 2019-02-21 PROCEDURE — 90834 PSYTX W PT 45 MINUTES: CPT | Mod: S$GLB,,, | Performed by: SOCIAL WORKER

## 2019-02-21 SDOH — SOCIAL DETERMINANTS OF HEALTH (SDOH): PROBLEM RELATED TO PRIMARY SUPPORT GROUP, UNSPECIFIED: Z63.9
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
## 2019-03-01 ENCOUNTER — LAB VISIT (OUTPATIENT)
Dept: LAB | Facility: HOSPITAL | Age: 49
End: 2019-03-01
Attending: FAMILY MEDICINE
Payer: MEDICARE

## 2019-03-01 DIAGNOSIS — E03.9 ACQUIRED HYPOTHYROIDISM: ICD-10-CM

## 2019-03-01 LAB
T4 FREE SERPL-MCNC: 1.11 NG/DL
TSH SERPL DL<=0.005 MIU/L-ACNC: 1.09 UIU/ML

## 2019-03-01 PROCEDURE — 84443 ASSAY THYROID STIM HORMONE: CPT

## 2019-03-01 PROCEDURE — 84439 ASSAY OF FREE THYROXINE: CPT

## 2019-03-01 PROCEDURE — 36415 COLL VENOUS BLD VENIPUNCTURE: CPT | Mod: PO
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
## 2019-03-13 ENCOUNTER — TELEPHONE (OUTPATIENT)
Dept: PSYCHIATRY | Facility: CLINIC | Age: 49
End: 2019-03-13

## 2019-04-02 ENCOUNTER — OFFICE VISIT (OUTPATIENT)
Dept: CARDIOLOGY | Facility: CLINIC | Age: 49
End: 2019-04-02
Payer: MEDICARE

## 2019-04-02 VITALS
BODY MASS INDEX: 28.87 KG/M2 | SYSTOLIC BLOOD PRESSURE: 90 MMHG | DIASTOLIC BLOOD PRESSURE: 62 MMHG | HEART RATE: 100 BPM | HEIGHT: 65 IN | WEIGHT: 173.31 LBS

## 2019-04-02 DIAGNOSIS — R00.2 PALPITATIONS: Primary | ICD-10-CM

## 2019-04-02 DIAGNOSIS — I10 ESSENTIAL HYPERTENSION: ICD-10-CM

## 2019-04-02 DIAGNOSIS — R00.0 TACHYCARDIA: ICD-10-CM

## 2019-04-02 PROCEDURE — 99999 PR PBB SHADOW E&M-EST. PATIENT-LVL III: ICD-10-PCS | Mod: PBBFAC,,, | Performed by: INTERNAL MEDICINE

## 2019-04-02 PROCEDURE — 99214 PR OFFICE/OUTPT VISIT, EST, LEVL IV, 30-39 MIN: ICD-10-PCS | Mod: S$GLB,,, | Performed by: INTERNAL MEDICINE

## 2019-04-02 PROCEDURE — 99999 PR PBB SHADOW E&M-EST. PATIENT-LVL III: CPT | Mod: PBBFAC,,, | Performed by: INTERNAL MEDICINE

## 2019-04-02 PROCEDURE — 99213 OFFICE O/P EST LOW 20 MIN: CPT | Mod: PBBFAC,PO | Performed by: INTERNAL MEDICINE

## 2019-04-02 PROCEDURE — 99214 OFFICE O/P EST MOD 30 MIN: CPT | Mod: S$GLB,,, | Performed by: INTERNAL MEDICINE

## 2019-04-02 NOTE — PROGRESS NOTES
Subjective:   Patient ID:  Daniela Costa is a 49 y.o. female who presents for follow-up of Follow-up  Toprol causing fatigue. It has helped palpitations.    Hypertension   This is a chronic problem. The current episode started more than 1 year ago. The problem has been gradually improving since onset. The problem is controlled. Associated symptoms include palpitations. Pertinent negatives include no chest pain or shortness of breath. Past treatments include beta blockers. The current treatment provides moderate improvement. There are no compliance problems.    Palpitations    This is a recurrent problem. The current episode started 1 to 4 weeks ago. The problem occurs intermittently. The problem has been gradually improving. Nothing aggravates the symptoms. Pertinent negatives include no chest pain, dizziness or shortness of breath. She has tried beta blockers for the symptoms. The treatment provided moderate relief. Risk factors include sedentary lifestyle.       Review of Systems   Constitution: Negative. Negative for weight gain.   HENT: Negative.    Eyes: Negative.    Cardiovascular: Positive for palpitations. Negative for chest pain and leg swelling.   Respiratory: Negative.  Negative for shortness of breath.    Endocrine: Negative.    Hematologic/Lymphatic: Negative.    Skin: Negative.    Musculoskeletal: Negative for muscle weakness.   Gastrointestinal: Negative.    Genitourinary: Negative.    Neurological: Negative.  Negative for dizziness.   Psychiatric/Behavioral: Negative.    Allergic/Immunologic: Negative.      Family History   Problem Relation Age of Onset    Diabetes Mother     Hyperlipidemia Mother     Diabetes Father     Hyperlipidemia Father      Past Medical History:   Diagnosis Date    Allergy     Anxiety     Arthritis     Asthma     Breast cancer     june 2012; BRCA 1 and 2 negative    Breast cancer genetic susceptibility     Cancer     Depression     Hypothyroidism     Lung  disease     Pneumonia      Social History     Socioeconomic History    Marital status: Legally      Spouse name: Not on file    Number of children: 6    Years of education: Not on file    Highest education level: Not on file   Occupational History    Occupation: stay home mom    Social Needs    Financial resource strain: Not on file    Food insecurity:     Worry: Not on file     Inability: Not on file    Transportation needs:     Medical: Not on file     Non-medical: Not on file   Tobacco Use    Smoking status: Former Smoker     Packs/day: 0.50     Years: 16.00     Pack years: 8.00     Types: Cigarettes     Start date: 1990     Last attempt to quit: 2014     Years since quittin.2    Smokeless tobacco: Never Used   Substance and Sexual Activity    Alcohol use: Yes     Frequency: 2-4 times a month     Drinks per session: 1 or 2     Binge frequency: Never     Comment: twice a month - wine     Drug use: No    Sexual activity: Not Currently     Partners: Male     Birth control/protection: See Surgical Hx   Lifestyle    Physical activity:     Days per week: Not on file     Minutes per session: Not on file    Stress: Not on file   Relationships    Social connections:     Talks on phone: Not on file     Gets together: Not on file     Attends Gnosticism service: Not on file     Active member of club or organization: Not on file     Attends meetings of clubs or organizations: Not on file     Relationship status: Not on file    Intimate partner violence:     Fear of current or ex partner: Not on file     Emotionally abused: Not on file     Physically abused: Not on file     Forced sexual activity: Not on file   Other Topics Concern    Not on file   Social History Narrative    Patient has 4 biological children and 2 step     Current Outpatient Medications on File Prior to Visit   Medication Sig Dispense Refill    albuterol 90 mcg/actuation inhaler Inhale 2 puffs into the lungs every 6  (six) hours. 3 Inhaler 3    ASCORBATE CALCIUM (VITAMIN C ORAL) Take by mouth.      clonazePAM (KLONOPIN) 1 MG tablet Take 1 tablet (1 mg total) by mouth daily as needed for Anxiety. 30 tablet 1    famotidine (PEPCID) 20 MG tablet Take 1 tablet (20 mg total) by mouth 2 (two) times daily. 60 tablet 0    fluticasone (FLONASE) 50 mcg/actuation nasal spray 1 spray (50 mcg total) by Each Nare route 2 (two) times daily. 3 Bottle 3    fluticasone-vilanterol (BREO ELLIPTA) 200-25 mcg/dose DsDv diskus inhaler Inhale 1 puff into the lungs once daily. Controller 180 each 3    hydrOXYzine (ATARAX) 50 MG tablet Take 1 to 2 tablets at bedtime as needed for sleep 60 tablet 1    ibuprofen (ADVIL,MOTRIN) 800 MG tablet Take 800 mg by mouth 3 (three) times daily.      inhalation spacing device (BREATHERITE VALVED MDI CHAMBER) Use as directed for inhalation. 1 Device prn    levothyroxine (SYNTHROID) 50 MCG tablet Take 1 tablet (50 mcg total) by mouth once daily. 90 tablet 3    loratadine (CLARITIN) 10 mg tablet Take 1 tablet (10 mg total) by mouth once daily. 30 tablet 0    metoprolol succinate (TOPROL-XL) 25 MG 24 hr tablet Take 1 tablet (25 mg total) by mouth once daily. 30 tablet 11    montelukast (SINGULAIR) 10 mg tablet Take 1 tablet (10 mg total) by mouth once daily. (Patient taking differently: Take 10 mg by mouth every evening. ) 90 tablet 3    multivitamin capsule Take 1 capsule by mouth once daily.      QUEtiapine (SEROQUEL) 200 MG Tab Take 1 tablet at bedtime for 3 days then 1 and 1/2 tablets at bedtime thereafter 45 tablet 1    methylPREDNISolone (MEDROL DOSEPACK) 4 mg tablet use as directed 1 Package 0    QUEtiapine (SEROQUEL) 100 MG Tab TAKE 1/2 TABLET AT BEDTIME FOR 2 DAYS, THEN 1 TABLET FOR 2 DAYS, THEN 1&1/2 TAB THEREAFTER 45 tablet 1    [DISCONTINUED] melatonin 1 mg Tab Take 1 tablet by mouth nightly.        No current facility-administered medications on file prior to visit.      Review of patient's  allergies indicates:   Allergen Reactions    Cefdinir      Radiation Recall, everywhere she had radiation it looked like blisters and very hot to touch    Levofloxacin      Went into deep depression. Messed with Pych meds    Strawberry Hives       Objective:     Physical Exam   Constitutional: She is oriented to person, place, and time. She appears well-developed and well-nourished.   HENT:   Head: Normocephalic and atraumatic.   Eyes: Pupils are equal, round, and reactive to light. Conjunctivae and EOM are normal.   Neck: Normal range of motion. Neck supple.   Cardiovascular: Normal rate, regular rhythm, normal heart sounds and intact distal pulses.   Pulmonary/Chest: Effort normal and breath sounds normal.   Abdominal: Soft. Bowel sounds are normal.   Musculoskeletal: Normal range of motion.   Neurological: She is alert and oriented to person, place, and time.   Skin: Skin is warm and dry.   Psychiatric: She has a normal mood and affect.   Nursing note and vitals reviewed.      Assessment:     1. Palpitations    2. Tachycardia    3. Essential hypertension        Plan:     Palpitations    Tachycardia    Essential hypertension      Take toprol at night as well as decreasin ( 1/2 tablet).  BP diary

## 2019-04-04 RX ORDER — QUETIAPINE FUMARATE 100 MG/1
TABLET, FILM COATED ORAL
Qty: 45 TABLET | Refills: 0 | Status: SHIPPED | OUTPATIENT
Start: 2019-04-04 | End: 2019-04-15 | Stop reason: ALTCHOICE

## 2019-04-04 RX ORDER — QUETIAPINE FUMARATE 200 MG/1
TABLET, FILM COATED ORAL
Qty: 45 TABLET | Refills: 0 | Status: SHIPPED | OUTPATIENT
Start: 2019-04-04 | End: 2019-04-15 | Stop reason: SDUPTHER

## 2019-04-10 ENCOUNTER — TELEPHONE (OUTPATIENT)
Dept: PSYCHIATRY | Facility: CLINIC | Age: 49
End: 2019-04-10
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
## 2019-04-15 ENCOUNTER — OFFICE VISIT (OUTPATIENT)
Dept: PSYCHIATRY | Facility: CLINIC | Age: 49
End: 2019-04-15
Payer: MEDICARE

## 2019-04-15 VITALS
HEART RATE: 86 BPM | SYSTOLIC BLOOD PRESSURE: 100 MMHG | DIASTOLIC BLOOD PRESSURE: 73 MMHG | BODY MASS INDEX: 28.62 KG/M2 | WEIGHT: 171.94 LBS

## 2019-04-15 DIAGNOSIS — F41.9 ANXIETY: ICD-10-CM

## 2019-04-15 DIAGNOSIS — F31.9 BIPOLAR I DISORDER: Primary | ICD-10-CM

## 2019-04-15 PROCEDURE — 99999 PR PBB SHADOW E&M-EST. PATIENT-LVL II: CPT | Mod: PBBFAC,,, | Performed by: PSYCHIATRY & NEUROLOGY

## 2019-04-15 PROCEDURE — 99214 OFFICE O/P EST MOD 30 MIN: CPT | Mod: S$GLB,,, | Performed by: PSYCHIATRY & NEUROLOGY

## 2019-04-15 PROCEDURE — 3074F PR MOST RECENT SYSTOLIC BLOOD PRESSURE < 130 MM HG: ICD-10-PCS | Mod: CPTII,S$GLB,, | Performed by: PSYCHIATRY & NEUROLOGY

## 2019-04-15 PROCEDURE — 3078F DIAST BP <80 MM HG: CPT | Mod: CPTII,S$GLB,, | Performed by: PSYCHIATRY & NEUROLOGY

## 2019-04-15 PROCEDURE — 3008F BODY MASS INDEX DOCD: CPT | Mod: CPTII,S$GLB,, | Performed by: PSYCHIATRY & NEUROLOGY

## 2019-04-15 PROCEDURE — 3074F SYST BP LT 130 MM HG: CPT | Mod: CPTII,S$GLB,, | Performed by: PSYCHIATRY & NEUROLOGY

## 2019-04-15 PROCEDURE — 3008F PR BODY MASS INDEX (BMI) DOCUMENTED: ICD-10-PCS | Mod: CPTII,S$GLB,, | Performed by: PSYCHIATRY & NEUROLOGY

## 2019-04-15 PROCEDURE — 3078F PR MOST RECENT DIASTOLIC BLOOD PRESSURE < 80 MM HG: ICD-10-PCS | Mod: CPTII,S$GLB,, | Performed by: PSYCHIATRY & NEUROLOGY

## 2019-04-15 PROCEDURE — 99999 PR PBB SHADOW E&M-EST. PATIENT-LVL II: ICD-10-PCS | Mod: PBBFAC,,, | Performed by: PSYCHIATRY & NEUROLOGY

## 2019-04-15 PROCEDURE — 99214 PR OFFICE/OUTPT VISIT, EST, LEVL IV, 30-39 MIN: ICD-10-PCS | Mod: S$GLB,,, | Performed by: PSYCHIATRY & NEUROLOGY

## 2019-04-15 RX ORDER — QUETIAPINE FUMARATE 200 MG/1
400 TABLET, FILM COATED ORAL NIGHTLY
Qty: 60 TABLET | Refills: 2 | Status: SHIPPED | OUTPATIENT
Start: 2019-04-15 | End: 2019-07-25 | Stop reason: SDUPTHER

## 2019-04-15 RX ORDER — CLONAZEPAM 1 MG/1
1 TABLET ORAL DAILY PRN
Qty: 30 TABLET | Refills: 2 | Status: SHIPPED | OUTPATIENT
Start: 2019-04-15 | End: 2019-09-12 | Stop reason: SDUPTHER

## 2019-05-01 DIAGNOSIS — J45.20 MILD INTERMITTENT ASTHMA, UNSPECIFIED WHETHER COMPLICATED: ICD-10-CM

## 2019-05-01 RX ORDER — ALBUTEROL SULFATE 90 UG/1
2 AEROSOL, METERED RESPIRATORY (INHALATION) EVERY 6 HOURS
Qty: 3 INHALER | Refills: 3 | Status: SHIPPED | OUTPATIENT
Start: 2019-05-01 | End: 2022-09-14 | Stop reason: SDUPTHER

## 2019-05-08 RX ORDER — QUETIAPINE FUMARATE 200 MG/1
TABLET, FILM COATED ORAL
Qty: 45 TABLET | Refills: 0 | Status: SHIPPED | OUTPATIENT
Start: 2019-05-08 | End: 2019-07-25 | Stop reason: SDUPTHER

## 2019-05-13 ENCOUNTER — OFFICE VISIT (OUTPATIENT)
Dept: PSYCHIATRY | Facility: CLINIC | Age: 49
End: 2019-05-13
Payer: COMMERCIAL

## 2019-05-13 DIAGNOSIS — F43.22 ADJUSTMENT DISORDER WITH ANXIETY: ICD-10-CM

## 2019-05-13 DIAGNOSIS — Z63.9 FAMILY CIRCUMSTANCE: ICD-10-CM

## 2019-05-13 DIAGNOSIS — F31.30 BIPOLAR I DISORDER, MOST RECENT EPISODE (OR CURRENT) DEPRESSED: Primary | ICD-10-CM

## 2019-05-13 PROCEDURE — 99499 UNLISTED E&M SERVICE: CPT | Mod: S$GLB,,, | Performed by: SOCIAL WORKER

## 2019-05-13 PROCEDURE — 90834 PR PSYCHOTHERAPY W/PATIENT, 45 MIN: ICD-10-PCS | Mod: S$GLB,,, | Performed by: SOCIAL WORKER

## 2019-05-13 PROCEDURE — 99499 RISK ADDL DX/OHS AUDIT: ICD-10-PCS | Mod: S$GLB,,, | Performed by: SOCIAL WORKER

## 2019-05-13 PROCEDURE — 90834 PSYTX W PT 45 MINUTES: CPT | Mod: S$GLB,,, | Performed by: SOCIAL WORKER

## 2019-05-13 SDOH — SOCIAL DETERMINANTS OF HEALTH (SDOH): PROBLEM RELATED TO PRIMARY SUPPORT GROUP, UNSPECIFIED: Z63.9
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
## 2019-06-12 ENCOUNTER — OFFICE VISIT (OUTPATIENT)
Dept: PSYCHIATRY | Facility: CLINIC | Age: 49
End: 2019-06-12
Payer: MEDICARE

## 2019-06-12 DIAGNOSIS — F41.1 ANXIETY STATES: ICD-10-CM

## 2019-06-12 DIAGNOSIS — F31.31 BIPOLAR AFFECTIVE DISORDER, CURRENTLY DEPRESSED, MILD: Primary | ICD-10-CM

## 2019-06-12 DIAGNOSIS — Z63.9 FAMILY CIRCUMSTANCE: ICD-10-CM

## 2019-06-12 PROCEDURE — 90834 PR PSYCHOTHERAPY W/PATIENT, 45 MIN: ICD-10-PCS | Mod: S$GLB,,, | Performed by: SOCIAL WORKER

## 2019-06-12 PROCEDURE — 90834 PSYTX W PT 45 MINUTES: CPT | Mod: S$GLB,,, | Performed by: SOCIAL WORKER

## 2019-06-12 SDOH — SOCIAL DETERMINANTS OF HEALTH (SDOH): PROBLEM RELATED TO PRIMARY SUPPORT GROUP, UNSPECIFIED: Z63.9
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
## 2019-07-03 ENCOUNTER — TELEPHONE (OUTPATIENT)
Dept: CARDIOLOGY | Facility: CLINIC | Age: 49
End: 2019-07-03

## 2019-07-25 RX ORDER — QUETIAPINE FUMARATE 200 MG/1
TABLET, FILM COATED ORAL
Qty: 45 TABLET | Refills: 1 | Status: SHIPPED | OUTPATIENT
Start: 2019-07-25 | End: 2019-08-22 | Stop reason: SDUPTHER

## 2019-08-08 ENCOUNTER — OFFICE VISIT (OUTPATIENT)
Dept: CARDIOLOGY | Facility: CLINIC | Age: 49
End: 2019-08-08
Payer: MEDICARE

## 2019-08-08 VITALS
SYSTOLIC BLOOD PRESSURE: 102 MMHG | BODY MASS INDEX: 25.83 KG/M2 | DIASTOLIC BLOOD PRESSURE: 64 MMHG | HEART RATE: 84 BPM | WEIGHT: 155 LBS | HEIGHT: 65 IN

## 2019-08-08 DIAGNOSIS — I49.3 PVC'S (PREMATURE VENTRICULAR CONTRACTIONS): ICD-10-CM

## 2019-08-08 DIAGNOSIS — R00.0 TACHYCARDIA: ICD-10-CM

## 2019-08-08 DIAGNOSIS — R53.82 CHRONIC FATIGUE: ICD-10-CM

## 2019-08-08 DIAGNOSIS — I10 ESSENTIAL HYPERTENSION: ICD-10-CM

## 2019-08-08 DIAGNOSIS — R00.2 PALPITATIONS: Primary | ICD-10-CM

## 2019-08-08 PROCEDURE — 3008F PR BODY MASS INDEX (BMI) DOCUMENTED: ICD-10-PCS | Mod: CPTII,S$GLB,, | Performed by: INTERNAL MEDICINE

## 2019-08-08 PROCEDURE — 99999 PR PBB SHADOW E&M-EST. PATIENT-LVL III: CPT | Mod: PBBFAC,,, | Performed by: INTERNAL MEDICINE

## 2019-08-08 PROCEDURE — 3074F SYST BP LT 130 MM HG: CPT | Mod: CPTII,S$GLB,, | Performed by: INTERNAL MEDICINE

## 2019-08-08 PROCEDURE — 99214 PR OFFICE/OUTPT VISIT, EST, LEVL IV, 30-39 MIN: ICD-10-PCS | Mod: S$GLB,,, | Performed by: INTERNAL MEDICINE

## 2019-08-08 PROCEDURE — 99999 PR PBB SHADOW E&M-EST. PATIENT-LVL III: ICD-10-PCS | Mod: PBBFAC,,, | Performed by: INTERNAL MEDICINE

## 2019-08-08 PROCEDURE — 3078F DIAST BP <80 MM HG: CPT | Mod: CPTII,S$GLB,, | Performed by: INTERNAL MEDICINE

## 2019-08-08 PROCEDURE — 3074F PR MOST RECENT SYSTOLIC BLOOD PRESSURE < 130 MM HG: ICD-10-PCS | Mod: CPTII,S$GLB,, | Performed by: INTERNAL MEDICINE

## 2019-08-08 PROCEDURE — 3078F PR MOST RECENT DIASTOLIC BLOOD PRESSURE < 80 MM HG: ICD-10-PCS | Mod: CPTII,S$GLB,, | Performed by: INTERNAL MEDICINE

## 2019-08-08 PROCEDURE — 3008F BODY MASS INDEX DOCD: CPT | Mod: CPTII,S$GLB,, | Performed by: INTERNAL MEDICINE

## 2019-08-08 PROCEDURE — 99214 OFFICE O/P EST MOD 30 MIN: CPT | Mod: S$GLB,,, | Performed by: INTERNAL MEDICINE

## 2019-08-08 RX ORDER — DILTIAZEM HYDROCHLORIDE 30 MG/1
30 TABLET, FILM COATED ORAL EVERY 12 HOURS
Qty: 60 TABLET | Refills: 11 | Status: SHIPPED | OUTPATIENT
Start: 2019-08-08 | End: 2020-07-28 | Stop reason: SDUPTHER

## 2019-08-08 NOTE — PROGRESS NOTES
Subjective:   Patient ID:  Daniela Costa is a 49 y.o. female who presents for follow-up of Follow-up and Palpitations  Pt still with fatigue, more palpitations lately with stress.Patient denies CP, angina or anginal equivalent.    Hypertension   This is a chronic problem. The current episode started more than 1 year ago. The problem has been gradually improving since onset. The problem is controlled. Associated symptoms include palpitations. Pertinent negatives include no chest pain or shortness of breath. Risk factors for coronary artery disease include stress. Past treatments include beta blockers. The current treatment provides moderate improvement. Compliance problems include medication side effects.    Palpitations    This is a recurrent problem. The current episode started 1 to 4 weeks ago. The problem occurs intermittently. The problem has been gradually improving. The symptoms are aggravated by stress. Pertinent negatives include no chest pain, dizziness or shortness of breath. She has tried beta blockers for the symptoms. The treatment provided moderate relief. Risk factors include stress.   Fatigue   This is a recurrent problem. The current episode started more than 1 month ago. The problem occurs intermittently. The problem has been waxing and waning. Associated symptoms include fatigue. Pertinent negatives include no chest pain. The symptoms are aggravated by stress (b blockers). She has tried rest for the symptoms. The treatment provided moderate relief.       Review of Systems   Constitution: Positive for fatigue. Negative for weight gain.   HENT: Negative.    Eyes: Negative.    Cardiovascular: Positive for palpitations. Negative for chest pain and leg swelling.   Respiratory: Negative.  Negative for shortness of breath.    Endocrine: Negative.    Hematologic/Lymphatic: Negative.    Skin: Negative.    Musculoskeletal: Negative for muscle weakness.   Gastrointestinal: Negative.    Genitourinary:  Negative.    Neurological: Negative.  Negative for dizziness.   Psychiatric/Behavioral: Negative.    Allergic/Immunologic: Negative.      Family History   Problem Relation Age of Onset    Diabetes Mother     Hyperlipidemia Mother     Diabetes Father     Hyperlipidemia Father      Past Medical History:   Diagnosis Date    Allergy     Anxiety     Arthritis     Asthma     Breast cancer     2012; BRCA 1 and 2 negative    Breast cancer genetic susceptibility     Cancer     Depression     Hypothyroidism     Lung disease     Pneumonia      Social History     Socioeconomic History    Marital status: Legally      Spouse name: Not on file    Number of children: 6    Years of education: Not on file    Highest education level: Not on file   Occupational History    Occupation: stay home mom    Social Needs    Financial resource strain: Not on file    Food insecurity:     Worry: Not on file     Inability: Not on file    Transportation needs:     Medical: Not on file     Non-medical: Not on file   Tobacco Use    Smoking status: Former Smoker     Packs/day: 0.50     Years: 16.00     Pack years: 8.00     Types: Cigarettes     Start date: 1990     Last attempt to quit: 2014     Years since quittin.6    Smokeless tobacco: Never Used   Substance and Sexual Activity    Alcohol use: Yes     Frequency: 2-4 times a month     Drinks per session: 1 or 2     Binge frequency: Never     Comment: twice a month - wine     Drug use: No    Sexual activity: Not Currently     Partners: Male     Birth control/protection: See Surgical Hx   Lifestyle    Physical activity:     Days per week: Not on file     Minutes per session: Not on file    Stress: Not on file   Relationships    Social connections:     Talks on phone: Not on file     Gets together: Not on file     Attends Baptism service: Not on file     Active member of club or organization: Not on file     Attends meetings of clubs or  organizations: Not on file     Relationship status: Not on file   Other Topics Concern    Not on file   Social History Narrative    Patient has 4 biological children and 2 step     Current Outpatient Medications on File Prior to Visit   Medication Sig Dispense Refill    albuterol (PROVENTIL/VENTOLIN HFA) 90 mcg/actuation inhaler INHALE 2 PUFFS INTO THE LUNGS EVERY 6 (SIX) HOURS. 3 Inhaler 3    ASCORBATE CALCIUM (VITAMIN C ORAL) Take by mouth.      clonazePAM (KLONOPIN) 1 MG tablet Take 1 tablet (1 mg total) by mouth daily as needed for Anxiety. 30 tablet 2    fluticasone (FLONASE) 50 mcg/actuation nasal spray 1 spray (50 mcg total) by Each Nare route 2 (two) times daily. 3 Bottle 3    fluticasone-vilanterol (BREO ELLIPTA) 200-25 mcg/dose DsDv diskus inhaler Inhale 1 puff into the lungs once daily. Controller 180 each 3    ibuprofen (ADVIL,MOTRIN) 800 MG tablet Take 800 mg by mouth 3 (three) times daily.      inhalation spacing device (BREATHERITE VALVED MDI CHAMBER) Use as directed for inhalation. 1 Device prn    levothyroxine (SYNTHROID) 50 MCG tablet Take 1 tablet (50 mcg total) by mouth once daily. 90 tablet 3    metoprolol succinate (TOPROL-XL) 25 MG 24 hr tablet Take 1 tablet (25 mg total) by mouth once daily. 30 tablet 11    montelukast (SINGULAIR) 10 mg tablet Take 1 tablet (10 mg total) by mouth once daily. (Patient taking differently: Take 10 mg by mouth every evening. ) 90 tablet 3    multivitamin capsule Take 1 capsule by mouth once daily.      QUEtiapine (SEROQUEL) 200 MG Tab TAKE 1 TABLET BY MOUTH AT BEDTIME FOR 3 DAYS THEN 1 AND 1/2 TABLETS AT BEDTIME THEREAFTER 45 tablet 1    famotidine (PEPCID) 20 MG tablet Take 1 tablet (20 mg total) by mouth 2 (two) times daily. 60 tablet 0    hydrOXYzine (ATARAX) 50 MG tablet Take 1 to 2 tablets at bedtime as needed for sleep 60 tablet 1    loratadine (CLARITIN) 10 mg tablet Take 1 tablet (10 mg total) by mouth once daily. 30 tablet 0     methylPREDNISolone (MEDROL DOSEPACK) 4 mg tablet use as directed 1 Package 0     No current facility-administered medications on file prior to visit.      Review of patient's allergies indicates:   Allergen Reactions    Cefdinir      Radiation Recall, everywhere she had radiation it looked like blisters and very hot to touch    Levofloxacin      Went into deep depression. Messed with Pych meds    Strawberry Hives       Objective:     Physical Exam   Constitutional: She is oriented to person, place, and time. She appears well-developed and well-nourished.   HENT:   Head: Normocephalic and atraumatic.   Eyes: Pupils are equal, round, and reactive to light. Conjunctivae and EOM are normal.   Neck: Normal range of motion. Neck supple.   Cardiovascular: Normal rate, regular rhythm, normal heart sounds and intact distal pulses.   Pulmonary/Chest: Effort normal and breath sounds normal.   Abdominal: Soft. Bowel sounds are normal.   Musculoskeletal: Normal range of motion.   Neurological: She is alert and oriented to person, place, and time.   Skin: Skin is warm and dry.   Psychiatric: She has a normal mood and affect.   Nursing note and vitals reviewed.      Assessment:     1. Palpitations    2. Tachycardia    3. Essential hypertension    4. Fatigue probably from b blockers    Plan:     Palpitations    Tachycardia    Essential hypertension    change metoprolol to cardizem-htn/palpitations

## 2019-08-11 PROBLEM — R53.82 CHRONIC FATIGUE: Status: ACTIVE | Noted: 2019-08-11

## 2019-08-22 RX ORDER — QUETIAPINE FUMARATE 200 MG/1
TABLET, FILM COATED ORAL
Qty: 45 TABLET | Refills: 0 | Status: SHIPPED | OUTPATIENT
Start: 2019-08-22 | End: 2019-09-12 | Stop reason: SDUPTHER

## 2019-09-02 DIAGNOSIS — J45.20 MILD INTERMITTENT ASTHMA, UNSPECIFIED WHETHER COMPLICATED: ICD-10-CM

## 2019-09-02 DIAGNOSIS — J30.89 SEASONAL ALLERGIC RHINITIS DUE TO OTHER ALLERGIC TRIGGER: ICD-10-CM

## 2019-09-03 DIAGNOSIS — J30.89 SEASONAL ALLERGIC RHINITIS DUE TO OTHER ALLERGIC TRIGGER: ICD-10-CM

## 2019-09-03 RX ORDER — MONTELUKAST SODIUM 10 MG/1
10 TABLET ORAL NIGHTLY
Qty: 90 TABLET | Refills: 3 | Status: SHIPPED | OUTPATIENT
Start: 2019-09-03 | End: 2020-08-24

## 2019-09-03 RX ORDER — FLUTICASONE PROPIONATE 50 MCG
SPRAY, SUSPENSION (ML) NASAL
Qty: 48 ML | Refills: 3 | Status: SHIPPED | OUTPATIENT
Start: 2019-09-03 | End: 2020-08-24

## 2019-09-12 ENCOUNTER — OFFICE VISIT (OUTPATIENT)
Dept: PSYCHIATRY | Facility: CLINIC | Age: 49
End: 2019-09-12
Payer: MEDICARE

## 2019-09-12 VITALS — WEIGHT: 149.5 LBS | BODY MASS INDEX: 24.87 KG/M2 | SYSTOLIC BLOOD PRESSURE: 101 MMHG | DIASTOLIC BLOOD PRESSURE: 77 MMHG

## 2019-09-12 DIAGNOSIS — F31.9 BIPOLAR I DISORDER: Primary | ICD-10-CM

## 2019-09-12 DIAGNOSIS — F41.9 ANXIETY: ICD-10-CM

## 2019-09-12 PROCEDURE — 99213 PR OFFICE/OUTPT VISIT, EST, LEVL III, 20-29 MIN: ICD-10-PCS | Mod: S$GLB,,, | Performed by: PSYCHIATRY & NEUROLOGY

## 2019-09-12 PROCEDURE — 99212 OFFICE O/P EST SF 10 MIN: CPT | Mod: PBBFAC | Performed by: PSYCHIATRY & NEUROLOGY

## 2019-09-12 PROCEDURE — 99999 PR PBB SHADOW E&M-EST. PATIENT-LVL II: CPT | Mod: PBBFAC,,, | Performed by: PSYCHIATRY & NEUROLOGY

## 2019-09-12 PROCEDURE — 99499 RISK ADDL DX/OHS AUDIT: ICD-10-PCS | Mod: S$PBB,,, | Performed by: PSYCHIATRY & NEUROLOGY

## 2019-09-12 PROCEDURE — 99213 OFFICE O/P EST LOW 20 MIN: CPT | Mod: S$GLB,,, | Performed by: PSYCHIATRY & NEUROLOGY

## 2019-09-12 PROCEDURE — 99499 UNLISTED E&M SERVICE: CPT | Mod: S$PBB,,, | Performed by: PSYCHIATRY & NEUROLOGY

## 2019-09-12 PROCEDURE — 99999 PR PBB SHADOW E&M-EST. PATIENT-LVL II: ICD-10-PCS | Mod: PBBFAC,,, | Performed by: PSYCHIATRY & NEUROLOGY

## 2019-09-12 RX ORDER — QUETIAPINE FUMARATE 200 MG/1
TABLET, FILM COATED ORAL
Qty: 45 TABLET | Refills: 3 | Status: SHIPPED | OUTPATIENT
Start: 2019-09-12 | End: 2019-12-15 | Stop reason: SDUPTHER

## 2019-09-12 RX ORDER — CLONAZEPAM 1 MG/1
1 TABLET ORAL DAILY PRN
Qty: 30 TABLET | Refills: 3 | Status: SHIPPED | OUTPATIENT
Start: 2019-09-12 | End: 2020-04-08 | Stop reason: SDUPTHER
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
## 2019-09-16 RX ORDER — QUETIAPINE FUMARATE 200 MG/1
TABLET, FILM COATED ORAL
Qty: 45 TABLET | Refills: 0 | OUTPATIENT
Start: 2019-09-16

## 2019-09-18 ENCOUNTER — OFFICE VISIT (OUTPATIENT)
Dept: PSYCHIATRY | Facility: CLINIC | Age: 49
End: 2019-09-18
Payer: COMMERCIAL

## 2019-09-18 DIAGNOSIS — F31.60 BIPOLAR I DISORDER, MOST RECENT EPISODE (OR CURRENT) MIXED: Primary | ICD-10-CM

## 2019-09-18 DIAGNOSIS — Z63.9 FAMILY CIRCUMSTANCE: ICD-10-CM

## 2019-09-18 DIAGNOSIS — F41.1 ANXIETY STATE: ICD-10-CM

## 2019-09-18 PROCEDURE — 90834 PR PSYCHOTHERAPY W/PATIENT, 45 MIN: ICD-10-PCS | Mod: S$GLB,,, | Performed by: SOCIAL WORKER

## 2019-09-18 PROCEDURE — 99211 OFF/OP EST MAY X REQ PHY/QHP: CPT | Mod: PBBFAC | Performed by: SOCIAL WORKER

## 2019-09-18 PROCEDURE — 99999 PR PBB SHADOW E&M-EST. PATIENT-LVL I: CPT | Mod: PBBFAC,,, | Performed by: SOCIAL WORKER

## 2019-09-18 PROCEDURE — 90834 PSYTX W PT 45 MINUTES: CPT | Mod: S$GLB,,, | Performed by: SOCIAL WORKER

## 2019-09-18 PROCEDURE — 99999 PR PBB SHADOW E&M-EST. PATIENT-LVL I: ICD-10-PCS | Mod: PBBFAC,,, | Performed by: SOCIAL WORKER

## 2019-09-18 SDOH — SOCIAL DETERMINANTS OF HEALTH (SDOH): PROBLEM RELATED TO PRIMARY SUPPORT GROUP, UNSPECIFIED: Z63.9

## 2019-10-03 NOTE — PROGRESS NOTES
"Individual Psychotherapy (PhD/LCSW)    9/18/2019    Site:  [facility]         Therapeutic Intervention: Met with patient.  Outpatient - Insight oriented psychotherapy 45 min - CPT code 17185 and Outpatient - Supportive psychotherapy 45 min - CPT Code 64502    Chief complaint/reason for encounter: depression, mood swings, somatic and interpersonal     Interval history and content of current session:   49 year old female patient presented for follow up psychotherapy for depression, anxiety, mood swings, serious health, relationship, and financial stressors.  Patient last seen for psychotherapy on 6/12/19.  The patient presented euthymic but reporting mostly she feels physically exhausted.  Happy to report she is officially  as of 9/9/19.  Still has property settlement to work through but has her  working on it.  Ex- blaming her for him losing his high paying job.  She talked about having a lot of responsibilities to juggle with her kids, Gil, age 18, Janes age 16, and Susan (almost) 13 --Oct.birthday.  She said 5 minutes of housework wears her out and she needs 20 minutes to recover.  She said she wants to try to get a part time job but wonders if she can tolerate it physically.   Denied any current si/hi.  No psychosis, cognitive deficits or substance abuse.   Supportive therapy provided.  Patient reporting feeling mostly okay, relatively stable emotionally.  Pleased with medication balance.  Wants psychotherapy for periodic "check in" and processing.  Plan is for follow up psychotherapy 12/2/19.                                                                                                                                                                                                                                                                                                                                                                                                                 "                                                                                                                                                                                                                                                                                         Treatment plan:    · Target symptoms: recurrent depression, anxiety , mood swings, intpersonal conflict  · Why chosen therapy is appropriate versus another modality: relevant to diagnosis, patient responds to this modality  · Outcome monitoring methods: self-report, observation  · Therapeutic intervention type: insight oriented psychotherapy, supportive psychotherapy    Risk parameters:  Patient reports no suicidal ideation  Patient reports no homicidal ideation  Patient reports no self-injurious behavior  Patient reports no violent behavior    Verbal deficits: None    Patient's response to intervention:  The patient's response to intervention is accepting.    Progress toward goals and other mental status changes:  The patient's progress toward goals is fair.    Diagnosis:     ICD-10-CM ICD-9-CM   1. Bipolar I disorder, most recent episode (or current) mixed F31.60 296.60   2. Anxiety state F41.1 300.00   3. Family circumstance Z63.9 V61.9       Plan:  individual psychotherapy and medication management by physician    Return to clinic: as scheduled    Length of Service (minutes): 45

## 2019-11-07 ENCOUNTER — OFFICE VISIT (OUTPATIENT)
Dept: CARDIOLOGY | Facility: CLINIC | Age: 49
End: 2019-11-07
Payer: MEDICARE

## 2019-11-07 VITALS
WEIGHT: 148.56 LBS | HEART RATE: 94 BPM | SYSTOLIC BLOOD PRESSURE: 102 MMHG | BODY MASS INDEX: 24.75 KG/M2 | OXYGEN SATURATION: 99 % | DIASTOLIC BLOOD PRESSURE: 68 MMHG | HEIGHT: 65 IN

## 2019-11-07 DIAGNOSIS — R00.0 TACHYCARDIA: ICD-10-CM

## 2019-11-07 DIAGNOSIS — I10 ESSENTIAL HYPERTENSION: ICD-10-CM

## 2019-11-07 DIAGNOSIS — I49.3 PVC'S (PREMATURE VENTRICULAR CONTRACTIONS): ICD-10-CM

## 2019-11-07 DIAGNOSIS — R53.82 CHRONIC FATIGUE: ICD-10-CM

## 2019-11-07 DIAGNOSIS — R00.2 PALPITATIONS: Primary | ICD-10-CM

## 2019-11-07 PROCEDURE — 3008F BODY MASS INDEX DOCD: CPT | Mod: CPTII,S$GLB,, | Performed by: INTERNAL MEDICINE

## 2019-11-07 PROCEDURE — 99214 PR OFFICE/OUTPT VISIT, EST, LEVL IV, 30-39 MIN: ICD-10-PCS | Mod: S$GLB,,, | Performed by: INTERNAL MEDICINE

## 2019-11-07 PROCEDURE — 99999 PR PBB SHADOW E&M-EST. PATIENT-LVL III: ICD-10-PCS | Mod: PBBFAC,,, | Performed by: INTERNAL MEDICINE

## 2019-11-07 PROCEDURE — 99214 OFFICE O/P EST MOD 30 MIN: CPT | Mod: S$GLB,,, | Performed by: INTERNAL MEDICINE

## 2019-11-07 PROCEDURE — 3008F PR BODY MASS INDEX (BMI) DOCUMENTED: ICD-10-PCS | Mod: CPTII,S$GLB,, | Performed by: INTERNAL MEDICINE

## 2019-11-07 PROCEDURE — 3078F DIAST BP <80 MM HG: CPT | Mod: CPTII,S$GLB,, | Performed by: INTERNAL MEDICINE

## 2019-11-07 PROCEDURE — 99499 UNLISTED E&M SERVICE: CPT | Mod: S$GLB,,, | Performed by: INTERNAL MEDICINE

## 2019-11-07 PROCEDURE — 3074F PR MOST RECENT SYSTOLIC BLOOD PRESSURE < 130 MM HG: ICD-10-PCS | Mod: CPTII,S$GLB,, | Performed by: INTERNAL MEDICINE

## 2019-11-07 PROCEDURE — 99999 PR PBB SHADOW E&M-EST. PATIENT-LVL III: CPT | Mod: PBBFAC,,, | Performed by: INTERNAL MEDICINE

## 2019-11-07 PROCEDURE — 99499 RISK ADDL DX/OHS AUDIT: ICD-10-PCS | Mod: S$GLB,,, | Performed by: INTERNAL MEDICINE

## 2019-11-07 PROCEDURE — 3078F PR MOST RECENT DIASTOLIC BLOOD PRESSURE < 80 MM HG: ICD-10-PCS | Mod: CPTII,S$GLB,, | Performed by: INTERNAL MEDICINE

## 2019-11-07 PROCEDURE — 3074F SYST BP LT 130 MM HG: CPT | Mod: CPTII,S$GLB,, | Performed by: INTERNAL MEDICINE

## 2019-11-07 NOTE — PROGRESS NOTES
Subjective:   Patient ID:  Daniela Costa is a 49 y.o. female who presents for follow-up of Palpitations (3 month f/u)  Pt is tolerating diltiazem. Pt with occasional palpitations.    Hypertension   This is a chronic problem. The current episode started more than 1 year ago. The problem has been gradually improving since onset. The problem is controlled. Pertinent negatives include no chest pain, palpitations or shortness of breath. Past treatments include calcium channel blockers.   Palpitations    This is a recurrent problem. The current episode started more than 1 month ago. The problem occurs intermittently. The problem has been gradually improving. Pertinent negatives include no chest pain, dizziness or shortness of breath. Treatments tried: diltiazem. The treatment provided moderate relief.       Review of Systems   Constitution: Negative. Negative for weight gain.   HENT: Negative.    Eyes: Negative.    Cardiovascular: Negative.  Negative for chest pain, leg swelling and palpitations.   Respiratory: Negative.  Negative for shortness of breath.    Endocrine: Negative.    Hematologic/Lymphatic: Negative.    Skin: Negative.    Musculoskeletal: Negative for muscle weakness.   Gastrointestinal: Negative.    Genitourinary: Negative.    Neurological: Negative.  Negative for dizziness.   Psychiatric/Behavioral: Negative.    Allergic/Immunologic: Negative.      Family History   Problem Relation Age of Onset    Diabetes Mother     Hyperlipidemia Mother     Diabetes Father     Hyperlipidemia Father      Past Medical History:   Diagnosis Date    Allergy     Anxiety     Arthritis     Asthma     Breast cancer     june 2012; BRCA 1 and 2 negative    Breast cancer genetic susceptibility     Cancer     Depression     Hypothyroidism     Lung disease     Pneumonia      Social History     Socioeconomic History    Marital status:      Spouse name: Not on file    Number of children: 6    Years of  education: Not on file    Highest education level: Not on file   Occupational History    Occupation: stay home mom    Social Needs    Financial resource strain: Not on file    Food insecurity:     Worry: Not on file     Inability: Not on file    Transportation needs:     Medical: Not on file     Non-medical: Not on file   Tobacco Use    Smoking status: Former Smoker     Packs/day: 0.50     Years: 16.00     Pack years: 8.00     Types: Cigarettes     Start date: 1990     Last attempt to quit: 2014     Years since quittin.8    Smokeless tobacco: Never Used   Substance and Sexual Activity    Alcohol use: Yes     Frequency: 2-4 times a month     Drinks per session: 1 or 2     Binge frequency: Never     Comment: twice a month - wine     Drug use: No    Sexual activity: Not Currently     Partners: Male     Birth control/protection: See Surgical Hx   Lifestyle    Physical activity:     Days per week: Not on file     Minutes per session: Not on file    Stress: Not on file   Relationships    Social connections:     Talks on phone: Not on file     Gets together: Not on file     Attends Rastafarian service: Not on file     Active member of club or organization: Not on file     Attends meetings of clubs or organizations: Not on file     Relationship status: Not on file   Other Topics Concern    Not on file   Social History Narrative    Patient has 4 biological children and 2 step     Current Outpatient Medications on File Prior to Visit   Medication Sig Dispense Refill    albuterol (PROVENTIL/VENTOLIN HFA) 90 mcg/actuation inhaler INHALE 2 PUFFS INTO THE LUNGS EVERY 6 (SIX) HOURS. 3 Inhaler 3    ASCORBATE CALCIUM (VITAMIN C ORAL) Take by mouth.      clonazePAM (KLONOPIN) 1 MG tablet Take 1 tablet (1 mg total) by mouth daily as needed for Anxiety. 30 tablet 3    diltiaZEM (CARDIZEM) 30 MG tablet Take 1 tablet (30 mg total) by mouth every 12 (twelve) hours. 60 tablet 11    famotidine (PEPCID) 20 MG  tablet Take 1 tablet (20 mg total) by mouth 2 (two) times daily. 60 tablet 0    fluticasone propionate (FLONASE) 50 mcg/actuation nasal spray SPRAY 1 SPRAY INTO EACH NOSTRIL TWO TIMES DAILY 48 mL 3    ibuprofen (ADVIL,MOTRIN) 800 MG tablet Take 800 mg by mouth 3 (three) times daily.      inhalation spacing device (BREATHERITE VALVED MDI CHAMBER) Use as directed for inhalation. 1 Device prn    levothyroxine (SYNTHROID) 50 MCG tablet Take 1 tablet (50 mcg total) by mouth once daily. 90 tablet 3    loratadine (CLARITIN) 10 mg tablet Take 1 tablet (10 mg total) by mouth once daily. 30 tablet 0    montelukast (SINGULAIR) 10 mg tablet Take 1 tablet (10 mg total) by mouth every evening. 90 tablet 3    multivitamin capsule Take 1 capsule by mouth once daily.      QUEtiapine (SEROQUEL) 200 MG Tab TAKE 1 AND 1/2 TABLETS AT BEDTIME 45 tablet 3    fluticasone-vilanterol (BREO ELLIPTA) 200-25 mcg/dose DsDv diskus inhaler Inhale 1 puff into the lungs once daily. Controller 180 each 3    hydrOXYzine (ATARAX) 50 MG tablet Take 1 to 2 tablets at bedtime as needed for sleep 60 tablet 1    methylPREDNISolone (MEDROL DOSEPACK) 4 mg tablet use as directed 1 Package 0     No current facility-administered medications on file prior to visit.      Review of patient's allergies indicates:   Allergen Reactions    Cefdinir      Radiation Recall, everywhere she had radiation it looked like blisters and very hot to touch    Levofloxacin      Went into deep depression. Messed with Pych meds    Strawberry Hives       Objective:     Physical Exam   Constitutional: She is oriented to person, place, and time. She appears well-developed and well-nourished.   HENT:   Head: Normocephalic and atraumatic.   Eyes: Pupils are equal, round, and reactive to light. Conjunctivae and EOM are normal.   Neck: Normal range of motion. Neck supple.   Cardiovascular: Normal rate, regular rhythm, normal heart sounds and intact distal pulses.    Pulmonary/Chest: Effort normal and breath sounds normal.   Abdominal: Soft. Bowel sounds are normal.   Musculoskeletal: Normal range of motion.   Neurological: She is alert and oriented to person, place, and time.   Skin: Skin is warm and dry.   Psychiatric: She has a normal mood and affect.   Nursing note and vitals reviewed.      Assessment:     1. Palpitations    2. Tachycardia    3. Essential hypertension    4. PVC's (premature ventricular contractions)    5. Chronic fatigue        Plan:     Palpitations    Tachycardia    Essential hypertension    PVC's (premature ventricular contractions)    Chronic fatigue      Continue diltiazem-palpitations/htn

## 2019-11-29 RX ORDER — LEVOTHYROXINE SODIUM 50 UG/1
TABLET ORAL
Qty: 90 TABLET | Refills: 3 | Status: SHIPPED | OUTPATIENT
Start: 2019-11-29 | End: 2022-09-08 | Stop reason: ALTCHOICE

## 2019-12-16 RX ORDER — QUETIAPINE FUMARATE 200 MG/1
TABLET, FILM COATED ORAL
Qty: 135 TABLET | Refills: 0 | Status: SHIPPED | OUTPATIENT
Start: 2019-12-16 | End: 2020-03-24 | Stop reason: SDUPTHER

## 2020-03-24 RX ORDER — QUETIAPINE FUMARATE 200 MG/1
TABLET, FILM COATED ORAL
Qty: 45 TABLET | Refills: 0 | Status: SHIPPED | OUTPATIENT
Start: 2020-03-24 | End: 2020-04-08 | Stop reason: SDUPTHER

## 2020-04-07 ENCOUNTER — PATIENT MESSAGE (OUTPATIENT)
Dept: PSYCHIATRY | Facility: CLINIC | Age: 50
End: 2020-04-07

## 2020-04-08 ENCOUNTER — OFFICE VISIT (OUTPATIENT)
Dept: PSYCHIATRY | Facility: CLINIC | Age: 50
End: 2020-04-08
Payer: COMMERCIAL

## 2020-04-08 DIAGNOSIS — F41.9 ANXIETY: ICD-10-CM

## 2020-04-08 DIAGNOSIS — F31.9 BIPOLAR I DISORDER, CURRENT EPISODE DEPRESSED: Primary | ICD-10-CM

## 2020-04-08 PROCEDURE — 99499 RISK ADDL DX/OHS AUDIT: ICD-10-PCS | Mod: 95,,, | Performed by: PSYCHIATRY & NEUROLOGY

## 2020-04-08 PROCEDURE — 99214 OFFICE O/P EST MOD 30 MIN: CPT | Mod: 95,,, | Performed by: PSYCHIATRY & NEUROLOGY

## 2020-04-08 PROCEDURE — 99214 PR OFFICE/OUTPT VISIT, EST, LEVL IV, 30-39 MIN: ICD-10-PCS | Mod: 95,,, | Performed by: PSYCHIATRY & NEUROLOGY

## 2020-04-08 PROCEDURE — 99499 UNLISTED E&M SERVICE: CPT | Mod: 95,,, | Performed by: PSYCHIATRY & NEUROLOGY

## 2020-04-08 RX ORDER — CLONAZEPAM 1 MG/1
1 TABLET ORAL DAILY PRN
Qty: 30 TABLET | Refills: 3 | Status: SHIPPED | OUTPATIENT
Start: 2020-04-08 | End: 2020-10-05

## 2020-04-08 RX ORDER — QUETIAPINE FUMARATE 200 MG/1
TABLET, FILM COATED ORAL
Qty: 45 TABLET | Refills: 3 | Status: SHIPPED | OUTPATIENT
Start: 2020-04-08 | End: 2020-07-02
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
## 2020-05-06 ENCOUNTER — PATIENT OUTREACH (OUTPATIENT)
Dept: ADMINISTRATIVE | Facility: OTHER | Age: 50
End: 2020-05-06

## 2020-07-29 RX ORDER — DILTIAZEM HYDROCHLORIDE 30 MG/1
30 TABLET, FILM COATED ORAL EVERY 12 HOURS
Qty: 60 TABLET | Refills: 1 | Status: SHIPPED | OUTPATIENT
Start: 2020-07-29 | End: 2020-08-29

## 2020-10-06 ENCOUNTER — PATIENT MESSAGE (OUTPATIENT)
Dept: ADMINISTRATIVE | Facility: HOSPITAL | Age: 50
End: 2020-10-06

## 2020-12-11 ENCOUNTER — PATIENT MESSAGE (OUTPATIENT)
Dept: OTHER | Facility: OTHER | Age: 50
End: 2020-12-11

## 2020-12-11 ENCOUNTER — PATIENT MESSAGE (OUTPATIENT)
Dept: ADMINISTRATIVE | Facility: OTHER | Age: 50
End: 2020-12-11

## 2020-12-22 ENCOUNTER — PATIENT OUTREACH (OUTPATIENT)
Dept: OTHER | Facility: OTHER | Age: 50
End: 2020-12-22

## 2021-01-07 ENCOUNTER — PATIENT MESSAGE (OUTPATIENT)
Dept: CARDIOLOGY | Facility: CLINIC | Age: 51
End: 2021-01-07

## 2021-01-07 ENCOUNTER — PATIENT MESSAGE (OUTPATIENT)
Dept: PSYCHIATRY | Facility: CLINIC | Age: 51
End: 2021-01-07

## 2021-01-08 ENCOUNTER — PATIENT MESSAGE (OUTPATIENT)
Dept: ADMINISTRATIVE | Facility: OTHER | Age: 51
End: 2021-01-08

## 2021-01-12 ENCOUNTER — OFFICE VISIT (OUTPATIENT)
Dept: CARDIOLOGY | Facility: CLINIC | Age: 51
End: 2021-01-12
Payer: MEDICAID

## 2021-01-12 DIAGNOSIS — R00.0 TACHYCARDIA: ICD-10-CM

## 2021-01-12 DIAGNOSIS — R53.82 CHRONIC FATIGUE: ICD-10-CM

## 2021-01-12 DIAGNOSIS — I10 ESSENTIAL HYPERTENSION: ICD-10-CM

## 2021-01-12 DIAGNOSIS — I49.3 PVC'S (PREMATURE VENTRICULAR CONTRACTIONS): ICD-10-CM

## 2021-01-12 DIAGNOSIS — R00.2 PALPITATIONS: Primary | ICD-10-CM

## 2021-01-12 PROCEDURE — 99214 PR OFFICE/OUTPT VISIT, EST, LEVL IV, 30-39 MIN: ICD-10-PCS | Mod: 95,,, | Performed by: INTERNAL MEDICINE

## 2021-01-12 PROCEDURE — 99214 OFFICE O/P EST MOD 30 MIN: CPT | Mod: 95,,, | Performed by: INTERNAL MEDICINE

## 2021-01-12 RX ORDER — CLONAZEPAM 1 MG/1
TABLET ORAL
Qty: 30 TABLET | Refills: 1 | Status: SHIPPED | OUTPATIENT
Start: 2021-01-12 | End: 2021-01-25 | Stop reason: SDUPTHER

## 2021-01-12 RX ORDER — QUETIAPINE FUMARATE 200 MG/1
TABLET, FILM COATED ORAL
Qty: 135 TABLET | Refills: 0 | Status: SHIPPED | OUTPATIENT
Start: 2021-01-12 | End: 2021-01-25 | Stop reason: SDUPTHER

## 2021-01-25 ENCOUNTER — OFFICE VISIT (OUTPATIENT)
Dept: PSYCHIATRY | Facility: CLINIC | Age: 51
End: 2021-01-25
Payer: MEDICAID

## 2021-01-25 DIAGNOSIS — F31.9 BIPOLAR I DISORDER, CURRENT EPISODE DEPRESSED: Primary | ICD-10-CM

## 2021-01-25 DIAGNOSIS — F41.9 ANXIETY: ICD-10-CM

## 2021-01-25 PROCEDURE — 99214 OFFICE O/P EST MOD 30 MIN: CPT | Mod: 95,,, | Performed by: PSYCHIATRY & NEUROLOGY

## 2021-01-25 PROCEDURE — 99214 PR OFFICE/OUTPT VISIT, EST, LEVL IV, 30-39 MIN: ICD-10-PCS | Mod: 95,,, | Performed by: PSYCHIATRY & NEUROLOGY

## 2021-01-25 PROCEDURE — 99499 RISK ADDL DX/OHS AUDIT: ICD-10-PCS | Mod: 95,,, | Performed by: PSYCHIATRY & NEUROLOGY

## 2021-01-25 PROCEDURE — 99499 UNLISTED E&M SERVICE: CPT | Mod: 95,,, | Performed by: PSYCHIATRY & NEUROLOGY

## 2021-01-25 RX ORDER — QUETIAPINE FUMARATE 200 MG/1
TABLET, FILM COATED ORAL
Qty: 135 TABLET | Refills: 1 | Status: SHIPPED | OUTPATIENT
Start: 2021-01-25 | End: 2021-10-01

## 2021-01-25 RX ORDER — CLONAZEPAM 1 MG/1
TABLET ORAL
Qty: 30 TABLET | Refills: 3 | Status: SHIPPED | OUTPATIENT
Start: 2021-01-25 | End: 2021-10-01

## 2021-01-27 DIAGNOSIS — I10 ESSENTIAL HYPERTENSION: ICD-10-CM

## 2021-01-27 RX ORDER — DILTIAZEM HYDROCHLORIDE 30 MG/1
30 TABLET, FILM COATED ORAL EVERY 12 HOURS
Qty: 180 TABLET | Refills: 3 | Status: SHIPPED | OUTPATIENT
Start: 2021-01-27 | End: 2022-09-15 | Stop reason: SDUPTHER

## 2021-03-25 ENCOUNTER — PATIENT MESSAGE (OUTPATIENT)
Dept: ADMINISTRATIVE | Facility: HOSPITAL | Age: 51
End: 2021-03-25

## 2021-03-26 NOTE — TELEPHONE ENCOUNTER
4/6/17   Plan: Eucrisa ointment to scalp daily, PRN if needed. Discontinue Regimen: Fluocinonide solution, burns & stings.\\nKetoconazole shampoo , too drying. Continue Regimen: Clobetasol solution to scalp, PRN X 1 month, then PRN. Otc Regimen: OTC Selsun Blue Detail Level: Zone

## 2021-04-28 ENCOUNTER — PATIENT MESSAGE (OUTPATIENT)
Dept: RESEARCH | Facility: HOSPITAL | Age: 51
End: 2021-04-28

## 2021-12-09 ENCOUNTER — PATIENT OUTREACH (OUTPATIENT)
Dept: OTHER | Facility: OTHER | Age: 51
End: 2021-12-09

## 2021-12-20 RX ORDER — QUETIAPINE FUMARATE 200 MG/1
TABLET, FILM COATED ORAL
Qty: 135 TABLET | Refills: 0 | Status: CANCELLED | OUTPATIENT
Start: 2021-12-20

## 2021-12-22 RX ORDER — QUETIAPINE FUMARATE 200 MG/1
TABLET, FILM COATED ORAL
Qty: 135 TABLET | Refills: 0 | Status: SHIPPED | OUTPATIENT
Start: 2021-12-22 | End: 2022-02-04

## 2022-02-04 ENCOUNTER — OFFICE VISIT (OUTPATIENT)
Dept: PSYCHIATRY | Facility: CLINIC | Age: 52
End: 2022-02-04
Payer: MEDICARE

## 2022-02-04 DIAGNOSIS — F41.9 ANXIETY: ICD-10-CM

## 2022-02-04 DIAGNOSIS — F31.9 BIPOLAR I DISORDER, CURRENT EPISODE DEPRESSED: Primary | ICD-10-CM

## 2022-02-04 PROCEDURE — 99999 PR PBB SHADOW E&M-EST. PATIENT-LVL I: ICD-10-PCS | Mod: PBBFAC,,, | Performed by: PSYCHIATRY & NEUROLOGY

## 2022-02-04 PROCEDURE — 99999 PR PBB SHADOW E&M-EST. PATIENT-LVL I: CPT | Mod: PBBFAC,,, | Performed by: PSYCHIATRY & NEUROLOGY

## 2022-02-04 PROCEDURE — 99214 OFFICE O/P EST MOD 30 MIN: CPT | Mod: S$GLB,,, | Performed by: PSYCHIATRY & NEUROLOGY

## 2022-02-04 PROCEDURE — 99214 PR OFFICE/OUTPT VISIT, EST, LEVL IV, 30-39 MIN: ICD-10-PCS | Mod: S$GLB,,, | Performed by: PSYCHIATRY & NEUROLOGY

## 2022-02-04 RX ORDER — QUETIAPINE FUMARATE 400 MG/1
400 TABLET, FILM COATED ORAL NIGHTLY
Qty: 30 TABLET | Refills: 2 | Status: SHIPPED | OUTPATIENT
Start: 2022-02-04 | End: 2022-05-16

## 2022-02-04 RX ORDER — CLONAZEPAM 1 MG/1
TABLET ORAL
Qty: 30 TABLET | Refills: 0 | Status: SHIPPED | OUTPATIENT
Start: 2022-02-04 | End: 2022-05-16
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
## 2022-06-13 RX ORDER — CLONAZEPAM 1 MG/1
TABLET ORAL
Qty: 30 TABLET | Refills: 0 | Status: SHIPPED | OUTPATIENT
Start: 2022-06-13 | End: 2022-07-12

## 2022-06-13 RX ORDER — QUETIAPINE FUMARATE 400 MG/1
400 TABLET, FILM COATED ORAL NIGHTLY
Qty: 30 TABLET | Refills: 1 | Status: SHIPPED | OUTPATIENT
Start: 2022-06-13 | End: 2022-08-14

## 2022-08-18 ENCOUNTER — OFFICE VISIT (OUTPATIENT)
Dept: PSYCHIATRY | Facility: CLINIC | Age: 52
End: 2022-08-18
Payer: COMMERCIAL

## 2022-08-18 DIAGNOSIS — F41.9 ANXIETY: ICD-10-CM

## 2022-08-18 DIAGNOSIS — F31.9 BIPOLAR I DISORDER, CURRENT EPISODE DEPRESSED: Primary | ICD-10-CM

## 2022-08-18 DIAGNOSIS — G47.00 INSOMNIA, UNSPECIFIED TYPE: ICD-10-CM

## 2022-08-18 PROCEDURE — 99499 UNLISTED E&M SERVICE: CPT | Mod: 95,,, | Performed by: PSYCHIATRY & NEUROLOGY

## 2022-08-18 PROCEDURE — 99214 PR OFFICE/OUTPT VISIT, EST, LEVL IV, 30-39 MIN: ICD-10-PCS | Mod: 95,,, | Performed by: PSYCHIATRY & NEUROLOGY

## 2022-08-18 PROCEDURE — 99214 OFFICE O/P EST MOD 30 MIN: CPT | Mod: 95,,, | Performed by: PSYCHIATRY & NEUROLOGY

## 2022-08-18 PROCEDURE — 99499 RISK ADDL DX/OHS AUDIT: ICD-10-PCS | Mod: 95,,, | Performed by: PSYCHIATRY & NEUROLOGY

## 2022-08-18 RX ORDER — TRAZODONE HYDROCHLORIDE 100 MG/1
TABLET ORAL
Qty: 30 TABLET | Refills: 1 | Status: SHIPPED | OUTPATIENT
Start: 2022-08-18 | End: 2022-11-04

## 2022-08-18 RX ORDER — LAMOTRIGINE 25 MG/1
TABLET ORAL
Qty: 84 TABLET | Refills: 0 | Status: SHIPPED | OUTPATIENT
Start: 2022-08-18 | End: 2022-11-04

## 2022-08-18 RX ORDER — LAMOTRIGINE 100 MG/1
100 TABLET ORAL DAILY
Qty: 30 TABLET | Refills: 0 | Status: SHIPPED | OUTPATIENT
Start: 2022-08-18 | End: 2022-08-18

## 2022-08-18 RX ORDER — BUSPIRONE HYDROCHLORIDE 30 MG/1
30 TABLET ORAL 3 TIMES DAILY
Qty: 60 TABLET | Refills: 2 | Status: SHIPPED | OUTPATIENT
Start: 2022-08-18 | End: 2022-11-04

## 2022-08-18 RX ORDER — LAMOTRIGINE 100 MG/1
100 TABLET ORAL DAILY
Qty: 30 TABLET | Refills: 1 | Status: SHIPPED | OUTPATIENT
Start: 2022-10-01 | End: 2022-11-04 | Stop reason: SDUPTHER

## 2022-08-18 RX ORDER — CLONAZEPAM 1 MG/1
TABLET ORAL
Qty: 30 TABLET | Refills: 1 | Status: SHIPPED | OUTPATIENT
Start: 2022-08-18 | End: 2022-11-04 | Stop reason: SDUPTHER
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
## 2022-09-07 ENCOUNTER — TELEPHONE (OUTPATIENT)
Dept: HEMATOLOGY/ONCOLOGY | Facility: CLINIC | Age: 52
End: 2022-09-07
Payer: MEDICARE

## 2022-09-07 ENCOUNTER — PATIENT MESSAGE (OUTPATIENT)
Dept: HEMATOLOGY/ONCOLOGY | Facility: CLINIC | Age: 52
End: 2022-09-07
Payer: MEDICARE

## 2022-09-07 NOTE — TELEPHONE ENCOUNTER
"Spoke to pt states "I want to transfer to [facility] I'm a 10 yr Breast Ca survivor and was seeing Dr. Larios at [facility] and haven't seen anyone in 5 yrs."  Message sent to [facility] NP for scheduling.   "

## 2022-09-08 ENCOUNTER — LAB VISIT (OUTPATIENT)
Dept: LAB | Facility: HOSPITAL | Age: 52
End: 2022-09-08
Attending: NURSE PRACTITIONER
Payer: MEDICARE

## 2022-09-08 ENCOUNTER — OFFICE VISIT (OUTPATIENT)
Dept: INTERNAL MEDICINE | Facility: CLINIC | Age: 52
End: 2022-09-08
Payer: MEDICARE

## 2022-09-08 ENCOUNTER — PATIENT MESSAGE (OUTPATIENT)
Dept: ADMINISTRATIVE | Facility: OTHER | Age: 52
End: 2022-09-08
Payer: MEDICARE

## 2022-09-08 VITALS
OXYGEN SATURATION: 98 % | TEMPERATURE: 98 F | WEIGHT: 231.06 LBS | HEART RATE: 96 BPM | SYSTOLIC BLOOD PRESSURE: 118 MMHG | HEIGHT: 65 IN | BODY MASS INDEX: 38.5 KG/M2 | DIASTOLIC BLOOD PRESSURE: 62 MMHG

## 2022-09-08 DIAGNOSIS — R63.5 WEIGHT GAIN: ICD-10-CM

## 2022-09-08 DIAGNOSIS — J45.20 MILD INTERMITTENT ASTHMA WITHOUT COMPLICATION: Primary | ICD-10-CM

## 2022-09-08 DIAGNOSIS — Z00.00 ANNUAL PHYSICAL EXAM: ICD-10-CM

## 2022-09-08 DIAGNOSIS — I10 PRIMARY HYPERTENSION: ICD-10-CM

## 2022-09-08 DIAGNOSIS — Z12.11 SCREENING FOR MALIGNANT NEOPLASM OF COLON: ICD-10-CM

## 2022-09-08 DIAGNOSIS — F31.9 BIPOLAR I DISORDER: ICD-10-CM

## 2022-09-08 DIAGNOSIS — Z00.00 ANNUAL PHYSICAL EXAM: Primary | ICD-10-CM

## 2022-09-08 PROCEDURE — 99396 PREV VISIT EST AGE 40-64: CPT | Mod: GZ,S$GLB,, | Performed by: NURSE PRACTITIONER

## 2022-09-08 PROCEDURE — 3078F DIAST BP <80 MM HG: CPT | Mod: CPTII,S$GLB,, | Performed by: NURSE PRACTITIONER

## 2022-09-08 PROCEDURE — 1160F PR REVIEW ALL MEDS BY PRESCRIBER/CLIN PHARMACIST DOCUMENTED: ICD-10-PCS | Mod: CPTII,S$GLB,, | Performed by: NURSE PRACTITIONER

## 2022-09-08 PROCEDURE — 3074F PR MOST RECENT SYSTOLIC BLOOD PRESSURE < 130 MM HG: ICD-10-PCS | Mod: CPTII,S$GLB,, | Performed by: NURSE PRACTITIONER

## 2022-09-08 PROCEDURE — 99499 RISK ADDL DX/OHS AUDIT: ICD-10-PCS | Mod: S$GLB,,, | Performed by: NURSE PRACTITIONER

## 2022-09-08 PROCEDURE — 3008F PR BODY MASS INDEX (BMI) DOCUMENTED: ICD-10-PCS | Mod: CPTII,S$GLB,, | Performed by: NURSE PRACTITIONER

## 2022-09-08 PROCEDURE — 99396 PR PREVENTIVE VISIT,EST,40-64: ICD-10-PCS | Mod: GZ,S$GLB,, | Performed by: NURSE PRACTITIONER

## 2022-09-08 PROCEDURE — 83036 HEMOGLOBIN GLYCOSYLATED A1C: CPT | Performed by: NURSE PRACTITIONER

## 2022-09-08 PROCEDURE — 99499 UNLISTED E&M SERVICE: CPT | Mod: S$GLB,,, | Performed by: NURSE PRACTITIONER

## 2022-09-08 PROCEDURE — 3078F PR MOST RECENT DIASTOLIC BLOOD PRESSURE < 80 MM HG: ICD-10-PCS | Mod: CPTII,S$GLB,, | Performed by: NURSE PRACTITIONER

## 2022-09-08 PROCEDURE — 1159F PR MEDICATION LIST DOCUMENTED IN MEDICAL RECORD: ICD-10-PCS | Mod: CPTII,S$GLB,, | Performed by: NURSE PRACTITIONER

## 2022-09-08 PROCEDURE — 99999 PR PBB SHADOW E&M-EST. PATIENT-LVL V: ICD-10-PCS | Mod: PBBFAC,,, | Performed by: NURSE PRACTITIONER

## 2022-09-08 PROCEDURE — 36415 COLL VENOUS BLD VENIPUNCTURE: CPT | Mod: PO | Performed by: NURSE PRACTITIONER

## 2022-09-08 PROCEDURE — 80061 LIPID PANEL: CPT | Performed by: NURSE PRACTITIONER

## 2022-09-08 PROCEDURE — 3008F BODY MASS INDEX DOCD: CPT | Mod: CPTII,S$GLB,, | Performed by: NURSE PRACTITIONER

## 2022-09-08 PROCEDURE — 84443 ASSAY THYROID STIM HORMONE: CPT | Performed by: NURSE PRACTITIONER

## 2022-09-08 PROCEDURE — 85025 COMPLETE CBC W/AUTO DIFF WBC: CPT | Performed by: NURSE PRACTITIONER

## 2022-09-08 PROCEDURE — 1159F MED LIST DOCD IN RCRD: CPT | Mod: CPTII,S$GLB,, | Performed by: NURSE PRACTITIONER

## 2022-09-08 PROCEDURE — 3074F SYST BP LT 130 MM HG: CPT | Mod: CPTII,S$GLB,, | Performed by: NURSE PRACTITIONER

## 2022-09-08 PROCEDURE — 80053 COMPREHEN METABOLIC PANEL: CPT | Performed by: NURSE PRACTITIONER

## 2022-09-08 PROCEDURE — 99999 PR PBB SHADOW E&M-EST. PATIENT-LVL V: CPT | Mod: PBBFAC,,, | Performed by: NURSE PRACTITIONER

## 2022-09-08 PROCEDURE — 84439 ASSAY OF FREE THYROXINE: CPT | Performed by: NURSE PRACTITIONER

## 2022-09-08 PROCEDURE — 1160F RVW MEDS BY RX/DR IN RCRD: CPT | Mod: CPTII,S$GLB,, | Performed by: NURSE PRACTITIONER

## 2022-09-08 RX ORDER — MELOXICAM 7.5 MG/1
7.5 TABLET ORAL 2 TIMES DAILY
COMMUNITY
Start: 2022-09-05 | End: 2022-10-13 | Stop reason: SDUPTHER

## 2022-09-08 RX ORDER — MELATONIN 10 MG
CAPSULE ORAL
COMMUNITY
End: 2023-09-26

## 2022-09-08 RX ORDER — ONDANSETRON 4 MG/1
4 TABLET, ORALLY DISINTEGRATING ORAL EVERY 6 HOURS PRN
COMMUNITY
Start: 2022-09-05

## 2022-09-08 NOTE — PROGRESS NOTES
"Subjective:       Patient ID: Daniela Costa is a 52 y.o. female.    Chief Complaint: Annual Exam and Fatigue    Pt presents to clinic today for annual exam and lab  New pt to me, PCP Dr. Terrell  Overall feels she has been doing well since last visit  Hasn't had labs/PCP check up in quite some time  Does with her psych MD for Bipolar    Today complains of some weight gain.  Reports she used to be 140-160s and now she is 231lb  Mentioned the weight gain to her psych MD  Was changed her from Seroquel to Lamictal to see if that helps the weight gain.  Change occurred only 2 weeks ago so hard to tell at this point    Was dx with breast cancer at age 41- follows with breast specialist  Had double mastectomy    Due for updated labs  Due for colonscopy           /62   Pulse 96   Temp 97.8 °F (36.6 °C) (Temporal)   Ht 5' 5" (1.651 m)   Wt 104.8 kg (231 lb 0.7 oz)   LMP  (LMP Unknown) Comment: no cycle 8/2012  SpO2 98%   BMI 38.45 kg/m²     Review of Systems   Constitutional:  Positive for fever and unexpected weight change. Negative for appetite change, chills, diaphoresis and fatigue.   HENT: Negative.     Eyes:  Negative for visual disturbance.   Respiratory:  Negative for cough, shortness of breath and wheezing.    Cardiovascular:  Negative for chest pain, palpitations and leg swelling.   Gastrointestinal:  Negative for abdominal distention, abdominal pain, blood in stool, constipation, diarrhea, nausea and vomiting.   Genitourinary:  Negative for decreased urine volume, difficulty urinating, dysuria, frequency, hematuria and urgency.   Neurological: Negative.  Negative for dizziness, syncope, speech difficulty, light-headedness and headaches.   Psychiatric/Behavioral:  Negative for agitation, confusion and hallucinations. The patient is not nervous/anxious.      Objective:      Physical Exam  Vitals and nursing note reviewed.   Constitutional:       General: She is not in acute distress.     Appearance: She " is well-developed. She is not diaphoretic.   HENT:      Head: Normocephalic and atraumatic.   Eyes:      General:         Right eye: No discharge.         Left eye: No discharge.      Conjunctiva/sclera: Conjunctivae normal.   Cardiovascular:      Rate and Rhythm: Normal rate and regular rhythm.      Heart sounds: Normal heart sounds. No murmur heard.  Pulmonary:      Effort: Pulmonary effort is normal. No respiratory distress.      Breath sounds: Normal breath sounds. No wheezing or rales.   Chest:      Chest wall: No tenderness.   Abdominal:      General: There is no distension.      Palpations: Abdomen is soft.   Musculoskeletal:      Right knee: Decreased range of motion.      Comments: With knee brace   Skin:     General: Skin is warm and dry.      Findings: No rash.   Neurological:      Mental Status: She is alert and oriented to person, place, and time.   Psychiatric:         Behavior: Behavior normal. Behavior is cooperative.         Thought Content: Thought content normal.         Judgment: Judgment normal.       Assessment:       1. Annual physical exam    2. Primary hypertension    3. Weight gain    4. Screening for malignant neoplasm of colon    5. Bipolar I disorder    6. BMI 38.0-38.9,adult          Plan:       Daniela was seen today for annual exam and fatigue.    Diagnoses and all orders for this visit:    Annual physical exam  -     CBC Auto Differential; Future  -     Comprehensive Metabolic Panel; Future  -     Lipid Panel; Future  - HM for age discussed, will update labs today    Primary hypertension  -     CBC Auto Differential; Future  -     Comprehensive Metabolic Panel; Future  -     Lipid Panel; Future  - Chronic, stable- continue to follow with Dr. Diaz    Weight gain  -     TSH; Future  - Will check thyroid, follow weight, food journal    Screening for malignant neoplasm of colon  -     Ambulatory referral/consult to Endo Procedure ; Future    Bipolar I disorder          - Chronic, stable- continue meds per psych    BMI 38.0-38.9,adult    Vitals reviewed and stable. BP within normal range at 118/62. Labs due     Patient is to continue eating a well balanced diet.      She is encouraged to engage in exercise outside of her day to day activities.      Patient advised to have colon cancer screening. Order placed for colonoscopy.      Questions and concerns addressed.      Follow up in 1 year or PRN.

## 2022-09-09 ENCOUNTER — PATIENT MESSAGE (OUTPATIENT)
Dept: INTERNAL MEDICINE | Facility: CLINIC | Age: 52
End: 2022-09-09
Payer: MEDICARE

## 2022-09-09 ENCOUNTER — PATIENT MESSAGE (OUTPATIENT)
Dept: ADMINISTRATIVE | Facility: OTHER | Age: 52
End: 2022-09-09
Payer: MEDICARE

## 2022-09-09 DIAGNOSIS — E03.9 HYPOTHYROIDISM, UNSPECIFIED TYPE: Primary | ICD-10-CM

## 2022-09-09 DIAGNOSIS — R73.9 HYPERGLYCEMIA: Primary | ICD-10-CM

## 2022-09-09 LAB
ALBUMIN SERPL BCP-MCNC: 4.3 G/DL (ref 3.5–5.2)
ALP SERPL-CCNC: 112 U/L (ref 55–135)
ALT SERPL W/O P-5'-P-CCNC: 32 U/L (ref 10–44)
ANION GAP SERPL CALC-SCNC: 14 MMOL/L (ref 8–16)
AST SERPL-CCNC: 18 U/L (ref 10–40)
BASOPHILS # BLD AUTO: 0.04 K/UL (ref 0–0.2)
BASOPHILS NFR BLD: 0.8 % (ref 0–1.9)
BILIRUB SERPL-MCNC: 0.3 MG/DL (ref 0.1–1)
BUN SERPL-MCNC: 9 MG/DL (ref 6–20)
CALCIUM SERPL-MCNC: 9.8 MG/DL (ref 8.7–10.5)
CHLORIDE SERPL-SCNC: 104 MMOL/L (ref 95–110)
CHOLEST SERPL-MCNC: 191 MG/DL (ref 120–199)
CHOLEST/HDLC SERPL: 2.9 {RATIO} (ref 2–5)
CO2 SERPL-SCNC: 25 MMOL/L (ref 23–29)
CREAT SERPL-MCNC: 0.9 MG/DL (ref 0.5–1.4)
DIFFERENTIAL METHOD: ABNORMAL
EOSINOPHIL # BLD AUTO: 0.1 K/UL (ref 0–0.5)
EOSINOPHIL NFR BLD: 2.6 % (ref 0–8)
ERYTHROCYTE [DISTWIDTH] IN BLOOD BY AUTOMATED COUNT: 13.7 % (ref 11.5–14.5)
EST. GFR  (NO RACE VARIABLE): >60 ML/MIN/1.73 M^2
ESTIMATED AVG GLUCOSE: 120 MG/DL (ref 68–131)
GLUCOSE SERPL-MCNC: 169 MG/DL (ref 70–110)
HBA1C MFR BLD: 5.8 % (ref 4–5.6)
HCT VFR BLD AUTO: 48.5 % (ref 37–48.5)
HDLC SERPL-MCNC: 67 MG/DL (ref 40–75)
HDLC SERPL: 35.1 % (ref 20–50)
HGB BLD-MCNC: 14.9 G/DL (ref 12–16)
IMM GRANULOCYTES # BLD AUTO: 0.02 K/UL (ref 0–0.04)
IMM GRANULOCYTES NFR BLD AUTO: 0.4 % (ref 0–0.5)
LDLC SERPL CALC-MCNC: 101.8 MG/DL (ref 63–159)
LYMPHOCYTES # BLD AUTO: 1.1 K/UL (ref 1–4.8)
LYMPHOCYTES NFR BLD: 20.9 % (ref 18–48)
MCH RBC QN AUTO: 28.5 PG (ref 27–31)
MCHC RBC AUTO-ENTMCNC: 30.7 G/DL (ref 32–36)
MCV RBC AUTO: 93 FL (ref 82–98)
MONOCYTES # BLD AUTO: 0.4 K/UL (ref 0.3–1)
MONOCYTES NFR BLD: 7.6 % (ref 4–15)
NEUTROPHILS # BLD AUTO: 3.4 K/UL (ref 1.8–7.7)
NEUTROPHILS NFR BLD: 67.7 % (ref 38–73)
NONHDLC SERPL-MCNC: 124 MG/DL
NRBC BLD-RTO: 0 /100 WBC
PLATELET # BLD AUTO: 183 K/UL (ref 150–450)
PMV BLD AUTO: 10 FL (ref 9.2–12.9)
POTASSIUM SERPL-SCNC: 4.1 MMOL/L (ref 3.5–5.1)
PROT SERPL-MCNC: 7 G/DL (ref 6–8.4)
RBC # BLD AUTO: 5.22 M/UL (ref 4–5.4)
SODIUM SERPL-SCNC: 143 MMOL/L (ref 136–145)
T4 FREE SERPL-MCNC: 0.78 NG/DL (ref 0.71–1.51)
TRIGL SERPL-MCNC: 111 MG/DL (ref 30–150)
TSH SERPL DL<=0.005 MIU/L-ACNC: 6.04 UIU/ML (ref 0.4–4)
WBC # BLD AUTO: 5.02 K/UL (ref 3.9–12.7)

## 2022-09-09 RX ORDER — LEVOTHYROXINE SODIUM 50 UG/1
50 TABLET ORAL DAILY
Qty: 90 TABLET | Refills: 1 | Status: SHIPPED | OUTPATIENT
Start: 2022-09-09 | End: 2023-03-23

## 2022-09-12 ENCOUNTER — HOSPITAL ENCOUNTER (OUTPATIENT)
Dept: PREADMISSION TESTING | Facility: HOSPITAL | Age: 52
Discharge: HOME | End: 2022-09-12
Attending: NURSE PRACTITIONER
Payer: MEDICARE

## 2022-09-12 DIAGNOSIS — Z12.11 SCREENING FOR MALIGNANT NEOPLASM OF COLON: Primary | ICD-10-CM

## 2022-09-12 RX ORDER — POLYETHYLENE GLYCOL 3350, SODIUM SULFATE ANHYDROUS, SODIUM BICARBONATE, SODIUM CHLORIDE, POTASSIUM CHLORIDE 236; 22.74; 6.74; 5.86; 2.97 G/4L; G/4L; G/4L; G/4L; G/4L
4 POWDER, FOR SOLUTION ORAL ONCE
Qty: 4000 ML | Refills: 0 | Status: SHIPPED | OUTPATIENT
Start: 2022-09-12 | End: 2022-09-12

## 2022-09-14 ENCOUNTER — PATIENT MESSAGE (OUTPATIENT)
Dept: INTERNAL MEDICINE | Facility: CLINIC | Age: 52
End: 2022-09-14
Payer: MEDICARE

## 2022-09-14 ENCOUNTER — OFFICE VISIT (OUTPATIENT)
Dept: PULMONOLOGY | Facility: CLINIC | Age: 52
End: 2022-09-14
Payer: MEDICARE

## 2022-09-14 ENCOUNTER — HOSPITAL ENCOUNTER (OUTPATIENT)
Dept: RADIOLOGY | Facility: HOSPITAL | Age: 52
Discharge: HOME | End: 2022-09-14
Attending: INTERNAL MEDICINE
Payer: MEDICARE

## 2022-09-14 VITALS
WEIGHT: 229.94 LBS | RESPIRATION RATE: 17 BRPM | SYSTOLIC BLOOD PRESSURE: 120 MMHG | BODY MASS INDEX: 38.31 KG/M2 | HEIGHT: 65 IN | HEART RATE: 107 BPM | OXYGEN SATURATION: 97 % | DIASTOLIC BLOOD PRESSURE: 78 MMHG

## 2022-09-14 DIAGNOSIS — J45.20 MILD INTERMITTENT ASTHMA WITHOUT COMPLICATION: ICD-10-CM

## 2022-09-14 DIAGNOSIS — J30.89 SEASONAL ALLERGIC RHINITIS DUE TO OTHER ALLERGIC TRIGGER: ICD-10-CM

## 2022-09-14 DIAGNOSIS — J45.20 MILD INTERMITTENT ASTHMA, UNSPECIFIED WHETHER COMPLICATED: Primary | ICD-10-CM

## 2022-09-14 DIAGNOSIS — Z23 NEED FOR VACCINATION: ICD-10-CM

## 2022-09-14 DIAGNOSIS — E66.01 SEVERE OBESITY (BMI 35.0-39.9) WITH COMORBIDITY: ICD-10-CM

## 2022-09-14 DIAGNOSIS — R73.03 PREDIABETES: Primary | ICD-10-CM

## 2022-09-14 PROBLEM — E03.9 HYPOTHYROIDISM: Status: ACTIVE | Noted: 2022-09-14

## 2022-09-14 PROBLEM — J45.909 ASTHMA: Status: ACTIVE | Noted: 2022-09-14

## 2022-09-14 PROCEDURE — 3074F PR MOST RECENT SYSTOLIC BLOOD PRESSURE < 130 MM HG: ICD-10-PCS | Mod: CPTII,S$GLB,, | Performed by: INTERNAL MEDICINE

## 2022-09-14 PROCEDURE — 99214 PR OFFICE/OUTPT VISIT, EST, LEVL IV, 30-39 MIN: ICD-10-PCS | Mod: S$GLB,,, | Performed by: INTERNAL MEDICINE

## 2022-09-14 PROCEDURE — 71046 X-RAY EXAM CHEST 2 VIEWS: CPT | Mod: 26,,, | Performed by: RADIOLOGY

## 2022-09-14 PROCEDURE — 99214 OFFICE O/P EST MOD 30 MIN: CPT | Mod: S$GLB,,, | Performed by: INTERNAL MEDICINE

## 2022-09-14 PROCEDURE — 3044F PR MOST RECENT HEMOGLOBIN A1C LEVEL <7.0%: ICD-10-PCS | Mod: CPTII,S$GLB,, | Performed by: INTERNAL MEDICINE

## 2022-09-14 PROCEDURE — 3078F DIAST BP <80 MM HG: CPT | Mod: CPTII,S$GLB,, | Performed by: INTERNAL MEDICINE

## 2022-09-14 PROCEDURE — 71046 X-RAY EXAM CHEST 2 VIEWS: CPT | Mod: TC

## 2022-09-14 PROCEDURE — 71046 XR CHEST PA AND LATERAL: ICD-10-PCS | Mod: 26,,, | Performed by: RADIOLOGY

## 2022-09-14 PROCEDURE — 3074F SYST BP LT 130 MM HG: CPT | Mod: CPTII,S$GLB,, | Performed by: INTERNAL MEDICINE

## 2022-09-14 PROCEDURE — 99999 PR PBB SHADOW E&M-EST. PATIENT-LVL IV: CPT | Mod: PBBFAC,,, | Performed by: INTERNAL MEDICINE

## 2022-09-14 PROCEDURE — 3044F HG A1C LEVEL LT 7.0%: CPT | Mod: CPTII,S$GLB,, | Performed by: INTERNAL MEDICINE

## 2022-09-14 PROCEDURE — 3008F BODY MASS INDEX DOCD: CPT | Mod: CPTII,S$GLB,, | Performed by: INTERNAL MEDICINE

## 2022-09-14 PROCEDURE — 99499 UNLISTED E&M SERVICE: CPT | Mod: S$GLB,,, | Performed by: INTERNAL MEDICINE

## 2022-09-14 PROCEDURE — 99499 RISK ADDL DX/OHS AUDIT: ICD-10-PCS | Mod: S$GLB,,, | Performed by: INTERNAL MEDICINE

## 2022-09-14 PROCEDURE — 1159F MED LIST DOCD IN RCRD: CPT | Mod: CPTII,S$GLB,, | Performed by: INTERNAL MEDICINE

## 2022-09-14 PROCEDURE — 1159F PR MEDICATION LIST DOCUMENTED IN MEDICAL RECORD: ICD-10-PCS | Mod: CPTII,S$GLB,, | Performed by: INTERNAL MEDICINE

## 2022-09-14 PROCEDURE — 3008F PR BODY MASS INDEX (BMI) DOCUMENTED: ICD-10-PCS | Mod: CPTII,S$GLB,, | Performed by: INTERNAL MEDICINE

## 2022-09-14 PROCEDURE — 99999 PR PBB SHADOW E&M-EST. PATIENT-LVL IV: ICD-10-PCS | Mod: PBBFAC,,, | Performed by: INTERNAL MEDICINE

## 2022-09-14 PROCEDURE — 3078F PR MOST RECENT DIASTOLIC BLOOD PRESSURE < 80 MM HG: ICD-10-PCS | Mod: CPTII,S$GLB,, | Performed by: INTERNAL MEDICINE

## 2022-09-14 RX ORDER — INSULIN PUMP SYRINGE, 3 ML
EACH MISCELLANEOUS
Qty: 1 EACH | Refills: 0 | Status: SHIPPED | OUTPATIENT
Start: 2022-09-14 | End: 2022-10-06 | Stop reason: SDUPTHER

## 2022-09-14 RX ORDER — LANCETS
EACH MISCELLANEOUS
Qty: 200 EACH | Refills: 11 | Status: SHIPPED | OUTPATIENT
Start: 2022-09-14 | End: 2022-10-06 | Stop reason: SDUPTHER

## 2022-09-14 RX ORDER — ALBUTEROL SULFATE 90 UG/1
2 AEROSOL, METERED RESPIRATORY (INHALATION) EVERY 6 HOURS
Qty: 18 G | Refills: 11 | Status: SHIPPED | OUTPATIENT
Start: 2022-09-14 | End: 2023-04-13 | Stop reason: SDUPTHER

## 2022-09-14 RX ORDER — MONTELUKAST SODIUM 10 MG/1
10 TABLET ORAL NIGHTLY
Qty: 30 TABLET | Refills: 11 | Status: SHIPPED | OUTPATIENT
Start: 2022-09-14 | End: 2022-10-14

## 2022-09-14 RX ORDER — FLUTICASONE FUROATE AND VILANTEROL 200; 25 UG/1; UG/1
1 POWDER RESPIRATORY (INHALATION) DAILY
Qty: 60 EACH | Refills: 11 | Status: SHIPPED | OUTPATIENT
Start: 2022-09-14 | End: 2023-04-13 | Stop reason: SDUPTHER

## 2022-09-14 NOTE — PROGRESS NOTES
Subjective:     Patient ID: Daniela Costa is a 52 y.o. female.    Chief Complaint:      HPI Post nasal drip and sinus congestion   Cough after exercising  Gained weight over the past two years    Asthma Follow-up  The patient has previously been evaluated here for asthma and presents for an asthma follow-up. The patient is currently having symptoms / an exacerbation. Current symptoms include chest tightness, dyspnea, non-productive cough, and wheezing. Symptoms have been present since several weeks ago and have been gradually worsening. She denies chest pain. Associated symptoms include poor exercise tolerance and shortness of breath.  This episode appears to have been triggered by exercise and pollens. Treatments tried for the current exacerbation include short-acting inhaled beta-adrenergic agonists, which have provided some relief of symptoms. The patient has been having similar episodes for approximately  many  years.    Current Disease Severity  The patient is having daytime symptoms daily. The patient is having daytime symptoms more than once per week, but not nightly. The patient is using short-acting beta agonists for symptom control  needs refill . She has exacerbations requiring oral systemic corticosteroids 0 times per year. Current limitations in activity from asthma: none. Number of days of school or work missed in the last month: not applicable. Number of urgent/emergent visit in last year: 0.  The patient is not using a spacer with MDIs. Her best peak flow rate is   na. She is not monitoring peak flow rates at home.    Past Medical History:   Diagnosis Date    Allergy     Anxiety     Arthritis     Asthma     Breast cancer     june 2012; BRCA 1 and 2 negative    Breast cancer genetic susceptibility     Cancer     Depression     Hypothyroidism     Lung disease     Pneumonia      Past Surgical History:   Procedure Laterality Date    ADENOIDECTOMY      BACK SURGERY      SI joint fusion    Breast  Reconstruction  Bilateral     BREAST SURGERY      CERVICAL FUSION      CERVICAL FUSION      HYSTERECTOMY      lumbar fusion      MASTECTOMY Right     MASTECTOMY Left     ROBOT-ASSISTED LAPAROSCOPIC ABDOMINAL HYSTERECTOMY USING DA HEBERT XI N/A 12/5/2018    Procedure: XI ROBOTIC HYSTERECTOMY;  Surgeon: Meka Reich MD;  Location: [facility] OR;  Service: OB/GYN;  Laterality: N/A;    ROBOT-ASSISTED LAPAROSCOPIC LYSIS OF ADHESIONS USING DA HEBERT XI N/A 12/5/2018    Procedure: XI ROBOTIC LYSIS, ADHESIONS;  Surgeon: Meka Reich MD;  Location: [facility] OR;  Service: OB/GYN;  Laterality: N/A;    ROBOT-ASSISTED LAPAROSCOPIC SALPINGO-OOPHORECTOMY USING DA HEBERT XI Bilateral 12/5/2018    Procedure: XI ROBOTIC SALPINGO-OOPHORECTOMY;  Surgeon: Meka Reich MD;  Location: [facility] OR;  Service: OB/GYN;  Laterality: Bilateral;    SI joint fusion   03/26/2018    TONSILLECTOMY      TUBAL LIGATION       Review of patient's allergies indicates:   Allergen Reactions    Cefdinir      Radiation Recall, everywhere she had radiation it looked like blisters and very hot to touch    Levofloxacin      Went into deep depression. Messed with Pych meds    Strawberry Hives     Current Outpatient Medications on File Prior to Visit   Medication Sig Dispense Refill    ASCORBATE CALCIUM (VITAMIN C ORAL) Take by mouth.      blood pressure monitor ([employer] EASE) ST size by Other route as needed for High Blood Pressure. 1 each 0    busPIRone (BUSPAR) 30 MG Tab Take 1 tablet (30 mg total) by mouth 3 (three) times daily. 60 tablet 2    clonazePAM (KLONOPIN) 1 MG tablet TAKE 1 TABLET BY MOUTH ONCE DAILY AS NEEDED FOR ANXIETY 30 tablet 1    diltiaZEM (CARDIZEM) 30 MG tablet Take 1 tablet (30 mg total) by mouth every 12 (twelve) hours. 180 tablet 3    famotidine (PEPCID) 20 MG tablet Take 1 tablet (20 mg total) by mouth 2 (two) times daily. 60 tablet 0    fluticasone propionate (FLONASE) 50 mcg/actuation nasal spray SPRAY 1 SPRAY INTO EACH NOSTRIL TWO  TIMES DAILY 48 mL 3    hydrOXYzine (ATARAX) 50 MG tablet Take 1 to 2 tablets at bedtime as needed for sleep 60 tablet 1    ibuprofen (ADVIL,MOTRIN) 800 MG tablet Take 800 mg by mouth 3 (three) times daily.      inhalation spacing device (BREATHERITE VALVED MDI CHAMBER) Use as directed for inhalation. 1 Device prn    [START ON 10/1/2022] lamoTRIgine (LAMICTAL) 100 MG tablet Take 1 tablet (100 mg total) by mouth once daily. 30 tablet 1    lamoTRIgine (LAMICTAL) 25 MG tablet Take 1 tablet daily x 14 days then 2 daily x 14 days then 3 daily x 14 days then start 100 mg daily. 84 tablet 0    levothyroxine (SYNTHROID) 50 MCG tablet Take 1 tablet (50 mcg total) by mouth once daily. 90 tablet 1    loratadine (CLARITIN) 10 mg tablet Take 1 tablet (10 mg total) by mouth once daily. 30 tablet 0    melatonin 10 mg Cap Take by mouth.      meloxicam (MOBIC) 7.5 MG tablet Take 7.5 mg by mouth 2 (two) times daily.      multivitamin capsule Take 1 capsule by mouth once daily.      ondansetron (ZOFRAN-ODT) 4 MG TbDL Take 4 mg by mouth every 6 (six) hours as needed.      traZODone (DESYREL) 100 MG tablet Take 1/2 to 1 tablet at bedtime as needed for sleep. 30 tablet 1    [DISCONTINUED] fluticasone-vilanterol (BREO ELLIPTA) 200-25 mcg/dose DsDv diskus inhaler Inhale 1 puff into the lungs once daily. Controller 180 each 3    [DISCONTINUED] albuterol (PROVENTIL/VENTOLIN HFA) 90 mcg/actuation inhaler INHALE 2 PUFFS INTO THE LUNGS EVERY 6 (SIX) HOURS. 3 Inhaler 3     No current facility-administered medications on file prior to visit.     Social History     Socioeconomic History    Marital status:     Number of children: 6   Occupational History    Occupation: stay home mom    Tobacco Use    Smoking status: Former     Packs/day: 0.50     Years: 16.00     Pack years: 8.00     Types: Cigarettes     Start date: 1990     Quit date: 2014     Years since quittin.7    Smokeless tobacco: Never   Substance and Sexual Activity     "Alcohol use: Yes     Comment: twice a month - wine     Drug use: No    Sexual activity: Not Currently     Partners: Male     Birth control/protection: See Surgical Hx   Social History Narrative    Patient has 4 biological children and 2 step     Family History   Problem Relation Age of Onset    Diabetes Mother     Hyperlipidemia Mother     Diabetes Father     Hyperlipidemia Father        Review of Systems   Constitutional:  Positive for fatigue. Negative for fever.   HENT:  Positive for postnasal drip, rhinorrhea and congestion.    Eyes:  Negative for redness and itching.   Respiratory:  Positive for cough, sputum production, shortness of breath, dyspnea on extertion, use of rescue inhaler and Paroxysmal Nocturnal Dyspnea.    Cardiovascular:  Negative for chest pain, palpitations and leg swelling.   Genitourinary:  Negative for difficulty urinating and hematuria.   Endocrine:  Negative for cold intolerance and heat intolerance.    Skin:  Negative for rash.   Gastrointestinal:  Negative for nausea and abdominal pain.   Neurological:  Negative for dizziness, syncope, weakness and light-headedness.   Hematological:  Negative for adenopathy. Does not bruise/bleed easily.   Psychiatric/Behavioral:  Negative for sleep disturbance. The patient is not nervous/anxious.      Objective:      /78   Pulse 107   Resp 17   Ht 5' 5" (1.651 m)   Wt 104.3 kg (229 lb 15 oz)   LMP  (LMP Unknown) Comment: no cycle 8/2012  SpO2 97%   BMI 38.26 kg/m²   Physical Exam  Vitals and nursing note reviewed.   Constitutional:       Appearance: She is well-developed. She is obese.   HENT:      Head: Normocephalic and atraumatic.      Nose: Nose normal.      Mouth/Throat:      Pharynx: No oropharyngeal exudate.   Eyes:      Conjunctiva/sclera: Conjunctivae normal.      Pupils: Pupils are equal, round, and reactive to light.   Neck:      Thyroid: No thyromegaly.      Vascular: No JVD.      Trachea: No tracheal deviation. "   Cardiovascular:      Rate and Rhythm: Normal rate and regular rhythm.      Heart sounds: Normal heart sounds.   Pulmonary:      Effort: Pulmonary effort is normal. No respiratory distress.      Breath sounds: Examination of the right-lower field reveals wheezing. Examination of the left-lower field reveals wheezing. Decreased breath sounds and wheezing present. No rhonchi or rales.   Chest:      Chest wall: No tenderness.   Abdominal:      General: Bowel sounds are normal.      Palpations: Abdomen is soft.   Musculoskeletal:         General: Normal range of motion.      Cervical back: Neck supple.   Lymphadenopathy:      Cervical: No cervical adenopathy.   Skin:     General: Skin is warm and dry.   Neurological:      Mental Status: She is alert and oriented to person, place, and time.     Personal Diagnostic Review  Chest x-ray: stable     Pulmonary Studies Review 9/14/2022   SpO2 97   Height 65   Weight 3679.04   BMI (Calculated) 38.3   Predicted Distance 347.51   Predicted Distance Meters (Calculated) 487.51   Some encounter information is confidential and restricted. Go to Review Flowsheets activity to see all data.       X-Ray Chest PA And Lateral  Narrative: EXAMINATION:  XR CHEST PA AND LATERAL    CLINICAL HISTORY:  SOB; Mild intermittent asthma, uncomplicated    TECHNIQUE:  PA and lateral views of the chest were performed.    COMPARISON:  Prior radiographs    FINDINGS:  Cardiac silhouette and mediastinal contours are normal.  Lungs are clear.  Osseous structures are intact.  Impression: No acute cardiopulmonary process.    Electronically signed by: Suleiman Jones MD  Date:    09/14/2022  Time:    09:30      Office Spirometry Results:     No flowsheet data found.  Pulmonary Studies Review 9/14/2022   SpO2 97   Height 65   Weight 3679.04   BMI (Calculated) 38.3   Predicted Distance 347.51   Predicted Distance Meters (Calculated) 487.51   Some encounter information is confidential and restricted. Go to  Review Flowsheets activity to see all data.         Assessment:            Mild intermittent asthma, unspecified whether complicated  -     albuterol (PROVENTIL/VENTOLIN HFA) 90 mcg/actuation inhaler; Inhale 2 puffs into the lungs every 6 (six) hours.  Dispense: 18 g; Refill: 11  -     fluticasone furoate-vilanteroL (BREO ELLIPTA) 200-25 mcg/dose DsDv diskus inhaler; Inhale 1 puff into the lungs once daily. Controller  Dispense: 60 each; Refill: 11  -     Spirometry with/without bronchodilator; Future; Expected date: 03/17/2023    Mild intermittent asthma without complication    Severe obesity (BMI 35.0-39.9) with comorbidity    Seasonal allergic rhinitis due to other allergic trigger  -     montelukast (SINGULAIR) 10 mg tablet; Take 1 tablet (10 mg total) by mouth every evening.  Dispense: 30 tablet; Refill: 11        Outpatient Encounter Medications as of 9/14/2022   Medication Sig Dispense Refill    ASCORBATE CALCIUM (VITAMIN C ORAL) Take by mouth.      blood pressure monitor ([employer] EASE) ST size by Other route as needed for High Blood Pressure. 1 each 0    busPIRone (BUSPAR) 30 MG Tab Take 1 tablet (30 mg total) by mouth 3 (three) times daily. 60 tablet 2    clonazePAM (KLONOPIN) 1 MG tablet TAKE 1 TABLET BY MOUTH ONCE DAILY AS NEEDED FOR ANXIETY 30 tablet 1    diltiaZEM (CARDIZEM) 30 MG tablet Take 1 tablet (30 mg total) by mouth every 12 (twelve) hours. 180 tablet 3    famotidine (PEPCID) 20 MG tablet Take 1 tablet (20 mg total) by mouth 2 (two) times daily. 60 tablet 0    fluticasone propionate (FLONASE) 50 mcg/actuation nasal spray SPRAY 1 SPRAY INTO EACH NOSTRIL TWO TIMES DAILY 48 mL 3    hydrOXYzine (ATARAX) 50 MG tablet Take 1 to 2 tablets at bedtime as needed for sleep 60 tablet 1    ibuprofen (ADVIL,MOTRIN) 800 MG tablet Take 800 mg by mouth 3 (three) times daily.      inhalation spacing device (BREATHERITE VALVED MDI CHAMBER) Use as directed for inhalation. 1 Device prn    [START ON 10/1/2022]  lamoTRIgine (LAMICTAL) 100 MG tablet Take 1 tablet (100 mg total) by mouth once daily. 30 tablet 1    lamoTRIgine (LAMICTAL) 25 MG tablet Take 1 tablet daily x 14 days then 2 daily x 14 days then 3 daily x 14 days then start 100 mg daily. 84 tablet 0    levothyroxine (SYNTHROID) 50 MCG tablet Take 1 tablet (50 mcg total) by mouth once daily. 90 tablet 1    loratadine (CLARITIN) 10 mg tablet Take 1 tablet (10 mg total) by mouth once daily. 30 tablet 0    melatonin 10 mg Cap Take by mouth.      meloxicam (MOBIC) 7.5 MG tablet Take 7.5 mg by mouth 2 (two) times daily.      multivitamin capsule Take 1 capsule by mouth once daily.      ondansetron (ZOFRAN-ODT) 4 MG TbDL Take 4 mg by mouth every 6 (six) hours as needed.      traZODone (DESYREL) 100 MG tablet Take 1/2 to 1 tablet at bedtime as needed for sleep. 30 tablet 1    [DISCONTINUED] fluticasone-vilanterol (BREO ELLIPTA) 200-25 mcg/dose DsDv diskus inhaler Inhale 1 puff into the lungs once daily. Controller 180 each 3    albuterol (PROVENTIL/VENTOLIN HFA) 90 mcg/actuation inhaler Inhale 2 puffs into the lungs every 6 (six) hours. 18 g 11    fluticasone furoate-vilanteroL (BREO ELLIPTA) 200-25 mcg/dose DsDv diskus inhaler Inhale 1 puff into the lungs once daily. Controller 60 each 11    montelukast (SINGULAIR) 10 mg tablet Take 1 tablet (10 mg total) by mouth every evening. 30 tablet 11    [] polyethylene glycol (GOLYTELY) 236-22.74-6.74 -5.86 gram suspension Take 4,000 mLs (4 L total) by mouth once. for 1 dose 4000 mL 0    [DISCONTINUED] albuterol (PROVENTIL/VENTOLIN HFA) 90 mcg/actuation inhaler INHALE 2 PUFFS INTO THE LUNGS EVERY 6 (SIX) HOURS. 3 Inhaler 3     No facility-administered encounter medications on file as of 2022.     Plan:       Requested Prescriptions     Signed Prescriptions Disp Refills    albuterol (PROVENTIL/VENTOLIN HFA) 90 mcg/actuation inhaler 18 g 11     Sig: Inhale 2 puffs into the lungs every 6 (six) hours.    fluticasone  furoate-vilanteroL (BREO ELLIPTA) 200-25 mcg/dose DsDv diskus inhaler 60 each 11     Sig: Inhale 1 puff into the lungs once daily. Controller    montelukast (SINGULAIR) 10 mg tablet 30 tablet 11     Sig: Take 1 tablet (10 mg total) by mouth every evening.     Problem List Items Addressed This Visit       Airway hyperreactivity - Primary    Relevant Medications    albuterol (PROVENTIL/VENTOLIN HFA) 90 mcg/actuation inhaler    fluticasone furoate-vilanteroL (BREO ELLIPTA) 200-25 mcg/dose DsDv diskus inhaler    Other Relevant Orders    Spirometry with/without bronchodilator    Allergic rhinitis    Relevant Medications    montelukast (SINGULAIR) 10 mg tablet    Mild intermittent asthma without complication    Severe obesity (BMI 35.0-39.9) with comorbidity          Follow up in about 6 months (around 3/14/2023) for Review ria - on return.    MEDICAL DECISION MAKING: Moderate to high complexity.  Overall, the multiple problems listed are of moderate to high severity that may impact quality of life and activities of daily living. Side effects of medications, treatment plan as well as options and alternatives reviewed and discussed with patient. There was counseling of patient concerning these issues.    Total time spent in counseling and coordination of care - 30  minutes of total time spent on the encounter, which includes face to face time and non-face to face time preparing to see the patient (eg, review of tests), Obtaining and/or reviewing separately obtained history, Documenting clinical information in the electronic or other health record, Independently interpreting results (not separately reported) and communicating results to the patient/family/caregiver, or Care coordination (not separately reported).    Time was used in discussion of prognosis, risks, benefits of treatment, instructions and compliance with regimen . Discussion with other physicians and/or health care providers - home health or for use of  durable medical equipment (oxygen, nebulizers, CPAP, BiPAP) occurred.

## 2022-09-15 ENCOUNTER — OFFICE VISIT (OUTPATIENT)
Dept: CARDIOLOGY | Facility: CLINIC | Age: 52
End: 2022-09-15
Payer: MEDICARE

## 2022-09-15 VITALS
WEIGHT: 228.19 LBS | SYSTOLIC BLOOD PRESSURE: 120 MMHG | BODY MASS INDEX: 38.02 KG/M2 | HEIGHT: 65 IN | DIASTOLIC BLOOD PRESSURE: 80 MMHG | HEART RATE: 116 BPM | OXYGEN SATURATION: 98 %

## 2022-09-15 DIAGNOSIS — Z01.810 PREOP CARDIOVASCULAR EXAM: ICD-10-CM

## 2022-09-15 DIAGNOSIS — I49.3 PVC'S (PREMATURE VENTRICULAR CONTRACTIONS): ICD-10-CM

## 2022-09-15 DIAGNOSIS — I10 PRIMARY HYPERTENSION: ICD-10-CM

## 2022-09-15 DIAGNOSIS — R53.82 CHRONIC FATIGUE: ICD-10-CM

## 2022-09-15 DIAGNOSIS — R00.2 PALPITATIONS: Primary | ICD-10-CM

## 2022-09-15 DIAGNOSIS — I10 ESSENTIAL HYPERTENSION: ICD-10-CM

## 2022-09-15 DIAGNOSIS — R00.0 TACHYCARDIA: ICD-10-CM

## 2022-09-15 PROCEDURE — 3074F SYST BP LT 130 MM HG: CPT | Mod: CPTII,S$GLB,, | Performed by: INTERNAL MEDICINE

## 2022-09-15 PROCEDURE — 1159F MED LIST DOCD IN RCRD: CPT | Mod: CPTII,S$GLB,, | Performed by: INTERNAL MEDICINE

## 2022-09-15 PROCEDURE — 1160F RVW MEDS BY RX/DR IN RCRD: CPT | Mod: CPTII,S$GLB,, | Performed by: INTERNAL MEDICINE

## 2022-09-15 PROCEDURE — 3079F DIAST BP 80-89 MM HG: CPT | Mod: CPTII,S$GLB,, | Performed by: INTERNAL MEDICINE

## 2022-09-15 PROCEDURE — 99215 OFFICE O/P EST HI 40 MIN: CPT | Mod: S$GLB,,, | Performed by: INTERNAL MEDICINE

## 2022-09-15 PROCEDURE — 1159F PR MEDICATION LIST DOCUMENTED IN MEDICAL RECORD: ICD-10-PCS | Mod: CPTII,S$GLB,, | Performed by: INTERNAL MEDICINE

## 2022-09-15 PROCEDURE — 3044F HG A1C LEVEL LT 7.0%: CPT | Mod: CPTII,S$GLB,, | Performed by: INTERNAL MEDICINE

## 2022-09-15 PROCEDURE — 3008F BODY MASS INDEX DOCD: CPT | Mod: CPTII,S$GLB,, | Performed by: INTERNAL MEDICINE

## 2022-09-15 PROCEDURE — 99999 PR PBB SHADOW E&M-EST. PATIENT-LVL V: CPT | Mod: PBBFAC,,, | Performed by: INTERNAL MEDICINE

## 2022-09-15 PROCEDURE — 99215 PR OFFICE/OUTPT VISIT, EST, LEVL V, 40-54 MIN: ICD-10-PCS | Mod: S$GLB,,, | Performed by: INTERNAL MEDICINE

## 2022-09-15 PROCEDURE — 99999 PR PBB SHADOW E&M-EST. PATIENT-LVL V: ICD-10-PCS | Mod: PBBFAC,,, | Performed by: INTERNAL MEDICINE

## 2022-09-15 PROCEDURE — 3074F PR MOST RECENT SYSTOLIC BLOOD PRESSURE < 130 MM HG: ICD-10-PCS | Mod: CPTII,S$GLB,, | Performed by: INTERNAL MEDICINE

## 2022-09-15 PROCEDURE — 3079F PR MOST RECENT DIASTOLIC BLOOD PRESSURE 80-89 MM HG: ICD-10-PCS | Mod: CPTII,S$GLB,, | Performed by: INTERNAL MEDICINE

## 2022-09-15 PROCEDURE — 3044F PR MOST RECENT HEMOGLOBIN A1C LEVEL <7.0%: ICD-10-PCS | Mod: CPTII,S$GLB,, | Performed by: INTERNAL MEDICINE

## 2022-09-15 PROCEDURE — 3008F PR BODY MASS INDEX (BMI) DOCUMENTED: ICD-10-PCS | Mod: CPTII,S$GLB,, | Performed by: INTERNAL MEDICINE

## 2022-09-15 PROCEDURE — 1160F PR REVIEW ALL MEDS BY PRESCRIBER/CLIN PHARMACIST DOCUMENTED: ICD-10-PCS | Mod: CPTII,S$GLB,, | Performed by: INTERNAL MEDICINE

## 2022-09-15 RX ORDER — DILTIAZEM HYDROCHLORIDE 30 MG/1
30 TABLET, FILM COATED ORAL EVERY 12 HOURS
Qty: 180 TABLET | Refills: 3 | Status: SHIPPED | OUTPATIENT
Start: 2022-09-15 | End: 2023-03-23

## 2022-09-15 NOTE — PROGRESS NOTES
Subjective:   Patient ID:  Daniela Costa is a 52 y.o. female who presents for follow-up of Palpitations and Hypertension  Bp and palpitations better.Pt planned for C sope.  Patient denies CP, angina or anginal equivalent.   Pt active without sx.  Palpitations   This is a new problem. The current episode started more than 1 month ago. The problem occurs rarely. The problem has been rapidly improving. On average, each episode lasts 5 minutes. Nothing aggravates the symptoms. Pertinent negatives include no chest pain, dizziness or shortness of breath. Treatments tried: diltiazem. The treatment provided moderate relief.   Hypertension  This is a chronic problem. The current episode started more than 1 year ago. The problem has been gradually improving since onset. The problem is controlled. Associated symptoms include palpitations. Pertinent negatives include no chest pain or shortness of breath. Past treatments include calcium channel blockers. The current treatment provides moderate improvement. There are no compliance problems.      Review of Systems   Constitutional: Negative. Negative for weight gain.   HENT: Negative.     Eyes: Negative.    Cardiovascular:  Positive for palpitations. Negative for chest pain and leg swelling.   Respiratory: Negative.  Negative for shortness of breath.    Endocrine: Negative.    Hematologic/Lymphatic: Negative.    Skin: Negative.    Musculoskeletal:  Negative for muscle weakness.   Gastrointestinal: Negative.    Genitourinary: Negative.    Neurological: Negative.  Negative for dizziness.   Psychiatric/Behavioral: Negative.     Allergic/Immunologic: Negative.    All other systems reviewed and are negative.  Family History   Problem Relation Age of Onset    Diabetes Mother     Hyperlipidemia Mother     Diabetes Father     Hyperlipidemia Father      Past Medical History:   Diagnosis Date    Allergy     Anxiety     Arthritis     Asthma     Breast cancer     june 2012; BRCA 1 and 2  negative    Breast cancer genetic susceptibility     Cancer     Depression     Hypothyroidism     Lung disease     Pneumonia      Social History     Socioeconomic History    Marital status:     Number of children: 6   Occupational History    Occupation: stay home mom    Tobacco Use    Smoking status: Former     Packs/day: 0.50     Years: 16.00     Pack years: 8.00     Types: Cigarettes     Start date: 1990     Quit date: 2014     Years since quittin.7    Smokeless tobacco: Never   Substance and Sexual Activity    Alcohol use: Yes     Comment: twice a month - wine     Drug use: No    Sexual activity: Not Currently     Partners: Male     Birth control/protection: See Surgical Hx   Social History Narrative    Patient has 4 biological children and 2 step     Current Outpatient Medications on File Prior to Visit   Medication Sig Dispense Refill    albuterol (PROVENTIL/VENTOLIN HFA) 90 mcg/actuation inhaler Inhale 2 puffs into the lungs every 6 (six) hours. 18 g 11    ASCORBATE CALCIUM (VITAMIN C ORAL) Take by mouth.      blood pressure monitor ([employer] EASE) ST size by Other route as needed for High Blood Pressure. 1 each 0    blood sugar diagnostic Strp To check BG 2 times daily, to use with insurance preferred meter 200 each 11    blood-glucose meter kit To check BG 2 times daily, to use with insurance preferred meter 1 each 0    busPIRone (BUSPAR) 30 MG Tab Take 1 tablet (30 mg total) by mouth 3 (three) times daily. 60 tablet 2    clonazePAM (KLONOPIN) 1 MG tablet TAKE 1 TABLET BY MOUTH ONCE DAILY AS NEEDED FOR ANXIETY 30 tablet 1    diltiaZEM (CARDIZEM) 30 MG tablet Take 1 tablet (30 mg total) by mouth every 12 (twelve) hours. 180 tablet 3    famotidine (PEPCID) 20 MG tablet Take 1 tablet (20 mg total) by mouth 2 (two) times daily. 60 tablet 0    fluticasone furoate-vilanteroL (BREO ELLIPTA) 200-25 mcg/dose DsDv diskus inhaler Inhale 1 puff into the lungs once daily. Controller 60 each 11     fluticasone propionate (FLONASE) 50 mcg/actuation nasal spray SPRAY 1 SPRAY INTO EACH NOSTRIL TWO TIMES DAILY 48 mL 3    hydrOXYzine (ATARAX) 50 MG tablet Take 1 to 2 tablets at bedtime as needed for sleep 60 tablet 1    ibuprofen (ADVIL,MOTRIN) 800 MG tablet Take 800 mg by mouth 3 (three) times daily.      inhalation spacing device (BREATHERITE VALVED MDI CHAMBER) Use as directed for inhalation. 1 Device prn    [START ON 10/1/2022] lamoTRIgine (LAMICTAL) 100 MG tablet Take 1 tablet (100 mg total) by mouth once daily. 30 tablet 1    lamoTRIgine (LAMICTAL) 25 MG tablet Take 1 tablet daily x 14 days then 2 daily x 14 days then 3 daily x 14 days then start 100 mg daily. 84 tablet 0    lancets Misc To check BG 2 times daily, to use with insurance preferred meter 200 each 11    levothyroxine (SYNTHROID) 50 MCG tablet Take 1 tablet (50 mcg total) by mouth once daily. 90 tablet 1    loratadine (CLARITIN) 10 mg tablet Take 1 tablet (10 mg total) by mouth once daily. 30 tablet 0    melatonin 10 mg Cap Take by mouth.      meloxicam (MOBIC) 7.5 MG tablet Take 7.5 mg by mouth 2 (two) times daily.      montelukast (SINGULAIR) 10 mg tablet Take 1 tablet (10 mg total) by mouth every evening. 30 tablet 11    multivitamin capsule Take 1 capsule by mouth once daily.      ondansetron (ZOFRAN-ODT) 4 MG TbDL Take 4 mg by mouth every 6 (six) hours as needed.      traZODone (DESYREL) 100 MG tablet Take 1/2 to 1 tablet at bedtime as needed for sleep. 30 tablet 1     No current facility-administered medications on file prior to visit.     Review of patient's allergies indicates:   Allergen Reactions    Cefdinir      Radiation Recall, everywhere she had radiation it looked like blisters and very hot to touch    Levofloxacin      Went into deep depression. Messed with Pych meds    Strawberry Hives       Objective:     Physical Exam  Vitals and nursing note reviewed.   Constitutional:       Appearance: She is well-developed.   HENT:      Head:  Normocephalic and atraumatic.   Eyes:      Conjunctiva/sclera: Conjunctivae normal.      Pupils: Pupils are equal, round, and reactive to light.   Cardiovascular:      Rate and Rhythm: Normal rate and regular rhythm.      Pulses: Intact distal pulses.      Heart sounds: Normal heart sounds.   Pulmonary:      Effort: Pulmonary effort is normal.      Breath sounds: Normal breath sounds.   Abdominal:      General: Bowel sounds are normal.      Palpations: Abdomen is soft.   Musculoskeletal:         General: Normal range of motion.      Cervical back: Normal range of motion and neck supple.   Skin:     General: Skin is warm and dry.   Neurological:      Mental Status: She is alert and oriented to person, place, and time.       Assessment:     1. Palpitations    2. Tachycardia    3. Primary hypertension    4. PVC's (premature ventricular contractions)    5. Chronic fatigue        Plan:     Palpitations    Tachycardia    Primary hypertension    PVC's (premature ventricular contractions)    Chronic fatigue      Continue diltiazem-palpitations/htn  Pt cleared for procedure at moderate Cv risk

## 2022-09-16 ENCOUNTER — PATIENT MESSAGE (OUTPATIENT)
Dept: ADMINISTRATIVE | Facility: OTHER | Age: 52
End: 2022-09-16
Payer: MEDICARE

## 2022-09-18 ENCOUNTER — OFFICE VISIT (OUTPATIENT)
Dept: URGENT CARE | Facility: CLINIC | Age: 52
End: 2022-09-18
Payer: MEDICARE

## 2022-09-18 VITALS
TEMPERATURE: 99 F | RESPIRATION RATE: 18 BRPM | HEIGHT: 65 IN | WEIGHT: 227 LBS | DIASTOLIC BLOOD PRESSURE: 65 MMHG | HEART RATE: 90 BPM | SYSTOLIC BLOOD PRESSURE: 115 MMHG | BODY MASS INDEX: 37.82 KG/M2 | OXYGEN SATURATION: 98 %

## 2022-09-18 DIAGNOSIS — L03.116 CELLULITIS OF LEFT LOWER EXTREMITY: Primary | ICD-10-CM

## 2022-09-18 DIAGNOSIS — R52 GENERALIZED BODY ACHES: ICD-10-CM

## 2022-09-18 DIAGNOSIS — T07.XXXA MULTIPLE EXCORIATIONS: ICD-10-CM

## 2022-09-18 PROCEDURE — 1160F RVW MEDS BY RX/DR IN RCRD: CPT | Mod: CPTII,S$GLB,, | Performed by: PHYSICIAN ASSISTANT

## 2022-09-18 PROCEDURE — 3078F DIAST BP <80 MM HG: CPT | Mod: CPTII,S$GLB,, | Performed by: PHYSICIAN ASSISTANT

## 2022-09-18 PROCEDURE — 99214 PR OFFICE/OUTPT VISIT, EST, LEVL IV, 30-39 MIN: ICD-10-PCS | Mod: 25,S$GLB,, | Performed by: PHYSICIAN ASSISTANT

## 2022-09-18 PROCEDURE — 96372 THER/PROPH/DIAG INJ SC/IM: CPT | Mod: S$GLB,,, | Performed by: PHYSICIAN ASSISTANT

## 2022-09-18 PROCEDURE — 3008F BODY MASS INDEX DOCD: CPT | Mod: CPTII,S$GLB,, | Performed by: PHYSICIAN ASSISTANT

## 2022-09-18 PROCEDURE — 96372 PR INJECTION,THERAP/PROPH/DIAG2ST, IM OR SUBCUT: ICD-10-PCS | Mod: S$GLB,,, | Performed by: PHYSICIAN ASSISTANT

## 2022-09-18 PROCEDURE — 3044F HG A1C LEVEL LT 7.0%: CPT | Mod: CPTII,S$GLB,, | Performed by: PHYSICIAN ASSISTANT

## 2022-09-18 PROCEDURE — 1159F MED LIST DOCD IN RCRD: CPT | Mod: CPTII,S$GLB,, | Performed by: PHYSICIAN ASSISTANT

## 2022-09-18 PROCEDURE — 3074F SYST BP LT 130 MM HG: CPT | Mod: CPTII,S$GLB,, | Performed by: PHYSICIAN ASSISTANT

## 2022-09-18 PROCEDURE — 3008F PR BODY MASS INDEX (BMI) DOCUMENTED: ICD-10-PCS | Mod: CPTII,S$GLB,, | Performed by: PHYSICIAN ASSISTANT

## 2022-09-18 PROCEDURE — 3074F PR MOST RECENT SYSTOLIC BLOOD PRESSURE < 130 MM HG: ICD-10-PCS | Mod: CPTII,S$GLB,, | Performed by: PHYSICIAN ASSISTANT

## 2022-09-18 PROCEDURE — 1160F PR REVIEW ALL MEDS BY PRESCRIBER/CLIN PHARMACIST DOCUMENTED: ICD-10-PCS | Mod: CPTII,S$GLB,, | Performed by: PHYSICIAN ASSISTANT

## 2022-09-18 PROCEDURE — 1159F PR MEDICATION LIST DOCUMENTED IN MEDICAL RECORD: ICD-10-PCS | Mod: CPTII,S$GLB,, | Performed by: PHYSICIAN ASSISTANT

## 2022-09-18 PROCEDURE — 3044F PR MOST RECENT HEMOGLOBIN A1C LEVEL <7.0%: ICD-10-PCS | Mod: CPTII,S$GLB,, | Performed by: PHYSICIAN ASSISTANT

## 2022-09-18 PROCEDURE — 3078F PR MOST RECENT DIASTOLIC BLOOD PRESSURE < 80 MM HG: ICD-10-PCS | Mod: CPTII,S$GLB,, | Performed by: PHYSICIAN ASSISTANT

## 2022-09-18 PROCEDURE — 99214 OFFICE O/P EST MOD 30 MIN: CPT | Mod: 25,S$GLB,, | Performed by: PHYSICIAN ASSISTANT

## 2022-09-18 RX ORDER — AMOXICILLIN AND CLAVULANATE POTASSIUM 875; 125 MG/1; MG/1
1 TABLET, FILM COATED ORAL EVERY 12 HOURS
Qty: 14 TABLET | Refills: 0 | Status: SHIPPED | OUTPATIENT
Start: 2022-09-18 | End: 2022-09-25

## 2022-09-18 RX ORDER — KETOROLAC TROMETHAMINE 30 MG/ML
30 INJECTION, SOLUTION INTRAMUSCULAR; INTRAVENOUS
Status: COMPLETED | OUTPATIENT
Start: 2022-09-18 | End: 2022-09-18

## 2022-09-18 RX ORDER — HYDROCORTISONE 25 MG/ML
LOTION TOPICAL 2 TIMES DAILY
Qty: 59 ML | Refills: 0 | Status: SHIPPED | OUTPATIENT
Start: 2022-09-18

## 2022-09-18 RX ADMIN — KETOROLAC TROMETHAMINE 30 MG: 30 INJECTION, SOLUTION INTRAMUSCULAR; INTRAVENOUS at 01:09

## 2022-09-18 NOTE — PATIENT INSTRUCTIONS
Animal scratch:    1. Use cool compresses for 20 minutes 3-5 times a day. Avoid picking or manipulating the wound. Clean the wound twice a day with soap and water.  Apply antibiotic ointment on it.   You should seek ER treatment if fever, increase pain, swelling or there are any other signs of increasing infection.   2. If you were prescribed antibiotics, please take them to completion(- If you are female and on birth control pills, use additional methods to prevent pregnancy while on antibiotics and for one cycle after. )  3. If you were given narcotics do not drive or operate heavy equipment or machinery while taking these medications.   4. Over-the-counter Tylenol and/or ibuprofen can also be used as directed for pain. (unless you have an allergy to them or medical condition such as stomach ulcers, kidney/liver disease or  on blood thinners for which you should not be taking these type of medications.)   5. Please return here in 1-3 days for a recheck of your wound, if desired.         CELLULITIS:   - Rest.    - Drink plenty of fluids.   - Keep the wound clean and dry.   - Wash daily with soap and water  - Warm water soaks (with or without epsom salt) 2-3 times a day for 5-10 minutes at a time     RASH/itching:  ALLERGIC REACTION:    - Take over-the-counter Claritin, Zyrtec, Allegra, OR Xyzal as directed for itching/rash/allergic reaction.    - You can take Benadryl over-the-counter as directed as well for itching/rash/allergic reaction. (Benadryl CAUSES DROWSINESS!-DO NOT DRIVE OR OPERATE HEAVY MACHINERY AFTER TAKING HIS MEDICATION)  - You should go to the ER for any new or worsening symtoms, including but not limited to: difficulty breathing, inability to swallow/talk, CP, or syncope.    --hydrocortisone cream  --do not scratch  --keep skin moisturized  --recommend oatmeal baths  --avoid allergens  --follow-up with PCP this week or consider seeing dermatology if this continues    - YOU SHOULD EXPERIENCE  SIGNIFICANT IMPROVEMENT IN SYMPTOMS IN THE NEXT 1-2 DAYS.  IF SYMPTOMS WORSEN, GO TO ER.  IF YOU DO NOT EXPERIENCE ANY IMPROVEMENT IN SYMPTOMS AND THE NEXT 1-2 DAYS WHILE YOU TAKE ANTIBIOTICS, RETURN TO CLINIC OR SEEK MEDICAL ATTENTION IMMEDIATELY.    - Follow up with your PCP or specialty clinic as directed in the next 1-2 weeks if not improved or as needed.  You can call [phone] to schedule an appointment with the appropriate provider.      - Go to the ER if you DEVELOP ANY NEW OR WORSENING SYMPTOMS.     DIARRHEA   Consider over-the-counter probiotics.--please take at least 1 hour apart from antibiotics.    Please make sure you are SLOWLY TAKING SMALL SIPS of fluids (Recommend: 1/2 Gatorade/Power-aid/Body Lamine/Pedialyte + 1/2 water) and getting plenty of rest.    Eat a bland diet of bananas, rice, applesauce, toast, crackers--advance your diet as you tolerate these foods.    YOU MAY TAKE OVER-THE-COUNTER PEPTO-BISMOL FOR STOMACH CRAMPING/DIARRHEA.  This may darken your stools.     Please do not take anything that would stop diarrhea-- For example Imodium.  You want to rid toxins and be sure to remain hydrated    Please follow-up with your primary care provider if your symptoms continue for longer than 5-7 days.    Go to the ER for any worsening or concerning symptoms.      - You must understand that you have received an Urgent Care treatment only and that you may be released before all of your medical problems are known or treated.   - You, the patient, will arrange for follow up care as instructed with your primary care provider or recommended specialist.   - If your condition worsens or fails to improve we recommend that you receive another evaluation at the ER immediately or contact your PCP to discuss your concerns, or return here.   - Please do not drive or make any important decisions for 24 hours if you have received any pain medications, sedatives or mood altering drugs during your  visit.    Disclaimer: This document was drafted with the use of a voice recognition device and is likely to have sound alike errors.

## 2022-09-18 NOTE — PROGRESS NOTES
"Subjective:       Patient ID: Daniela Costa is a 52 y.o. female.    Vitals:  height is 5' 5" (1.651 m) and weight is 103 kg (227 lb). Her tympanic temperature is 98.8 °F (37.1 °C). Her blood pressure is 115/65 and her pulse is 90. Her respiration is 18 and oxygen saturation is 98%.     Chief Complaint: Insect Bite and Diarrhea    52-year-old female presents to urgent care with cat scratches and cellulitis which she noticed yesterday.  Reports that she marked the small area of redness which has now spread.  Patient reports associated itching with excoriations on her belly.  Also complains of chronic diarrhea, fatigue, and generalized body aches.    Insect Bite  This is a new problem. The current episode started yesterday. The problem occurs constantly. The problem has been gradually worsening. Associated symptoms include myalgias. Pertinent negatives include no abdominal pain, anorexia, arthralgias, change in bowel habit, chest pain, chills, congestion, coughing, diaphoresis, fatigue, fever, headaches, joint swelling, nausea, neck pain, numbness, rash, sore throat, swollen glands, urinary symptoms, vertigo, visual change, vomiting or weakness. Nothing aggravates the symptoms. Treatments tried: soak, warm compress, ointment. The treatment provided no relief.   Diarrhea   This is a new problem. The current episode started today. The problem occurs 5 to 10 times per day. The problem has been gradually worsening. The stool consistency is described as Watery. The patient states that diarrhea awakens her from sleep. Associated symptoms include myalgias. Pertinent negatives include no abdominal pain, arthralgias, bloating, chills, coughing, fever, headaches, increased  flatus, sweats, URI, vomiting or weight loss. Nothing aggravates the symptoms. She has tried nothing for the symptoms. The treatment provided no relief. There is no history of bowel resection, inflammatory bowel disease, irritable bowel syndrome, " "malabsorption, a recent abdominal surgery or short gut syndrome.     Constitution: Negative for chills, sweating, fatigue and fever.   HENT:  Negative for congestion and sore throat.    Neck: Negative for neck pain.   Cardiovascular:  Negative for chest pain.   Respiratory:  Negative for cough.    Gastrointestinal:  Positive for diarrhea. Negative for abdominal pain, nausea and vomiting.   Musculoskeletal:  Positive for muscle ache. Negative for joint pain and joint swelling.   Skin:  Positive for wound, abrasion and erythema. Negative for rash.   Neurological:  Negative for history of vertigo, headaches and numbness.     Objective:      Vitals:    09/18/22 1303   BP: 115/65   Pulse: 90   Resp: 18   Temp: 98.8 °F (37.1 °C)   TempSrc: Tympanic   SpO2: 98%   Weight: 103 kg (227 lb)   Height: 5' 5" (1.651 m)       Physical Exam   Constitutional: She is oriented to person, place, and time. She appears well-developed.  Non-toxic appearance. She does not appear ill. No distress.   HENT:   Head: Normocephalic and atraumatic. Head is without abrasion, without contusion and without laceration.   Ears:   Right Ear: External ear normal.   Left Ear: External ear normal.   Nose: Nose normal.   Mouth/Throat: Oropharynx is clear and moist and mucous membranes are normal.   Eyes: Conjunctivae, EOM and lids are normal. Pupils are equal, round, and reactive to light. No scleral icterus. Extraocular movement intact   Neck: Trachea normal and phonation normal. Neck supple. No neck rigidity present.   Cardiovascular: Normal rate, regular rhythm, normal heart sounds and normal pulses.   Pulmonary/Chest: Effort normal and breath sounds normal. No accessory muscle usage or stridor. No respiratory distress. She has no decreased breath sounds.   Musculoskeletal: Normal range of motion.         General: Normal range of motion.   Neurological: no focal deficit. She is alert, oriented to person, place, and time and at baseline. She displays no " weakness and normal reflexes. No cranial nerve deficit or sensory deficit. Coordination and gait normal.   Skin: Skin is warm, dry, intact, not diaphoretic, rash and urticarial. Capillary refill takes less than 2 seconds. erythema and lesion (Excoriations) No abrasion, No burn, No bruising and No ecchymosis   Psychiatric: Her speech is normal and behavior is normal. Mood, judgment and thought content normal.   Nursing note and vitals reviewed.            Assessment:       1. Cellulitis of left lower extremity    2. Multiple excoriations    3. Generalized body aches          Plan:         Cellulitis of left lower extremity    Multiple excoriations  -     hydrocortisone 2.5 % lotion; Apply topically 2 (two) times daily.  Dispense: 59 mL; Refill: 0    Generalized body aches  -     ketorolac injection 30 mg    Other orders  -     amoxicillin-clavulanate 875-125mg (AUGMENTIN) 875-125 mg per tablet; Take 1 tablet by mouth every 12 (twelve) hours. for 7 days  Dispense: 14 tablet; Refill: 0         Medical Decision Making:   Covered with Augmentin secondary to cat scratches; patient reports that she can tolerate penicillins.  Patient Instructions    Animal scratch:    1. Use cool compresses for 20 minutes 3-5 times a day. Avoid picking or manipulating the wound. Clean the wound twice a day with soap and water.  Apply antibiotic ointment on it.   You should seek ER treatment if fever, increase pain, swelling or there are any other signs of increasing infection.   2. If you were prescribed antibiotics, please take them to completion(- If you are female and on birth control pills, use additional methods to prevent pregnancy while on antibiotics and for one cycle after. )  3. If you were given narcotics do not drive or operate heavy equipment or machinery while taking these medications.   4. Over-the-counter Tylenol and/or ibuprofen can also be used as directed for pain. (unless you have an allergy to them or medical condition  such as stomach ulcers, kidney/liver disease or  on blood thinners for which you should not be taking these type of medications.)   5. Please return here in 1-3 days for a recheck of your wound, if desired.         CELLULITIS:   - Rest.    - Drink plenty of fluids.   - Keep the wound clean and dry.   - Wash daily with soap and water  - Warm water soaks (with or without epsom salt) 2-3 times a day for 5-10 minutes at a time     RASH/itching:  ALLERGIC REACTION:    - Take over-the-counter Claritin, Zyrtec, Allegra, OR Xyzal as directed for itching/rash/allergic reaction.    - You can take Benadryl over-the-counter as directed as well for itching/rash/allergic reaction. (Benadryl CAUSES DROWSINESS!-DO NOT DRIVE OR OPERATE HEAVY MACHINERY AFTER TAKING HIS MEDICATION)  - You should go to the ER for any new or worsening symtoms, including but not limited to: difficulty breathing, inability to swallow/talk, CP, or syncope.    --hydrocortisone cream  --do not scratch  --keep skin moisturized  --recommend oatmeal baths  --avoid allergens  --follow-up with PCP this week or consider seeing dermatology if this continues    - YOU SHOULD EXPERIENCE SIGNIFICANT IMPROVEMENT IN SYMPTOMS IN THE NEXT 1-2 DAYS.  IF SYMPTOMS WORSEN, GO TO ER.  IF YOU DO NOT EXPERIENCE ANY IMPROVEMENT IN SYMPTOMS AND THE NEXT 1-2 DAYS WHILE YOU TAKE ANTIBIOTICS, RETURN TO CLINIC OR SEEK MEDICAL ATTENTION IMMEDIATELY.    - Follow up with your PCP or specialty clinic as directed in the next 1-2 weeks if not improved or as needed.  You can call [phone] to schedule an appointment with the appropriate provider.      - Go to the ER if you DEVELOP ANY NEW OR WORSENING SYMPTOMS.     DIARRHEA   Consider over-the-counter probiotics.--please take at least 1 hour apart from antibiotics.    Please make sure you are SLOWLY TAKING SMALL SIPS of fluids (Recommend: 1/2 Gatorade/Power-aid/Body Lamine/Pedialyte + 1/2 water) and getting plenty of rest.    Eat a bland  diet of bananas, rice, applesauce, toast, crackers--advance your diet as you tolerate these foods.    YOU MAY TAKE OVER-THE-COUNTER PEPTO-BISMOL FOR STOMACH CRAMPING/DIARRHEA.  This may darken your stools.     Please do not take anything that would stop diarrhea-- For example Imodium.  You want to rid toxins and be sure to remain hydrated    Please follow-up with your primary care provider if your symptoms continue for longer than 5-7 days.    Go to the ER for any worsening or concerning symptoms.      - You must understand that you have received an Urgent Care treatment only and that you may be released before all of your medical problems are known or treated.   - You, the patient, will arrange for follow up care as instructed with your primary care provider or recommended specialist.   - If your condition worsens or fails to improve we recommend that you receive another evaluation at the ER immediately or contact your PCP to discuss your concerns, or return here.   - Please do not drive or make any important decisions for 24 hours if you have received any pain medications, sedatives or mood altering drugs during your visit.    Disclaimer: This document was drafted with the use of a voice recognition device and is likely to have sound alike errors.     Additional MDM:   Differential Diagnosis:   Other: The following diagnoses were also considered and will be evaluated: Pasteurella, MRSA and Bordetella. Therefore covered with  Augmentin            39.3

## 2022-09-19 ENCOUNTER — OFFICE VISIT (OUTPATIENT)
Dept: INTERNAL MEDICINE | Facility: CLINIC | Age: 52
End: 2022-09-19
Payer: MEDICARE

## 2022-09-19 VITALS
TEMPERATURE: 97 F | DIASTOLIC BLOOD PRESSURE: 80 MMHG | WEIGHT: 228.63 LBS | SYSTOLIC BLOOD PRESSURE: 126 MMHG | BODY MASS INDEX: 38.09 KG/M2 | HEIGHT: 65 IN

## 2022-09-19 DIAGNOSIS — L03.116 CELLULITIS OF LEFT LOWER EXTREMITY: Primary | ICD-10-CM

## 2022-09-19 DIAGNOSIS — R52 GENERALIZED BODY ACHES: ICD-10-CM

## 2022-09-19 DIAGNOSIS — R19.7 DIARRHEA, UNSPECIFIED TYPE: ICD-10-CM

## 2022-09-19 PROCEDURE — 3079F DIAST BP 80-89 MM HG: CPT | Mod: CPTII,S$GLB,, | Performed by: NURSE PRACTITIONER

## 2022-09-19 PROCEDURE — 3044F HG A1C LEVEL LT 7.0%: CPT | Mod: CPTII,S$GLB,, | Performed by: NURSE PRACTITIONER

## 2022-09-19 PROCEDURE — 3008F PR BODY MASS INDEX (BMI) DOCUMENTED: ICD-10-PCS | Mod: CPTII,S$GLB,, | Performed by: NURSE PRACTITIONER

## 2022-09-19 PROCEDURE — 3044F PR MOST RECENT HEMOGLOBIN A1C LEVEL <7.0%: ICD-10-PCS | Mod: CPTII,S$GLB,, | Performed by: NURSE PRACTITIONER

## 2022-09-19 PROCEDURE — 1159F PR MEDICATION LIST DOCUMENTED IN MEDICAL RECORD: ICD-10-PCS | Mod: CPTII,S$GLB,, | Performed by: NURSE PRACTITIONER

## 2022-09-19 PROCEDURE — 1160F PR REVIEW ALL MEDS BY PRESCRIBER/CLIN PHARMACIST DOCUMENTED: ICD-10-PCS | Mod: CPTII,S$GLB,, | Performed by: NURSE PRACTITIONER

## 2022-09-19 PROCEDURE — 3074F PR MOST RECENT SYSTOLIC BLOOD PRESSURE < 130 MM HG: ICD-10-PCS | Mod: CPTII,S$GLB,, | Performed by: NURSE PRACTITIONER

## 2022-09-19 PROCEDURE — 99999 PR PBB SHADOW E&M-EST. PATIENT-LVL III: CPT | Mod: PBBFAC,,, | Performed by: NURSE PRACTITIONER

## 2022-09-19 PROCEDURE — 1159F MED LIST DOCD IN RCRD: CPT | Mod: CPTII,S$GLB,, | Performed by: NURSE PRACTITIONER

## 2022-09-19 PROCEDURE — 1160F RVW MEDS BY RX/DR IN RCRD: CPT | Mod: CPTII,S$GLB,, | Performed by: NURSE PRACTITIONER

## 2022-09-19 PROCEDURE — 99214 PR OFFICE/OUTPT VISIT, EST, LEVL IV, 30-39 MIN: ICD-10-PCS | Mod: S$GLB,,, | Performed by: NURSE PRACTITIONER

## 2022-09-19 PROCEDURE — 3008F BODY MASS INDEX DOCD: CPT | Mod: CPTII,S$GLB,, | Performed by: NURSE PRACTITIONER

## 2022-09-19 PROCEDURE — 99999 PR PBB SHADOW E&M-EST. PATIENT-LVL III: ICD-10-PCS | Mod: PBBFAC,,, | Performed by: NURSE PRACTITIONER

## 2022-09-19 PROCEDURE — 3074F SYST BP LT 130 MM HG: CPT | Mod: CPTII,S$GLB,, | Performed by: NURSE PRACTITIONER

## 2022-09-19 PROCEDURE — 3079F PR MOST RECENT DIASTOLIC BLOOD PRESSURE 80-89 MM HG: ICD-10-PCS | Mod: CPTII,S$GLB,, | Performed by: NURSE PRACTITIONER

## 2022-09-19 PROCEDURE — 99214 OFFICE O/P EST MOD 30 MIN: CPT | Mod: S$GLB,,, | Performed by: NURSE PRACTITIONER

## 2022-09-19 NOTE — PROGRESS NOTES
"Subjective:       Patient ID: Daniela Costa is a 52 y.o. female.    Chief Complaint: Follow-up    Pt presents to clinic today for urgent care follow up  Was seen yesterday for cellulitis of her left lower leg and some scratches from her kitten  Started on Augmentin  Has noticed some improvement in the redness on her leg   to touch    Also, pt is complaining of generalized body aches/fatigue  Worried she may have fibromyalgia  Has some hot spots to her back in different areas that are painful to the touch  Recently switched to Lamictal as well as restarted her thyroid meds  Wondering if this may be some of the cause    Pt also reports some diarrhea over the last 2 days  Started probiotic but still having some episodes  No nausea or vomiting  No other will contacts        Rash  This is a new problem. The current episode started yesterday. The problem has been gradually worsening since onset. The affected locations include the left leg. The rash is characterized by pain, redness and swelling. It is unknown if there was an exposure to a precipitant. Associated symptoms include anorexia, diarrhea, eye pain, fatigue and joint pain. Pertinent negatives include no congestion, cough, facial edema, fever, nail changes, rhinorrhea, shortness of breath, sore throat or vomiting. Past treatments include analgesics, anti-itch cream, antibiotics, antihistamine and moisturizer. The treatment provided no relief. Her past medical history is significant for allergies, asthma and varicella. There is no history of eczema.     /80   Temp 97 °F (36.1 °C) (Temporal)   Ht 5' 5" (1.651 m)   Wt 103.7 kg (228 lb 9.9 oz)   LMP  (LMP Unknown) Comment: no cycle 8/2012  BMI 38.04 kg/m²     Review of Systems   Constitutional:  Positive for fatigue. Negative for appetite change, chills, diaphoresis and fever.   HENT: Negative.  Negative for congestion, rhinorrhea and sore throat.    Eyes:  Positive for pain. Negative for " visual disturbance.   Respiratory:  Negative for cough, shortness of breath and wheezing.    Cardiovascular:  Negative for chest pain, palpitations and leg swelling.   Gastrointestinal:  Positive for anorexia and diarrhea. Negative for abdominal distention, abdominal pain, blood in stool, constipation, nausea and vomiting.   Genitourinary:  Negative for decreased urine volume, difficulty urinating, dysuria, frequency, hematuria and urgency.   Musculoskeletal:  Positive for joint pain.   Skin:  Positive for rash. Negative for nail changes.   Neurological: Negative.  Negative for dizziness, syncope, speech difficulty, light-headedness and headaches.   Psychiatric/Behavioral:  Negative for agitation, confusion and hallucinations. The patient is not nervous/anxious.      Objective:      Physical Exam  Vitals and nursing note reviewed.   Constitutional:       General: She is not in acute distress.     Appearance: Normal appearance. She is well-developed. She is not diaphoretic.   HENT:      Head: Normocephalic and atraumatic.   Cardiovascular:      Rate and Rhythm: Normal rate and regular rhythm.      Heart sounds: Normal heart sounds. No murmur heard.  Pulmonary:      Effort: Pulmonary effort is normal. No respiratory distress.      Breath sounds: Normal breath sounds.   Musculoskeletal:         General: Normal range of motion.   Skin:     General: Skin is warm and dry.      Findings: Wound present.   Neurological:      Mental Status: She is alert.   Psychiatric:         Mood and Affect: Mood normal.         Behavior: Behavior normal. Behavior is cooperative.         Thought Content: Thought content normal.         Judgment: Judgment normal.         Assessment:       1. Cellulitis of left lower extremity    2. Generalized body aches    3. Diarrhea, unspecified type    4. BMI 38.0-38.9,adult          Plan:       Daniela was seen today for follow-up.    Diagnoses and all orders for this visit:    Cellulitis of left lower  extremity        - Continue meds prescribed by urgent care, discussed s/s to monitor for worsening of her symptoms   Generalized body aches        - Discussed with pt could be viral or the shift in her meds. Would like to see her on her new lamictal and thyroid meds for a few weeks prior to doing FM workup  Diarrhea, unspecified type        - Likely viral, continue probiotics and bland diet, push fluids     BMI 38.0-38.9,adult    If no improvement in symptoms, advised pt to follow up with PCP for workup of fibromyalgia. Pt agrees with plan  Watch for s/s of worsening of the cellulitis

## 2022-09-20 ENCOUNTER — CLINICAL SUPPORT (OUTPATIENT)
Dept: INTERNAL MEDICINE | Facility: CLINIC | Age: 52
End: 2022-09-20
Payer: MEDICARE

## 2022-09-20 DIAGNOSIS — Z23 PNEUMOCOCCAL VACCINE ADMINISTERED: Primary | ICD-10-CM

## 2022-09-20 PROCEDURE — 90677 PCV20 VACCINE IM: CPT | Mod: S$GLB,,, | Performed by: NURSE PRACTITIONER

## 2022-09-20 PROCEDURE — 90677 PNEUMOCOCCAL CONJUGATE VACCINE 20-VALENT: ICD-10-PCS | Mod: S$GLB,,, | Performed by: NURSE PRACTITIONER

## 2022-09-20 PROCEDURE — G0009 PNEUMOCOCCAL CONJUGATE VACCINE 20-VALENT: ICD-10-PCS | Mod: S$GLB,,, | Performed by: NURSE PRACTITIONER

## 2022-09-20 PROCEDURE — G0009 ADMIN PNEUMOCOCCAL VACCINE: HCPCS | Mod: S$GLB,,, | Performed by: NURSE PRACTITIONER

## 2022-09-20 NOTE — PROGRESS NOTES
Pt came in for pnuemococcal vaccine. Pt asked about shingles vaccine as well. ANNALISE Linares stated to wait on the shingles vaccine since pt was not feeling well yesterday. Administered pnuemococcal vaccine in Left deltoid. Pt tolerated well. No complaints/concerns noted. Instructed pt to wait 15 min before leaving.

## 2022-09-22 ENCOUNTER — OFFICE VISIT (OUTPATIENT)
Dept: OBSTETRICS AND GYNECOLOGY | Facility: CLINIC | Age: 52
End: 2022-09-22
Payer: MEDICARE

## 2022-09-22 ENCOUNTER — TELEPHONE (OUTPATIENT)
Dept: OBSTETRICS AND GYNECOLOGY | Facility: CLINIC | Age: 52
End: 2022-09-22

## 2022-09-22 VITALS
DIASTOLIC BLOOD PRESSURE: 72 MMHG | BODY MASS INDEX: 37.79 KG/M2 | SYSTOLIC BLOOD PRESSURE: 124 MMHG | WEIGHT: 227.06 LBS

## 2022-09-22 DIAGNOSIS — Z01.419 ENCOUNTER FOR GYNECOLOGICAL EXAMINATION WITHOUT ABNORMAL FINDING: Primary | ICD-10-CM

## 2022-09-22 PROCEDURE — 3044F PR MOST RECENT HEMOGLOBIN A1C LEVEL <7.0%: ICD-10-PCS | Mod: CPTII,S$GLB,, | Performed by: NURSE PRACTITIONER

## 2022-09-22 PROCEDURE — 1159F MED LIST DOCD IN RCRD: CPT | Mod: CPTII,S$GLB,, | Performed by: NURSE PRACTITIONER

## 2022-09-22 PROCEDURE — 1160F RVW MEDS BY RX/DR IN RCRD: CPT | Mod: CPTII,S$GLB,, | Performed by: NURSE PRACTITIONER

## 2022-09-22 PROCEDURE — 88175 CYTOPATH C/V AUTO FLUID REDO: CPT | Performed by: NURSE PRACTITIONER

## 2022-09-22 PROCEDURE — 3074F SYST BP LT 130 MM HG: CPT | Mod: CPTII,S$GLB,, | Performed by: NURSE PRACTITIONER

## 2022-09-22 PROCEDURE — G0101 CA SCREEN;PELVIC/BREAST EXAM: HCPCS | Mod: S$GLB,,, | Performed by: NURSE PRACTITIONER

## 2022-09-22 PROCEDURE — 3044F HG A1C LEVEL LT 7.0%: CPT | Mod: CPTII,S$GLB,, | Performed by: NURSE PRACTITIONER

## 2022-09-22 PROCEDURE — 3008F PR BODY MASS INDEX (BMI) DOCUMENTED: ICD-10-PCS | Mod: CPTII,S$GLB,, | Performed by: NURSE PRACTITIONER

## 2022-09-22 PROCEDURE — 3008F BODY MASS INDEX DOCD: CPT | Mod: CPTII,S$GLB,, | Performed by: NURSE PRACTITIONER

## 2022-09-22 PROCEDURE — 1160F PR REVIEW ALL MEDS BY PRESCRIBER/CLIN PHARMACIST DOCUMENTED: ICD-10-PCS | Mod: CPTII,S$GLB,, | Performed by: NURSE PRACTITIONER

## 2022-09-22 PROCEDURE — 99999 PR PBB SHADOW E&M-EST. PATIENT-LVL IV: CPT | Mod: PBBFAC,,, | Performed by: NURSE PRACTITIONER

## 2022-09-22 PROCEDURE — 3078F PR MOST RECENT DIASTOLIC BLOOD PRESSURE < 80 MM HG: ICD-10-PCS | Mod: CPTII,S$GLB,, | Performed by: NURSE PRACTITIONER

## 2022-09-22 PROCEDURE — 3074F PR MOST RECENT SYSTOLIC BLOOD PRESSURE < 130 MM HG: ICD-10-PCS | Mod: CPTII,S$GLB,, | Performed by: NURSE PRACTITIONER

## 2022-09-22 PROCEDURE — 1159F PR MEDICATION LIST DOCUMENTED IN MEDICAL RECORD: ICD-10-PCS | Mod: CPTII,S$GLB,, | Performed by: NURSE PRACTITIONER

## 2022-09-22 PROCEDURE — 99999 PR PBB SHADOW E&M-EST. PATIENT-LVL IV: ICD-10-PCS | Mod: PBBFAC,,, | Performed by: NURSE PRACTITIONER

## 2022-09-22 PROCEDURE — 87624 HPV HI-RISK TYP POOLED RSLT: CPT | Performed by: NURSE PRACTITIONER

## 2022-09-22 PROCEDURE — G0101 PR CA SCREEN;PELVIC/BREAST EXAM: ICD-10-PCS | Mod: S$GLB,,, | Performed by: NURSE PRACTITIONER

## 2022-09-22 PROCEDURE — 3078F DIAST BP <80 MM HG: CPT | Mod: CPTII,S$GLB,, | Performed by: NURSE PRACTITIONER

## 2022-09-22 NOTE — PROGRESS NOTES
CC: Well woman exam    Daniela Costa is a 52 y.o. female  presents for a well woman exam.  No issues, problems, or complaints.    No issues since hysterectomy in 2018- first visit back, will do vaginal pap since hx of LGSIL     Past Medical History:   Diagnosis Date    Allergy     Anxiety     Arthritis     Asthma     Breast cancer     2012; BRCA 1 and 2 negative    Breast cancer genetic susceptibility     Cancer     Depression     Hypothyroidism     Lung disease     Pneumonia      Past Surgical History:   Procedure Laterality Date    ADENOIDECTOMY      BACK SURGERY      SI joint fusion    Breast Reconstruction  Bilateral     BREAST SURGERY      CERVICAL FUSION      CERVICAL FUSION      HYSTERECTOMY      lumbar fusion      MASTECTOMY Right     due to breast cancer, radiation done after surgery    MASTECTOMY Left     propholytic due to breast cancer in right breast    ROBOT-ASSISTED LAPAROSCOPIC ABDOMINAL HYSTERECTOMY USING DA EHBERT XI N/A 2018    Procedure: XI ROBOTIC HYSTERECTOMY;  Surgeon: Meka Reich MD;  Location: [facility] OR;  Service: OB/GYN;  Laterality: N/A;    ROBOT-ASSISTED LAPAROSCOPIC LYSIS OF ADHESIONS USING DA HEBERT XI N/A 2018    Procedure: XI ROBOTIC LYSIS, ADHESIONS;  Surgeon: Meka Reich MD;  Location: [facility] OR;  Service: OB/GYN;  Laterality: N/A;    ROBOT-ASSISTED LAPAROSCOPIC SALPINGO-OOPHORECTOMY USING DA HEBERT XI Bilateral 2018    Procedure: XI ROBOTIC SALPINGO-OOPHORECTOMY;  Surgeon: Meka Reich MD;  Location: [facility] OR;  Service: OB/GYN;  Laterality: Bilateral;    SI joint fusion   2018    TONSILLECTOMY      TUBAL LIGATION       Family History   Problem Relation Age of Onset    Diabetes Mother     Hyperlipidemia Mother     Diabetes Father     Hyperlipidemia Father      Social History     Tobacco Use    Smoking status: Former     Packs/day: 0.50     Years: 16.00     Pack years: 8.00     Types: Cigarettes     Start date: 1990      Quit date: 2014     Years since quittin.7    Smokeless tobacco: Never   Substance Use Topics    Alcohol use: Yes     Comment: twice a month - wine     Drug use: No     OB History          7    Para   4    Term   4            AB   3    Living   4         SAB        IAB        Ectopic        Multiple        Live Births                     /72 (BP Location: Right arm, Patient Position: Sitting, BP Method: Small (Manual))   Wt 103 kg (227 lb 1.2 oz)   LMP  (LMP Unknown) Comment: no cycle 2012  BMI 37.79 kg/m²     ROS:  GENERAL: Denies weight gain or weight loss. Feeling well overall.   SKIN: Denies rash or lesions.   HEAD: Denies head injury or headache.   NODES: Denies enlarged lymph nodes.   CHEST: Denies chest pain or shortness of breath.   CARDIOVASCULAR: Denies palpitations or left sided chest pain.   ABDOMEN: No abdominal pain, constipation, diarrhea, nausea, vomiting or rectal bleeding.   URINARY: No frequency, dysuria, hematuria, or burning on urination.  REPRODUCTIVE: See HPI.   BREASTS: The patient performs breast self-examination and denies pain, lumps, or nipple discharge.   HEMATOLOGIC: No easy bruisability or excessive bleeding.   MUSCULOSKELETAL: Denies joint pain or swelling.   NEUROLOGIC: Denies syncope or weakness.   PSYCHIATRIC: Denies depression, anxiety or mood swings.    PE:   APPEARANCE: Well nourished, well developed, in no acute distress.  AFFECT: WNL, alert and oriented x 3.  SKIN: No acne or hirsutism.  NECK: Neck symmetric without masses or thyromegaly.  NODES: No inguinal, cervical, axillary or femoral lymph node enlargement.  CHEST: Good respiratory effort.   ABDOMEN: Soft. No tenderness or masses. No hepatosplenomegaly. No hernias.  BREASTS: bilateral mastectomy   PELVIC: Normal external female genitalia without lesions. Normal hair distribution. Adequate perineal body, normal urethral meatus. Vagina atrophic without lesions or discharge. No significant  cystocele or rectocele. Bimanual exam shows uterus and cervix to be surgically absent. Adnexa absent     1. Encounter for gynecological examination without abnormal finding  Liquid-Based Pap Smear, Screening    HPV High Risk Genotypes, PCR       and PLAN:    Patient was counseled today on A.C.S. Pap guidelines and recommendations for yearly pelvic exams, mammograms and monthly self breast exams; to see her PCP for other health maintenance.

## 2022-09-26 ENCOUNTER — E-VISIT (OUTPATIENT)
Dept: INTERNAL MEDICINE | Facility: CLINIC | Age: 52
End: 2022-09-26
Payer: MEDICARE

## 2022-09-26 DIAGNOSIS — L03.116 CELLULITIS OF LEFT LOWER EXTREMITY: Primary | ICD-10-CM

## 2022-09-26 PROCEDURE — 99421 OL DIG E/M SVC 5-10 MIN: CPT | Mod: S$GLB,,, | Performed by: NURSE PRACTITIONER

## 2022-09-26 PROCEDURE — 99421 PR E&M, ONLINE DIGIT, EST, < 7 DAYS, 5-10 MINS: ICD-10-PCS | Mod: S$GLB,,, | Performed by: NURSE PRACTITIONER
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
## 2022-09-28 LAB
HPV HR 12 DNA SPEC QL NAA+PROBE: POSITIVE
HPV16 AG SPEC QL: NEGATIVE
HPV18 DNA SPEC QL NAA+PROBE: NEGATIVE

## 2022-09-29 ENCOUNTER — PATIENT MESSAGE (OUTPATIENT)
Dept: OBSTETRICS AND GYNECOLOGY | Facility: CLINIC | Age: 52
End: 2022-09-29
Payer: MEDICARE

## 2022-09-29 LAB
FINAL PATHOLOGIC DIAGNOSIS: NORMAL
Lab: NORMAL

## 2022-09-30 ENCOUNTER — TELEPHONE (OUTPATIENT)
Dept: HEMATOLOGY/ONCOLOGY | Facility: CLINIC | Age: 52
End: 2022-09-30
Payer: MEDICARE

## 2022-09-30 NOTE — TELEPHONE ENCOUNTER
----- Message from Lucía Castanon RN sent at 9/29/2022  5:15 PM CDT -----  Regarding: FW: Appt  Contact: [phone]    ----- Message -----  From: eKnyetta Fergusongabriel  Sent: 9/29/2022   1:17 PM CDT  To: Corinne E Coniglio, RN, Lucía Castanon RN, #  Subject: Appt                                             Patient Daniela is calling. Patient would like to be seen for a check up. Patient had breast cancer 9 years ago and haven't seen a doctor in 5 years. Patient not having any symptoms just would like a checkup. Please call patient at [phone]     Thank You

## 2022-10-03 ENCOUNTER — TELEPHONE (OUTPATIENT)
Dept: HEMATOLOGY/ONCOLOGY | Facility: CLINIC | Age: 52
End: 2022-10-03
Payer: MEDICARE

## 2022-10-03 ENCOUNTER — DOCUMENTATION ONLY (OUTPATIENT)
Dept: HEMATOLOGY/ONCOLOGY | Facility: CLINIC | Age: 52
End: 2022-10-03
Payer: MEDICARE

## 2022-10-03 NOTE — NURSING
1010am: Outgoing call to pt regarding in-basket referral for appt. Spoke with pt. Pt needs to establish care with Oncologist. Pt was seen and treated at [facility] back in [id number] with Dr. Larios. Need records. Pt has most of her records and will drop off today. Pt informed pt will be scheduled once we have records. Pt verbalized understanding.

## 2022-10-03 NOTE — NURSING
1600pm: Outgoing fax to Dr. Bernabe Turner office, Dr. Thang Larios office, [facility] radiology, [facility] radiology, and [facility] imaging division requesting surgical notes, provider notes, and radiology imaging on disc. Pt brought some of her records and those have been scanned into media.

## 2022-10-05 ENCOUNTER — TELEPHONE (OUTPATIENT)
Dept: HEMATOLOGY/ONCOLOGY | Facility: CLINIC | Age: 52
End: 2022-10-05
Payer: MEDICARE

## 2022-10-05 NOTE — NURSING
1146am: Incoming call from pt regarding new scheduled appt. Spoke with pt. Pt scheduled on 10/11 at 1130 am at [facility] with ANNALISE Castillo. Pt given location instructions. Pt verbalized understanding. Pt plan to report to appt as scheduled.   Oncology Navigation   Intake  Cancer Type: Breast  Internal / External Referral: External (self-referral)  Date of Referral: 09/30/22  Initial Nurse Navigator Contact: 10/03/22  Referral to Initial Contact Timeline (days): 3  Date Worked: 10/05/22  Appointment Date: 10/11/22  Schedule to Appointment Timeline (days): 6     Treatment                              Acuity      Follow Up  No follow-ups on file.

## 2022-10-06 ENCOUNTER — PATIENT MESSAGE (OUTPATIENT)
Dept: INTERNAL MEDICINE | Facility: CLINIC | Age: 52
End: 2022-10-06
Payer: MEDICARE

## 2022-10-06 ENCOUNTER — PATIENT MESSAGE (OUTPATIENT)
Dept: OBSTETRICS AND GYNECOLOGY | Facility: CLINIC | Age: 52
End: 2022-10-06
Payer: MEDICARE

## 2022-10-06 DIAGNOSIS — E03.9 HYPOTHYROIDISM, UNSPECIFIED TYPE: Primary | ICD-10-CM

## 2022-10-10 NOTE — PROGRESS NOTES
Breast Surgical Oncology  [place]  [facility]        PCP:  Lucía Terrell MD  Date of Service: 10/11/2022    CHIEF COMPLAINT:   Personal history of breast cancer    DIAGNOSIS:   Daniela Costa is a 52 y.o. female who is kindly referred by Dr. Terrell for a history of breast cancer.     Patient would like to transfer her cancer surveillance to us. Previously with Dr. Thang Asencio    Her breast cancer risk factor profile is as follows:   Menarche at 14,   Menopause at 41.    She is . Misc- 4  Age at first live birth was 26.     LMP- 41- chemo- menopause- TAHBSO 2018  HRT- none  Breast feeding- X4 - 6 mons, 2 years, 3.5 years    ETOH- rare  Genetic mutation- BRCA 1 and 2 negative- has not had extended testing  radiation to neck or chest wall- right side- - Dr. Longo- 4 months radiation      FH: mother breast cancer at age 47, maternal grandmother breast cancer and metastatic melanoma 65, maternal grandfather lung cancer at 64 y/o, father non hodgkins lymphoma- 61 y/o    Per patient - reviewed the few records received in media as well    2013  Personal history of breast cancer diagnosed at age 42 y/o  Right side- 6.1 cm overall size  Abnormal mammo after palpated mass  Palpable mass- pt palpated  Breast biopsy with core or excision- invasive ductal carcinoma    Pathology- per pt initially stage 2 - then bumped to stage 3    Treatment    Lumpectomy or mastectomy   Reconstruction- immediate vs delayed   Lymph node biopsy- sentinel node   Genetic testing- negative for only BRCA 1 & 2   Radiation- right chest wall 4 months   [facility]-Adjuvant therapy- AC X4 and taxol X 12.   endocrine - estrogen positive- tamoxifen first - 1 year- changed to exemestane for the last 4 years. Completed May 2019    Changes in chest wall- bilateral right mastectomy with delayed reconstruction- prophylactic mastectomy left - latissimus flap reconstruction with implant- right- implant left- saline implants- had full  axillary dissection- removed 12 and 7 positive?  Pt not sure    Residual symptoms from treatment   Neuropathy- slight neuropathy fingers- toes with coolness   Fatigue- chronically fatigued   Lymphedema- stable    Diet- gained about 80# over past few years- has lost 10# over past couple of weeks    Exercise- 3 times a week - yoga and pilate's- walks twice a week- 30-45 minutes    Patient wants to establish care with us     Last gyn- 9/22/2022    Colonoscopy scheduled for 10/31/2022- this is her first    Labs 9/2022- cbc and cmp wnl- hgbA1c elevated, tsh normal    FAMILY HISTORY:     Family History   Problem Relation Age of Onset    Diabetes Mother     Hyperlipidemia Mother     Diabetes Father     Hyperlipidemia Father         PAST MEDICAL HISTORY:     Past Medical History:   Diagnosis Date    Allergy     Anxiety     Arthritis     Asthma     Breast cancer     june 2012; BRCA 1 and 2 negative    Breast cancer genetic susceptibility     Cancer     Depression     Hypothyroidism     Lung disease     Pneumonia        SURGICAL HISTORY:     Past Surgical History:   Procedure Laterality Date    ADENOIDECTOMY      BACK SURGERY      SI joint fusion    Breast Reconstruction  Bilateral     BREAST SURGERY      CERVICAL FUSION      CERVICAL FUSION      HYSTERECTOMY      lumbar fusion      MASTECTOMY Right 2013    due to breast cancer, radiation done after surgery    MASTECTOMY Left 2015    propholytic due to breast cancer in right breast    ROBOT-ASSISTED LAPAROSCOPIC ABDOMINAL HYSTERECTOMY USING DA HEBERT XI N/A 12/05/2018    Procedure: XI ROBOTIC HYSTERECTOMY;  Surgeon: Meka Reich MD;  Location: [facility] OR;  Service: OB/GYN;  Laterality: N/A;    ROBOT-ASSISTED LAPAROSCOPIC LYSIS OF ADHESIONS USING DA HEBERT XI N/A 12/05/2018    Procedure: XI ROBOTIC LYSIS, ADHESIONS;  Surgeon: Meka Reich MD;  Location: [facility] OR;  Service: OB/GYN;  Laterality: N/A;    ROBOT-ASSISTED LAPAROSCOPIC SALPINGO-OOPHORECTOMY USING DA HEBERT XI  Bilateral 2018    Procedure: XI ROBOTIC SALPINGO-OOPHORECTOMY;  Surgeon: Meka Reich MD;  Location: [facility];  Service: OB/GYN;  Laterality: Bilateral;    SI joint fusion   2018    TONSILLECTOMY      TUBAL LIGATION         SOCIAL HISTORY:     Social History     Tobacco Use    Smoking status: Former     Packs/day: 0.50     Years: 16.00     Pack years: 8.00     Types: Cigarettes     Start date: 1990     Quit date: 2014     Years since quittin.7    Smokeless tobacco: Never   Substance Use Topics    Alcohol use: Yes     Comment: twice a month - wine     Drug use: No        MEDICATIONS/ALLERGIES:     Current Outpatient Medications   Medication Instructions    albuterol (PROVENTIL/VENTOLIN HFA) 90 mcg/actuation inhaler 2 puffs, Inhalation, Every 6 hours    ASCORBATE CALCIUM (VITAMIN C ORAL) Oral    blood pressure monitor ([employer] EASE) ST size Other, As needed (PRN)    blood sugar diagnostic Strp To check BG 2 times daily, to use with insurance preferred meter    blood-glucose meter kit To check BG 2 times daily, to use with insurance preferred meter    busPIRone (BUSPAR) 30 mg, Oral, 3 times daily    clonazePAM (KLONOPIN) 1 MG tablet TAKE 1 TABLET BY MOUTH ONCE DAILY AS NEEDED FOR ANXIETY    diltiaZEM (CARDIZEM) 30 mg, Oral, Every 12 hours    famotidine (PEPCID) 20 mg, Oral, 2 times daily    fluticasone furoate-vilanteroL (BREO ELLIPTA) 200-25 mcg/dose DsDv diskus inhaler 1 puff, Inhalation, Daily, Controller    fluticasone propionate (FLONASE) 50 mcg/actuation nasal spray SPRAY 1 SPRAY INTO EACH NOSTRIL TWO TIMES DAILY    hydrocortisone 2.5 % lotion Topical (Top), 2 times daily    hydrOXYzine (ATARAX) 50 MG tablet Take 1 to 2 tablets at bedtime as needed for sleep    inhalation spacing device (BREATHERITE VALVED MDI CHAMBER) Use as directed for inhalation.    lamoTRIgine (LAMICTAL) 25 MG tablet Take 1 tablet daily x 14 days then 2 daily x 14 days then 3 daily x 14 days then start 100 mg  daily.    lamoTRIgine (LAMICTAL) 100 mg, Oral, Daily    lancets Misc To check BG 2 times daily, to use with insurance preferred meter    levothyroxine (SYNTHROID) 50 mcg, Oral, Daily    loratadine (CLARITIN) 10 mg, Oral, Daily    melatonin 10 mg Cap Oral    meloxicam (MOBIC) 7.5 mg, Oral, 2 times daily    montelukast (SINGULAIR) 10 mg, Oral, Nightly    ondansetron (ZOFRAN-ODT) 4 mg, Oral, Every 6 hours PRN    traZODone (DESYREL) 100 MG tablet Take 1/2 to 1 tablet at bedtime as needed for sleep.     Review of patient's allergies indicates:   Allergen Reactions    Cefdinir      Radiation Recall, everywhere she had radiation it looked like blisters and very hot to touch    Levofloxacin      Went into deep depression. Messed with Pych meds    Sodium benzoate Hives    [place] Hives       REVIEW OF SYSTEMS:   I have reviewed 12 systems, including 2 points per system.   Review of Systems   Constitutional: Negative.    HENT: Negative.    Eyes: Negative.    Respiratory: Negative.    Cardiovascular: Negative.    Gastrointestinal: Negative.         No reflux   Endocrine: Negative.    Genitourinary: Negative.    Musculoskeletal: Negative.  Chronic stable back pain. Has had several back surgeries   Integumentary:  Negative.   Allergic/Immunologic: Negative.    Neurological: Negative.  Finger and toe neuropathy since chemo- stable  Hematological: Negative.  Negative for adenopathy.   Psychiatric/Behavioral: Negative.    Breast: denies breast mass, pain or discharge- mild right arm edema- compression sleeve does not fit any longer    PHYSICAL EXAM:   General: The patient appears well and is in no acute distress.   Physical Exam  Constitutional:       Appearance: She is well-developed.   HENT:      Head: Normocephalic and atraumatic.      Right Ear: External ear normal.      Left Ear: External ear normal.      Mouth/Throat:      Pharynx: No oropharyngeal exudate.   Eyes:      General: No scleral icterus.        Right eye: No  discharge.         Left eye: No discharge.      Conjunctiva/sclera: Conjunctivae normal.      Pupils: Pupils are equal, round, and reactive to light.   Neck:      Thyroid: No thyromegaly.   Cardiovascular:      Rate and Rhythm: Normal rate and regular rhythm.      Heart sounds: Normal heart sounds.   Pulmonary:      Effort: Pulmonary effort is normal.      Breath sounds: Normal breath sounds.   Chest:      Breasts:         Right: No inverted nipple, mass, nipple discharge, skin change or tenderness.         Left: No inverted nipple, mass, nipple discharge, skin change or tenderness.   Abdominal:      General: Bowel sounds are normal.      Palpations: Abdomen is soft.   Musculoskeletal:         General: Normal range of motion.      Right shoulder: No crepitus. Normal strength.      Cervical back: Normal range of motion and neck supple.   Lymphadenopathy:      Head:      Right side of head: No submental, submandibular, tonsillar, preauricular, posterior auricular or occipital adenopathy.      Left side of head: No submental, submandibular, tonsillar, preauricular, posterior auricular or occipital adenopathy.      Cervical: No cervical adenopathy.      Right cervical: No superficial or posterior cervical adenopathy.     Left cervical: No superficial or posterior cervical adenopathy.      Upper Body:      Right upper body: No supraclavicular or axillary adenopathy.      Left upper body: No supraclavicular or axillary adenopathy.   Skin:     General: Skin is warm and dry.      Coloration: Skin is not pale.      Findings: No erythema or rash.   Neurological:      Mental Status: She is alert and oriented to person, place, and time.      Deep Tendon Reflexes: Reflexes are normal and symmetric.   Psychiatric:         Behavior: Behavior normal.         Thought Content: Thought content normal.         Judgment: Judgment normal.  BREAST EXAM  No Asymmetry- bilateral reconstruction  Right:latissimus flap with saline implant  -  Mass: No  - Skin change: No  - Nipple Discharge: No  - Nipple retraction: No  - Axillary LAD: No  Left: saline implant  - Mass: No  - Skin change: No  - Nipple Discharge: No  - Nipple retraction: No  - Axillary LAD: No    IMAGING:   Chidi mastectomies    PATHOLOGY:     In media- Invasive ductal cancer right breast grade 2  1 of 7 nodes positive for cancer? Pt not sure if 1 of 7 or 7 of 12 nodes  ASSESSMENT:     1. Malignant neoplasm of right female breast, unspecified estrogen receptor status, unspecified site of breast    2. Paresthesia of skin    3. History of right breast cancer    4. Lymphedema    5. Fatigue, unspecified type          PLAN:   Daniela Costa is a 52 y.o. female who presents for breast cancer surveillance and survivorship.     - Diagnosed with locally advanced right breast cancer at the age of 40 y/o. Had neoadjuvant chemo- AC X 4 + taxol X 12- right chest wall and axilla radiation, 5 years of adjuvant endocrine therapy  -s/p chidi mastectomies with reconstruction  - brca 1 and 2 testing only- interested in expanded testing- will refer to genetic counseling for evaluation and testing  - mild lymphedema right arm- needs new sleeve- referral to PT placed  - fatigue- could be multifactorial- medication, wt gain and stressors- exercising most days- will try to do 150 min a week and increase cardio- will do iron studies, mg, b12 today   - wt loss- pt has recently lost 10 #- will continue with present regimen and efforts- she will let me know if she needs referral to nutrition provider  - up to date with gyn and will have first colonoscopy in a couple of weeks  - RTC 6 mons for chest wall exam and surveillance      Daniela Costa has a normal chest wall exam today. We discussed the possible signs and symptoms of breast cancer as lump, masses, new asymmetries, skin changes. She is encouraged to contact me if any new breast concerns arise.

## 2022-10-11 ENCOUNTER — LAB VISIT (OUTPATIENT)
Dept: LAB | Facility: HOSPITAL | Age: 52
End: 2022-10-11
Attending: NURSE PRACTITIONER
Payer: MEDICARE

## 2022-10-11 ENCOUNTER — OFFICE VISIT (OUTPATIENT)
Dept: SURGERY | Facility: CLINIC | Age: 52
End: 2022-10-11
Payer: MEDICARE

## 2022-10-11 VITALS
SYSTOLIC BLOOD PRESSURE: 117 MMHG | BODY MASS INDEX: 36.1 KG/M2 | DIASTOLIC BLOOD PRESSURE: 82 MMHG | RESPIRATION RATE: 20 BRPM | OXYGEN SATURATION: 97 % | WEIGHT: 216.69 LBS | HEART RATE: 101 BPM | TEMPERATURE: 97 F | HEIGHT: 65 IN

## 2022-10-11 DIAGNOSIS — I89.0 LYMPHEDEMA: ICD-10-CM

## 2022-10-11 DIAGNOSIS — R20.2 PARESTHESIA OF SKIN: ICD-10-CM

## 2022-10-11 DIAGNOSIS — C50.911 MALIGNANT NEOPLASM OF RIGHT FEMALE BREAST, UNSPECIFIED ESTROGEN RECEPTOR STATUS, UNSPECIFIED SITE OF BREAST: Primary | ICD-10-CM

## 2022-10-11 DIAGNOSIS — R53.83 FATIGUE, UNSPECIFIED TYPE: ICD-10-CM

## 2022-10-11 DIAGNOSIS — C50.911 MALIGNANT NEOPLASM OF RIGHT FEMALE BREAST, UNSPECIFIED ESTROGEN RECEPTOR STATUS, UNSPECIFIED SITE OF BREAST: ICD-10-CM

## 2022-10-11 DIAGNOSIS — Z85.3 HISTORY OF RIGHT BREAST CANCER: ICD-10-CM

## 2022-10-11 LAB — MAGNESIUM SERPL-MCNC: 2.2 MG/DL (ref 1.6–2.6)

## 2022-10-11 PROCEDURE — 99205 PR OFFICE/OUTPT VISIT, NEW, LEVL V, 60-74 MIN: ICD-10-PCS | Mod: S$GLB,,, | Performed by: NURSE PRACTITIONER

## 2022-10-11 PROCEDURE — 83735 ASSAY OF MAGNESIUM: CPT | Performed by: NURSE PRACTITIONER

## 2022-10-11 PROCEDURE — 3079F PR MOST RECENT DIASTOLIC BLOOD PRESSURE 80-89 MM HG: ICD-10-PCS | Mod: CPTII,S$GLB,, | Performed by: NURSE PRACTITIONER

## 2022-10-11 PROCEDURE — 3079F DIAST BP 80-89 MM HG: CPT | Mod: CPTII,S$GLB,, | Performed by: NURSE PRACTITIONER

## 2022-10-11 PROCEDURE — 1159F MED LIST DOCD IN RCRD: CPT | Mod: CPTII,S$GLB,, | Performed by: NURSE PRACTITIONER

## 2022-10-11 PROCEDURE — 3008F BODY MASS INDEX DOCD: CPT | Mod: CPTII,S$GLB,, | Performed by: NURSE PRACTITIONER

## 2022-10-11 PROCEDURE — 82607 VITAMIN B-12: CPT | Performed by: NURSE PRACTITIONER

## 2022-10-11 PROCEDURE — 36415 COLL VENOUS BLD VENIPUNCTURE: CPT | Performed by: NURSE PRACTITIONER

## 2022-10-11 PROCEDURE — 82728 ASSAY OF FERRITIN: CPT | Performed by: NURSE PRACTITIONER

## 2022-10-11 PROCEDURE — 99205 OFFICE O/P NEW HI 60 MIN: CPT | Mod: S$GLB,,, | Performed by: NURSE PRACTITIONER

## 2022-10-11 PROCEDURE — 3074F PR MOST RECENT SYSTOLIC BLOOD PRESSURE < 130 MM HG: ICD-10-PCS | Mod: CPTII,S$GLB,, | Performed by: NURSE PRACTITIONER

## 2022-10-11 PROCEDURE — 3044F HG A1C LEVEL LT 7.0%: CPT | Mod: CPTII,S$GLB,, | Performed by: NURSE PRACTITIONER

## 2022-10-11 PROCEDURE — 3074F SYST BP LT 130 MM HG: CPT | Mod: CPTII,S$GLB,, | Performed by: NURSE PRACTITIONER

## 2022-10-11 PROCEDURE — 1159F PR MEDICATION LIST DOCUMENTED IN MEDICAL RECORD: ICD-10-PCS | Mod: CPTII,S$GLB,, | Performed by: NURSE PRACTITIONER

## 2022-10-11 PROCEDURE — 99999 PR PBB SHADOW E&M-EST. PATIENT-LVL V: CPT | Mod: PBBFAC,,, | Performed by: NURSE PRACTITIONER

## 2022-10-11 PROCEDURE — 3008F PR BODY MASS INDEX (BMI) DOCUMENTED: ICD-10-PCS | Mod: CPTII,S$GLB,, | Performed by: NURSE PRACTITIONER

## 2022-10-11 PROCEDURE — 3044F PR MOST RECENT HEMOGLOBIN A1C LEVEL <7.0%: ICD-10-PCS | Mod: CPTII,S$GLB,, | Performed by: NURSE PRACTITIONER

## 2022-10-11 PROCEDURE — 84466 ASSAY OF TRANSFERRIN: CPT | Performed by: NURSE PRACTITIONER

## 2022-10-11 PROCEDURE — 99999 PR PBB SHADOW E&M-EST. PATIENT-LVL V: ICD-10-PCS | Mod: PBBFAC,,, | Performed by: NURSE PRACTITIONER

## 2022-10-12 ENCOUNTER — TELEPHONE (OUTPATIENT)
Dept: GENETICS | Facility: CLINIC | Age: 52
End: 2022-10-12
Payer: MEDICARE

## 2022-10-12 DIAGNOSIS — R53.83 FATIGUE, UNSPECIFIED TYPE: Primary | ICD-10-CM

## 2022-10-12 DIAGNOSIS — R79.0 LOW IRON STORES: ICD-10-CM

## 2022-10-12 LAB
FERRITIN SERPL-MCNC: 81 NG/ML (ref 20–300)
IRON SERPL-MCNC: 67 UG/DL (ref 30–160)
SATURATED IRON: 16 % (ref 20–50)
TOTAL IRON BINDING CAPACITY: 419 UG/DL (ref 250–450)
TRANSFERRIN SERPL-MCNC: 283 MG/DL (ref 200–375)
VIT B12 SERPL-MCNC: 321 PG/ML (ref 210–950)

## 2022-10-12 NOTE — TELEPHONE ENCOUNTER
"Spoke with pt and confirmed genetics appt.      ----- Message from Boom Reddy sent at 10/12/2022 11:48 AM CDT -----  Regarding: Speak with office  Contact: Daniela  Consult/Advisory:       Name Of Caller: Daniela      Contact Preference?:[phone]         Does patient feel the need to be seen today? No      What is the nature of the call?: Returning call back to [facility].       Additional Notes:  "Thank you for all that you do for our patients'"      "

## 2022-10-13 ENCOUNTER — OFFICE VISIT (OUTPATIENT)
Dept: INTERNAL MEDICINE | Facility: CLINIC | Age: 52
End: 2022-10-13
Payer: MEDICARE

## 2022-10-13 ENCOUNTER — HOSPITAL ENCOUNTER (OUTPATIENT)
Dept: RADIOLOGY | Facility: HOSPITAL | Age: 52
Discharge: HOME | End: 2022-10-13
Attending: NURSE PRACTITIONER
Payer: MEDICARE

## 2022-10-13 VITALS
HEART RATE: 70 BPM | OXYGEN SATURATION: 98 % | WEIGHT: 217.81 LBS | HEIGHT: 65 IN | SYSTOLIC BLOOD PRESSURE: 124 MMHG | TEMPERATURE: 98 F | DIASTOLIC BLOOD PRESSURE: 80 MMHG | BODY MASS INDEX: 36.29 KG/M2

## 2022-10-13 DIAGNOSIS — F31.9 BIPOLAR I DISORDER: ICD-10-CM

## 2022-10-13 DIAGNOSIS — M25.551 PAIN IN RIGHT HIP: ICD-10-CM

## 2022-10-13 DIAGNOSIS — E03.9 HYPOTHYROIDISM, UNSPECIFIED TYPE: ICD-10-CM

## 2022-10-13 DIAGNOSIS — M25.551 PAIN IN RIGHT HIP: Primary | ICD-10-CM

## 2022-10-13 PROCEDURE — 3074F SYST BP LT 130 MM HG: CPT | Mod: CPTII,S$GLB,, | Performed by: NURSE PRACTITIONER

## 2022-10-13 PROCEDURE — 99999 PR PBB SHADOW E&M-EST. PATIENT-LVL V: CPT | Mod: PBBFAC,,, | Performed by: NURSE PRACTITIONER

## 2022-10-13 PROCEDURE — 73502 X-RAY EXAM HIP UNI 2-3 VIEWS: CPT | Mod: TC,FY,PO,RT

## 2022-10-13 PROCEDURE — 3044F HG A1C LEVEL LT 7.0%: CPT | Mod: CPTII,S$GLB,, | Performed by: NURSE PRACTITIONER

## 2022-10-13 PROCEDURE — 73502 XR HIP WITH PELVIS WHEN PERFORMED, 2 OR 3  VIEWS RIGHT: ICD-10-PCS | Mod: 26,RT,, | Performed by: RADIOLOGY

## 2022-10-13 PROCEDURE — 99999 PR PBB SHADOW E&M-EST. PATIENT-LVL V: ICD-10-PCS | Mod: PBBFAC,,, | Performed by: NURSE PRACTITIONER

## 2022-10-13 PROCEDURE — 3079F DIAST BP 80-89 MM HG: CPT | Mod: CPTII,S$GLB,, | Performed by: NURSE PRACTITIONER

## 2022-10-13 PROCEDURE — 99214 PR OFFICE/OUTPT VISIT, EST, LEVL IV, 30-39 MIN: ICD-10-PCS | Mod: 25,S$GLB,, | Performed by: NURSE PRACTITIONER

## 2022-10-13 PROCEDURE — 3074F PR MOST RECENT SYSTOLIC BLOOD PRESSURE < 130 MM HG: ICD-10-PCS | Mod: CPTII,S$GLB,, | Performed by: NURSE PRACTITIONER

## 2022-10-13 PROCEDURE — 3044F PR MOST RECENT HEMOGLOBIN A1C LEVEL <7.0%: ICD-10-PCS | Mod: CPTII,S$GLB,, | Performed by: NURSE PRACTITIONER

## 2022-10-13 PROCEDURE — 96372 THER/PROPH/DIAG INJ SC/IM: CPT | Mod: S$GLB,,, | Performed by: NURSE PRACTITIONER

## 2022-10-13 PROCEDURE — 99214 OFFICE O/P EST MOD 30 MIN: CPT | Mod: 25,S$GLB,, | Performed by: NURSE PRACTITIONER

## 2022-10-13 PROCEDURE — 1160F PR REVIEW ALL MEDS BY PRESCRIBER/CLIN PHARMACIST DOCUMENTED: ICD-10-PCS | Mod: CPTII,S$GLB,, | Performed by: NURSE PRACTITIONER

## 2022-10-13 PROCEDURE — 96372 PR INJECTION,THERAP/PROPH/DIAG2ST, IM OR SUBCUT: ICD-10-PCS | Mod: S$GLB,,, | Performed by: NURSE PRACTITIONER

## 2022-10-13 PROCEDURE — 73502 X-RAY EXAM HIP UNI 2-3 VIEWS: CPT | Mod: 26,RT,, | Performed by: RADIOLOGY

## 2022-10-13 PROCEDURE — 1159F PR MEDICATION LIST DOCUMENTED IN MEDICAL RECORD: ICD-10-PCS | Mod: CPTII,S$GLB,, | Performed by: NURSE PRACTITIONER

## 2022-10-13 PROCEDURE — 1160F RVW MEDS BY RX/DR IN RCRD: CPT | Mod: CPTII,S$GLB,, | Performed by: NURSE PRACTITIONER

## 2022-10-13 PROCEDURE — 1159F MED LIST DOCD IN RCRD: CPT | Mod: CPTII,S$GLB,, | Performed by: NURSE PRACTITIONER

## 2022-10-13 PROCEDURE — 3079F PR MOST RECENT DIASTOLIC BLOOD PRESSURE 80-89 MM HG: ICD-10-PCS | Mod: CPTII,S$GLB,, | Performed by: NURSE PRACTITIONER

## 2022-10-13 RX ORDER — KETOROLAC TROMETHAMINE 30 MG/ML
60 INJECTION, SOLUTION INTRAMUSCULAR; INTRAVENOUS
Status: COMPLETED | OUTPATIENT
Start: 2022-10-13 | End: 2022-10-13

## 2022-10-13 RX ORDER — CYCLOBENZAPRINE HCL 10 MG
10 TABLET ORAL 3 TIMES DAILY PRN
Qty: 30 TABLET | Refills: 0 | Status: SHIPPED | OUTPATIENT
Start: 2022-10-13 | End: 2022-10-23

## 2022-10-13 RX ORDER — MELOXICAM 7.5 MG/1
7.5 TABLET ORAL 2 TIMES DAILY PRN
Qty: 60 TABLET | Refills: 0 | Status: SHIPPED | OUTPATIENT
Start: 2022-10-13 | End: 2023-04-11 | Stop reason: DRUGHIGH

## 2022-10-13 RX ADMIN — KETOROLAC TROMETHAMINE 60 MG: 30 INJECTION, SOLUTION INTRAMUSCULAR; INTRAVENOUS at 02:10

## 2022-10-13 NOTE — PROGRESS NOTES
"Subjective:       Patient ID: Daniela Costa is a 52 y.o. female.    Chief Complaint: Hip Pain    Pt presents to clinic today for right hip pain  Started abruptly this am at 5 am and woke her up out of her sleep  No known injury that she can think of   Does workout but last workout class was last week  No numbness or tingling in the leg  No radiation down the leg or to the back  No rashes/skin issues to that area  Has a history of SI joint issues on the left side  Called her ortho but couldn't get in today to be seen       /80   Pulse 70   Temp 97.8 °F (36.6 °C) (Temporal)   Ht 5' 5" (1.651 m)   Wt 98.8 kg (217 lb 13 oz)   LMP  (LMP Unknown) Comment: no cycle 8/2012  SpO2 98%   BMI 36.25 kg/m²     Review of Systems   Constitutional:  Negative for activity change and unexpected weight change.   HENT:  Negative for hearing loss, rhinorrhea and trouble swallowing.    Eyes:  Negative for discharge and visual disturbance.   Respiratory:  Negative for chest tightness and wheezing.    Cardiovascular:  Negative for chest pain and palpitations.   Gastrointestinal:  Negative for blood in stool, constipation, diarrhea and vomiting.   Endocrine: Negative for polydipsia and polyuria.   Genitourinary:  Negative for difficulty urinating, dysuria, hematuria and menstrual problem.   Musculoskeletal:  Positive for arthralgias and joint swelling. Negative for neck pain.   Neurological:  Positive for weakness. Negative for headaches.   Psychiatric/Behavioral:  Negative for confusion and dysphoric mood.      Objective:      Physical Exam  Vitals and nursing note reviewed.   Constitutional:       General: She is not in acute distress.     Appearance: Normal appearance. She is well-developed. She is not diaphoretic.   HENT:      Head: Normocephalic and atraumatic.   Cardiovascular:      Rate and Rhythm: Normal rate and regular rhythm.      Heart sounds: Normal heart sounds. No murmur heard.  Pulmonary:      Effort: Pulmonary " effort is normal. No respiratory distress.      Breath sounds: Normal breath sounds.   Musculoskeletal:      Right hip: Tenderness present. Decreased range of motion. Decreased strength.   Skin:     General: Skin is warm and dry.      Findings: No rash.   Neurological:      Mental Status: She is alert.   Psychiatric:         Mood and Affect: Mood normal.         Behavior: Behavior normal. Behavior is cooperative.         Thought Content: Thought content normal.         Judgment: Judgment normal.       Assessment:       1. Pain in right hip    2. Hypothyroidism, unspecified type    3. Bipolar I disorder    4. BMI 36.0-36.9,adult          Plan:       Daniela was seen today for hip pain.    Diagnoses and all orders for this visit:    Pain in right hip  -     X-Ray Hip 2 or 3 views Right (with Pelvis when performed); Future  -     ketorolac injection 60 mg  -     cyclobenzaprine (FLEXERIL) 10 MG tablet; Take 1 tablet (10 mg total) by mouth 3 (three) times daily as needed for Muscle spasms.  -     meloxicam (MOBIC) 7.5 MG tablet; Take 1 tablet (7.5 mg total) by mouth 2 (two) times daily as needed for Pain (pain).    Hypothyroidism, unspecified type        - Chronic, stable on current meds. Has lost 15 lbs since last visit.   Bipolar I disorder        - Chronic, stable on current meds. Continue following with psych   BMI 36.0-36.9,adult      Heat  Nsaids  Muscle relaxants  Will notify pt of xray results via portal   Follow up for worsening or no improvement in symptoms and PRN.

## 2022-10-31 ENCOUNTER — ANESTHESIA EVENT (OUTPATIENT)
Dept: ENDOSCOPY | Facility: HOSPITAL | Age: 52
End: 2022-10-31
Payer: MEDICARE

## 2022-10-31 ENCOUNTER — ANESTHESIA (OUTPATIENT)
Dept: ENDOSCOPY | Facility: HOSPITAL | Age: 52
End: 2022-10-31
Payer: MEDICARE

## 2022-10-31 ENCOUNTER — HOSPITAL ENCOUNTER (OUTPATIENT)
Facility: HOSPITAL | Age: 52
Discharge: HOME | End: 2022-10-31
Attending: INTERNAL MEDICINE | Admitting: INTERNAL MEDICINE
Payer: MEDICARE

## 2022-10-31 DIAGNOSIS — Z12.11 COLON CANCER SCREENING: Primary | ICD-10-CM

## 2022-10-31 PROCEDURE — 63600175 PHARM REV CODE 636 W HCPCS: Performed by: NURSE ANESTHETIST, CERTIFIED REGISTERED

## 2022-10-31 PROCEDURE — 88305 TISSUE EXAM BY PATHOLOGIST: CPT | Mod: 26,,, | Performed by: STUDENT IN AN ORGANIZED HEALTH CARE EDUCATION/TRAINING PROGRAM

## 2022-10-31 PROCEDURE — 45385 COLONOSCOPY W/LESION REMOVAL: CPT | Mod: PT | Performed by: INTERNAL MEDICINE

## 2022-10-31 PROCEDURE — 88305 TISSUE EXAM BY PATHOLOGIST: CPT | Performed by: STUDENT IN AN ORGANIZED HEALTH CARE EDUCATION/TRAINING PROGRAM

## 2022-10-31 PROCEDURE — 45385 PR COLONOSCOPY,REMV LESN,SNARE: ICD-10-PCS | Mod: PT,,, | Performed by: INTERNAL MEDICINE

## 2022-10-31 PROCEDURE — 88305 TISSUE EXAM BY PATHOLOGIST: ICD-10-PCS | Mod: 26,,, | Performed by: STUDENT IN AN ORGANIZED HEALTH CARE EDUCATION/TRAINING PROGRAM

## 2022-10-31 PROCEDURE — 27201089 HC SNARE, DISP (ANY): Performed by: INTERNAL MEDICINE

## 2022-10-31 PROCEDURE — 25000003 PHARM REV CODE 250: Performed by: NURSE ANESTHETIST, CERTIFIED REGISTERED

## 2022-10-31 PROCEDURE — 37000009 HC ANESTHESIA EA ADD 15 MINS: Performed by: INTERNAL MEDICINE

## 2022-10-31 PROCEDURE — 37000008 HC ANESTHESIA 1ST 15 MINUTES: Performed by: INTERNAL MEDICINE

## 2022-10-31 PROCEDURE — 45385 COLONOSCOPY W/LESION REMOVAL: CPT | Mod: PT,,, | Performed by: INTERNAL MEDICINE

## 2022-10-31 RX ORDER — LIDOCAINE HYDROCHLORIDE 20 MG/ML
INJECTION INTRAVENOUS
Status: DISCONTINUED | OUTPATIENT
Start: 2022-10-31 | End: 2022-10-31

## 2022-10-31 RX ORDER — PROPOFOL 10 MG/ML
VIAL (ML) INTRAVENOUS
Status: DISCONTINUED | OUTPATIENT
Start: 2022-10-31 | End: 2022-10-31

## 2022-10-31 RX ADMIN — SODIUM CHLORIDE, SODIUM LACTATE, POTASSIUM CHLORIDE, AND CALCIUM CHLORIDE: .6; .31; .03; .02 INJECTION, SOLUTION INTRAVENOUS at 10:10

## 2022-10-31 RX ADMIN — PROPOFOL 30 MG: 10 INJECTION, EMULSION INTRAVENOUS at 10:10

## 2022-10-31 RX ADMIN — Medication 50 MG: at 10:10

## 2022-10-31 RX ADMIN — PROPOFOL 80 MG: 10 INJECTION, EMULSION INTRAVENOUS at 10:10

## 2022-10-31 NOTE — ANESTHESIA POSTPROCEDURE EVALUATION
Anesthesia Post Evaluation    Patient: Daniela Costa    Procedure(s) Performed: Procedure(s) (LRB):  COLONOSCOPY (N/A)    Final Anesthesia Type: MAC      Patient location during evaluation: PACU  Patient participation: Yes- Able to Participate  Level of consciousness: awake and alert  Post-procedure vital signs: reviewed and stable  Pain management: adequate  Airway patency: patent    PONV status at discharge: No PONV  Anesthetic complications: no      Cardiovascular status: blood pressure returned to baseline  Respiratory status: unassisted and room air  Hydration status: euvolemic  Follow-up not needed.          Vitals Value Taken Time   /67 10/31/22 1002   Temp 36.8 °C (98.2 °F) 10/31/22 1002   Pulse 87 10/31/22 1002   Resp 20 10/31/22 1002   SpO2 96 % 10/31/22 1002         No case tracking events are documented in the log.      Pain/Shanon Score: No data recorded

## 2022-10-31 NOTE — TRANSFER OF CARE
"Anesthesia Transfer of Care Note    Patient: Daniela Costa    Procedure(s) Performed: Procedure(s) (LRB):  COLONOSCOPY (N/A)    Patient location: PACU    Anesthesia Type: MAC    Transport from OR: Transported from OR on room air with adequate spontaneous ventilation    Post pain: adequate analgesia    Post assessment: no apparent anesthetic complications    Post vital signs: stable    Level of consciousness: responds to stimulation    Nausea/Vomiting: no nausea/vomiting    Complications: none    Transfer of care protocol was followed      Last vitals:   Visit Vitals  /67 (BP Location: Left arm, Patient Position: Lying)   Pulse 87   Temp 36.8 °C (98.2 °F) (Temporal)   Resp 20   Ht 5' 5" (1.651 m)   Wt 97.5 kg (215 lb)   LMP  (LMP Unknown)   SpO2 96%   Breastfeeding No   BMI 35.78 kg/m²     "

## 2022-10-31 NOTE — ANESTHESIA PREPROCEDURE EVALUATION
10/31/2022  Daniela Costa is a 52 y.o., female.      Pre-op Assessment    I have reviewed the Patient Summary Reports.     I have reviewed the Nursing Notes. I have reviewed the NPO Status.   I have reviewed the Medications.     Review of Systems  Anesthesia Hx:  No problems with previous Anesthesia  Denies Family Hx of Anesthesia complications.   Denies Personal Hx of Anesthesia complications.   Social:  Former Smoker    Hematology/Oncology:  Hematology Normal       -- Cancer in past history:  Breast   EENT/Dental:EENT/Dental Normal   Cardiovascular:   Hypertension    Pulmonary:   Asthma mild    Renal/:  Renal/ Normal     Hepatic/GI:  Hepatic/GI Normal    Musculoskeletal:  Musculoskeletal Normal    Neurological:  Neurology Normal    Endocrine:   Hypothyroidism  Obesity / BMI > 30  Dermatological:  Skin Normal    Psych:   Psychiatric History          Physical Exam  General: Well nourished, Cooperative, Alert and Oriented    Airway:  Mallampati: II   Mouth Opening: Normal  TM Distance: Normal  Tongue: Normal  Neck ROM: Normal ROM    Dental:  Intact    Chest/Lungs:  Clear to auscultation, Normal Respiratory Rate    Heart:  Rate: Normal  Rhythm: Regular Rhythm        Anesthesia Plan  Type of Anesthesia, risks & benefits discussed:    Anesthesia Type: MAC  Intra-op Monitoring Plan: Standard ASA Monitors  Induction:  IV  Informed Consent: Informed consent signed with the Patient and all parties understand the risks and agree with anesthesia plan.  All questions answered.   ASA Score: 3  Day of Surgery Review of History & Physical: H&P Update referred to the surgeon/provider.I have interviewed and examined the patient. I have reviewed the patient's H&P dated: There are no significant changes.     Ready For Surgery From Anesthesia Perspective.     .

## 2022-10-31 NOTE — H&P
PRE PROCEDURE H&P    Patient Name: Daniela Costa  MRN: [id number]  : 1970  Date of Procedure:  10/31/2022  Referring Physician: Lexy Irizarry MD  Primary Physician: Lucía Terrell MD  Procedure Physician: Lexy Irizarry MD       Planned Procedure: Colonoscopy  Diagnosis: screening for colon cancer  Chief Complaint: Same as above    HPI: Patient is an 52 y.o. female is here for the above.     Last colonoscopy: no prior   Family history: neg   Anticoagulation: none     Past Medical History:   Past Medical History:   Diagnosis Date    Allergy     Anxiety     Arthritis     Asthma     Breast cancer     2012; BRCA 1 and 2 negative    Breast cancer genetic susceptibility     Cancer     Depression     Hypothyroidism     Lung disease     Pneumonia         Past Surgical History:  Past Surgical History:   Procedure Laterality Date    ADENOIDECTOMY      BACK SURGERY      SI joint fusion    Breast Reconstruction  Bilateral     BREAST SURGERY      CERVICAL FUSION      CERVICAL FUSION      HYSTERECTOMY      lumbar fusion      MASTECTOMY Right     due to breast cancer, radiation done after surgery    MASTECTOMY Left     propholytic due to breast cancer in right breast    ROBOT-ASSISTED LAPAROSCOPIC ABDOMINAL HYSTERECTOMY USING DA HEBERT XI N/A 2018    Procedure: XI ROBOTIC HYSTERECTOMY;  Surgeon: Meka Reich MD;  Location: [facility] OR;  Service: OB/GYN;  Laterality: N/A;    ROBOT-ASSISTED LAPAROSCOPIC LYSIS OF ADHESIONS USING DA HEBERT XI N/A 2018    Procedure: XI ROBOTIC LYSIS, ADHESIONS;  Surgeon: Meka Reich MD;  Location: [facility] OR;  Service: OB/GYN;  Laterality: N/A;    ROBOT-ASSISTED LAPAROSCOPIC SALPINGO-OOPHORECTOMY USING DA HEBERT XI Bilateral 2018    Procedure: XI ROBOTIC SALPINGO-OOPHORECTOMY;  Surgeon: Meka Reich MD;  Location: [facility] OR;  Service: OB/GYN;  Laterality: Bilateral;    SI joint fusion   2018    TONSILLECTOMY      TUBAL LIGATION          Home  Medications:  Prior to Admission medications    Medication Sig Start Date End Date Taking? Authorizing Provider   albuterol (PROVENTIL/VENTOLIN HFA) 90 mcg/actuation inhaler Inhale 2 puffs into the lungs every 6 (six) hours. 9/14/22 9/14/23 Yes Everette John MD   busPIRone (BUSPAR) 30 MG Tab Take 1 tablet (30 mg total) by mouth 3 (three) times daily. 8/18/22 8/18/23 Yes Tom Brito MD   clonazePAM (KLONOPIN) 1 MG tablet TAKE 1 TABLET BY MOUTH ONCE DAILY AS NEEDED FOR ANXIETY 8/18/22  Yes oTm Brito MD   diltiaZEM (CARDIZEM) 30 MG tablet Take 1 tablet (30 mg total) by mouth every 12 (twelve) hours. 9/15/22  Yes Anshul Diaz MD   famotidine (PEPCID) 20 MG tablet Take 1 tablet (20 mg total) by mouth 2 (two) times daily. 12/26/18  Yes Cynthia Jefferson MD   fluticasone furoate-vilanteroL (BREO ELLIPTA) 200-25 mcg/dose DsDv diskus inhaler Inhale 1 puff into the lungs once daily. Controller 9/14/22  Yes Everette John MD   fluticasone propionate (FLONASE) 50 mcg/actuation nasal spray SPRAY 1 SPRAY INTO EACH NOSTRIL TWO TIMES DAILY 8/24/20  Yes Everette John MD   hydrocortisone 2.5 % lotion Apply topically 2 (two) times daily. 9/18/22  Yes Davida Gilmore PA-C   hydrOXYzine (ATARAX) 50 MG tablet Take 1 to 2 tablets at bedtime as needed for sleep 1/28/19  Yes Tom Brito MD   lamoTRIgine (LAMICTAL) 100 MG tablet Take 1 tablet (100 mg total) by mouth once daily. 10/1/22 10/1/23 Yes Tom Brito MD   levothyroxine (SYNTHROID) 50 MCG tablet Take 1 tablet (50 mcg total) by mouth once daily. 9/9/22  Yes Vijay Odell NP   loratadine (CLARITIN) 10 mg tablet Take 1 tablet (10 mg total) by mouth once daily. 12/26/18  Yes Cynthia Jefferson MD   melatonin 10 mg Cap Take by mouth.   Yes Historical Provider   meloxicam (MOBIC) 7.5 MG tablet Take 1 tablet (7.5 mg total) by mouth 2 (two) times daily as needed for Pain (pain). 10/13/22  Yes Vijay Odell NP   ondansetron (ZOFRAN-ODT) 4 MG  TbDL Take 4 mg by mouth every 6 (six) hours as needed. 22  Yes Historical Provider   traZODone (DESYREL) 100 MG tablet Take 1/2 to 1 tablet at bedtime as needed for sleep. 22  Yes Tom Brito MD   ASCORBATE CALCIUM (VITAMIN C ORAL) Take by mouth.    Historical Provider   blood pressure monitor ([employer] EASE) ST size by Other route as needed for High Blood Pressure. 20   Lucía Terrell MD   blood sugar diagnostic Strp To check BG 2 times daily, to use with insurance preferred meter 10/6/22   Vijay Odell NP   blood-glucose meter kit To check BG 2 times daily, to use with insurance preferred meter 10/6/22   Vijay Odell NP   inhalation spacing device (BREATHERITE VALVED MDI CHAMBER) Use as directed for inhalation. 17   Everette John MD   lamoTRIgine (LAMICTAL) 25 MG tablet Take 1 tablet daily x 14 days then 2 daily x 14 days then 3 daily x 14 days then start 100 mg daily. 22   Tom Brito MD   lancets Misc To check BG 2 times daily, to use with insurance preferred meter 10/6/22   Vijay Odell NP        Allergies:  Review of patient's allergies indicates:   Allergen Reactions    Cefdinir      Radiation Recall, everywhere she had radiation it looked like blisters and very hot to touch    Levofloxacin      Went into deep depression. Messed with Pych meds    Sodium benzoate Hives    Strawberry Hives        Social History:   Social History     Socioeconomic History    Marital status:     Number of children: 6   Occupational History    Occupation: stay home mom    Tobacco Use    Smoking status: Former     Packs/day: 0.50     Years: 16.00     Pack years: 8.00     Types: Cigarettes     Start date: 1990     Quit date: 2014     Years since quittin.8    Smokeless tobacco: Never   Substance and Sexual Activity    Alcohol use: Yes     Alcohol/week: 2.0 standard drinks     Types: 2 Glasses of wine per week    Drug use: No    Sexual activity: Not Currently      "Partners: Male     Birth control/protection: See Surgical Hx   Social History Narrative    Patient has 4 biological children and 2 step       Family History:  Family History   Problem Relation Age of Onset    Diabetes Mother     Hyperlipidemia Mother     Diabetes Father     Hyperlipidemia Father        ROS: No acute cardiac events, no acute respiratory complaints.     Physical Exam (all patients):    /67 (BP Location: Left arm, Patient Position: Lying)   Pulse 87   Temp 98.2 °F (36.8 °C) (Temporal)   Resp 20   Ht 5' 5" (1.651 m)   Wt 97.5 kg (215 lb)   LMP  (LMP Unknown) Comment: no cycle 8/2012  SpO2 96%   Breastfeeding No   BMI 35.78 kg/m²   Lungs: Clear to auscultation bilaterally, respirations unlabored  Heart: Regular rate and rhythm, S1 and S2 normal, no obvious murmurs  Abdomen:         Soft, non-tender, bowel sounds normal, no masses, no organomegaly    Lab Results   Component Value Date    WBC 5.02 09/08/2022    MCV 93 09/08/2022    RDW 13.7 09/08/2022     09/08/2022    INR 0.9 01/09/2019     (H) 09/08/2022    HGBA1C 5.8 (H) 09/09/2022    BUN 9 09/08/2022     09/08/2022    K 4.1 09/08/2022     09/08/2022        SEDATION PLAN: per anesthesia      History reviewed, vital signs satisfactory, cardiopulmonary status satisfactory, sedation options, risks and plans have been discussed with the patient  All their questions were answered and the patient agrees to the sedation procedures as planned and the patient is deemed an appropriate candidate for the sedation as planned.    Procedure explained to patient, informed consent obtained and placed in chart.    eLxy Irizarry  10/31/2022  10:40 AM    "

## 2022-10-31 NOTE — PROVATION PATIENT INSTRUCTIONS
Discharge Summary/Instructions after an Endoscopic Procedure  Patient Name: Daniela Costa  Patient MRN: [id number]  Patient YOB: 1970 Monday, October 31, 2022 Lexy Irizarry MD  Dear patient,  As a result of recent federal legislation (The Federal Cures Act), you may   receive lab or pathology results from your procedure in your [facility]   account before your physician is able to contact you. Your physician or   their representative will relay the results to you with their   recommendations at their soonest availability.  Thank you,  RESTRICTIONS:  During your procedure today, you received medications for sedation.  These   medications may affect your judgment, balance and coordination.  Therefore,   for 24 hours, you have the following restrictions:   - DO NOT drive a car, operate machinery, make legal/financial decisions,   sign important papers or drink alcohol.    ACTIVITY:  Today: no heavy lifting, straining or running due to procedural   sedation/anesthesia.  The following day: return to full activity including work.  DIET:  Eat and drink normally unless instructed otherwise.     TREATMENT FOR COMMON SIDE EFFECTS:  - Mild abdominal pain, nausea, belching, bloating or excessive gas:  rest,   eat lightly and use a heating pad.  - Sore Throat: treat with throat lozenges and/or gargle with warm salt   water.  - Because air was used during the procedure, expelling large amounts of air   from your rectum or belching is normal.  - If a bowel prep was taken, you may not have a bowel movement for 1-3 days.    This is normal.  SYMPTOMS TO WATCH FOR AND REPORT TO YOUR PHYSICIAN:  1. Abdominal pain or bloating, other than gas cramps.  2. Chest pain.  3. Back pain.  4. Signs of infection such as: chills or fever occurring within 24 hours   after the procedure.  5. Rectal bleeding, which would show as bright red, maroon, or black stools.   (A tablespoon of blood from the rectum is not serious, especially if    hemorrhoids are present.)  6. Vomiting.  7. Weakness or dizziness.  GO DIRECTLY TO THE NEAREST EMERGENCY ROOM IF YOU HAVE ANY OF THE FOLLOWING:      Difficulty breathing              Chills and/or fever over 101 F   Persistent vomiting and/or vomiting blood   Severe abdominal pain   Severe chest pain   Black, tarry stools   Bleeding- more than one tablespoon   Any other symptom or condition that you feel may need urgent attention  Your doctor recommends these additional instructions:  If any biopsies were taken, your doctors clinic will contact you in 1 to 2   weeks with any results.  - Patient has a contact number available for emergencies.  The signs and   symptoms of potential delayed complications were discussed with the   patient.  Return to normal activities tomorrow.  Written discharge   instructions were provided to the patient.   - Discharge patient to home (via wheelchair).   - Resume previous diet today.   - Continue present medications.   - No aspirin, ibuprofen, naproxen, or other non-steroidal anti-inflammatory   drugs for 7 days after polyp removal.   - Await pathology results.   - Repeat colonoscopy in 3 years for surveillance.  For questions, problems or results please call your physician Lexy Irizarry MD at Work:  [phone]  If you have any questions about the above instructions, call the GI   department at [phone] or call the endoscopy unit at [phone]   from 7am until 3 pm.  [facility] [place], EMERGENCY ROOM PHONE NUMBER:   [phone]  IF A COMPLICATION OR EMERGENCY SITUATION ARISES AND YOU ARE UNABLE TO REACH   YOUR PHYSICIAN - GO DIRECTLY TO THE EMERGENCY ROOM.  I have read or have had read to me these discharge instructions for my   procedure and have received a written copy.  I understand these   instructions and will follow-up with my physician if I have any questions.     __________________________________        _____________________________________  Nurse Signature                                          Patient/Designated   Responsible Party Signature  MD Lexy Ash MD  10/31/2022 11:05:51 AM  This report has been verified and signed electronically.  Dear patient,  As a result of recent federal legislation (The Federal Cures Act), you may   receive lab or pathology results from your procedure in your [facility]   account before your physician is able to contact you. Your physician or   their representative will relay the results to you with their   recommendations at their soonest availability.  Thank you,  PROVATION

## 2022-11-01 VITALS
WEIGHT: 215 LBS | HEART RATE: 78 BPM | TEMPERATURE: 98 F | HEIGHT: 65 IN | OXYGEN SATURATION: 97 % | BODY MASS INDEX: 35.82 KG/M2 | DIASTOLIC BLOOD PRESSURE: 79 MMHG | RESPIRATION RATE: 16 BRPM | SYSTOLIC BLOOD PRESSURE: 118 MMHG

## 2022-11-02 LAB
FINAL PATHOLOGIC DIAGNOSIS: NORMAL
GROSS: NORMAL
Lab: NORMAL

## 2022-11-04 ENCOUNTER — OFFICE VISIT (OUTPATIENT)
Dept: PSYCHIATRY | Facility: CLINIC | Age: 52
End: 2022-11-04
Payer: MEDICARE

## 2022-11-04 ENCOUNTER — OFFICE VISIT (OUTPATIENT)
Dept: GENETICS | Facility: CLINIC | Age: 52
End: 2022-11-04
Payer: MEDICARE

## 2022-11-04 DIAGNOSIS — Z85.3 HISTORY OF RIGHT BREAST CANCER: ICD-10-CM

## 2022-11-04 DIAGNOSIS — F41.9 ANXIETY: ICD-10-CM

## 2022-11-04 DIAGNOSIS — F31.75 BIPOLAR DISORDER, IN PARTIAL REMISSION, MOST RECENT EPISODE DEPRESSED: Primary | ICD-10-CM

## 2022-11-04 DIAGNOSIS — G47.00 INSOMNIA, UNSPECIFIED TYPE: ICD-10-CM

## 2022-11-04 DIAGNOSIS — Z80.41 FAMILY HISTORY OF OVARIAN CANCER: ICD-10-CM

## 2022-11-04 DIAGNOSIS — Z80.3 FAMILY HISTORY OF BREAST CANCER: Primary | ICD-10-CM

## 2022-11-04 DIAGNOSIS — Z80.0 FAMILY HISTORY OF PANCREATIC CANCER: ICD-10-CM

## 2022-11-04 PROCEDURE — 3044F PR MOST RECENT HEMOGLOBIN A1C LEVEL <7.0%: ICD-10-PCS | Mod: CPTII,S$GLB,,

## 2022-11-04 PROCEDURE — 99214 OFFICE O/P EST MOD 30 MIN: CPT | Mod: 95,,, | Performed by: PSYCHIATRY & NEUROLOGY

## 2022-11-04 PROCEDURE — 99499 NO LOS: ICD-10-PCS | Mod: S$GLB,,,

## 2022-11-04 PROCEDURE — 99999 PR PBB SHADOW E&M-EST. PATIENT-LVL II: CPT | Mod: PBBFAC,,, | Performed by: PSYCHIATRY & NEUROLOGY

## 2022-11-04 PROCEDURE — 99499 UNLISTED E&M SERVICE: CPT | Mod: S$GLB,,,

## 2022-11-04 PROCEDURE — 96040 PR GENETIC COUNSELING, EACH 30 MIN: CPT | Mod: S$GLB,,,

## 2022-11-04 PROCEDURE — 3044F PR MOST RECENT HEMOGLOBIN A1C LEVEL <7.0%: ICD-10-PCS | Mod: CPTII,95,, | Performed by: PSYCHIATRY & NEUROLOGY

## 2022-11-04 PROCEDURE — 99999 PR PBB SHADOW E&M-EST. PATIENT-LVL I: CPT | Mod: PBBFAC,,,

## 2022-11-04 PROCEDURE — 99999 PR PBB SHADOW E&M-EST. PATIENT-LVL II: ICD-10-PCS | Mod: PBBFAC,,, | Performed by: PSYCHIATRY & NEUROLOGY

## 2022-11-04 PROCEDURE — 99214 PR OFFICE/OUTPT VISIT, EST, LEVL IV, 30-39 MIN: ICD-10-PCS | Mod: 95,,, | Performed by: PSYCHIATRY & NEUROLOGY

## 2022-11-04 PROCEDURE — 3044F HG A1C LEVEL LT 7.0%: CPT | Mod: CPTII,S$GLB,,

## 2022-11-04 PROCEDURE — 96040 PR GENETIC COUNSELING, EACH 30 MIN: ICD-10-PCS | Mod: S$GLB,,,

## 2022-11-04 PROCEDURE — 99999 PR PBB SHADOW E&M-EST. PATIENT-LVL I: ICD-10-PCS | Mod: PBBFAC,,,

## 2022-11-04 PROCEDURE — 3044F HG A1C LEVEL LT 7.0%: CPT | Mod: CPTII,95,, | Performed by: PSYCHIATRY & NEUROLOGY

## 2022-11-04 RX ORDER — BUSPIRONE HYDROCHLORIDE 15 MG/1
15 TABLET ORAL 3 TIMES DAILY
Qty: 90 TABLET | Refills: 3 | Status: SHIPPED | OUTPATIENT
Start: 2022-11-04 | End: 2023-03-28

## 2022-11-04 RX ORDER — TRAZODONE HYDROCHLORIDE 50 MG/1
50 TABLET ORAL NIGHTLY PRN
Qty: 30 TABLET | Refills: 3 | Status: SHIPPED | OUTPATIENT
Start: 2022-11-04 | End: 2023-07-05 | Stop reason: SDUPTHER

## 2022-11-04 RX ORDER — CLONAZEPAM 1 MG/1
TABLET ORAL
Qty: 30 TABLET | Refills: 1 | Status: SHIPPED | OUTPATIENT
Start: 2022-11-04 | End: 2023-02-28 | Stop reason: SDUPTHER

## 2022-11-04 RX ORDER — LAMOTRIGINE 100 MG/1
100 TABLET ORAL DAILY
Qty: 30 TABLET | Refills: 3 | Status: SHIPPED | OUTPATIENT
Start: 2022-11-04 | End: 2023-05-23
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
## 2022-11-04 NOTE — PROGRESS NOTES
"  Cancer Genetics  Hereditary/High Risk Clinic  Department of Hematology and Oncology  [facility]        Date of Service:  2022  Provider:  Karina Trujillo MS, [place]    Patient Information  Name:  Daniela Costa  :  1970  MRN:  [id number]        Referring Provider: Anna Main NP    The chief complaint leading to consultation is: as below.  Visit type: in-person.  Face-to-face time with patient: approximately 50 minutes.  Approximately 70 minutes in total were spent on this encounter, which includes face-to-face time and non-face-to-face time preparing to see the patient (e.g., review of tests), obtaining and/or reviewing separately obtained history, documenting clinical information in the electronic or other health record, independently interpreting results (not separately reported) and communicating results to the patient/family/caregiver, or care coordination (not separately reported).    Addendum added: 2022 by HAWA.    SUBJECTIVE:      Chief Complaint: Genetic Counseling    History of Present Illness (HPI):  Daniela Costa ("Daniela"), 52 y.o., assigned female sex at birth is new to the [facility] Department of Hematology and Oncology and to me.  She was referred by  Breast Surgery  for genetic cancer risk assessment given her personal history of breast cancer. At ~age 42, she was diagnosed with breast cancer of the right breast s/p right mastectomy and prophylactic left mastectomy. She was treated with neoadjuvant chemotherapy and post-surgery radiation. She underwent BRCA1/BRCA2 analysis in , which was negative. She has been referred for update genetic testing.    Focused Medical History:   Germline cancer-genetic testing:  Yes - [facility] BRCA1/BRCA2 () - Negative  Cancer:  Yes   Breast cancer ()  Colon polyp:  Yes - 2 polyps  Most recent colonoscopy: 10/2022, told to repeat in 3 years   Other benign tumor:  Yes   Breast lump ()  Left breast cyst (20+ years ago) - dense " breast tissue  Pancreatitis:  No  Pancreatic cyst:  No     Surgical History:  WEST/BSO (2018) - precancerous cells  Bilateral Mastectomy (2013 R, 2015 L)    Ancestry:  Ashkenazi Mormon ancestry:  No  Paternal: Anglo-[place]  Maternal: Czech, [place], [place]    Focused Family History:  Consanguinity in ancestors:  No  Germline cancer-genetic testing in blood relatives:  No  Cancer in family:  Yes; there are no known blood relatives affected with cancer other than those mentioned in the pedigree below    Family Cancer Pedigree:            Review of Systems:  See HPI.      SUBJECTIVE:   Records Reviewed  Medical History  Problem List  Any pertinent, available Procedures and Pathology reports in both [facility] and [facility]    COUNSELING   Causes of cancer  Germline cancer genetic testing is the testing of genes associated with cancer, known as cancer susceptibility genes.  Just as these genes are inherited from parents, mutations in these genes can be inherited, as well.  A mutation in a cancer susceptibility gene adversely affects the gene's ability to prevent cancer; therefore, carriers of cancer susceptibility gene mutations may be at increased risk for certain cancers.     Only 5-10% cancers are caused by a cancer susceptibility gene mutation, meaning the cancer is genetic/hereditary; rather, most cancers are sporadic.  Causes of sporadic cancers may include environmental risk factors, lifestyle risk factors, and non-modifiable risk factors.  It is important to note that members of a family often share not only their genetics but also risk factors including environmental and lifestyle risk factors, so cancers can be familial.    Discussed that mutations in BRCA1 and BRCA2 are associated with an increased risk for breast and ovarian cancer, in addition to male breast cancer, pancreatic, melanoma, and prostate cancer. Discussed that mutations in other genes may increase the risk of breast  cancer, in addition to other cancers.     Potential results of genetic testing, and their implications  Potential results of genetic testing include positive, negative, and variant of unknown significance (VUS).    A positive result indicates the presence of at least one clinically significant mutation, and the patient's associated cancer risks vary depending upon the cancer susceptibility gene(s) in which there is/are a mutation(s).  With a positive result, in some cases, depending upon the specific result and the patient's clinical history, modified risk management may be recommended, including measures for risk reduction and/or surveillance; however, modified management is not always an option.    A negative result indicates that no clinically significant mutations were identified in the gene(s) tested.    A VUS indicates that there is not presently enough data for the laboratory to make a determination as to whether the variant is clinically significant.  VUSs are not typically acted upon clinically.        Modified management may also be recommended, even with a result of no or unknown significance, based upon risk assessment that incorporates the family history.  Sometimes, depending upon the genetic testing result and the cancer diagnosis, additional/modified treatments may be an option, though this is not guaranteed.     Genetic mutation inheritance  If Daniela tests positive for a mutation, her first-degree relatives would each have a 50% chance of having the same mutation, and other, more distantly related blood-relatives would also be at risk of having the same mutation.       Genetic discrimination  The Genetic Information Nondiscrimination Act (KIM) is U.S. federal legislation that provides some protections against use of an individual's genetic information by their health insurer and by their employer.  Title I of KIM prohibits most health insurers from utilizing an individual's genetic information to  make decisions regarding insurance eligibility or premium charges.  Title II of KIM prohibits covered entities, including employers, from requesting the genetic information of employees and applicants.  KIM does not protect individuals from genetic discrimination toward health insurance obtained through a job with the  or through the Federal Employees Health Benefits Plan; from genetic discrimination by employers with fewer than 15 employees or if employed by the ; or from genetic discrimination by any other type of policies/entities, including but not limited to life insurance, disability insurance, long-term care insurance,  benefits, and  Health Services benefits.     Genetic testing logistics  An outside laboratory would perform the testing after a blood sample is collected here at [facility] or a saliva sample is collected by the patient at home.  With genetic testing, there is a potential for the patient to incur out-of-pocket costs.  Results can take 2-3 weeks.  Post-test genetic counseling can be conducted once the genetic testing results are available.     Assessment/Plan  Based on the information provided by Daniela, she meets current criteria for genetic testing of hereditary breast and ovarian cancer syndrome according to current clinical guidelines. Daniela's clinical history, including personal history and/or family history, is suggestive of a hereditary cancer syndrome in the family given the personal and family history of breast cancer, with her diagnosis before the age of 50, as well as the paternal family history of pancreatic and ovarian cancer.     Offered germline cancer-genetic testing at this time versus deferring testing at this time or declining testing altogether.  Daniela desires to proceed with germline cancer-genetic testing at this time and has provided her informed consent to proceed.  Various test panel options were discussed. Daniela decided to proceed with  genetic testing through the 84-gene Multi-Cancer Panel (AIP, ALK, APC, BEAN, AXIN2, BAP1, BARD1, BLM, BMPR1A, BRCA1, BRCA2, BRIP1, CASR, CDC73, CDH1, CDK4, CDKN1B, CDKN1C, CDKN2A, CEBPA, CHEK2, CTNNA1, DICER1, DIS3L2, EGFR, EPCAM, FH, FLCN, GATA2, GPC3, GREM1, HOXB13, HRAS, KIT, MAX, MEN1, MET, MITF, MLH1, MSH2, MSH3, MSH6, MUTYH, NBN, NF1, NF2, NTHL1, PALB2, PDGFRA, PHOX2B, PMS2, POLD1, POLE, POT1, XRQKO7E, PTCH1, PTEN, RAD50, RAD51C, RAD51D, RB1, RECQL4, RET, RUNX1, SDHA, SDHAF2, SDHB, SDHC, SDHD, SMAD4, SMARCA4, SMARCB1, SMARCE1, STK11, SUFU, TERC, TERT, MZNU978, TP53, TSC1, TSC2, VHL, WRN, WT1). A blood sample was collected today.      Questions were encouraged and answered to the patient's satisfaction, and she verbalized understanding of information and agreement with the plan.         Signed,    Karina Trujillo MS, [facility]  Certified Genetic Counselor, Hereditary and High-Risk Clinic  Hematology/Oncology, [facility]    11/04/2022

## 2022-11-07 ENCOUNTER — LAB VISIT (OUTPATIENT)
Dept: LAB | Facility: HOSPITAL | Age: 52
End: 2022-11-07
Attending: SURGERY
Payer: MEDICARE

## 2022-11-07 DIAGNOSIS — Z01.818 OTHER SPECIFIED PRE-OPERATIVE EXAMINATION: Primary | ICD-10-CM

## 2022-11-07 PROCEDURE — 36415 COLL VENOUS BLD VENIPUNCTURE: CPT | Mod: PO | Performed by: SURGERY

## 2022-11-07 PROCEDURE — 85025 COMPLETE CBC W/AUTO DIFF WBC: CPT | Performed by: SURGERY

## 2022-11-07 PROCEDURE — 80053 COMPREHEN METABOLIC PANEL: CPT | Performed by: SURGERY

## 2022-11-08 LAB
ALBUMIN SERPL BCP-MCNC: 4.1 G/DL (ref 3.5–5.2)
ALP SERPL-CCNC: 93 U/L (ref 55–135)
ALT SERPL W/O P-5'-P-CCNC: 28 U/L (ref 10–44)
ANION GAP SERPL CALC-SCNC: 11 MMOL/L (ref 8–16)
AST SERPL-CCNC: 21 U/L (ref 10–40)
BASOPHILS # BLD AUTO: 0.04 K/UL (ref 0–0.2)
BASOPHILS NFR BLD: 0.8 % (ref 0–1.9)
BILIRUB SERPL-MCNC: 0.5 MG/DL (ref 0.1–1)
BUN SERPL-MCNC: 9 MG/DL (ref 6–20)
CALCIUM SERPL-MCNC: 8.9 MG/DL (ref 8.7–10.5)
CHLORIDE SERPL-SCNC: 108 MMOL/L (ref 95–110)
CO2 SERPL-SCNC: 24 MMOL/L (ref 23–29)
CREAT SERPL-MCNC: 0.9 MG/DL (ref 0.5–1.4)
DIFFERENTIAL METHOD: ABNORMAL
EOSINOPHIL # BLD AUTO: 0.2 K/UL (ref 0–0.5)
EOSINOPHIL NFR BLD: 3.4 % (ref 0–8)
ERYTHROCYTE [DISTWIDTH] IN BLOOD BY AUTOMATED COUNT: 14.4 % (ref 11.5–14.5)
EST. GFR  (NO RACE VARIABLE): >60 ML/MIN/1.73 M^2
GLUCOSE SERPL-MCNC: 111 MG/DL (ref 70–110)
HCT VFR BLD AUTO: 49.7 % (ref 37–48.5)
HGB BLD-MCNC: 15.5 G/DL (ref 12–16)
IMM GRANULOCYTES # BLD AUTO: 0.01 K/UL (ref 0–0.04)
IMM GRANULOCYTES NFR BLD AUTO: 0.2 % (ref 0–0.5)
LYMPHOCYTES # BLD AUTO: 1.2 K/UL (ref 1–4.8)
LYMPHOCYTES NFR BLD: 24.9 % (ref 18–48)
MCH RBC QN AUTO: 29 PG (ref 27–31)
MCHC RBC AUTO-ENTMCNC: 31.2 G/DL (ref 32–36)
MCV RBC AUTO: 93 FL (ref 82–98)
MONOCYTES # BLD AUTO: 0.3 K/UL (ref 0.3–1)
MONOCYTES NFR BLD: 6.7 % (ref 4–15)
NEUTROPHILS # BLD AUTO: 3.1 K/UL (ref 1.8–7.7)
NEUTROPHILS NFR BLD: 64 % (ref 38–73)
NRBC BLD-RTO: 0 /100 WBC
PLATELET # BLD AUTO: 184 K/UL (ref 150–450)
PMV BLD AUTO: 11.2 FL (ref 9.2–12.9)
POTASSIUM SERPL-SCNC: 3.8 MMOL/L (ref 3.5–5.1)
PROT SERPL-MCNC: 7.1 G/DL (ref 6–8.4)
RBC # BLD AUTO: 5.34 M/UL (ref 4–5.4)
SODIUM SERPL-SCNC: 143 MMOL/L (ref 136–145)
WBC # BLD AUTO: 4.77 K/UL (ref 3.9–12.7)

## 2022-12-02 ENCOUNTER — CLINICAL SUPPORT (OUTPATIENT)
Dept: REHABILITATION | Facility: HOSPITAL | Age: 52
End: 2022-12-02
Payer: MEDICARE

## 2022-12-02 ENCOUNTER — TELEPHONE (OUTPATIENT)
Dept: GENETICS | Facility: CLINIC | Age: 52
End: 2022-12-02
Payer: MEDICARE

## 2022-12-02 DIAGNOSIS — I89.0 LYMPHEDEMA: ICD-10-CM

## 2022-12-02 DIAGNOSIS — M25.612 IMPAIRED RANGE OF MOTION OF LEFT SHOULDER: ICD-10-CM

## 2022-12-02 PROCEDURE — 97535 SELF CARE MNGMENT TRAINING: CPT | Mod: PN

## 2022-12-02 PROCEDURE — 97161 PT EVAL LOW COMPLEX 20 MIN: CPT | Mod: PN

## 2022-12-02 NOTE — TELEPHONE ENCOUNTER
Contacted patient to disclose negative genetic testing results with patient. Explained that a negative result means that the 84 genes tested are working properly. Unfortunately, there is no hereditary explanation for her personal and family history of cancer. Also discussed that a VUS was identified in the POLE gene, meaning a change was found in a gene but there is not enough evidence to determine its significant. Therefore, changes to cancer screening and family testing are not recommended. She may still be at risk for cancer in the future based on family history and/or other factors. Therefore, she should continue regular cancer screenings as recommended by her physicians.     Her relatives are not recommended to have genetic testing given her results are negative. Relatives should remain aware of the family history of cancer so that appropriate cancer screenings (i.e. mammogram, colonoscopy, skin exam) can be recommended. Daniela's close female are relatives are recommended to begin breast cancer screening 10 years prior to the youngest age of diagnosis in the family. Given her diagnosis at age 42, Daniela's close female relatives are recommended to start by age 30.      Results were released to patient in [facility] portal. Patient expressed understanding. Encouraged to contact genetic counselor if she has any questions.

## 2022-12-16 ENCOUNTER — CLINICAL SUPPORT (OUTPATIENT)
Dept: REHABILITATION | Facility: HOSPITAL | Age: 52
End: 2022-12-16
Payer: MEDICARE

## 2022-12-16 DIAGNOSIS — I89.0 LYMPHEDEMA: Primary | ICD-10-CM

## 2022-12-16 DIAGNOSIS — M25.612 IMPAIRED RANGE OF MOTION OF LEFT SHOULDER: ICD-10-CM

## 2022-12-16 PROCEDURE — 97110 THERAPEUTIC EXERCISES: CPT | Mod: PN

## 2022-12-16 PROCEDURE — 97140 MANUAL THERAPY 1/> REGIONS: CPT | Mod: PN

## 2022-12-16 PROCEDURE — 97535 SELF CARE MNGMENT TRAINING: CPT | Mod: PN

## 2022-12-16 NOTE — PROGRESS NOTES
Physical Therapy Daily Treatment Note       Date: 12/16/2022   Name: Daniela Costa  Clinic Number: [id number]    Therapy Diagnosis:   Encounter Diagnoses   Name Primary?    Lymphedema Yes    Impaired range of motion of left shoulder        Physician: Anna Main NP    Physician Orders: PT Eval and Treat   Medical Diagnosis from Referral: [email or web address]   Evaluation Date: 12/2/2022  Authorization Period Expiration: 12/30/2022  Plan of Care Expiration: 2/10/2023  Visit # / Visits authorized: 1/ 1     FOTO: lymphedema consult only    Time In: 1105 am  Time Out: 1145 am  Total Billable Time: 40 minutes    Precautions: Standard      Subjective     Pt reports: watching Manual Lymph Drainage video and understanding concept of direction for drainage; Has been performing open book exercises which has helped the motion of her right arm.   She was compliant with home exercise program.  Response to previous treatment: good  Pain Scale: Daniela rates pain on a scale of 0/10 on VAS.   Pain location:  n/a    Functional change: n/a      Objective     Objective Measures updated at progress report unless specified.     Treatment     Daniela received individual therapeutic exercises to improve postural correction and alignment, stretching and soft tissue mobility, and strengthening for 35 minutes including the following:     Daniela received therapeutic exercises to develop ROM and flexibility for (15) minutes including:     Intervention Performed Today    Open book and windmills x Sidelying and at the wall   Child's pose with lateral flexion to right and left x    Thread the needle x    Provided with gentle yoga handout x For full body mobility                          Plan for Next Visit: chest opening exercises and shoulder range of motion exercises        Daniela received the following manual therapy techniques were performed to increased myofascial/soft tissue length, mobility  and pliability, increase PROM, AROM and function as well as to decrease pain for 10 minutes    Exercise (12/16/2022)   Cervical STM    Upper trap STM    Clavicular, Intercostal STM    Pec mm STM/release X   Subscapularis STM/release X   STM latissimus dorsi mm    STM Rhomoids    Axillary Web syndrome techniques N/a                 Daniela received the following self care/home management therapy techniques were instructed and performed (Manual Lymph Drainage ) for 15 minutes:  Performed short neck series  Performed abdominal series  Established Axilla-Inguinal Anastomoses (A-I-A)  Established Axilla-Axilla Anastomoses (A-A-A)  Performed MLD on anterior and posterior right upper extremity  Recommended a 10-15 mmHg sleeve for upcoming flight to [place] to prevent swelling of the right arm      Home Exercise Program and Patient Education   Education provided re:  -Manual Lymph Drainage (written handout provided)  -compression sleeve recommendations  -exercises and manual therapies for right upper extremity and thoracic range of motion     Written Home Exercises Provided: yes.  Exercises were reviewed and Daniela was able to demonstrate them prior to the end of the session.  Daniela demonstrated good  understanding of the education provided.     See EMR under Patient Instructions for exercises provided 12/14/2022.    Pt has no cultural, educational or language barriers to learning provided.    Assessment       Goals as follows:  Short Term goals: 5 weeks  1. Patient will demonstrate 100% understanding of lymphedema risk reduction practices to include self monitoring for lymphedema. GOAL MET  2. Patient will demonstrate independence with Home Exercise program established. (progressing, not met)  3. Pt will increase AROM/PROM in shoulder abduction ROM to 170 degrees on right to improve functional reach, carry, push, pull pain free. (progressing, not met)  4. Pt will increase AROM/PROM in shoulder flexion to 170 degrees on  right to improve functional reach, carry, push, pull pain free.(progressing, not met)  5. Strength will be assessed and appropriate goals set. (progressing, not met)     Long Term Goals: 10 weeks   1. Pt will demonstrate full/maximized tissue mobility to increase ROM and promote healthy tissue to be pain free at discharge. (progressing, not met)           Plan   Plan of care Certification: 12/2/2022 to 2/10/2023     Daniela feels like she can manage swelling in right arm if she starts showing signs of lymphedema and will get a lighter compression sleeve to wear for preventative reasons. She appeared confident about performing Manual Lymph Drainage and has handouts and a video to review as needed. No planned visits scheduled after today     Therapist: Aretha Pascual, PT  12/16/2022

## 2022-12-28 ENCOUNTER — PATIENT MESSAGE (OUTPATIENT)
Dept: ADMINISTRATIVE | Facility: OTHER | Age: 52
End: 2022-12-28
Payer: MEDICARE

## 2023-01-11 NOTE — MR AVS SNAPSHOT
[facility]  [address]  [place] [address]  Phone: [phone]                  Daniela Costa   3/14/2017 1:20 PM   Office Visit    Description:  Female : 1970   Provider:  Arianna Webster MD   Department:  [facility]           Reason for Visit     Otalgia           Diagnoses this Visit        Comments    Acute bacterial sinusitis    -  Primary            To Do List           Future Appointments        Provider Department Dept Phone    2017 1:20 PM Lucía Terrell MD [facility] [phone]      Goals (5 Years of Data)     None       These Medications        Disp Refills Start End    doxycycline (VIBRAMYCIN) 100 MG Cap 20 capsule 0 3/14/2017 3/24/2017    Take 1 capsule (100 mg total) by mouth every 12 (twelve) hours. - Oral    Pharmacy: [facility]/pharmacy #[id number]  [place], [place] - [address] [address] Ph #: [phone]       mometasone (NASONEX) 50 mcg/actuation nasal spray 17 g 0 3/14/2017     2 sprays by Nasal route once daily. - Nasal    [id number] [place] [facility] [address] [address] [facility] Ph #: [phone]         [facility]     [facility] -  Assistance  Registered nurses in the [facility] provide clinical advisement, health education, appointment booking, and other advisory services.  Call for this free service at [phone].             Medications           Message regarding Medications     Verify the changes and/or additions to your medication regime listed below are the same as discussed with your clinician today.  If any of these changes or additions are incorrect, please notify your healthcare provider.        START taking these NEW medications        Refills    doxycycline (VIBRAMYCIN) 100 MG Cap 0    Sig: Take 1 capsule (100 mg total) by mouth every 12 (twelve) hours.    Class: Normal    Route: Oral    mometasone (NASONEX) 50 mcg/actuation  1/1/23/Event Note Event Note Event Note Event Note Event Note Event Note Event Note Event Note Event Note Event Note Event Note Follow Up Nutrition Assessment/Nutrition Services Nutrition Services nasal spray 0    Si sprays by Nasal route once daily.    Class: Normal    Route: Nasal      STOP taking these medications     predniSONE (DELTASONE) 20 MG tablet 3 tab for 5 days. 2 tab for 5 days. 1 tab for 5 days. 1/2 tab for 5 days    lorazepam (ATIVAN) 1 MG tablet Take 1 mg by mouth every 6 (six) hours as needed for Anxiety.    tamoxifen (NOLVADEX) 20 MG Tab Take 20 mg by mouth once daily.    trazodone (DESYREL) 50 MG tablet Take 50 mg by mouth every evening.    fluticasone (FLONASE) 50 mcg/actuation nasal spray 1 spray by Nasal route.           Verify that the below list of medications is an accurate representation of the medications you are currently taking.  If none reported, the list may be blank. If incorrect, please contact your healthcare provider. Carry this list with you in case of emergency.           Current Medications     albuterol (ACCUNEB) 0.63 mg/3 mL Nebu Take 0.63 mg by nebulization every 6 (six) hours as needed.    amoxicillin-clavulanate 875-125mg (AUGMENTIN) 875-125 mg per tablet TAKE 1 TABLET BY MOUTH 2 TIMES PER DAY    budesonide-formoterol 160-4.5 mcg (SYMBICORT) 160-4.5 mcg/actuation HFAA Inhale into the lungs every 12 (twelve) hours.    doxycycline (VIBRAMYCIN) 100 MG Cap Take 1 capsule (100 mg total) by mouth every 12 (twelve) hours.    exemestane (AROMASIN) 25 mg tablet Take 25 mg by mouth.    lamotrigine (LAMICTAL) 100 MG tablet Take 100 mg by mouth once daily.    lamotrigine (LAMICTAL) 200 MG tablet Take 200 mg by mouth once daily.    levalbuterol (XOPENEX) 1.25 mg/0.5 mL nebulizer solution Take 1 ampule by nebulization every 4 (four) hours as needed for Wheezing.    mometasone (NASONEX) 50 mcg/actuation nasal spray 2 sprays by Nasal route once daily.    tiotropium (SPIRIVA) 18 mcg inhalation capsule Inhale 18 mcg into the lungs once daily.    venlafaxine (EFFEXOR-XR) 150 MG Cp24 Take 75 mg by mouth once daily.           Clinical Reference Information           Your Vitals Were   "   BP Pulse Temp Resp Height Weight    116/80 142 99.8 °F (37.7 °C) (Tympanic) 18 5' 6" (1.676 m) 88.5 kg (195 lb 1.7 oz)    Last Period SpO2 BMI          08/14/2012 98% 31.49 kg/m2        Blood Pressure          Most Recent Value    BP  116/80      Allergies as of 3/14/2017     Cefdinir    Levofloxacin      Immunizations Administered on Date of Encounter - 3/14/2017     None      [facility] Sign-Up     Activating your [facility] account is as easy as 1-2-3!     1) Visit [email or web address], select Sign Up Now, enter this activation code and your date of birth, then select Next.  [email or web address]  Expires: 4/28/2017  1:45 PM      2) Create a username and password to use when you visit [facility] in the future and select a security question in case you lose your password and select Next.    3) Enter your e-mail address and click Sign Up!    Additional Information  If you have questions, please e-mail [email or web address] or call [phone] to talk to our [facility] staff. Remember, [facility] is NOT to be used for urgent needs. For medical emergencies, dial 911.         Instructions      Acute Bacterial Rhinosinusitis (ABRS)  Acute bacterial rhinosinusitis (ABRS) is an infection of your nasal cavity and sinuses. Its caused by bacteria. Acute means that youve had symptoms for less than 12 weeks.  Understanding your sinuses  The nasal cavity is the large air-filled space behind your nose. The sinuses are a group of spaces formed by the bones of your face. They connect with your nasal cavity. ABRS causes the tissue lining these spaces to become inflamed. Mucus may not drain normally. This leads to facial pain and other symptoms.  What causes ABRS?  ABRS most often follows an upper respiratory infection caused by a virus. Bacteria then infect the lining of your nasal cavity and sinuses. But you can also get ABRS if you have:  · Nasal allergies  · Long-term nasal swelling and congestion not caused by allergies  · Blockage in " the nose  Symptoms of ABRS  The symptoms of ABRS may be different for each person, and can include:  · Nasal congestion  · Runny nose  · Fluid draining from the nose down the throat (postnasal drip)  · Headache  · Cough  · Pain in the sinuses  · Thick, colored fluid from the nose (mucus)  · Fever  Diagnosing ABRS  ABRS may be diagnosed if youve had an upper respiratory infection like a cold and cough for longer than 10 to 14 days. Your health care provider will ask about your symptoms and your medical history. The provider will check your vital signs, including your temperature. Youll have a physical exam. The health care provider will check your ears, nose, and throat. You likely wont need any tests. If ABRS comes back, you may have a culture or other tests.  Treatment for ABRS  Treatment may include:  · Antibiotic medicine. This is for symptoms that last for at least 10 to 14 days.  · Nasal corticosteroid medicine. Drops or spray used in the nose can lessen swelling and congestion.  · Over-the-counter pain medicine. This is to lessen sinus pain and pressure.  · Nasal decongestant medicine. Spray or drops may help to lessen congestion. Do not use them for more than a few days.  · Salt wash (saline irrigation). This can help to loosen mucus.  Possible complications of ABRS  ABRS may come back or become long-term (chronic).  In rare cases, ABRS may cause complications such as:   · Inflamed tissue around the brain and spinal cord (meningitis)  · Inflamed tissue around the eyes (orbital cellulitis)  · Inflamed bones around the sinuses (osteitis)  These problems may need to be treated in a hospital with intravenous (IV) antibiotic medicine or surgery.  When to call the health care provider  Call your health care provider if you have any of the following:  · Symptoms that dont get better, or get worse  · Symptoms that dont get better after 3 to 5 days on antibiotics  · Trouble seeing  · Swelling around your  eyes  · Confusion or trouble staying awake   Date Last Reviewed: 3/3/2015  © 2891-3330 [employer]. [address] [place] [address], [place] [address]. All rights reserved. This information is not intended as a substitute for professional medical care. Always follow your healthcare professional's instructions.             Language Assistance Services     ATTENTION: Language assistance services are available, free of charge. Please call [phone].      ATENCIÓN: Si habla español, tiene a montesinos disposición servicios gratuitos de asistencia lingüística. Llame al [phone].     CHÚ Ý: N?u b?n nói Ti?ng Vi?t, có các d?ch v? h? tr? ngôn ng? mi?n phí dành cho b?n. G?i s? [phone].         [facility] complies with applicable Federal civil rights laws and does not discriminate on the basis of race, color, national origin, age, disability, or sex.

## 2023-01-12 ENCOUNTER — LAB VISIT (OUTPATIENT)
Dept: LAB | Facility: HOSPITAL | Age: 53
End: 2023-01-12
Attending: NURSE PRACTITIONER
Payer: MEDICARE

## 2023-01-12 DIAGNOSIS — R79.0 LOW IRON STORES: ICD-10-CM

## 2023-01-12 DIAGNOSIS — D58.2 ELEVATED HEMOGLOBIN: Primary | ICD-10-CM

## 2023-01-12 DIAGNOSIS — R53.83 FATIGUE, UNSPECIFIED TYPE: ICD-10-CM

## 2023-01-12 LAB
ALBUMIN SERPL BCP-MCNC: 4.1 G/DL (ref 3.5–5.2)
ALP SERPL-CCNC: 98 U/L (ref 55–135)
ALT SERPL W/O P-5'-P-CCNC: 23 U/L (ref 10–44)
ANION GAP SERPL CALC-SCNC: 13 MMOL/L (ref 8–16)
AST SERPL-CCNC: 17 U/L (ref 10–40)
BASOPHILS # BLD AUTO: 0.05 K/UL (ref 0–0.2)
BASOPHILS NFR BLD: 1.1 % (ref 0–1.9)
BILIRUB SERPL-MCNC: 0.8 MG/DL (ref 0.1–1)
BUN SERPL-MCNC: 10 MG/DL (ref 6–20)
CALCIUM SERPL-MCNC: 9.8 MG/DL (ref 8.7–10.5)
CHLORIDE SERPL-SCNC: 106 MMOL/L (ref 95–110)
CO2 SERPL-SCNC: 24 MMOL/L (ref 23–29)
CREAT SERPL-MCNC: 1 MG/DL (ref 0.5–1.4)
DIFFERENTIAL METHOD: ABNORMAL
EOSINOPHIL # BLD AUTO: 0.1 K/UL (ref 0–0.5)
EOSINOPHIL NFR BLD: 3.1 % (ref 0–8)
ERYTHROCYTE [DISTWIDTH] IN BLOOD BY AUTOMATED COUNT: 13.2 % (ref 11.5–14.5)
EST. GFR  (NO RACE VARIABLE): >60 ML/MIN/1.73 M^2
FERRITIN SERPL-MCNC: 79 NG/ML (ref 20–300)
GLUCOSE SERPL-MCNC: 119 MG/DL (ref 70–110)
HCT VFR BLD AUTO: 50.9 % (ref 37–48.5)
HGB BLD-MCNC: 16.1 G/DL (ref 12–16)
IMM GRANULOCYTES # BLD AUTO: 0.01 K/UL (ref 0–0.04)
IMM GRANULOCYTES NFR BLD AUTO: 0.2 % (ref 0–0.5)
IRON SERPL-MCNC: 83 UG/DL (ref 30–160)
LYMPHOCYTES # BLD AUTO: 1.3 K/UL (ref 1–4.8)
LYMPHOCYTES NFR BLD: 28.6 % (ref 18–48)
MCH RBC QN AUTO: 27.9 PG (ref 27–31)
MCHC RBC AUTO-ENTMCNC: 31.6 G/DL (ref 32–36)
MCV RBC AUTO: 88 FL (ref 82–98)
MONOCYTES # BLD AUTO: 0.3 K/UL (ref 0.3–1)
MONOCYTES NFR BLD: 7 % (ref 4–15)
NEUTROPHILS # BLD AUTO: 2.8 K/UL (ref 1.8–7.7)
NEUTROPHILS NFR BLD: 60 % (ref 38–73)
NRBC BLD-RTO: 0 /100 WBC
PLATELET # BLD AUTO: 196 K/UL (ref 150–450)
PMV BLD AUTO: 10.3 FL (ref 9.2–12.9)
POTASSIUM SERPL-SCNC: 4.5 MMOL/L (ref 3.5–5.1)
PROT SERPL-MCNC: 7.4 G/DL (ref 6–8.4)
RBC # BLD AUTO: 5.77 M/UL (ref 4–5.4)
SATURATED IRON: 23 % (ref 20–50)
SODIUM SERPL-SCNC: 143 MMOL/L (ref 136–145)
TOTAL IRON BINDING CAPACITY: 357 UG/DL (ref 250–450)
TRANSFERRIN SERPL-MCNC: 241 MG/DL (ref 200–375)
WBC # BLD AUTO: 4.58 K/UL (ref 3.9–12.7)

## 2023-01-12 PROCEDURE — 80053 COMPREHEN METABOLIC PANEL: CPT | Performed by: NURSE PRACTITIONER

## 2023-01-12 PROCEDURE — 84466 ASSAY OF TRANSFERRIN: CPT | Performed by: NURSE PRACTITIONER

## 2023-01-12 PROCEDURE — 85025 COMPLETE CBC W/AUTO DIFF WBC: CPT | Performed by: NURSE PRACTITIONER

## 2023-01-12 PROCEDURE — 82728 ASSAY OF FERRITIN: CPT | Performed by: NURSE PRACTITIONER

## 2023-02-02 ENCOUNTER — OFFICE VISIT (OUTPATIENT)
Dept: HEMATOLOGY/ONCOLOGY | Facility: CLINIC | Age: 53
End: 2023-02-02
Payer: MEDICARE

## 2023-02-02 ENCOUNTER — LAB VISIT (OUTPATIENT)
Dept: LAB | Facility: HOSPITAL | Age: 53
End: 2023-02-02
Attending: NURSE PRACTITIONER
Payer: MEDICARE

## 2023-02-02 VITALS
SYSTOLIC BLOOD PRESSURE: 103 MMHG | BODY MASS INDEX: 33.43 KG/M2 | WEIGHT: 200.63 LBS | HEIGHT: 65 IN | OXYGEN SATURATION: 98 % | HEART RATE: 78 BPM | DIASTOLIC BLOOD PRESSURE: 70 MMHG

## 2023-02-02 DIAGNOSIS — D45 POLYCYTHEMIA VERA: ICD-10-CM

## 2023-02-02 DIAGNOSIS — D58.2 ELEVATED HEMOGLOBIN: ICD-10-CM

## 2023-02-02 DIAGNOSIS — D75.1 POLYCYTHEMIA: Primary | ICD-10-CM

## 2023-02-02 DIAGNOSIS — D75.1 POLYCYTHEMIA: ICD-10-CM

## 2023-02-02 DIAGNOSIS — Z85.3 HISTORY OF BREAST CANCER: ICD-10-CM

## 2023-02-02 DIAGNOSIS — R53.82 CHRONIC FATIGUE: ICD-10-CM

## 2023-02-02 DIAGNOSIS — J98.4 RESTRICTIVE AIRWAY DISEASE: ICD-10-CM

## 2023-02-02 LAB
BASOPHILS # BLD AUTO: 0.05 K/UL (ref 0–0.2)
BASOPHILS NFR BLD: 1 % (ref 0–1.9)
DIFFERENTIAL METHOD: ABNORMAL
EOSINOPHIL # BLD AUTO: 0.1 K/UL (ref 0–0.5)
EOSINOPHIL NFR BLD: 2.1 % (ref 0–8)
ERYTHROCYTE [DISTWIDTH] IN BLOOD BY AUTOMATED COUNT: 14.5 % (ref 11.5–14.5)
HCT VFR BLD AUTO: 49.3 % (ref 37–48.5)
HGB BLD-MCNC: 15.8 G/DL (ref 12–16)
IMM GRANULOCYTES # BLD AUTO: 0.01 K/UL (ref 0–0.04)
IMM GRANULOCYTES NFR BLD AUTO: 0.2 % (ref 0–0.5)
LYMPHOCYTES # BLD AUTO: 1.1 K/UL (ref 1–4.8)
LYMPHOCYTES NFR BLD: 20.8 % (ref 18–48)
MCH RBC QN AUTO: 29 PG (ref 27–31)
MCHC RBC AUTO-ENTMCNC: 32 G/DL (ref 32–36)
MCV RBC AUTO: 91 FL (ref 82–98)
MONOCYTES # BLD AUTO: 0.4 K/UL (ref 0.3–1)
MONOCYTES NFR BLD: 6.8 % (ref 4–15)
NEUTROPHILS # BLD AUTO: 3.6 K/UL (ref 1.8–7.7)
NEUTROPHILS NFR BLD: 69.1 % (ref 38–73)
NRBC BLD-RTO: 0 /100 WBC
PATH REV BLD -IMP: NORMAL
PLATELET # BLD AUTO: 169 K/UL (ref 150–450)
PMV BLD AUTO: 10.4 FL (ref 9.2–12.9)
RBC # BLD AUTO: 5.44 M/UL (ref 4–5.4)
WBC # BLD AUTO: 5.14 K/UL (ref 3.9–12.7)

## 2023-02-02 PROCEDURE — 3074F PR MOST RECENT SYSTOLIC BLOOD PRESSURE < 130 MM HG: ICD-10-PCS | Mod: CPTII,S$GLB,, | Performed by: NURSE PRACTITIONER

## 2023-02-02 PROCEDURE — 81270 JAK2 GENE: CPT | Performed by: NURSE PRACTITIONER

## 2023-02-02 PROCEDURE — 81339 MPL GENE SEQ ALYS EXON 10: CPT | Performed by: NURSE PRACTITIONER

## 2023-02-02 PROCEDURE — 3078F PR MOST RECENT DIASTOLIC BLOOD PRESSURE < 80 MM HG: ICD-10-PCS | Mod: CPTII,S$GLB,, | Performed by: NURSE PRACTITIONER

## 2023-02-02 PROCEDURE — 82668 ASSAY OF ERYTHROPOIETIN: CPT | Performed by: NURSE PRACTITIONER

## 2023-02-02 PROCEDURE — 1160F PR REVIEW ALL MEDS BY PRESCRIBER/CLIN PHARMACIST DOCUMENTED: ICD-10-PCS | Mod: CPTII,S$GLB,, | Performed by: NURSE PRACTITIONER

## 2023-02-02 PROCEDURE — 81219 CALR GENE COM VARIANTS: CPT | Performed by: NURSE PRACTITIONER

## 2023-02-02 PROCEDURE — 3074F SYST BP LT 130 MM HG: CPT | Mod: CPTII,S$GLB,, | Performed by: NURSE PRACTITIONER

## 2023-02-02 PROCEDURE — 85060 BLOOD SMEAR INTERPRETATION: CPT | Mod: ,,, | Performed by: STUDENT IN AN ORGANIZED HEALTH CARE EDUCATION/TRAINING PROGRAM

## 2023-02-02 PROCEDURE — 85025 COMPLETE CBC W/AUTO DIFF WBC: CPT | Performed by: NURSE PRACTITIONER

## 2023-02-02 PROCEDURE — 99214 PR OFFICE/OUTPT VISIT, EST, LEVL IV, 30-39 MIN: ICD-10-PCS | Mod: S$GLB,,, | Performed by: NURSE PRACTITIONER

## 2023-02-02 PROCEDURE — 3008F BODY MASS INDEX DOCD: CPT | Mod: CPTII,S$GLB,, | Performed by: NURSE PRACTITIONER

## 2023-02-02 PROCEDURE — 81403 MOPATH PROCEDURE LEVEL 4: CPT | Performed by: NURSE PRACTITIONER

## 2023-02-02 PROCEDURE — 1159F MED LIST DOCD IN RCRD: CPT | Mod: CPTII,S$GLB,, | Performed by: NURSE PRACTITIONER

## 2023-02-02 PROCEDURE — 85060 PATHOLOGIST REVIEW: ICD-10-PCS | Mod: ,,, | Performed by: STUDENT IN AN ORGANIZED HEALTH CARE EDUCATION/TRAINING PROGRAM

## 2023-02-02 PROCEDURE — 3078F DIAST BP <80 MM HG: CPT | Mod: CPTII,S$GLB,, | Performed by: NURSE PRACTITIONER

## 2023-02-02 PROCEDURE — 99999 PR PBB SHADOW E&M-EST. PATIENT-LVL IV: CPT | Mod: PBBFAC,,, | Performed by: NURSE PRACTITIONER

## 2023-02-02 PROCEDURE — 1159F PR MEDICATION LIST DOCUMENTED IN MEDICAL RECORD: ICD-10-PCS | Mod: CPTII,S$GLB,, | Performed by: NURSE PRACTITIONER

## 2023-02-02 PROCEDURE — 3008F PR BODY MASS INDEX (BMI) DOCUMENTED: ICD-10-PCS | Mod: CPTII,S$GLB,, | Performed by: NURSE PRACTITIONER

## 2023-02-02 PROCEDURE — 99999 PR PBB SHADOW E&M-EST. PATIENT-LVL IV: ICD-10-PCS | Mod: PBBFAC,,, | Performed by: NURSE PRACTITIONER

## 2023-02-02 PROCEDURE — 36415 COLL VENOUS BLD VENIPUNCTURE: CPT | Mod: PO | Performed by: NURSE PRACTITIONER

## 2023-02-02 PROCEDURE — 1160F RVW MEDS BY RX/DR IN RCRD: CPT | Mod: CPTII,S$GLB,, | Performed by: NURSE PRACTITIONER

## 2023-02-02 PROCEDURE — 99214 OFFICE O/P EST MOD 30 MIN: CPT | Mod: S$GLB,,, | Performed by: NURSE PRACTITIONER

## 2023-02-02 NOTE — PROGRESS NOTES
Subjective:      Patient ID: Daniela Costa is a 53 y.o. female.    Chief Complaint: fatigue    HPI:  Patient is a 53 year old female who presents today as a new patient for further evaluation and recommendation of elevated hemoglobin.       She has a history of breast cancer diagnosed at age 40 y/o  Right side- 6.1 cm overall size  Abnormal mammo after palpated mass  Palpable mass- pt palpated  Breast biopsy with core or excision- invasive ductal carcinoma     Pathology- per pt initially stage 2 - then bumped to stage 3     Treatment               Lumpectomy or mastectomy              Reconstruction- immediate vs delayed              Lymph node biopsy- sentinel node              Genetic testing- negative for only BRCA 1 & 2              Radiation- right chest wall 4 months              Curtis-Adjuvant therapy- AC X4 and taxol X 12.   endocrine - estrogen positive- tamoxifen first - 1 year- changed to exemestane for the last 4 years. Completed May 2019 - followed by Anna Main, NP    Has a significant pulmonary history including reactive airway disease, mild intermittent asthma, and airway hyperactivity.  Followed by pulmonology/Dr. John    States that her father has polycythemia requiring phlebotomy.     Family hx of cancer:  Father NHL, ALL as a child, no polycythemia  Mother: Breast cancer  Self: Breast Cancer    Is trying to lose weight and has lost 30 lbs since September 2022.      Denies smoking currently.  States that she quit in 1/2014.  Smoked on/off for 20 years (about total 10 years) 1/2 to 1 pack/day.  Drinks a glass a wine maybe once per week.  Denies illicit drug use.     Currently disabled - non working at this time    10/31/2022 colonoscopyThe examined portion of the ileum was normal.  Two 8 to 10 mm polyps in the sigmoid colon,    removed with a hot snare. Resected and retrieved. Pathology of Tubular adenoma - Repeat colonoscopy in 3 years.   9/2022 PAP Negative for intraepithelial lesion or  "malignancy       Social History     Socioeconomic History    Marital status:     Number of children: 6   Occupational History    Occupation: stay home mom    Tobacco Use    Smoking status: Former     Packs/day: 0.50     Years: 16.00     Pack years: 8.00     Types: Cigarettes     Start date: 1990     Quit date: 2014     Years since quittin.0    Smokeless tobacco: Never   Substance and Sexual Activity    Alcohol use: Yes     Alcohol/week: 2.0 standard drinks     Types: 2 Glasses of wine per week    Drug use: No    Sexual activity: Not Currently     Partners: Male     Birth control/protection: See Surgical Hx   Social History Narrative    Patient has 4 biological children and 2 step       Family History   Problem Relation Age of Onset    Diabetes Mother     Hyperlipidemia Mother     Breast cancer Mother 53        lumpectomy, chemo, radiation    Diabetes Father     Hyperlipidemia Father     Other Father         non-Hogdkin's lymphoma    Other Sister         prophylactic BL mastectomy    Other Sister         prophylactic BL mastecomy    Down syndrome Other     Bipolar disorder Maternal Grandmother     Breast cancer Maternal Grandmother     Melanoma Maternal Grandmother         on scalp with brain mets    Liver cancer Paternal Grandfather     Ovarian cancer Paternal Grandmother         metastatic    Melanoma Maternal Aunt     Breast cancer Maternal Aunt         DCIS    Bipolar disorder Maternal Aunt     Bipolar disorder Maternal Aunt     Cancer Paternal Aunt         "in lymphatic csystem"    Pancreatic cancer Paternal Uncle        Past Surgical History:   Procedure Laterality Date    ADENOIDECTOMY      BACK SURGERY      SI joint fusion    Breast Reconstruction  Bilateral     BREAST SURGERY      CERVICAL FUSION      CERVICAL FUSION      COLONOSCOPY N/A 10/31/2022    Procedure: COLONOSCOPY;  Surgeon: Lexy Irizarry MD;  Location: [facility];  Service: Endoscopy;  Laterality: N/A;    HYSTERECTOMY      " lumbar fusion      MASTECTOMY Right 2013    due to breast cancer, radiation done after surgery    MASTECTOMY Left 2015    propholytic due to breast cancer in right breast    ROBOT-ASSISTED LAPAROSCOPIC ABDOMINAL HYSTERECTOMY USING DA HEBERT XI N/A 12/05/2018    Procedure: XI ROBOTIC HYSTERECTOMY;  Surgeon: Meka Reich MD;  Location: [facility] OR;  Service: OB/GYN;  Laterality: N/A;    ROBOT-ASSISTED LAPAROSCOPIC LYSIS OF ADHESIONS USING DA HEBERT XI N/A 12/05/2018    Procedure: XI ROBOTIC LYSIS, ADHESIONS;  Surgeon: Meka Reich MD;  Location: [facility] OR;  Service: OB/GYN;  Laterality: N/A;    ROBOT-ASSISTED LAPAROSCOPIC SALPINGO-OOPHORECTOMY USING DA HEBERT XI Bilateral 12/05/2018    Procedure: XI ROBOTIC SALPINGO-OOPHORECTOMY;  Surgeon: Meka Reich MD;  Location: [facility] OR;  Service: OB/GYN;  Laterality: Bilateral;    SI joint fusion   03/26/2018    TONSILLECTOMY      TUBAL LIGATION         Past Medical History:   Diagnosis Date    Allergy     Anxiety     Arthritis     Asthma     Breast cancer     june 2012; BRCA 1 and 2 negative    Breast cancer genetic susceptibility     Cancer     Depression     Hypothyroidism     Lung disease     Pneumonia        Review of Systems   Constitutional:  Positive for fatigue.   Eyes: Negative.    Respiratory:  Positive for shortness of breath (on exertion). Negative for chest tightness.    Gastrointestinal: Negative.    Endocrine: Negative.    Genitourinary: Negative.    Musculoskeletal:  Positive for arthralgias.   Skin: Negative.    Allergic/Immunologic: Negative.    Neurological:  Positive for numbness (numbness to fingertips due to previous chemotherapy) and headaches (notices after reading).   Hematological: Negative.    Psychiatric/Behavioral: Negative.          Medication List with Changes/Refills   Current Medications    ALBUTEROL (PROVENTIL/VENTOLIN HFA) 90 MCG/ACTUATION INHALER    Inhale 2 puffs into the lungs every 6 (six) hours.    ASCORBATE CALCIUM (VITAMIN C  ORAL)    Take by mouth.    BLOOD PRESSURE MONITOR (EAL EASE) ST SIZE    by Other route as needed for High Blood Pressure.    BLOOD SUGAR DIAGNOSTIC STRP    To check BG 2 times daily, to use with insurance preferred meter    BLOOD-GLUCOSE METER KIT    To check BG 2 times daily, to use with insurance preferred meter    BUSPIRONE (BUSPAR) 15 MG TABLET    Take 1 tablet (15 mg total) by mouth 3 (three) times daily.    CLONAZEPAM (KLONOPIN) 1 MG TABLET    TAKE 1 TABLET BY MOUTH ONCE DAILY AS NEEDED FOR ANXIETY    DILTIAZEM (CARDIZEM) 30 MG TABLET    Take 1 tablet (30 mg total) by mouth every 12 (twelve) hours.    FAMOTIDINE (PEPCID) 20 MG TABLET    Take 1 tablet (20 mg total) by mouth 2 (two) times daily.    FLUTICASONE FUROATE-VILANTEROL (BREO ELLIPTA) 200-25 MCG/DOSE DSDV DISKUS INHALER    Inhale 1 puff into the lungs once daily. Controller    FLUTICASONE PROPIONATE (FLONASE) 50 MCG/ACTUATION NASAL SPRAY    SPRAY 1 SPRAY INTO EACH NOSTRIL TWO TIMES DAILY    HYDROCORTISONE 2.5 % LOTION    Apply topically 2 (two) times daily.    HYDROXYZINE (ATARAX) 50 MG TABLET    Take 1 to 2 tablets at bedtime as needed for sleep    INHALATION SPACING DEVICE (BREATHERITE VALVED MDI CHAMBER)    Use as directed for inhalation.    LAMOTRIGINE (LAMICTAL) 100 MG TABLET    Take 1 tablet (100 mg total) by mouth once daily.    LANCETS MISC    To check BG 2 times daily, to use with insurance preferred meter    LEVOTHYROXINE (SYNTHROID) 50 MCG TABLET    Take 1 tablet (50 mcg total) by mouth once daily.    LORATADINE (CLARITIN) 10 MG TABLET    Take 1 tablet (10 mg total) by mouth once daily.    MELATONIN 10 MG CAP    Take by mouth.    MELOXICAM (MOBIC) 7.5 MG TABLET    Take 1 tablet (7.5 mg total) by mouth 2 (two) times daily as needed for Pain (pain).    ONDANSETRON (ZOFRAN-ODT) 4 MG TBDL    Take 4 mg by mouth every 6 (six) hours as needed.    TRAZODONE (DESYREL) 50 MG TABLET    Take 1 tablet (50 mg total) by mouth nightly as needed for  Insomnia.        Objective:     Vitals:    02/02/23 1313   BP: 103/70   Pulse: 78       Physical Exam  Vitals and nursing note reviewed.   Constitutional:       Appearance: Normal appearance.   HENT:      Head: Normocephalic and atraumatic.      Right Ear: External ear normal.      Left Ear: External ear normal.   Cardiovascular:      Rate and Rhythm: Normal rate and regular rhythm.      Heart sounds: Normal heart sounds, S1 normal and S2 normal.   Pulmonary:      Effort: Pulmonary effort is normal.      Comments: Air trapping with prolonged expiratory sounds post expiration  Abdominal:      General: There is no distension.      Palpations: There is no hepatomegaly or splenomegaly.      Tenderness: There is no abdominal tenderness. There is no guarding.   Musculoskeletal:         General: Normal range of motion.      Cervical back: Normal range of motion.   Skin:     General: Skin is warm and dry.   Neurological:      General: No focal deficit present.      Mental Status: She is alert and oriented to person, place, and time.   Psychiatric:         Attention and Perception: Attention and perception normal.         Mood and Affect: Mood and affect normal.         Speech: Speech normal.         Behavior: Behavior normal. Behavior is cooperative.         Thought Content: Thought content normal.         Cognition and Memory: Cognition and memory normal.         Judgment: Judgment normal.       Assessment:     Problem List Items Addressed This Visit          Other    Chronic fatigue     Other Visit Diagnoses       Polycythemia    -  Primary    Relevant Orders    MPN (JAK2 V617F)WITH REFLEX TO CALR,MPL    JAK2 Exon 12 And Other Non-V617 Mutation Detection, Bld    CBC Auto Differential    Pathologist Interpretation Differential    EPO LEVEL    Elevated hemoglobin        Relevant Orders    MPN (JAK2 V617F)WITH REFLEX TO CALR,MPL    JAK2 Exon 12 And Other Non-V617 Mutation Detection, Bld    Polycythemia vera        Relevant  Orders    MPN (JAK2 V617F)WITH REFLEX TO CALR,MPL    History of breast cancer        Restrictive airway disease                Lab Results   Component Value Date    WBC 4.58 01/12/2023    RBC 5.77 (H) 01/12/2023    HGB 16.1 (H) 01/12/2023    HCT 50.9 (H) 01/12/2023    MCV 88 01/12/2023    MCH 27.9 01/12/2023    MCHC 31.6 (L) 01/12/2023    RDW 13.2 01/12/2023     01/12/2023    MPV 10.3 01/12/2023    GRAN 2.8 01/12/2023    GRAN 60.0 01/12/2023    LYMPH 1.3 01/12/2023    LYMPH 28.6 01/12/2023    MONO 0.3 01/12/2023    MONO 7.0 01/12/2023    EOS 0.1 01/12/2023    BASO 0.05 01/12/2023    EOSINOPHIL 3.1 01/12/2023    BASOPHIL 1.1 01/12/2023      Lab Results   Component Value Date     01/12/2023    K 4.5 01/12/2023     01/12/2023    CO2 24 01/12/2023    BUN 10 01/12/2023    CREATININE 1.0 01/12/2023    CALCIUM 9.8 01/12/2023    ANIONGAP 13 01/12/2023    ESTGFRAFRICA >60 01/09/2019    EGFRNONAA >60 01/09/2019     Lab Results   Component Value Date    ALT 23 01/12/2023    AST 17 01/12/2023    ALKPHOS 98 01/12/2023    BILITOT 0.8 01/12/2023     Lab Results   Component Value Date    IRON 83 01/12/2023    TRANSFERRIN 241 01/12/2023    TIBC 357 01/12/2023    FESATURATED 23 01/12/2023      Lab Results   Component Value Date    FERRITIN 79 01/12/2023       Med Onc Chart Routing      Follow up with physician    Follow up with BREANNA 2 weeks. F/u in 2 weeks to discuss results and plans going forward   Infusion scheduling note n/a   Injection scheduling note n/a   Labs   Lab interval:  Labs today cbc, path review, erythropoeitin, MPN, non exon JAK2 mutation   Imaging   N/a   Pharmacy appointment No pharmacy appointment needed      Other referrals No additional referrals needed          Plan:   Polycythemia  -     MPN (JAK2 V617F)WITH REFLEX TO CALR,MPL; Future; Expected date: 02/02/2023  -     JAK2 Exon 12 And Other Non-V617 Mutation Detection, Bld; Future; Expected date: 02/02/2023  -     CBC Auto Differential;  Future; Expected date: 02/02/2023  -     Pathologist Interpretation Differential; Future; Expected date: 02/02/2023  -     EPO LEVEL; Future; Expected date: 02/02/2023    Elevated hemoglobin  -     Ambulatory referral/consult to Hematology / Oncology  -     MPN (JAK2 V617F)WITH REFLEX TO CALR,MPL; Future; Expected date: 02/02/2023  -     JAK2 Exon 12 And Other Non-V617 Mutation Detection, Bld; Future; Expected date: 02/02/2023    Polycythemia vera  -     MPN (JAK2 V617F)WITH REFLEX TO CALR,MPL; Future; Expected date: 02/02/2023    History of breast cancer    Chronic fatigue    Restrictive airway disease    She will f/u with pulmonology to be evaluated with sleep study and to inform MD that she has required more frequent use of rescue inhaler recently.  We discuss primary vs secondary polycythemia.  If PV, phlebotomy for hct >45%.  If secondary, phlebotomy for hct >55%.  She will f/u in 2 weeks to discuss results and plans going forward.     Collaborating Provider:  Dr. Darren Felix    Thank You,  Yamil Stover, FNP-C  Hematology Oncology

## 2023-02-03 LAB — PATH REV BLD -IMP: NORMAL

## 2023-02-06 LAB — EPO SERPL-ACNC: 5.8 MIU/ML (ref 2.6–18.5)

## 2023-02-08 LAB
JAK2 EXON 12 MUTATION DETECTION BLOOD: NORMAL
PATH REPORT.FINAL DX SPEC: NORMAL

## 2023-02-09 LAB
MPNR  FINAL DIAGNOSIS: NORMAL
MPNR  SPECIMEN TYPE: 0
MPNR RESULT: NORMAL

## 2023-02-15 ENCOUNTER — TELEPHONE (OUTPATIENT)
Dept: INTERNAL MEDICINE | Facility: CLINIC | Age: 53
End: 2023-02-15
Payer: MEDICARE

## 2023-02-15 NOTE — TELEPHONE ENCOUNTER
Spoke to pt. Pt states she tested positive for covid yesterday. Pt is complaining of ear pain and aching. Pt would like something called in for the ear(drops, antibiotics, etc)

## 2023-02-15 NOTE — TELEPHONE ENCOUNTER
So sorry to hear you have tested postive for COVID. There is no ear drop/antibiotics to prescribe as this is a viral illness. We typically suggest lots of rest, plenty of fluids, tylenol/motrin for body aches, chills and fever. Over the counter cold/flu meds also help with the symptoms. 5 days of isolation is recommended.

## 2023-02-28 ENCOUNTER — PATIENT MESSAGE (OUTPATIENT)
Dept: PULMONOLOGY | Facility: CLINIC | Age: 53
End: 2023-02-28
Payer: MEDICARE

## 2023-02-28 DIAGNOSIS — G47.34 NOCTURNAL HYPOXEMIA: ICD-10-CM

## 2023-02-28 DIAGNOSIS — G47.33 OSA (OBSTRUCTIVE SLEEP APNEA): Primary | ICD-10-CM

## 2023-02-28 DIAGNOSIS — D75.1 POLYCYTHEMIA: ICD-10-CM

## 2023-02-28 NOTE — TELEPHONE ENCOUNTER
Subjective:     Patient ID: Daniela Costa is a 52 y.o. female.    Chief Complaint:      HPI   Concerned about Obstructive Sleep Apnea and nocturnal hypoxemia due to polycythemia  EPWORTh 5    Post nasal drip and sinus congestion   Cough after exercising  Gained weight over the past two years    Asthma Follow-up  The patient has previously been evaluated here for asthma and presents for an asthma follow-up. The patient is currently having symptoms / an exacerbation. Current symptoms include chest tightness, dyspnea, non-productive cough, and wheezing. Symptoms have been present since several weeks ago and have been gradually worsening. She denies chest pain. Associated symptoms include poor exercise tolerance and shortness of breath.  This episode appears to have been triggered by exercise and pollens. Treatments tried for the current exacerbation include short-acting inhaled beta-adrenergic agonists, which have provided some relief of symptoms. The patient has been having similar episodes for approximately many years.    Current Disease Severity  The patient is having daytime symptoms daily. The patient is having daytime symptoms more than once per week, but not nightly. The patient is using short-acting beta agonists for symptom control needs refill. She has exacerbations requiring oral systemic corticosteroids 0 times per year. Current limitations in activity from asthma: none. Number of days of school or work missed in the last month: not applicable. Number of urgent/emergent visit in last year: 0.  The patient is not using a spacer with MDIs. Her best peak flow rate is   na. She is not monitoring peak flow rates at home.    Past Medical History:   Diagnosis Date    Allergy     Anxiety     Arthritis     Asthma     Breast cancer     june 2012; BRCA 1 and 2 negative    Breast cancer genetic susceptibility     Cancer     Depression     Hypothyroidism     Lung disease     Pneumonia      Past Surgical History:    Procedure Laterality Date    ADENOIDECTOMY      BACK SURGERY      SI joint fusion    Breast Reconstruction  Bilateral     BREAST SURGERY      CERVICAL FUSION      CERVICAL FUSION      HYSTERECTOMY      lumbar fusion      MASTECTOMY Right     MASTECTOMY Left     ROBOT-ASSISTED LAPAROSCOPIC ABDOMINAL HYSTERECTOMY USING DA HEBERT XI N/A 12/5/2018    Procedure: XI ROBOTIC HYSTERECTOMY;  Surgeon: Meka Reich MD;  Location: [facility] OR;  Service: OB/GYN;  Laterality: N/A;    ROBOT-ASSISTED LAPAROSCOPIC LYSIS OF ADHESIONS USING DA HEBERT XI N/A 12/5/2018    Procedure: XI ROBOTIC LYSIS, ADHESIONS;  Surgeon: Meka Reich MD;  Location: [facility] OR;  Service: OB/GYN;  Laterality: N/A;    ROBOT-ASSISTED LAPAROSCOPIC SALPINGO-OOPHORECTOMY USING DA HEBERT XI Bilateral 12/5/2018    Procedure: XI ROBOTIC SALPINGO-OOPHORECTOMY;  Surgeon: Meka Reich MD;  Location: [facility] OR;  Service: OB/GYN;  Laterality: Bilateral;    SI joint fusion   03/26/2018    TONSILLECTOMY      TUBAL LIGATION       Review of patient's allergies indicates:   Allergen Reactions    Cefdinir      Radiation Recall, everywhere she had radiation it looked like blisters and very hot to touch    Levofloxacin      Went into deep depression. Messed with Pych meds    Strawberry Hives     Current Outpatient Medications on File Prior to Visit   Medication Sig Dispense Refill    ASCORBATE CALCIUM (VITAMIN C ORAL) Take by mouth.      blood pressure monitor ([email or web address] EASE) ST size by Other route as needed for High Blood Pressure. 1 each 0    busPIRone (BUSPAR) 30 MG Tab Take 1 tablet (30 mg total) by mouth 3 (three) times daily. 60 tablet 2    clonazePAM (KLONOPIN) 1 MG tablet TAKE 1 TABLET BY MOUTH ONCE DAILY AS NEEDED FOR ANXIETY 30 tablet 1    diltiaZEM (CARDIZEM) 30 MG tablet Take 1 tablet (30 mg total) by mouth every 12 (twelve) hours. 180 tablet 3    famotidine (PEPCID) 20 MG tablet Take 1 tablet (20 mg total) by mouth 2 (two) times daily. 60 tablet 0     fluticasone propionate (FLONASE) 50 mcg/actuation nasal spray SPRAY 1 SPRAY INTO EACH NOSTRIL TWO TIMES DAILY 48 mL 3    hydrOXYzine (ATARAX) 50 MG tablet Take 1 to 2 tablets at bedtime as needed for sleep 60 tablet 1    ibuprofen (ADVIL,MOTRIN) 800 MG tablet Take 800 mg by mouth 3 (three) times daily.      inhalation spacing device (BREATHERITE VALVED MDI CHAMBER) Use as directed for inhalation. 1 Device prn    [START ON 10/1/2022] lamoTRIgine (LAMICTAL) 100 MG tablet Take 1 tablet (100 mg total) by mouth once daily. 30 tablet 1    lamoTRIgine (LAMICTAL) 25 MG tablet Take 1 tablet daily x 14 days then 2 daily x 14 days then 3 daily x 14 days then start 100 mg daily. 84 tablet 0    levothyroxine (SYNTHROID) 50 MCG tablet Take 1 tablet (50 mcg total) by mouth once daily. 90 tablet 1    loratadine (CLARITIN) 10 mg tablet Take 1 tablet (10 mg total) by mouth once daily. 30 tablet 0    melatonin 10 mg Cap Take by mouth.      meloxicam (MOBIC) 7.5 MG tablet Take 7.5 mg by mouth 2 (two) times daily.      multivitamin capsule Take 1 capsule by mouth once daily.      ondansetron (ZOFRAN-ODT) 4 MG TbDL Take 4 mg by mouth every 6 (six) hours as needed.      traZODone (DESYREL) 100 MG tablet Take 1/2 to 1 tablet at bedtime as needed for sleep. 30 tablet 1    [DISCONTINUED] fluticasone-vilanterol (BREO ELLIPTA) 200-25 mcg/dose DsDv diskus inhaler Inhale 1 puff into the lungs once daily. Controller 180 each 3    [DISCONTINUED] albuterol (PROVENTIL/VENTOLIN HFA) 90 mcg/actuation inhaler INHALE 2 PUFFS INTO THE LUNGS EVERY 6 (SIX) HOURS. 3 Inhaler 3     No current facility-administered medications on file prior to visit.     Social History     Socioeconomic History    Marital status:     Number of children: 6   Occupational History    Occupation: stay home mom    Tobacco Use    Smoking status: Former     Packs/day: 0.50     Years: 16.00     Pack years: 8.00     Types: Cigarettes     Start date: 11/1/1990     Quit date:  "2014     Years since quittin.7    Smokeless tobacco: Never   Substance and Sexual Activity    Alcohol use: Yes     Comment: twice a month - wine     Drug use: No    Sexual activity: Not Currently     Partners: Male     Birth control/protection: See Surgical Hx   Social History Narrative    Patient has 4 biological children and 2 step     Family History   Problem Relation Age of Onset    Diabetes Mother     Hyperlipidemia Mother     Diabetes Father     Hyperlipidemia Father        Review of Systems   Constitutional:  Positive for fatigue. Negative for fever.   HENT:  Positive for postnasal drip, rhinorrhea and congestion.    Eyes:  Negative for redness and itching.   Respiratory:  Positive for cough, sputum production, shortness of breath, dyspnea on extertion, use of rescue inhaler and Paroxysmal Nocturnal Dyspnea.    Cardiovascular:  Negative for chest pain, palpitations and leg swelling.   Genitourinary:  Negative for difficulty urinating and hematuria.   Endocrine:  Negative for cold intolerance and heat intolerance.    Skin:  Negative for rash.   Gastrointestinal:  Negative for nausea and abdominal pain.   Neurological:  Negative for dizziness, syncope, weakness and light-headedness.   Hematological:  Negative for adenopathy. Does not bruise/bleed easily.   Psychiatric/Behavioral:  Negative for sleep disturbance. The patient is not nervous/anxious.      Objective:      /78   Pulse 107   Resp 17   Ht 5' 5" (1.651 m)   Wt 104.3 kg (229 lb 15 oz)   LMP  (LMP Unknown) Comment: no cycle 2012  SpO2 97%   BMI 38.26 kg/m²   Physical Exam  Vitals and nursing note reviewed.   Constitutional:       Appearance: She is well-developed. She is obese.   HENT:      Head: Normocephalic and atraumatic.      Nose: Nose normal.      Mouth/Throat:      Pharynx: No oropharyngeal exudate.   Eyes:      Conjunctiva/sclera: Conjunctivae normal.      Pupils: Pupils are equal, round, and reactive to light.   Neck:     "  Thyroid: No thyromegaly.      Vascular: No JVD.      Trachea: No tracheal deviation.   Cardiovascular:      Rate and Rhythm: Normal rate and regular rhythm.      Heart sounds: Normal heart sounds.   Pulmonary:      Effort: Pulmonary effort is normal. No respiratory distress.      Breath sounds: Examination of the right-lower field reveals wheezing. Examination of the left-lower field reveals wheezing. Decreased breath sounds and wheezing present. No rhonchi or rales.   Chest:      Chest wall: No tenderness.   Abdominal:      General: Bowel sounds are normal.      Palpations: Abdomen is soft.   Musculoskeletal:         General: Normal range of motion.      Cervical back: Neck supple.   Lymphadenopathy:      Cervical: No cervical adenopathy.   Skin:     General: Skin is warm and dry.   Neurological:      Mental Status: She is alert and oriented to person, place, and time.     Personal Diagnostic Review  Chest x-ray: stable     Pulmonary Studies Review 9/14/2022   SpO2 97   Height 65   Weight 3679.04   BMI (Calculated) 38.3   Predicted Distance 347.51   Predicted Distance Meters (Calculated) 487.51   Some encounter information is confidential and restricted. Go to Review Flowsheets activity to see all data.       X-Ray Chest PA And Lateral  Narrative: EXAMINATION:  XR CHEST PA AND LATERAL    CLINICAL HISTORY:  SOB; Mild intermittent asthma, uncomplicated    TECHNIQUE:  PA and lateral views of the chest were performed.    COMPARISON:  Prior radiographs    FINDINGS:  Cardiac silhouette and mediastinal contours are normal.  Lungs are clear.  Osseous structures are intact.  Impression: No acute cardiopulmonary process.    Electronically signed by: Suleiman Jones MD  Date:    09/14/2022  Time:    09:30      Office Spirometry Results:     No flowsheet data found.  Pulmonary Studies Review 9/14/2022   SpO2 97   Height 65   Weight 3679.04   BMI (Calculated) 38.3   Predicted Distance 347.51   Predicted Distance Meters  (Calculated) 487.51   Some encounter information is confidential and restricted. Go to Review Flowsheets activity to see all data.         Assessment:            Mild intermittent asthma, unspecified whether complicated  -     albuterol (PROVENTIL/VENTOLIN HFA) 90 mcg/actuation inhaler; Inhale 2 puffs into the lungs every 6 (six) hours.  Dispense: 18 g; Refill: 11  -     fluticasone furoate-vilanteroL (BREO ELLIPTA) 200-25 mcg/dose DsDv diskus inhaler; Inhale 1 puff into the lungs once daily. Controller  Dispense: 60 each; Refill: 11  -     Spirometry with/without bronchodilator; Future; Expected date: 03/17/2023    Mild intermittent asthma without complication    Severe obesity (BMI 35.0-39.9) with comorbidity    Seasonal allergic rhinitis due to other allergic trigger  -     montelukast (SINGULAIR) 10 mg tablet; Take 1 tablet (10 mg total) by mouth every evening.  Dispense: 30 tablet; Refill: 11        Outpatient Encounter Medications as of 9/14/2022   Medication Sig Dispense Refill    ASCORBATE CALCIUM (VITAMIN C ORAL) Take by mouth.      blood pressure monitor ([employer] EASE) ST size by Other route as needed for High Blood Pressure. 1 each 0    busPIRone (BUSPAR) 30 MG Tab Take 1 tablet (30 mg total) by mouth 3 (three) times daily. 60 tablet 2    clonazePAM (KLONOPIN) 1 MG tablet TAKE 1 TABLET BY MOUTH ONCE DAILY AS NEEDED FOR ANXIETY 30 tablet 1    diltiaZEM (CARDIZEM) 30 MG tablet Take 1 tablet (30 mg total) by mouth every 12 (twelve) hours. 180 tablet 3    famotidine (PEPCID) 20 MG tablet Take 1 tablet (20 mg total) by mouth 2 (two) times daily. 60 tablet 0    fluticasone propionate (FLONASE) 50 mcg/actuation nasal spray SPRAY 1 SPRAY INTO EACH NOSTRIL TWO TIMES DAILY 48 mL 3    hydrOXYzine (ATARAX) 50 MG tablet Take 1 to 2 tablets at bedtime as needed for sleep 60 tablet 1    ibuprofen (ADVIL,MOTRIN) 800 MG tablet Take 800 mg by mouth 3 (three) times daily.      inhalation spacing device (BREATHERITE VALVED  MDI CHAMBER) Use as directed for inhalation. 1 Device prn    [START ON 10/1/2022] lamoTRIgine (LAMICTAL) 100 MG tablet Take 1 tablet (100 mg total) by mouth once daily. 30 tablet 1    lamoTRIgine (LAMICTAL) 25 MG tablet Take 1 tablet daily x 14 days then 2 daily x 14 days then 3 daily x 14 days then start 100 mg daily. 84 tablet 0    levothyroxine (SYNTHROID) 50 MCG tablet Take 1 tablet (50 mcg total) by mouth once daily. 90 tablet 1    loratadine (CLARITIN) 10 mg tablet Take 1 tablet (10 mg total) by mouth once daily. 30 tablet 0    melatonin 10 mg Cap Take by mouth.      meloxicam (MOBIC) 7.5 MG tablet Take 7.5 mg by mouth 2 (two) times daily.      multivitamin capsule Take 1 capsule by mouth once daily.      ondansetron (ZOFRAN-ODT) 4 MG TbDL Take 4 mg by mouth every 6 (six) hours as needed.      traZODone (DESYREL) 100 MG tablet Take 1/2 to 1 tablet at bedtime as needed for sleep. 30 tablet 1    [DISCONTINUED] fluticasone-vilanterol (BREO ELLIPTA) 200-25 mcg/dose DsDv diskus inhaler Inhale 1 puff into the lungs once daily. Controller 180 each 3    albuterol (PROVENTIL/VENTOLIN HFA) 90 mcg/actuation inhaler Inhale 2 puffs into the lungs every 6 (six) hours. 18 g 11    fluticasone furoate-vilanteroL (BREO ELLIPTA) 200-25 mcg/dose DsDv diskus inhaler Inhale 1 puff into the lungs once daily. Controller 60 each 11    montelukast (SINGULAIR) 10 mg tablet Take 1 tablet (10 mg total) by mouth every evening. 30 tablet 11    [] polyethylene glycol (GOLYTELY) 236-22.74-6.74 -5.86 gram suspension Take 4,000 mLs (4 L total) by mouth once. for 1 dose 4000 mL 0    [DISCONTINUED] albuterol (PROVENTIL/VENTOLIN HFA) 90 mcg/actuation inhaler INHALE 2 PUFFS INTO THE LUNGS EVERY 6 (SIX) HOURS. 3 Inhaler 3     No facility-administered encounter medications on file as of 2022.     Plan:       Requested Prescriptions     Signed Prescriptions Disp Refills    albuterol (PROVENTIL/VENTOLIN HFA) 90 mcg/actuation inhaler 18 g 11      Sig: Inhale 2 puffs into the lungs every 6 (six) hours.    fluticasone furoate-vilanteroL (BREO ELLIPTA) 200-25 mcg/dose DsDv diskus inhaler 60 each 11     Sig: Inhale 1 puff into the lungs once daily. Controller    montelukast (SINGULAIR) 10 mg tablet 30 tablet 11     Sig: Take 1 tablet (10 mg total) by mouth every evening.     Problem List Items Addressed This Visit       Airway hyperreactivity - Primary    Relevant Medications    albuterol (PROVENTIL/VENTOLIN HFA) 90 mcg/actuation inhaler    fluticasone furoate-vilanteroL (BREO ELLIPTA) 200-25 mcg/dose DsDv diskus inhaler    Other Relevant Orders    Spirometry with/without bronchodilator    Allergic rhinitis    Relevant Medications    montelukast (SINGULAIR) 10 mg tablet    Mild intermittent asthma without complication    Severe obesity (BMI 35.0-39.9) with comorbidity          Follow up in about 6 months (around 3/14/2023) for Review ria - on return.    MEDICAL DECISION MAKING: Moderate to high complexity.  Overall, the multiple problems listed are of moderate to high severity that may impact quality of life and activities of daily living. Side effects of medications, treatment plan as well as options and alternatives reviewed and discussed with patient. There was counseling of patient concerning these issues.    Total time spent in counseling and coordination of care - 30  minutes of total time spent on the encounter, which includes face to face time and non-face to face time preparing to see the patient (eg, review of tests), Obtaining and/or reviewing separately obtained history, Documenting clinical information in the electronic or other health record, Independently interpreting results (not separately reported) and communicating results to the patient/family/caregiver, or Care coordination (not separately reported).    Time was used in discussion of prognosis, risks, benefits of treatment, instructions and compliance with regimen . Discussion with  other physicians and/or health care providers - home health or for use of durable medical equipment (oxygen, nebulizers, CPAP, BiPAP) occurred.

## 2023-03-01 ENCOUNTER — TELEPHONE (OUTPATIENT)
Dept: SLEEP MEDICINE | Facility: CLINIC | Age: 53
End: 2023-03-01
Payer: MEDICARE

## 2023-03-09 ENCOUNTER — TELEPHONE (OUTPATIENT)
Dept: PULMONOLOGY | Facility: CLINIC | Age: 53
End: 2023-03-09
Payer: MEDICARE

## 2023-03-13 ENCOUNTER — PATIENT MESSAGE (OUTPATIENT)
Dept: PAIN MEDICINE | Facility: CLINIC | Age: 53
End: 2023-03-13
Payer: MEDICARE

## 2023-03-15 ENCOUNTER — OFFICE VISIT (OUTPATIENT)
Dept: INTERNAL MEDICINE | Facility: CLINIC | Age: 53
End: 2023-03-15
Payer: MEDICARE

## 2023-03-15 VITALS
OXYGEN SATURATION: 96 % | BODY MASS INDEX: 32.94 KG/M2 | TEMPERATURE: 98 F | SYSTOLIC BLOOD PRESSURE: 102 MMHG | WEIGHT: 198 LBS | DIASTOLIC BLOOD PRESSURE: 64 MMHG | HEART RATE: 98 BPM

## 2023-03-15 DIAGNOSIS — J01.90 ACUTE NON-RECURRENT SINUSITIS, UNSPECIFIED LOCATION: Primary | ICD-10-CM

## 2023-03-15 PROCEDURE — 1159F MED LIST DOCD IN RCRD: CPT | Mod: CPTII,S$GLB,, | Performed by: NURSE PRACTITIONER

## 2023-03-15 PROCEDURE — 99999 PR PBB SHADOW E&M-EST. PATIENT-LVL V: CPT | Mod: PBBFAC,,, | Performed by: NURSE PRACTITIONER

## 2023-03-15 PROCEDURE — 1159F PR MEDICATION LIST DOCUMENTED IN MEDICAL RECORD: ICD-10-PCS | Mod: CPTII,S$GLB,, | Performed by: NURSE PRACTITIONER

## 2023-03-15 PROCEDURE — 99213 PR OFFICE/OUTPT VISIT, EST, LEVL III, 20-29 MIN: ICD-10-PCS | Mod: S$GLB,,, | Performed by: NURSE PRACTITIONER

## 2023-03-15 PROCEDURE — 3074F SYST BP LT 130 MM HG: CPT | Mod: CPTII,S$GLB,, | Performed by: NURSE PRACTITIONER

## 2023-03-15 PROCEDURE — 1160F PR REVIEW ALL MEDS BY PRESCRIBER/CLIN PHARMACIST DOCUMENTED: ICD-10-PCS | Mod: CPTII,S$GLB,, | Performed by: NURSE PRACTITIONER

## 2023-03-15 PROCEDURE — 3078F DIAST BP <80 MM HG: CPT | Mod: CPTII,S$GLB,, | Performed by: NURSE PRACTITIONER

## 2023-03-15 PROCEDURE — 3074F PR MOST RECENT SYSTOLIC BLOOD PRESSURE < 130 MM HG: ICD-10-PCS | Mod: CPTII,S$GLB,, | Performed by: NURSE PRACTITIONER

## 2023-03-15 PROCEDURE — 3008F BODY MASS INDEX DOCD: CPT | Mod: CPTII,S$GLB,, | Performed by: NURSE PRACTITIONER

## 2023-03-15 PROCEDURE — 3078F PR MOST RECENT DIASTOLIC BLOOD PRESSURE < 80 MM HG: ICD-10-PCS | Mod: CPTII,S$GLB,, | Performed by: NURSE PRACTITIONER

## 2023-03-15 PROCEDURE — 3008F PR BODY MASS INDEX (BMI) DOCUMENTED: ICD-10-PCS | Mod: CPTII,S$GLB,, | Performed by: NURSE PRACTITIONER

## 2023-03-15 PROCEDURE — 99213 OFFICE O/P EST LOW 20 MIN: CPT | Mod: S$GLB,,, | Performed by: NURSE PRACTITIONER

## 2023-03-15 PROCEDURE — 1160F RVW MEDS BY RX/DR IN RCRD: CPT | Mod: CPTII,S$GLB,, | Performed by: NURSE PRACTITIONER

## 2023-03-15 PROCEDURE — 99999 PR PBB SHADOW E&M-EST. PATIENT-LVL V: ICD-10-PCS | Mod: PBBFAC,,, | Performed by: NURSE PRACTITIONER

## 2023-03-15 RX ORDER — FLUCONAZOLE 150 MG/1
150 TABLET ORAL DAILY
Qty: 2 TABLET | Refills: 0 | Status: SHIPPED | OUTPATIENT
Start: 2023-03-15 | End: 2023-03-16

## 2023-03-15 RX ORDER — PROMETHAZINE HYDROCHLORIDE AND DEXTROMETHORPHAN HYDROBROMIDE 6.25; 15 MG/5ML; MG/5ML
5 SYRUP ORAL NIGHTLY PRN
Qty: 120 ML | Refills: 0 | Status: SHIPPED | OUTPATIENT
Start: 2023-03-15 | End: 2023-09-26

## 2023-03-15 RX ORDER — AMOXICILLIN AND CLAVULANATE POTASSIUM 875; 125 MG/1; MG/1
1 TABLET, FILM COATED ORAL EVERY 12 HOURS
Qty: 20 TABLET | Refills: 0 | Status: SHIPPED | OUTPATIENT
Start: 2023-03-15 | End: 2023-03-28

## 2023-03-15 NOTE — PROGRESS NOTES
Subjective:       Patient ID: Daniela Costa is a 53 y.o. female.    Chief Complaint: Sinus Problem    Pt presents to clinic today for cough, congestion, head pressure  Taking Flonase, Claritin, zyrtec, otc meds without relief  Started with symptoms on Wednesday, has been trying otc treatment without relief  Neg COVID at home  No ill contacts  Having headaches, ear pressure, facial pressure  99 tmax   Coughing, having to use her inhaler some      /64   Pulse 98   Temp 97.9 °F (36.6 °C)   Wt 89.8 kg (197 lb 15.6 oz)   LMP  (LMP Unknown) Comment: no cycle 8/2012  SpO2 96%   BMI 32.94 kg/m²     Review of Systems   Constitutional:  Negative for activity change, appetite change, chills, diaphoresis, fatigue, fever and unexpected weight change.   HENT:  Positive for congestion, ear pain, postnasal drip, rhinorrhea, sinus pressure, sneezing and sore throat. Negative for dental problem, drooling, ear discharge, hearing loss, mouth sores, nosebleeds, tinnitus, trouble swallowing and voice change.    Eyes:  Negative for pain, discharge and redness.   Respiratory:  Positive for cough. Negative for choking, chest tightness, shortness of breath and wheezing.    Cardiovascular:  Negative for chest pain, palpitations and leg swelling.   Gastrointestinal:  Negative for abdominal pain, diarrhea, nausea and vomiting.   Endocrine: Negative for cold intolerance and heat intolerance.   Genitourinary:  Negative for dysuria.   Musculoskeletal:  Negative for arthralgias, joint swelling, myalgias and neck pain.   Skin:  Negative for rash.   Allergic/Immunologic: Positive for environmental allergies. Negative for food allergies and immunocompromised state.   Neurological:  Negative for syncope, light-headedness and headaches.     Objective:      Physical Exam  Vitals and nursing note reviewed.   Constitutional:       Appearance: She is well-developed. She is not diaphoretic.   HENT:      Head: Normocephalic and atraumatic.       Right Ear: Tympanic membrane, ear canal and external ear normal.      Left Ear: Tympanic membrane, ear canal and external ear normal.      Nose: Mucosal edema and rhinorrhea present.      Right Sinus: Frontal sinus tenderness present. No maxillary sinus tenderness.      Left Sinus: Frontal sinus tenderness present. No maxillary sinus tenderness.      Mouth/Throat:      Pharynx: Uvula midline. No oropharyngeal exudate or posterior oropharyngeal erythema.   Eyes:      General:         Right eye: No discharge.         Left eye: No discharge.      Conjunctiva/sclera: Conjunctivae normal.   Cardiovascular:      Rate and Rhythm: Normal rate and regular rhythm.      Heart sounds: Normal heart sounds. No murmur heard.  Pulmonary:      Effort: Pulmonary effort is normal. No accessory muscle usage or respiratory distress.      Breath sounds: Normal breath sounds. No decreased breath sounds, wheezing, rhonchi or rales.   Chest:      Chest wall: No tenderness.   Abdominal:      General: There is no distension.      Palpations: Abdomen is soft.   Musculoskeletal:         General: Normal range of motion.      Cervical back: Normal range of motion.   Skin:     General: Skin is warm and dry.   Neurological:      Mental Status: She is alert and oriented to person, place, and time.   Psychiatric:         Behavior: Behavior normal.       Assessment:       1. Acute non-recurrent sinusitis, unspecified location    2. BMI 32.0-32.9,adult        Plan:       Daniela was seen today for sinus problem.    Diagnoses and all orders for this visit:    Acute non-recurrent sinusitis, unspecified location  -     amoxicillin-clavulanate 875-125mg (AUGMENTIN) 875-125 mg per tablet; Take 1 tablet by mouth every 12 (twelve) hours.  -     fluconazole (DIFLUCAN) 150 MG Tab; Take 1 tablet (150 mg total) by mouth once daily. for 1 day  - promethazine-dextromethorphan (PROMETHAZINE-DM) 6.25-15 mg/5 mL Syrp; Take 5 mLs by mouth nightly as needed  (Cough).  - Will treat as pt has been sick for 1 week, failed otc treatment, and symptomatic on exam    BMI 32.0-32.9,adult            Add zyrtec daily  Add 12 hour plain mucinex for cough and chest congestion  Start Flonase Nasal Steroid Spray 2 sprays each nare daily to decrease the sinus inflammation and help dry the post nasal drip.  Cough meds prn   Follow up for worsening or no improvement in symptoms and PRN.

## 2023-03-23 ENCOUNTER — OFFICE VISIT (OUTPATIENT)
Dept: CARDIOLOGY | Facility: CLINIC | Age: 53
End: 2023-03-23
Payer: MEDICARE

## 2023-03-23 VITALS
BODY MASS INDEX: 32.44 KG/M2 | HEART RATE: 88 BPM | SYSTOLIC BLOOD PRESSURE: 103 MMHG | HEIGHT: 65 IN | WEIGHT: 194.69 LBS | OXYGEN SATURATION: 98 % | DIASTOLIC BLOOD PRESSURE: 65 MMHG

## 2023-03-23 DIAGNOSIS — R00.0 TACHYCARDIA: ICD-10-CM

## 2023-03-23 DIAGNOSIS — I10 PRIMARY HYPERTENSION: ICD-10-CM

## 2023-03-23 DIAGNOSIS — I49.3 PVC'S (PREMATURE VENTRICULAR CONTRACTIONS): ICD-10-CM

## 2023-03-23 DIAGNOSIS — I10 ESSENTIAL HYPERTENSION: ICD-10-CM

## 2023-03-23 DIAGNOSIS — R00.2 PALPITATIONS: Primary | ICD-10-CM

## 2023-03-23 PROCEDURE — 99999 PR PBB SHADOW E&M-EST. PATIENT-LVL V: CPT | Mod: PBBFAC,,, | Performed by: INTERNAL MEDICINE

## 2023-03-23 PROCEDURE — 3008F BODY MASS INDEX DOCD: CPT | Mod: CPTII,S$GLB,, | Performed by: INTERNAL MEDICINE

## 2023-03-23 PROCEDURE — 3074F PR MOST RECENT SYSTOLIC BLOOD PRESSURE < 130 MM HG: ICD-10-PCS | Mod: CPTII,S$GLB,, | Performed by: INTERNAL MEDICINE

## 2023-03-23 PROCEDURE — 99214 PR OFFICE/OUTPT VISIT, EST, LEVL IV, 30-39 MIN: ICD-10-PCS | Mod: S$GLB,,, | Performed by: INTERNAL MEDICINE

## 2023-03-23 PROCEDURE — 3008F PR BODY MASS INDEX (BMI) DOCUMENTED: ICD-10-PCS | Mod: CPTII,S$GLB,, | Performed by: INTERNAL MEDICINE

## 2023-03-23 PROCEDURE — 1159F PR MEDICATION LIST DOCUMENTED IN MEDICAL RECORD: ICD-10-PCS | Mod: CPTII,S$GLB,, | Performed by: INTERNAL MEDICINE

## 2023-03-23 PROCEDURE — 99214 OFFICE O/P EST MOD 30 MIN: CPT | Mod: S$GLB,,, | Performed by: INTERNAL MEDICINE

## 2023-03-23 PROCEDURE — 3074F SYST BP LT 130 MM HG: CPT | Mod: CPTII,S$GLB,, | Performed by: INTERNAL MEDICINE

## 2023-03-23 PROCEDURE — 3078F PR MOST RECENT DIASTOLIC BLOOD PRESSURE < 80 MM HG: ICD-10-PCS | Mod: CPTII,S$GLB,, | Performed by: INTERNAL MEDICINE

## 2023-03-23 PROCEDURE — 1160F PR REVIEW ALL MEDS BY PRESCRIBER/CLIN PHARMACIST DOCUMENTED: ICD-10-PCS | Mod: CPTII,S$GLB,, | Performed by: INTERNAL MEDICINE

## 2023-03-23 PROCEDURE — 1160F RVW MEDS BY RX/DR IN RCRD: CPT | Mod: CPTII,S$GLB,, | Performed by: INTERNAL MEDICINE

## 2023-03-23 PROCEDURE — 99999 PR PBB SHADOW E&M-EST. PATIENT-LVL V: ICD-10-PCS | Mod: PBBFAC,,, | Performed by: INTERNAL MEDICINE

## 2023-03-23 PROCEDURE — 1159F MED LIST DOCD IN RCRD: CPT | Mod: CPTII,S$GLB,, | Performed by: INTERNAL MEDICINE

## 2023-03-23 PROCEDURE — 3078F DIAST BP <80 MM HG: CPT | Mod: CPTII,S$GLB,, | Performed by: INTERNAL MEDICINE

## 2023-03-23 RX ORDER — MONTELUKAST SODIUM 10 MG/1
10 TABLET ORAL
COMMUNITY
Start: 2023-02-28 | End: 2023-11-20

## 2023-03-23 RX ORDER — DILTIAZEM HYDROCHLORIDE 30 MG/1
30 TABLET, FILM COATED ORAL EVERY 8 HOURS
Qty: 180 TABLET | Refills: 3 | Status: SHIPPED | OUTPATIENT
Start: 2023-03-23

## 2023-03-23 NOTE — PROGRESS NOTES
Subjective:   Patient ID:  Daniela Costa is a 53 y.o. female who presents for follow-up of Follow-up  BP and palpitations. Pt increased diltiazem to tid.  Patient denies CP, angina or anginal equivalent.     Palpitations   This is a new problem. The current episode started more than 1 month ago. The problem occurs rarely. The problem has been rapidly improving. On average, each episode lasts 5 minutes. Nothing aggravates the symptoms. Pertinent negatives include no chest pain, dizziness or shortness of breath. Treatments tried: diltiazem. The treatment provided moderate relief.   Hypertension  This is a chronic problem. The current episode started more than 1 year ago. The problem has been gradually improving since onset. The problem is controlled. Associated symptoms include palpitations. Pertinent negatives include no chest pain or shortness of breath. Past treatments include calcium channel blockers. The current treatment provides moderate improvement. There are no compliance problems.      Review of Systems   Constitutional: Negative. Negative for weight gain.   HENT: Negative.     Eyes: Negative.    Cardiovascular:  Positive for palpitations. Negative for chest pain and leg swelling.   Respiratory: Negative.  Negative for shortness of breath.    Endocrine: Negative.    Hematologic/Lymphatic: Negative.    Skin: Negative.    Musculoskeletal:  Negative for muscle weakness.   Gastrointestinal: Negative.    Genitourinary: Negative.    Neurological: Negative.  Negative for dizziness.   Psychiatric/Behavioral: Negative.     Allergic/Immunologic: Negative.    All other systems reviewed and are negative.  Family History   Problem Relation Age of Onset    Diabetes Mother     Hyperlipidemia Mother     Breast cancer Mother 53        lumpectomy, chemo, radiation    Diabetes Father     Hyperlipidemia Father     Other Father         non-Hogdkin's lymphoma    Other Sister         prophylactic BL mastectomy    Other Sister   "       prophylactic BL mastecomy    Down syndrome Other     Bipolar disorder Maternal Grandmother     Breast cancer Maternal Grandmother     Melanoma Maternal Grandmother         on scalp with brain mets    Liver cancer Paternal Grandfather     Ovarian cancer Paternal Grandmother         metastatic    Melanoma Maternal Aunt     Breast cancer Maternal Aunt         DCIS    Bipolar disorder Maternal Aunt     Bipolar disorder Maternal Aunt     Cancer Paternal Aunt         "in lymphatic csystem"    Pancreatic cancer Paternal Uncle      Past Medical History:   Diagnosis Date    Allergy     Anxiety     Arthritis     Asthma     Breast cancer     2012; BRCA 1 and 2 negative    Breast cancer genetic susceptibility     Cancer     Depression     Hypothyroidism     Lung disease     Pneumonia      Social History     Socioeconomic History    Marital status:     Number of children: 6   Occupational History    Occupation: stay home mom    Tobacco Use    Smoking status: Former     Packs/day: 0.50     Years: 16.00     Pack years: 8.00     Types: Cigarettes     Start date: 1990     Quit date: 2014     Years since quittin.2    Smokeless tobacco: Never   Substance and Sexual Activity    Alcohol use: Yes     Alcohol/week: 2.0 standard drinks     Types: 2 Glasses of wine per week    Drug use: No    Sexual activity: Not Currently     Partners: Male     Birth control/protection: See Surgical Hx   Social History Narrative    Patient has 4 biological children and 2 step     Current Outpatient Medications on File Prior to Visit   Medication Sig Dispense Refill    albuterol (PROVENTIL/VENTOLIN HFA) 90 mcg/actuation inhaler Inhale 2 puffs into the lungs every 6 (six) hours. 18 g 11    ASCORBATE CALCIUM (VITAMIN C ORAL) Take by mouth.      blood pressure monitor ([facility] EASE) ST size by Other route as needed for High Blood Pressure. 1 each 0    blood sugar diagnostic Strp To check BG 2 times daily, to use with insurance " preferred meter 200 each 11    blood-glucose meter kit To check BG 2 times daily, to use with insurance preferred meter 1 each 0    clonazePAM (KLONOPIN) 1 MG tablet TAKE 1 TABLET BY MOUTH ONCE DAILY AS NEEDED FOR ANXIETY 30 tablet 1    diltiaZEM (CARDIZEM) 30 MG tablet Take 1 tablet (30 mg total) by mouth every 12 (twelve) hours. 180 tablet 3    famotidine (PEPCID) 20 MG tablet Take 1 tablet (20 mg total) by mouth 2 (two) times daily. 60 tablet 0    fluticasone furoate-vilanteroL (BREO ELLIPTA) 200-25 mcg/dose DsDv diskus inhaler Inhale 1 puff into the lungs once daily. Controller 60 each 11    fluticasone propionate (FLONASE) 50 mcg/actuation nasal spray SPRAY 1 SPRAY INTO EACH NOSTRIL TWO TIMES DAILY 48 mL 3    hydrocortisone 2.5 % lotion Apply topically 2 (two) times daily. 59 mL 0    inhalation spacing device (BREATHERITE VALVED MDI CHAMBER) Use as directed for inhalation. 1 Device prn    lamoTRIgine (LAMICTAL) 100 MG tablet Take 1 tablet (100 mg total) by mouth once daily. 30 tablet 3    lancets Misc To check BG 2 times daily, to use with insurance preferred meter 200 each 11    loratadine (CLARITIN) 10 mg tablet Take 1 tablet (10 mg total) by mouth once daily. 30 tablet 0    melatonin 10 mg Cap Take by mouth.      meloxicam (MOBIC) 7.5 MG tablet Take 1 tablet (7.5 mg total) by mouth 2 (two) times daily as needed for Pain (pain). 60 tablet 0    montelukast (SINGULAIR) 10 mg tablet Take 10 mg by mouth.      ondansetron (ZOFRAN-ODT) 4 MG TbDL Take 4 mg by mouth every 6 (six) hours as needed.      promethazine-dextromethorphan (PROMETHAZINE-DM) 6.25-15 mg/5 mL Syrp Take 5 mLs by mouth nightly as needed (Cough). 120 mL 0    traZODone (DESYREL) 50 MG tablet Take 1 tablet (50 mg total) by mouth nightly as needed for Insomnia. 30 tablet 3    amoxicillin-clavulanate 875-125mg (AUGMENTIN) 875-125 mg per tablet Take 1 tablet by mouth every 12 (twelve) hours. (Patient not taking: Reported on 3/23/2023) 20 tablet 0     busPIRone (BUSPAR) 15 MG tablet Take 1 tablet (15 mg total) by mouth 3 (three) times daily. (Patient not taking: Reported on 3/23/2023) 90 tablet 3    hydrOXYzine (ATARAX) 50 MG tablet Take 1 to 2 tablets at bedtime as needed for sleep (Patient not taking: Reported on 3/23/2023) 60 tablet 1    [DISCONTINUED] levothyroxine (SYNTHROID) 50 MCG tablet Take 1 tablet (50 mcg total) by mouth once daily. 90 tablet 1     No current facility-administered medications on file prior to visit.     Review of patient's allergies indicates:   Allergen Reactions    Cefdinir      Radiation Recall, everywhere she had radiation it looked like blisters and very hot to touch    Levofloxacin      Went into deep depression. Messed with Pych meds    Sodium benzoate Hives    Strawberry Hives       Objective:     Physical Exam  Vitals and nursing note reviewed.   Constitutional:       Appearance: She is well-developed.   HENT:      Head: Normocephalic and atraumatic.   Eyes:      Conjunctiva/sclera: Conjunctivae normal.      Pupils: Pupils are equal, round, and reactive to light.   Cardiovascular:      Rate and Rhythm: Normal rate and regular rhythm.      Pulses: Intact distal pulses.      Heart sounds: Normal heart sounds.   Pulmonary:      Effort: Pulmonary effort is normal.      Breath sounds: Normal breath sounds.   Abdominal:      General: Bowel sounds are normal.      Palpations: Abdomen is soft.   Musculoskeletal:         General: Normal range of motion.      Cervical back: Normal range of motion and neck supple.   Skin:     General: Skin is warm and dry.   Neurological:      Mental Status: She is alert and oriented to person, place, and time.       Assessment:     1. Palpitations    2. Tachycardia    3. Primary hypertension    4. PVC's (premature ventricular contractions)        Plan:     Palpitations    Tachycardia    Primary hypertension    PVC's (premature ventricular contractions)    Continue diltiazem-palpitations/htn

## 2023-03-28 ENCOUNTER — LAB VISIT (OUTPATIENT)
Dept: LAB | Facility: HOSPITAL | Age: 53
End: 2023-03-28
Attending: NURSE PRACTITIONER
Payer: MEDICARE

## 2023-03-28 ENCOUNTER — OFFICE VISIT (OUTPATIENT)
Dept: INTERNAL MEDICINE | Facility: CLINIC | Age: 53
End: 2023-03-28
Payer: MEDICARE

## 2023-03-28 VITALS
DIASTOLIC BLOOD PRESSURE: 66 MMHG | BODY MASS INDEX: 31.26 KG/M2 | SYSTOLIC BLOOD PRESSURE: 108 MMHG | HEART RATE: 108 BPM | WEIGHT: 187.63 LBS | HEIGHT: 65 IN | TEMPERATURE: 98 F | OXYGEN SATURATION: 96 %

## 2023-03-28 DIAGNOSIS — Z20.2 EXPOSURE TO SYPHILIS: ICD-10-CM

## 2023-03-28 DIAGNOSIS — Z20.5 EXPOSURE TO HEPATITIS C: ICD-10-CM

## 2023-03-28 DIAGNOSIS — Z20.2 EXPOSURE TO SYPHILIS: Primary | ICD-10-CM

## 2023-03-28 PROCEDURE — 99999 PR PBB SHADOW E&M-EST. PATIENT-LVL V: ICD-10-PCS | Mod: PBBFAC,,, | Performed by: NURSE PRACTITIONER

## 2023-03-28 PROCEDURE — 3008F BODY MASS INDEX DOCD: CPT | Mod: CPTII,S$GLB,, | Performed by: NURSE PRACTITIONER

## 2023-03-28 PROCEDURE — 36415 COLL VENOUS BLD VENIPUNCTURE: CPT | Mod: PO | Performed by: NURSE PRACTITIONER

## 2023-03-28 PROCEDURE — 3078F PR MOST RECENT DIASTOLIC BLOOD PRESSURE < 80 MM HG: ICD-10-PCS | Mod: CPTII,S$GLB,, | Performed by: NURSE PRACTITIONER

## 2023-03-28 PROCEDURE — 1159F PR MEDICATION LIST DOCUMENTED IN MEDICAL RECORD: ICD-10-PCS | Mod: CPTII,S$GLB,, | Performed by: NURSE PRACTITIONER

## 2023-03-28 PROCEDURE — 3074F SYST BP LT 130 MM HG: CPT | Mod: CPTII,S$GLB,, | Performed by: NURSE PRACTITIONER

## 2023-03-28 PROCEDURE — 3008F PR BODY MASS INDEX (BMI) DOCUMENTED: ICD-10-PCS | Mod: CPTII,S$GLB,, | Performed by: NURSE PRACTITIONER

## 2023-03-28 PROCEDURE — 86592 SYPHILIS TEST NON-TREP QUAL: CPT | Performed by: NURSE PRACTITIONER

## 2023-03-28 PROCEDURE — 1160F PR REVIEW ALL MEDS BY PRESCRIBER/CLIN PHARMACIST DOCUMENTED: ICD-10-PCS | Mod: CPTII,S$GLB,, | Performed by: NURSE PRACTITIONER

## 2023-03-28 PROCEDURE — 99999 PR PBB SHADOW E&M-EST. PATIENT-LVL V: CPT | Mod: PBBFAC,,, | Performed by: NURSE PRACTITIONER

## 2023-03-28 PROCEDURE — 86803 HEPATITIS C AB TEST: CPT | Performed by: NURSE PRACTITIONER

## 2023-03-28 PROCEDURE — 99214 PR OFFICE/OUTPT VISIT, EST, LEVL IV, 30-39 MIN: ICD-10-PCS | Mod: S$GLB,,, | Performed by: NURSE PRACTITIONER

## 2023-03-28 PROCEDURE — 1159F MED LIST DOCD IN RCRD: CPT | Mod: CPTII,S$GLB,, | Performed by: NURSE PRACTITIONER

## 2023-03-28 PROCEDURE — 3074F PR MOST RECENT SYSTOLIC BLOOD PRESSURE < 130 MM HG: ICD-10-PCS | Mod: CPTII,S$GLB,, | Performed by: NURSE PRACTITIONER

## 2023-03-28 PROCEDURE — 3078F DIAST BP <80 MM HG: CPT | Mod: CPTII,S$GLB,, | Performed by: NURSE PRACTITIONER

## 2023-03-28 PROCEDURE — 1160F RVW MEDS BY RX/DR IN RCRD: CPT | Mod: CPTII,S$GLB,, | Performed by: NURSE PRACTITIONER

## 2023-03-28 PROCEDURE — 99214 OFFICE O/P EST MOD 30 MIN: CPT | Mod: S$GLB,,, | Performed by: NURSE PRACTITIONER

## 2023-03-28 NOTE — PROGRESS NOTES
"Subjective:       Patient ID: Daniela Costa is a 53 y.o. female.    Chief Complaint: Exposure to STD    Pt presents to clinic today for exposure to STD  Pt's long term boyfriend went to PCP and was found to be hep c and RPR reactive  She is here today for testing  Neither with any type of symptoms  No rashes, fever   No vaginal discharge      /66   Pulse 108   Temp 98.4 °F (36.9 °C)   Ht 5' 5" (1.651 m)   Wt 85.1 kg (187 lb 9.8 oz)   LMP  (LMP Unknown) Comment: no cycle 8/2012  SpO2 96%   BMI 31.22 kg/m²     Review of Systems   Constitutional:  Positive for activity change. Negative for unexpected weight change.   HENT:  Negative for hearing loss, rhinorrhea and trouble swallowing.    Eyes:  Negative for discharge and visual disturbance.   Respiratory:  Negative for chest tightness and wheezing.    Cardiovascular:  Negative for chest pain and palpitations.   Gastrointestinal:  Negative for blood in stool, constipation, diarrhea and vomiting.   Endocrine: Negative for polydipsia and polyuria.   Genitourinary:  Negative for difficulty urinating, dysuria, hematuria and menstrual problem.   Musculoskeletal:  Positive for arthralgias. Negative for joint swelling and neck pain.   Neurological:  Positive for headaches. Negative for weakness.   Psychiatric/Behavioral:  Positive for dysphoric mood. Negative for confusion.      Objective:      Physical Exam  Vitals and nursing note reviewed.   Constitutional:       General: She is not in acute distress.     Appearance: Normal appearance. She is well-developed. She is not diaphoretic.   HENT:      Head: Normocephalic and atraumatic.   Cardiovascular:      Rate and Rhythm: Normal rate and regular rhythm.      Heart sounds: Normal heart sounds. No murmur heard.  Pulmonary:      Effort: Pulmonary effort is normal. No respiratory distress.      Breath sounds: Normal breath sounds.   Musculoskeletal:         General: Normal range of motion.   Skin:     General: Skin " is warm and dry.      Findings: No rash.   Neurological:      Mental Status: She is alert.   Psychiatric:         Mood and Affect: Mood normal.         Behavior: Behavior normal. Behavior is cooperative.         Thought Content: Thought content normal.         Judgment: Judgment normal.       Assessment:       1. Exposure to syphilis    2. Exposure to hepatitis C    3. BMI 31.0-31.9,adult          Plan:       Daniela was seen today for exposure to std.    Diagnoses and all orders for this visit:    Exposure to syphilis  -     RPR; Future    Exposure to hepatitis C  -     Hepatitis C Antibody; Future    BMI 31.0-31.9,adult      Will draw labs requested today  Will notify pt of results   Discussed options of treatment should pt be postive  Questions answered  Safe sex discussed  Follow up for worsening or no improvement in symptoms and PRN.

## 2023-03-28 NOTE — PROGRESS NOTES
Breast Surgical Oncology  [facility]        PCP:  Lucía Terrell MD  Date of Service: 2023    CHIEF COMPLAINT:   Personal history of breast cancer    DIAGNOSIS:   Daniela Costa is a 53 y.o. female who is kindly referred by Dr. Terrell for a history of breast cancer.     Patient would like to transfer her cancer surveillance to us. Previously with Dr. Thang Asencio    Last visit with me 10/11/2022    Her breast cancer risk factor profile is as follows:   Menarche at 14,   Menopause at 41.    She is . Misc- 4  Age at first live birth was 26.     LMP- 41- chemo- menopause- TAHBSO 2018  HRT- none  Breast feeding- X4 - 6 mons, 2 years, 3.5 years    ETOH- rare  Genetic mutation- BRCA 1 and 2 negative- had extended testing 2022 that was negative  radiation to neck or chest wall- right side- - Dr. Longo- 4 months radiation      FH: mother breast cancer at age 47, maternal grandmother breast cancer and metastatic melanoma 65, maternal grandfather lung cancer at 64 y/o, father non hodgkins lymphoma- 59 y/o    Per patient - reviewed the few records received in media as well    2013  Personal history of breast cancer diagnosed at age 42 y/o  Right side- 6.1 cm overall size  Abnormal mammo after palpated mass  Palpable mass- pt palpated  Breast biopsy with core or excision- invasive ductal carcinoma    Pathology- per pt initially stage 2 - then bumped to stage 3    Treatment    Lumpectomy or mastectomy   Reconstruction- immediate vs delayed   Lymph node biopsy- sentinel node   Genetic testing- negative for only BRCA 1 & 2   Radiation- right chest wall 4 months   [facility]-Adjuvant therapy- AC X4 and taxol X 12.   endocrine - estrogen positive- tamoxifen first - 1 year- changed to exemestane for the last 4 years. Completed May 2019    Changes in chest wall- bilateral right mastectomy with delayed reconstruction- prophylactic mastectomy left - latissimus flap reconstruction with implant-  Medicare Wellness Visit  Plan for Preventive Care    A good way for you to stay healthy is to use preventive care.  Medicare covers many services that can help you stay healthy.* The goal of these services is to find any health problems as quickly as possible. Finding problems early can help make them easier to treat.  Your personal plan below lists the services you may need and when they are due.     Health Maintenance Summary     Topic Due On Due Status Completed On Postpone Until Reason    MAMMOGRAM - BREAST CANCER SCREENING Sep 14, 2018 Not Due Sep 14, 2016      Colorectal Cancer Screening - Colonoscopy Oct 4, 2023 Not Due Oct 4, 2013      Osteoporosis Screening  Completed Dec 14, 2012      Immunization - Pneumococcal  Completed Jun 16, 2016      Diabetes Eye Exam Mar 22, 2018 Overdue Mar 22, 2017      Glycohemoglobin A1C  (Diabetes Sugar)  Oct 25, 2018 Not Due Apr 25, 2018      GFR  (Kidney Function Test)  Apr 25, 2019 Not Due Apr 25, 2018      Immunization - TDAP Pregnancy  Hidden       Diabetes Foot Exam  Dec 7, 2018 Not Due Dec 7, 2017      Medicare Wellness Visit May 7, 2019 Not Due May 7, 2018      IMMUNIZATION - DTaP/Tdap/Td Aug 27, 2011 Postponed Aug 26, 2011 May 7, 2018 Insurance or Financial    Immunization-Influenza  Completed Oct 7, 2017      Depression Screening Jan 9, 2019 Not Due Jan 9, 2018      Hepatitis C Screening  Completed Jun 13, 2017             Preventive Care for Women and Men    Heart Screenings (Cardiovascular):  · Blood tests are used to check your cholesterol, lipid and triglyceride levels. High levels can increase your risk for heart disease and stroke. High levels can be treated with medications, diet and exercise. Lowering your levels can help keep your heart and blood vessels healthy.  Your provider will order these tests if they are needed.    · An ultrasound is done to see if you have an abdominal aortic aneurysm (AAA).  This is an enlargement of one of the main blood vessels  "right- implant left- saline implants- had full axillary dissection- removed 12 and 7 positive?  Pt not sure    Residual symptoms from treatment   Neuropathy- slight neuropathy fingers- toes with coolness   Fatigue- chronically fatigued   Lymphedema- stable    Diet- eating healthy    Exercise- 3 times a week - yoga and pilate's- walks twice a week- 30-45 minutes    Last gyn- 9/22/2022    Colonoscopy scheduled for 10/31/2022- this is her first    Labs 2/2/2023 labs wnl- cbc and cmp    FAMILY HISTORY:     Family History   Problem Relation Age of Onset    Diabetes Mother     Hyperlipidemia Mother     Breast cancer Mother 53        lumpectomy, chemo, radiation    Arthritis Mother     Cancer Mother     Hypertension Mother     Diabetes Father     Hyperlipidemia Father     Other Father         non-Hogdkin's lymphoma    Arthritis Father     COPD Father     Hypertension Father     Other Sister         prophylactic BL mastectomy    Other Sister         prophylactic BL mastecomy    Down syndrome Other     Cancer Maternal Grandfather     Bipolar disorder Maternal Grandmother     Breast cancer Maternal Grandmother     Melanoma Maternal Grandmother         on scalp with brain mets    Arthritis Maternal Grandmother     Cancer Maternal Grandmother     Hearing loss Maternal Grandmother     Liver cancer Paternal Grandfather     Ovarian cancer Paternal Grandmother         metastatic    Melanoma Maternal Aunt     Breast cancer Maternal Aunt         DCIS    Bipolar disorder Maternal Aunt     Bipolar disorder Maternal Aunt     Cancer Paternal Aunt         "in lymphatic csystem"    Pancreatic cancer Paternal Uncle         PAST MEDICAL HISTORY:     Past Medical History:   Diagnosis Date    Allergy     Anxiety     Arthritis     Asthma     Breast cancer     june 2012; BRCA 1 and 2 negative    Breast cancer genetic susceptibility     Cancer     Depression     Hypothyroidism     Lung disease     Pneumonia        SURGICAL HISTORY:     Past " that delivers blood to the body.   In the [place], 9,000 deaths are caused by AAA.  You may not even know you have this problem and as many as 1 in 3 people will have a serious problem if it is not treated.  Early diagnosis allows for more effective treatment and cure.  If you have a family history of AAA or are a male age 65-75 who has smoked, you are at higher risk of an AAA.  Your provider can order this test, if needed.    Colorectal Screening:  · There are many tests that are used to check for cancer of your colon and rectum. You and your provider should discuss what test is best for you and when to have it done.  Options include:  · Screening Colonoscopy: exam of the entire colon, seen through a flexible lighted tube.  · Flexible Sigmoidoscopy: exam of the last third (sigmoid portion) of the colon and rectum, seen through a flexible lighted tube.  · Cologuard DNA stool test: a sample of your stool is used to screen for cancer and unseen blood in your stool.  · Fecal Occult Blood Test: a sample of your stool is studied to find any unseen blood    Flu Shot:  · An immunization that helps to prevent influenza (the flu). You should get this every year. The best time to get the shot is in the fall.    Pneumococcal Shot:  • Vaccines are available that can help prevent pneumococcal disease, which is any type of infection caused by Streptococcus pneumoniae bacteria.   Their use can prevent some cases of pneumonia, meningitis, and sepsis. There are two types of pneumococcal vaccines:   o Conjugate vaccines (PCV-13 or Prevnar 13®) - helps protect against the 13 types of pneumococcal bacteria that are the most common causes of serious infections in children and adults.    o Polysaccharide vaccine (PPSV23 or Gkfirutvo30®) - helps protect against 23 types of pneumococcal bacteria for patients who are recommended to get it.  These vaccines should be given at least 12 months apart.  A booster is usually not needed.      Hepatitis B Shot:  · An immunization that helps to protect people from getting Hepatitis B. Hepatitis B is a virus that spreads through contact with infected blood or body fluids. Many people with the virus do not have symptoms.  The virus can lead to serious problems, such as liver disease. Some people are at higher risk than others. Your doctor will tell you if you need this shot.     Diabetes Screening:  · A test to measure sugar (glucose) in your blood is called a fasting blood sugar. Fasting means you cannot have food or drink for at least 8 hours before the test. This test can detect diabetes long before you may notice symptoms.    Glaucoma Screening:  · Glaucoma screening is performed by your eye doctor. The test measures the fluid pressure inside your eyes to determine if you have glaucoma.     Hepatitis C Screening:  · A blood test to see if you have the hepatitis C virus.  Hepatitis C attacks the liver and is a major cause of chronic liver disease.  Medicare will cover a single screening for all adults born between 1945 & 1965, or high risk patients (people who have injected illegal drugs or people who have had blood transfusions).  High risk patients who continue to inject illegal drugs can be screened for Hepatitis C every year.    Smoking and Tobacco-Use Cessation Counseling:  · Tobacco is the single greatest cause of disease and early death in our country today. Medication and counseling together can increase a person’s chance of quitting for good.   · Medicare covers two quitting attempts per year, with four counseling sessions per attempt (eight sessions in a 12 month period)    Preventive Screening tests for Women    Screening Mammograms and Breast Exams:  · An x-ray of your breasts to check for breast cancer before you or your doctor may be able to feel it.  If breast cancer is found early it can usually be treated with success.    Pelvic Exams and Pap Tests:  · An exam to check for cervical  Surgical History:   Procedure Laterality Date    ADENOIDECTOMY      BACK SURGERY      SI joint fusion    Breast Reconstruction  Bilateral     BREAST SURGERY      CERVICAL FUSION      CERVICAL FUSION      COLONOSCOPY N/A 10/31/2022    Procedure: COLONOSCOPY;  Surgeon: Lexy Irizarry MD;  Location: [facility];  Service: Endoscopy;  Laterality: N/A;    COSMETIC SURGERY  Several    HYSTERECTOMY      lumbar fusion      MASTECTOMY Right     due to breast cancer, radiation done after surgery    MASTECTOMY Left     propholytic due to breast cancer in right breast    ROBOT-ASSISTED LAPAROSCOPIC ABDOMINAL HYSTERECTOMY USING DA HEBERT XI N/A 2018    Procedure: XI ROBOTIC HYSTERECTOMY;  Surgeon: Meka Reich MD;  Location: [facility] OR;  Service: OB/GYN;  Laterality: N/A;    ROBOT-ASSISTED LAPAROSCOPIC LYSIS OF ADHESIONS USING DA HEBERT XI N/A 2018    Procedure: XI ROBOTIC LYSIS, ADHESIONS;  Surgeon: Meka Reich MD;  Location: [facility] OR;  Service: OB/GYN;  Laterality: N/A;    ROBOT-ASSISTED LAPAROSCOPIC SALPINGO-OOPHORECTOMY USING DA HEBERT XI Bilateral 2018    Procedure: XI ROBOTIC SALPINGO-OOPHORECTOMY;  Surgeon: eMka Reich MD;  Location: [facility] OR;  Service: OB/GYN;  Laterality: Bilateral;    SI joint fusion   2018    SPINE SURGERY  Several    TONSILLECTOMY      TUBAL LIGATION         SOCIAL HISTORY:     Social History     Tobacco Use    Smoking status: Former     Packs/day: 0.50     Years: 16.00     Pack years: 8.00     Types: Cigarettes     Start date: 1990     Quit date: 2014     Years since quittin.2    Smokeless tobacco: Never   Substance Use Topics    Alcohol use: Not Currently    Drug use: No        MEDICATIONS/ALLERGIES:     Current Outpatient Medications   Medication Instructions    albuterol (PROVENTIL/VENTOLIN HFA) 90 mcg/actuation inhaler 2 puffs, Inhalation, Every 6 hours    ASCORBATE CALCIUM (VITAMIN C ORAL) Oral    blood pressure monitor ([employer] EASE)   and vaginal cancer. A Pap test is a lab test in which cells are taken from your cervix and sent to the lab to look for signs of cervical cancer. If cancer of the cervix is found early, chances for a cure are good. Testing can generally end at age 65, or if a woman has a hysterectomy for a benign condition. Your provider may recommend more frequent testing if certain abnormal results are found.    Bone Mass Measurements:  · A painless x-ray of your bone density to see if you are at risk for a broken bone. Bone density refers to the thickness of bones or how tightly the bone tissue is packed.    Preventive Screening tests for Men    Prostate Screening:  · PSA - Prostate Cancer blood test.  Experts do not recommend routine screening of healthy men with no signs or symptoms of prostate disease.  However, men should not ignore urinary symptoms, and should discuss their family history with their doctor.    *Medicare pays for many preventive services to keep you healthy. For some of these services, you might have to pay a deductible, coinsurance, and / or copayment.  The amounts vary depending on the type of services you need and the kind of Medicare health plan you have.               size Other, As needed (PRN)    blood sugar diagnostic Strp To check BG 2 times daily, to use with insurance preferred meter    blood-glucose meter kit To check BG 2 times daily, to use with insurance preferred meter    clonazePAM (KLONOPIN) 1 MG tablet TAKE 1 TABLET BY MOUTH ONCE DAILY AS NEEDED FOR ANXIETY    diltiaZEM (CARDIZEM) 30 mg, Oral, Every 8 hours    famotidine (PEPCID) 20 mg, Oral, 2 times daily    fluticasone furoate-vilanteroL (BREO ELLIPTA) 200-25 mcg/dose DsDv diskus inhaler 1 puff, Inhalation, Daily, Controller    fluticasone propionate (FLONASE) 50 mcg/actuation nasal spray SPRAY 1 SPRAY INTO EACH NOSTRIL TWO TIMES DAILY    hydrocortisone 2.5 % lotion Topical (Top), 2 times daily    inhalation spacing device (BREATHERITE VALVED MDI CHAMBER) Use as directed for inhalation.    lamoTRIgine (LAMICTAL) 100 mg, Oral, Daily    lancets Misc To check BG 2 times daily, to use with insurance preferred meter    levothyroxine (SYNTHROID) 50 MCG tablet Take 1 tablet by mouth once daily    loratadine (CLARITIN) 10 mg, Oral, Daily    melatonin 10 mg Cap Oral    meloxicam (MOBIC) 7.5 mg, Oral, 2 times daily PRN    montelukast (SINGULAIR) 10 mg, Oral    ondansetron (ZOFRAN-ODT) 4 mg, Oral, Every 6 hours PRN    promethazine-dextromethorphan (PROMETHAZINE-DM) 6.25-15 mg/5 mL Syrp 5 mLs, Oral, Nightly PRN    traZODone (DESYREL) 50 mg, Oral, Nightly PRN     Review of patient's allergies indicates:   Allergen Reactions    Cefdinir      Radiation Recall, everywhere she had radiation it looked like blisters and very hot to touch    Levofloxacin      Went into deep depression. Messed with Familiar meds    Sodium benzoate Hives    Swink Hives       REVIEW OF SYSTEMS:   I have reviewed 12 systems, including 2 points per system.   Review of Systems   Constitutional: Negative.    HENT: Negative.    Eyes: Negative.    Respiratory: Negative.    Cardiovascular: Negative.    Gastrointestinal: Negative.         No reflux    Endocrine: Negative.    Genitourinary: Negative.    Musculoskeletal: Negative.  Chronic stable back pain. Has had several back surgeries   Integumentary:  Negative.   Allergic/Immunologic: Negative.    Neurological: Negative.  Finger and toe neuropathy since chemo- stable  Hematological: Negative.  Negative for adenopathy.   Psychiatric/Behavioral: Negative.    Breast: denies breast mass, pain or discharge- mild right arm edema- compression sleeve does not fit any longer    PHYSICAL EXAM:   General: The patient appears well and is in no acute distress.   Physical Exam  Constitutional:       Appearance: She is well-developed.   HENT:      Head: Normocephalic and atraumatic.      Right Ear: External ear normal.      Left Ear: External ear normal.      Mouth/Throat:      Pharynx: No oropharyngeal exudate.   Eyes:      General: No scleral icterus.        Right eye: No discharge.         Left eye: No discharge.      Conjunctiva/sclera: Conjunctivae normal.      Pupils: Pupils are equal, round, and reactive to light.   Neck:      Thyroid: No thyromegaly.   Cardiovascular:      Rate and Rhythm: Normal rate and regular rhythm.      Heart sounds: Normal heart sounds.   Pulmonary:      Effort: Pulmonary effort is normal.      Breath sounds: Normal breath sounds.   Chest:      Breasts:         Right: No inverted nipple, mass, nipple discharge, skin change or tenderness.         Left: No inverted nipple, mass, nipple discharge, skin change or tenderness.   Abdominal:      General: Bowel sounds are normal.      Palpations: Abdomen is soft.   Musculoskeletal:         General: Normal range of motion.      Right shoulder: No crepitus. Normal strength.      Cervical back: Normal range of motion and neck supple.   Lymphadenopathy:      Head:      Right side of head: No submental, submandibular, tonsillar, preauricular, posterior auricular or occipital adenopathy.      Left side of head: No submental, submandibular, tonsillar,  preauricular, posterior auricular or occipital adenopathy.      Cervical: No cervical adenopathy.      Right cervical: No superficial or posterior cervical adenopathy.     Left cervical: No superficial or posterior cervical adenopathy.      Upper Body:      Right upper body: No supraclavicular or axillary adenopathy.      Left upper body: No supraclavicular or axillary adenopathy.   Skin:     General: Skin is warm and dry.      Coloration: Skin is not pale.      Findings: No erythema or rash.   Neurological:      Mental Status: She is alert and oriented to person, place, and time.      Deep Tendon Reflexes: Reflexes are normal and symmetric.   Psychiatric:         Behavior: Behavior normal.         Thought Content: Thought content normal.         Judgment: Judgment normal.  BREAST EXAM  No Asymmetry- bilateral reconstruction  Right:latissimus flap with saline implant  - Mass: No  - Skin change: No  - Nipple Discharge: No  - Nipple retraction: No  - Axillary LAD: No  Left: saline implant  - Mass: No  - Skin change: No  - Nipple Discharge: No  - Nipple retraction: No  - Axillary LAD: No    IMAGING:   Chidi mastectomies    PATHOLOGY:     In media- Invasive ductal cancer right breast grade 2  1 of 7 nodes positive for cancer? Pt not sure if 1 of 7 or 7 of 12 nodes  ASSESSMENT:     No diagnosis found.        PLAN:   Daniela Costa is a 53 y.o. female who presents for breast cancer surveillance and survivorship.     - Diagnosed with locally advanced right breast cancer at the age of 40 y/o. Had neoadjuvant chemo- AC X 4 + taxol X 12- right chest wall and axilla radiation, 5 years of adjuvant endocrine therapy  -s/p chidi mastectomies with reconstruction  - brca 1 and 2 testing years ago-  expanded testing- 12/2022- no mutation noted  - mild lymphedema right arm- purchased new sleeve and went through lymphedema therapy  - 25# down from 6 mons ago- great job   - up to date with gyn and had first colonoscopy Oct 2022  - RTC one  year for chest wall exam and surveillance      Daniela Costa has a normal chest wall exam today. We discussed the possible signs and symptoms of breast cancer as lump, masses, new asymmetries, skin changes. She is encouraged to contact me if any new breast concerns arise.

## 2023-03-29 LAB
HCV AB SERPL QL IA: NORMAL
RPR SER QL: NORMAL

## 2023-03-31 ENCOUNTER — HOSPITAL ENCOUNTER (OUTPATIENT)
Dept: SLEEP MEDICINE | Facility: HOSPITAL | Age: 53
Discharge: HOME | End: 2023-03-31
Attending: INTERNAL MEDICINE
Payer: MEDICARE

## 2023-03-31 DIAGNOSIS — G47.33 OSA (OBSTRUCTIVE SLEEP APNEA): ICD-10-CM

## 2023-03-31 DIAGNOSIS — G47.34 NOCTURNAL HYPOXEMIA: ICD-10-CM

## 2023-03-31 DIAGNOSIS — D75.1 POLYCYTHEMIA: ICD-10-CM

## 2023-03-31 PROCEDURE — 95806 SLEEP STUDY UNATT&RESP EFFT: CPT | Performed by: INTERNAL MEDICINE

## 2023-03-31 NOTE — Clinical Note
2 night study MILD/BORDERLINE OBSTRUCTIVE SLEEP APNEA with overall AHI 6.8/hr (49 events): night # 2 Oxygen desaturation: 90%. SpO2 between 90% to 94% for 2 hr 59 min. Patient snored 95% time above 50 . Heart rate range: 61 bpm - 98 bpm REC's: Therapy with APAP at 4-20 cm WP using mask of choice with heated humidification is an option. Please refer to sleep disorders clinic Weight loss/management. with regular exercise per direction of physician. Avoid drowsy driving. Follow up in sleep clinic to maximize adherence and ensure resolution of symptoms

## 2023-04-03 PROBLEM — G47.33 OSA (OBSTRUCTIVE SLEEP APNEA): Status: ACTIVE | Noted: 2023-04-03

## 2023-04-03 PROCEDURE — 95806 PR SLEEP STUDY, UNATTENDED, SIMUL RECORD HR/O2 SAT/RESP FLOW/RESP EFFT: ICD-10-PCS | Mod: 26,,, | Performed by: INTERNAL MEDICINE

## 2023-04-03 PROCEDURE — 95806 SLEEP STUDY UNATT&RESP EFFT: CPT | Mod: 26,,, | Performed by: INTERNAL MEDICINE

## 2023-04-03 NOTE — PROCEDURES
"2 night study  MILD/BORDERLINE OBSTRUCTIVE SLEEP APNEA with overall AHI 6.8/hr (49 events): night # 2  Oxygen desaturation: 90%. SpO2 between 90% to 94% for 2 hr 59 min.  Patient snored 95% time above 50 .  Heart rate range: 61 bpm - 98 bpm  REC's:  Therapy with APAP at 4-20 cm WP using mask of choice with heated humidification is an option.  Please refer to sleep disorders clinic  Weight loss/management. with regular exercise per direction of physician.  Avoid drowsy driving.  Follow up in sleep clinic to maximize adherence and ensure resolution of symptoms    Dear Everette John MD  [id number] [facility]  [place] [id number]/Lucía Terrell MD         The sleep study that you ordered is complete.  You have ordered sleep LAB services to perform the sleep study for Daniela Costa .      Please find Sleep Study result in  the "Media tab" of Chart Review menu.        You can look  for the report in the  Media by the document type "Sleep Study Documents". Alphabetizing  "Document type" column helps to find the SLEEP STUDY report  Faster.       As the ordering provider, you are responsible for reviewing the results and implementing a treatment plan with your patient.    If you need a Sleep Medicine provider to explain the sleep study findings and arrange treatment for the patient, please refer patient for consultation to our Sleep Clinic via [facility] with Ambulatory Consult Sleep.     To do that please place an order for an  "Ambulatory Consult Sleep" -  order , it will go to our clinic work queue for our staff  to contact the patient for an appointment.      For any questions, please contact our sleep lab  staff at [phone] to talk to clinical staff          Chon Akers MD   "

## 2023-04-11 ENCOUNTER — OFFICE VISIT (OUTPATIENT)
Dept: SURGERY | Facility: CLINIC | Age: 53
End: 2023-04-11
Payer: MEDICARE

## 2023-04-11 VITALS
HEIGHT: 65 IN | HEART RATE: 89 BPM | DIASTOLIC BLOOD PRESSURE: 78 MMHG | WEIGHT: 191.81 LBS | BODY MASS INDEX: 31.96 KG/M2 | RESPIRATION RATE: 14 BRPM | SYSTOLIC BLOOD PRESSURE: 112 MMHG

## 2023-04-11 DIAGNOSIS — Z12.39 ENCOUNTER FOR BREAST CANCER SCREENING USING NON-MAMMOGRAM MODALITY: ICD-10-CM

## 2023-04-11 DIAGNOSIS — C50.911 MALIGNANT NEOPLASM OF RIGHT FEMALE BREAST, UNSPECIFIED ESTROGEN RECEPTOR STATUS, UNSPECIFIED SITE OF BREAST: Primary | ICD-10-CM

## 2023-04-11 DIAGNOSIS — Z71.89 COUNSELING ON HEALTH PROMOTION AND DISEASE PREVENTION: ICD-10-CM

## 2023-04-11 DIAGNOSIS — Z71.89 COUNSELING AND COORDINATION OF CARE: ICD-10-CM

## 2023-04-11 DIAGNOSIS — I89.0 LYMPHEDEMA: ICD-10-CM

## 2023-04-11 PROCEDURE — 99999 PR PBB SHADOW E&M-EST. PATIENT-LVL IV: ICD-10-PCS | Mod: PBBFAC,,, | Performed by: NURSE PRACTITIONER

## 2023-04-11 PROCEDURE — 3008F BODY MASS INDEX DOCD: CPT | Mod: CPTII,S$GLB,, | Performed by: NURSE PRACTITIONER

## 2023-04-11 PROCEDURE — 99999 PR PBB SHADOW E&M-EST. PATIENT-LVL IV: CPT | Mod: PBBFAC,,, | Performed by: NURSE PRACTITIONER

## 2023-04-11 PROCEDURE — 1159F MED LIST DOCD IN RCRD: CPT | Mod: CPTII,S$GLB,, | Performed by: NURSE PRACTITIONER

## 2023-04-11 PROCEDURE — 3074F SYST BP LT 130 MM HG: CPT | Mod: CPTII,S$GLB,, | Performed by: NURSE PRACTITIONER

## 2023-04-11 PROCEDURE — 99214 PR OFFICE/OUTPT VISIT, EST, LEVL IV, 30-39 MIN: ICD-10-PCS | Mod: S$GLB,,, | Performed by: NURSE PRACTITIONER

## 2023-04-11 PROCEDURE — 3078F PR MOST RECENT DIASTOLIC BLOOD PRESSURE < 80 MM HG: ICD-10-PCS | Mod: CPTII,S$GLB,, | Performed by: NURSE PRACTITIONER

## 2023-04-11 PROCEDURE — 1160F PR REVIEW ALL MEDS BY PRESCRIBER/CLIN PHARMACIST DOCUMENTED: ICD-10-PCS | Mod: CPTII,S$GLB,, | Performed by: NURSE PRACTITIONER

## 2023-04-11 PROCEDURE — 1160F RVW MEDS BY RX/DR IN RCRD: CPT | Mod: CPTII,S$GLB,, | Performed by: NURSE PRACTITIONER

## 2023-04-11 PROCEDURE — 3078F DIAST BP <80 MM HG: CPT | Mod: CPTII,S$GLB,, | Performed by: NURSE PRACTITIONER

## 2023-04-11 PROCEDURE — 3008F PR BODY MASS INDEX (BMI) DOCUMENTED: ICD-10-PCS | Mod: CPTII,S$GLB,, | Performed by: NURSE PRACTITIONER

## 2023-04-11 PROCEDURE — 3074F PR MOST RECENT SYSTOLIC BLOOD PRESSURE < 130 MM HG: ICD-10-PCS | Mod: CPTII,S$GLB,, | Performed by: NURSE PRACTITIONER

## 2023-04-11 PROCEDURE — 99214 OFFICE O/P EST MOD 30 MIN: CPT | Mod: S$GLB,,, | Performed by: NURSE PRACTITIONER

## 2023-04-11 PROCEDURE — 1159F PR MEDICATION LIST DOCUMENTED IN MEDICAL RECORD: ICD-10-PCS | Mod: CPTII,S$GLB,, | Performed by: NURSE PRACTITIONER

## 2023-04-11 RX ORDER — CYCLOBENZAPRINE HCL 10 MG
TABLET ORAL
COMMUNITY
Start: 2023-04-05

## 2023-04-11 RX ORDER — MELOXICAM 15 MG/1
15 TABLET ORAL
COMMUNITY
Start: 2023-04-05

## 2023-04-13 ENCOUNTER — CLINICAL SUPPORT (OUTPATIENT)
Dept: PULMONOLOGY | Facility: CLINIC | Age: 53
End: 2023-04-13
Payer: MEDICAID

## 2023-04-13 ENCOUNTER — OFFICE VISIT (OUTPATIENT)
Dept: PULMONOLOGY | Facility: CLINIC | Age: 53
End: 2023-04-13
Payer: MEDICARE

## 2023-04-13 VITALS
RESPIRATION RATE: 18 BRPM | DIASTOLIC BLOOD PRESSURE: 80 MMHG | HEART RATE: 108 BPM | HEIGHT: 65 IN | BODY MASS INDEX: 32.06 KG/M2 | SYSTOLIC BLOOD PRESSURE: 122 MMHG | OXYGEN SATURATION: 98 % | WEIGHT: 192.44 LBS

## 2023-04-13 DIAGNOSIS — G47.33 OSA (OBSTRUCTIVE SLEEP APNEA): Primary | ICD-10-CM

## 2023-04-13 DIAGNOSIS — J45.20 MILD INTERMITTENT ASTHMA, UNSPECIFIED WHETHER COMPLICATED: ICD-10-CM

## 2023-04-13 DIAGNOSIS — J30.89 SEASONAL ALLERGIC RHINITIS DUE TO OTHER ALLERGIC TRIGGER: ICD-10-CM

## 2023-04-13 DIAGNOSIS — J45.20 MILD INTERMITTENT ASTHMA WITHOUT COMPLICATION: ICD-10-CM

## 2023-04-13 DIAGNOSIS — E66.01 SEVERE OBESITY (BMI 35.0-39.9) WITH COMORBIDITY: ICD-10-CM

## 2023-04-13 DIAGNOSIS — R53.82 CHRONIC FATIGUE: ICD-10-CM

## 2023-04-13 PROCEDURE — 1159F MED LIST DOCD IN RCRD: CPT | Mod: CPTII,S$GLB,, | Performed by: INTERNAL MEDICINE

## 2023-04-13 PROCEDURE — 3074F PR MOST RECENT SYSTOLIC BLOOD PRESSURE < 130 MM HG: ICD-10-PCS | Mod: CPTII,S$GLB,, | Performed by: INTERNAL MEDICINE

## 2023-04-13 PROCEDURE — 99214 PR OFFICE/OUTPT VISIT, EST, LEVL IV, 30-39 MIN: ICD-10-PCS | Mod: 25,S$GLB,, | Performed by: INTERNAL MEDICINE

## 2023-04-13 PROCEDURE — 94060 EVALUATION OF WHEEZING: CPT | Mod: S$GLB,,, | Performed by: INTERNAL MEDICINE

## 2023-04-13 PROCEDURE — 99214 OFFICE O/P EST MOD 30 MIN: CPT | Mod: 25,S$GLB,, | Performed by: INTERNAL MEDICINE

## 2023-04-13 PROCEDURE — 3074F SYST BP LT 130 MM HG: CPT | Mod: CPTII,S$GLB,, | Performed by: INTERNAL MEDICINE

## 2023-04-13 PROCEDURE — 3008F BODY MASS INDEX DOCD: CPT | Mod: CPTII,S$GLB,, | Performed by: INTERNAL MEDICINE

## 2023-04-13 PROCEDURE — 3079F PR MOST RECENT DIASTOLIC BLOOD PRESSURE 80-89 MM HG: ICD-10-PCS | Mod: CPTII,S$GLB,, | Performed by: INTERNAL MEDICINE

## 2023-04-13 PROCEDURE — 94060 PR EVAL OF BRONCHOSPASM: ICD-10-PCS | Mod: S$GLB,,, | Performed by: INTERNAL MEDICINE

## 2023-04-13 PROCEDURE — 3079F DIAST BP 80-89 MM HG: CPT | Mod: CPTII,S$GLB,, | Performed by: INTERNAL MEDICINE

## 2023-04-13 PROCEDURE — 99999 PR PBB SHADOW E&M-EST. PATIENT-LVL IV: ICD-10-PCS | Mod: PBBFAC,,, | Performed by: INTERNAL MEDICINE

## 2023-04-13 PROCEDURE — 1159F PR MEDICATION LIST DOCUMENTED IN MEDICAL RECORD: ICD-10-PCS | Mod: CPTII,S$GLB,, | Performed by: INTERNAL MEDICINE

## 2023-04-13 PROCEDURE — 99999 PR PBB SHADOW E&M-EST. PATIENT-LVL IV: CPT | Mod: PBBFAC,,, | Performed by: INTERNAL MEDICINE

## 2023-04-13 PROCEDURE — 3008F PR BODY MASS INDEX (BMI) DOCUMENTED: ICD-10-PCS | Mod: CPTII,S$GLB,, | Performed by: INTERNAL MEDICINE

## 2023-04-13 RX ORDER — FLUTICASONE PROPIONATE 50 MCG
SPRAY, SUSPENSION (ML) NASAL
Qty: 48 ML | Refills: 3 | Status: SHIPPED | OUTPATIENT
Start: 2023-04-13

## 2023-04-13 RX ORDER — ALBUTEROL SULFATE 0.83 MG/ML
2.5 SOLUTION RESPIRATORY (INHALATION)
Qty: 360 ML | Refills: 11 | Status: SHIPPED | OUTPATIENT
Start: 2023-04-13 | End: 2024-04-12

## 2023-04-13 RX ORDER — FLUTICASONE FUROATE AND VILANTEROL 200; 25 UG/1; UG/1
1 POWDER RESPIRATORY (INHALATION) DAILY
Qty: 60 EACH | Refills: 11 | Status: SHIPPED | OUTPATIENT
Start: 2023-04-13

## 2023-04-13 RX ORDER — ALBUTEROL SULFATE 90 UG/1
2 AEROSOL, METERED RESPIRATORY (INHALATION) EVERY 6 HOURS
Qty: 18 G | Refills: 11 | Status: SHIPPED | OUTPATIENT
Start: 2023-04-13 | End: 2023-11-22 | Stop reason: SDUPTHER

## 2023-04-13 NOTE — PATIENT INSTRUCTIONS
"Continuous Positive Airway Pressure Machine Cleaning    One of the most important factors in maintaining CPAP compliance is taking proper care of your CPAP equipment. In order to have successful CPAP therapy, you must be willing to make your treatment a priority in your life, and that means regularly cleaning and maintaining your CPAP equipment. Fortunately, taking proper care of your equipment is pretty easy, and not very time consuming. With a little adjustment to your regular morning routine, your device and accessories will be working at 100% efficiency to get you that much needed sleep you've been longing for.    One of the most frequent questions we get asked is "how often do I need to clean my CPAP equipment?" .    CPAP Humidifier Cleaning and Replacement:  Nearly all current CPAP machines now come with a heated humidification system that helps cut down on morning dry mouth as well as keeping your nasal turbinates from drying out and becoming irritated and inflamed. However, the humidification chamber needs to be cleaned out daily to prevent bacteria build-up as well as calcification. Recommended routine:    Remove chamber from humidifier carefully so water doesn't enter your CPAP machine.    Open chamber and wash with warm, soapy water.    Rinse well with water and allow to dry on a clean cloth or paper towel out in direct sunlight.    Fill with distilled or sterile water (obtained at any grocery store) Do not use tap water as it may contain minerals and chemicals that can damage components of the machine. It is also not recommended to use filtered water (i.e. through a Cyn filter) for the same reasons.    Once a week the humidifier chamber should be soaked in a solution of 1 part white vinegar 3 parts water for approximately 15-20 minutes before rinsing thoroughly with distilled water.    Some humidifier chambers are  safe, but make sure to check your CPAP machine's manual before cleaning in a " .    Humidifier chambers should be replaced as needed.    CPAP Mask Cleaning and Replacement:  Most CPAP mask cushions are made of silicone, a gentle, non-irritating material. However, while silicone is a very comfortable material for masks, it doesn't have a very long lifespan, and without proper care can breakdown quicker than expected. The oil from your skin interacts with the silicone mask and causes breakdown and stiffness. Therefore, cleaning your CPAP mask is crucial in making it efficient as possible. Here's some tips on CPAP mask cleaning and replacement:    Wash mask daily with warm water and mild, non-fragrant soap or purchase CPAP mask specific wipes and detergents. You can use the same type of mild basic facial soap that you use on your face.    Rinse with water and allow to air dry on a clean cloth or paper towel out of direct sunlight.    Before using mask at night, wash your face thoroughly and don't use facial moisturizers. Facial oils and moisturizers can breakdown the silicone faster.    Once a week soak mask in solution of 1 part white vinegar 3 parts water before rinsing in distilled water.    Headgear and chinstraps should be washed as needed by hand using warm soapy water, rinsed well, and air dried. Do not place headgear or chinstraps in washing machine or dryer.  For replacement schedules of CPAP masks you should check both your 's recommendations and your insurance allowance. However, for most masks it is recommended that you  replace the cushions 1-2 times per month, and the mask every 3-6 months.    CPAP tubing should be cleaned weekly in a sink of warm, soapy water, rinsed well, and left to hang-dry out of direct sunlight.    CPAP Filters Cleaning and Replacement  Your filters are located near the back of the CPAP machine where the device draws air from the room that it compresses to your pressure settings. Nearly all CPAP machines have a disposable white paper  filter and some have an additional non-disposable grey filter as well. Here are some cleaning tips for your CPAP filters:    You should clean the grey non-disposable filter at least on a weekly basis. You may have to clean it more regularly if you have pets, smoke inside your house, or if your home is especially mohinder.    Rinse grey filters with water and allow to dry before placing back into your machine.    The grey re-usable filters should be replaced when it begins to look worn or after 6 months.    Replace disposable white paper filters monthly or more frequently if it appears dingy or dirty.    Your CPAP machine itself does not need to be cleaned but you may want to dust it down with a slightly damp cloth as desired.    General CPAP Maintenance & CPAP Cleaning Tips  Make your CPAP equipment cleaning part of your morning routine, allowing the equipment ample time to dry during the day.    Keep machine and accessories out of direct sunlight to avoid damaging them.    Never use bleach to clean accessories.    Other machine accessories such as power cords and data cards may need to be replaced due to equipment malfunctions.    Place machine on a level surface away from objects such as curtains that may interfere with the air intake.    Always use distilled or sterile water when cleaning components.    Keep track of when you should order replacement parts for your mask and accessories so that you always get the most out of your therapy.    With these simple tips on cleaning and maintaining your CPAP device and accessories, you will assuredly have a much better CPAP therapy experience.    There are a number of machines advertised that clean Continuous Positive Airway Pressure equipment. For most patients this is not necessary. If you have a history of chronic sinus or lung infections this type of cleaning device may be helpful. Unfortunately, at this time most insurance companies and Medicare are not paying for  these devices.

## 2023-04-13 NOTE — PROGRESS NOTES
Subjective:     Patient ID: Daniela Costa is a 53 y.o. female.    Chief Complaint:  Home polysomnogram reviewed    HPI Post nasal drip and sinus congestion   Cough after exercising  Gained weight over the past two years  Fatigue in morning    Sleep Apnea  She presents for a sleep evaluation. She complains of periods of not breathing, excessive daytime sleepiness, sinus problems, congested nose, feels sleepy during the day, fatigue.  Symptoms began 4 years ago, gradually worsening since that time.  She goes to sleep at 11 weekdays and  weekends. She awakens 7 weekdays and  weekends. She falls asleep in 30 + minutes.  Collar size na. She denies knees buckling with laughing, completely or partially paralyzed while falling asleep or waking up. Previous evaluation and treatment has included home polysomnogram - see below      Asthma Follow-up  The patient has previously been evaluated here for asthma and presents for an asthma follow-up. The patient is not currently having symptoms / an exacerbation. Current symptoms include chest tightness, dyspnea, non-productive cough, and wheezing. Symptoms have been present since several weeks ago and have been gradually worsening. She denies chest pain. Associated symptoms include poor exercise tolerance and shortness of breath.  This episode appears to have been triggered by exercise and pollens. Treatments tried for the current exacerbation include short-acting inhaled beta-adrenergic agonists, which have provided some relief of symptoms. The patient has been having similar episodes for approximately  many  years.    Current Disease Severity  The patient is having daytime symptoms daily. The patient is having daytime symptoms more than once per week, but not nightly. The patient is using short-acting beta agonists for symptom control  needs refill . She has exacerbations requiring oral systemic corticosteroids 0 times per year. Current limitations in activity from asthma: none.  Number of days of school or work missed in the last month: not applicable. Number of urgent/emergent visit in last year: 0.  The patient is not using a spacer with MDIs. Her best peak flow rate is   na. She is not monitoring peak flow rates at home.    Past Medical History:   Diagnosis Date    Allergy     Anxiety     Arthritis     Asthma     Breast cancer     june 2012; BRCA 1 and 2 negative    Breast cancer genetic susceptibility     Cancer     Depression     Hypothyroidism     Lung disease     Pneumonia      Past Surgical History:   Procedure Laterality Date    ADENOIDECTOMY      BACK SURGERY      SI joint fusion    Breast Reconstruction  Bilateral     BREAST SURGERY      CERVICAL FUSION      CERVICAL FUSION      COLONOSCOPY N/A 10/31/2022    Procedure: COLONOSCOPY;  Surgeon: Lexy Irizarry MD;  Location: [facility];  Service: Endoscopy;  Laterality: N/A;    COSMETIC SURGERY  Several    HYSTERECTOMY      lumbar fusion      MASTECTOMY Right 2013    due to breast cancer, radiation done after surgery    MASTECTOMY Left 2015    propholytic due to breast cancer in right breast    ROBOT-ASSISTED LAPAROSCOPIC ABDOMINAL HYSTERECTOMY USING DA HEBERT XI N/A 12/05/2018    Procedure: XI ROBOTIC HYSTERECTOMY;  Surgeon: Meka Reich MD;  Location: [facility] OR;  Service: OB/GYN;  Laterality: N/A;    ROBOT-ASSISTED LAPAROSCOPIC LYSIS OF ADHESIONS USING DA HEBERT XI N/A 12/05/2018    Procedure: XI ROBOTIC LYSIS, ADHESIONS;  Surgeon: Meka Reich MD;  Location: [facility] OR;  Service: OB/GYN;  Laterality: N/A;    ROBOT-ASSISTED LAPAROSCOPIC SALPINGO-OOPHORECTOMY USING DA HEBERT XI Bilateral 12/05/2018    Procedure: XI ROBOTIC SALPINGO-OOPHORECTOMY;  Surgeon: Meka Reich MD;  Location: [facility] OR;  Service: OB/GYN;  Laterality: Bilateral;    SI joint fusion   03/26/2018    SPINE SURGERY  Several    TONSILLECTOMY      TUBAL LIGATION       Review of patient's allergies indicates:   Allergen Reactions    Cefdinir      Radiation  Recall, everywhere she had radiation it looked like blisters and very hot to touch    Levofloxacin      Went into deep depression. Messed with Pych meds    Sodium benzoate Hives    Strawberry Hives     Current Outpatient Medications on File Prior to Visit   Medication Sig Dispense Refill    ASCORBATE CALCIUM (VITAMIN C ORAL) Take by mouth.      blood pressure monitor ([employer] EASE) ST size by Other route as needed for High Blood Pressure. 1 each 0    blood sugar diagnostic Strp To check BG 2 times daily, to use with insurance preferred meter 200 each 11    blood-glucose meter kit To check BG 2 times daily, to use with insurance preferred meter 1 each 0    clonazePAM (KLONOPIN) 1 MG tablet TAKE 1 TABLET BY MOUTH ONCE DAILY AS NEEDED FOR ANXIETY 30 tablet 1    cyclobenzaprine (FLEXERIL) 10 MG tablet Take by mouth.      diltiaZEM (CARDIZEM) 30 MG tablet Take 1 tablet (30 mg total) by mouth every 8 (eight) hours. 180 tablet 3    famotidine (PEPCID) 20 MG tablet Take 1 tablet (20 mg total) by mouth 2 (two) times daily. 60 tablet 0    hydrocortisone 2.5 % lotion Apply topically 2 (two) times daily. 59 mL 0    inhalation spacing device (BREATHERITE VALVED MDI CHAMBER) Use as directed for inhalation. 1 Device prn    lamoTRIgine (LAMICTAL) 100 MG tablet Take 1 tablet (100 mg total) by mouth once daily. 30 tablet 3    lancets Misc To check BG 2 times daily, to use with insurance preferred meter 200 each 11    levothyroxine (SYNTHROID) 50 MCG tablet Take 1 tablet by mouth once daily 90 tablet 0    loratadine (CLARITIN) 10 mg tablet Take 1 tablet (10 mg total) by mouth once daily. 30 tablet 0    melatonin 10 mg Cap Take by mouth.      meloxicam (MOBIC) 15 MG tablet Take 15 mg by mouth.      montelukast (SINGULAIR) 10 mg tablet Take 10 mg by mouth.      ondansetron (ZOFRAN-ODT) 4 MG TbDL Take 4 mg by mouth every 6 (six) hours as needed.      promethazine-dextromethorphan (PROMETHAZINE-DM) 6.25-15 mg/5 mL Syrp Take 5 mLs by mouth  "nightly as needed (Cough). 120 mL 0    traZODone (DESYREL) 50 MG tablet Take 1 tablet (50 mg total) by mouth nightly as needed for Insomnia. 30 tablet 3     No current facility-administered medications on file prior to visit.     Social History     Socioeconomic History    Marital status:     Number of children: 6   Occupational History    Occupation: stay home mom    Tobacco Use    Smoking status: Former     Packs/day: 0.50     Years: 16.00     Pack years: 8.00     Types: Cigarettes     Start date: 1990     Quit date: 2014     Years since quittin.3    Smokeless tobacco: Never   Substance and Sexual Activity    Alcohol use: Not Currently    Drug use: No    Sexual activity: Not Currently     Partners: Male     Birth control/protection: See Surgical Hx   Social History Narrative    Patient has 4 biological children and 2 step     Family History   Problem Relation Age of Onset    Diabetes Mother     Hyperlipidemia Mother     Breast cancer Mother 53        lumpectomy, chemo, radiation    Arthritis Mother     Cancer Mother     Hypertension Mother     Diabetes Father     Hyperlipidemia Father     Other Father         non-Hogdkin's lymphoma    Arthritis Father     COPD Father     Hypertension Father     Other Sister         prophylactic BL mastectomy    Other Sister         prophylactic BL mastecomy    Down syndrome Other     Cancer Maternal Grandfather     Bipolar disorder Maternal Grandmother     Breast cancer Maternal Grandmother     Melanoma Maternal Grandmother         on scalp with brain mets    Arthritis Maternal Grandmother     Cancer Maternal Grandmother     Hearing loss Maternal Grandmother     Liver cancer Paternal Grandfather     Ovarian cancer Paternal Grandmother         metastatic    Melanoma Maternal Aunt     Breast cancer Maternal Aunt         DCIS    Bipolar disorder Maternal Aunt     Bipolar disorder Maternal Aunt     Cancer Paternal Aunt         "in lymphatic csystem"    Pancreatic " "cancer Paternal Uncle        Review of Systems   Constitutional:  Positive for fatigue. Negative for fever.   HENT:  Positive for postnasal drip, rhinorrhea and congestion.    Eyes:  Negative for redness and itching.   Respiratory:  Positive for cough, sputum production, shortness of breath, dyspnea on extertion, use of rescue inhaler and Paroxysmal Nocturnal Dyspnea.    Cardiovascular:  Negative for chest pain, palpitations and leg swelling.   Genitourinary:  Negative for difficulty urinating and hematuria.   Endocrine:  Negative for cold intolerance and heat intolerance.    Skin:  Negative for rash.   Gastrointestinal:  Negative for nausea and abdominal pain.   Neurological:  Negative for dizziness, syncope, weakness and light-headedness.   Hematological:  Negative for adenopathy. Does not bruise/bleed easily.   Psychiatric/Behavioral:  Negative for sleep disturbance. The patient is not nervous/anxious.      Objective:      /80   Pulse 108   Resp 18   Ht 5' 5" (1.651 m)   Wt 87.3 kg (192 lb 7.4 oz)   LMP  (LMP Unknown) Comment: no cycle 8/2012  SpO2 98%   BMI 32.03 kg/m²   Physical Exam  Vitals and nursing note reviewed.   Constitutional:       Appearance: She is well-developed. She is obese.   HENT:      Head: Normocephalic and atraumatic.      Nose: Nose normal.      Mouth/Throat:      Pharynx: No oropharyngeal exudate.   Eyes:      Conjunctiva/sclera: Conjunctivae normal.      Pupils: Pupils are equal, round, and reactive to light.   Neck:      Thyroid: No thyromegaly.      Vascular: No JVD.      Trachea: No tracheal deviation.   Cardiovascular:      Rate and Rhythm: Normal rate and regular rhythm.      Heart sounds: Normal heart sounds.   Pulmonary:      Effort: Pulmonary effort is normal. No respiratory distress.      Breath sounds: Examination of the right-lower field reveals wheezing. Examination of the left-lower field reveals wheezing. Decreased breath sounds and wheezing present. No rhonchi " or rales.   Chest:      Chest wall: No tenderness.   Abdominal:      General: Bowel sounds are normal.      Palpations: Abdomen is soft.   Musculoskeletal:         General: Normal range of motion.      Cervical back: Neck supple.   Lymphadenopathy:      Cervical: No cervical adenopathy.   Skin:     General: Skin is warm and dry.   Neurological:      Mental Status: She is alert and oriented to person, place, and time.     Personal Diagnostic Review  Chest x-ray: stable     Pulmonary Studies Review 4/13/2023   SpO2 98   Height 65   Weight 3079.39   BMI (Calculated) 32   Predicted Distance 380.99   Predicted Distance Meters (Calculated) 520.66   Some encounter information is confidential and restricted. Go to Review [facility] activity to see all data.       X-Ray Hip 2 or 3 views Right (with Pelvis when performed)  Narrative: EXAMINATION:  XR HIP WITH PELVIS WHEN PERFORMED, 2 OR 3  VIEWS RIGHT    CLINICAL HISTORY:  Pain in right hip    TECHNIQUE:  AP view of the pelvis and frog leg lateral view of the right hip were performed.    COMPARISON:  None    FINDINGS:  There is no radiographic evidence of acute osseous, articular, or soft tissue abnormality.  Joint spaces are preserved.Postoperative changes from multilevel fusion are seen in the lumbosacral spine as well as the left SI joint.  Impression: No acute findings    Electronically signed by: Anibal Mcdermott MD  Date:    10/13/2022  Time:    15:22      Office Spirometry Results:Spirometry shows mild obstruction. Spirometry remains unimproved following bronchodilator.   Â       No flowsheet data found.  Pulmonary Studies Review 4/13/2023   SpO2 98   Height 65   Weight 3079.39   BMI (Calculated) 32   Predicted Distance 380.99   Predicted Distance Meters (Calculated) 520.66   Some encounter information is confidential and restricted. Go to Review [facility] activity to see all data.     2 night study  MILD/BORDERLINE OBSTRUCTIVE SLEEP APNEA with overall AHI 6.8/hr (49  events): night # 2  Oxygen desaturation: 90%. SpO2 between 90% to 94% for 2 hr 59 min.  Patient snored 95% time above 50 .  Heart rate range: 61 bpm - 98 bpm  REC's:  Therapy with APAP at 4-20 cm WP using mask of choice with heated humidification is an option.  Please refer to sleep disorders clinic  Weight loss/management. with regular exercise per direction of physician.  Avoid drowsy driving.  Follow up in sleep clinic to maximize adherence and ensure resolution of symptoms     Dear Everette John MD  [id number] [facility]  [place] [id number]/Lucía Terrell MD        Assessment:       CHLOE (obstructive sleep apnea)  Start auto CPAP    CHLOE (obstructive sleep apnea)  -     CPAP/BIPAP SUPPLIES  -     CPAP FOR HOME USE    Mild intermittent asthma, unspecified whether complicated  -     albuterol (PROVENTIL/VENTOLIN HFA) 90 mcg/actuation inhaler; Inhale 2 puffs into the lungs every 6 (six) hours.  Dispense: 18 g; Refill: 11  -     fluticasone furoate-vilanteroL (BREO ELLIPTA) 200-25 mcg/dose DsDv diskus inhaler; Inhale 1 puff into the lungs once daily. Controller  Dispense: 60 each; Refill: 11  -     albuterol (PROVENTIL) 2.5 mg /3 mL (0.083 %) nebulizer solution; Take 3 mLs (2.5 mg total) by nebulization every 4 to 6 hours as needed for Wheezing or Shortness of Breath.  Dispense: 360 mL; Refill: 11    Seasonal allergic rhinitis due to other allergic trigger  -     fluticasone propionate (FLONASE) 50 mcg/actuation nasal spray; SPRAY 1 SPRAY INTO EACH NOSTRIL TWO TIMES DAILY  Dispense: 48 mL; Refill: 3    Chronic fatigue    Mild intermittent asthma without complication    Severe obesity (BMI 35.0-39.9) with comorbidity            Outpatient Encounter Medications as of 4/13/2023   Medication Sig Dispense Refill    ASCORBATE CALCIUM (VITAMIN C ORAL) Take by mouth.      blood pressure monitor ([employer] EASE) ST size by Other route as needed for High Blood Pressure. 1 each 0    blood sugar diagnostic Strp To check BG  2 times daily, to use with insurance preferred meter 200 each 11    blood-glucose meter kit To check BG 2 times daily, to use with insurance preferred meter 1 each 0    clonazePAM (KLONOPIN) 1 MG tablet TAKE 1 TABLET BY MOUTH ONCE DAILY AS NEEDED FOR ANXIETY 30 tablet 1    cyclobenzaprine (FLEXERIL) 10 MG tablet Take by mouth.      diltiaZEM (CARDIZEM) 30 MG tablet Take 1 tablet (30 mg total) by mouth every 8 (eight) hours. 180 tablet 3    famotidine (PEPCID) 20 MG tablet Take 1 tablet (20 mg total) by mouth 2 (two) times daily. 60 tablet 0    hydrocortisone 2.5 % lotion Apply topically 2 (two) times daily. 59 mL 0    inhalation spacing device (BREATHERITE VALVED MDI CHAMBER) Use as directed for inhalation. 1 Device prn    lamoTRIgine (LAMICTAL) 100 MG tablet Take 1 tablet (100 mg total) by mouth once daily. 30 tablet 3    lancets Misc To check BG 2 times daily, to use with insurance preferred meter 200 each 11    levothyroxine (SYNTHROID) 50 MCG tablet Take 1 tablet by mouth once daily 90 tablet 0    loratadine (CLARITIN) 10 mg tablet Take 1 tablet (10 mg total) by mouth once daily. 30 tablet 0    melatonin 10 mg Cap Take by mouth.      meloxicam (MOBIC) 15 MG tablet Take 15 mg by mouth.      montelukast (SINGULAIR) 10 mg tablet Take 10 mg by mouth.      ondansetron (ZOFRAN-ODT) 4 MG TbDL Take 4 mg by mouth every 6 (six) hours as needed.      promethazine-dextromethorphan (PROMETHAZINE-DM) 6.25-15 mg/5 mL Syrp Take 5 mLs by mouth nightly as needed (Cough). 120 mL 0    traZODone (DESYREL) 50 MG tablet Take 1 tablet (50 mg total) by mouth nightly as needed for Insomnia. 30 tablet 3    [DISCONTINUED] albuterol (PROVENTIL/VENTOLIN HFA) 90 mcg/actuation inhaler Inhale 2 puffs into the lungs every 6 (six) hours. 18 g 11    [DISCONTINUED] fluticasone furoate-vilanteroL (BREO ELLIPTA) 200-25 mcg/dose DsDv diskus inhaler Inhale 1 puff into the lungs once daily. Controller 60 each 11    [DISCONTINUED] fluticasone propionate  (FLONASE) 50 mcg/actuation nasal spray SPRAY 1 SPRAY INTO EACH NOSTRIL TWO TIMES DAILY 48 mL 3    albuterol (PROVENTIL) 2.5 mg /3 mL (0.083 %) nebulizer solution Take 3 mLs (2.5 mg total) by nebulization every 4 to 6 hours as needed for Wheezing or Shortness of Breath. 360 mL 11    albuterol (PROVENTIL/VENTOLIN HFA) 90 mcg/actuation inhaler Inhale 2 puffs into the lungs every 6 (six) hours. 18 g 11    [] fluconazole (DIFLUCAN) 150 MG Tab Take 1 tablet (150 mg total) by mouth once daily. for 1 day 2 tablet 0    fluticasone furoate-vilanteroL (BREO ELLIPTA) 200-25 mcg/dose DsDv diskus inhaler Inhale 1 puff into the lungs once daily. Controller 60 each 11    fluticasone propionate (FLONASE) 50 mcg/actuation nasal spray SPRAY 1 SPRAY INTO EACH NOSTRIL TWO TIMES DAILY 48 mL 3    [DISCONTINUED] amoxicillin-clavulanate 875-125mg (AUGMENTIN) 875-125 mg per tablet Take 1 tablet by mouth every 12 (twelve) hours. (Patient not taking: Reported on 3/23/2023) 20 tablet 0    [DISCONTINUED] busPIRone (BUSPAR) 15 MG tablet Take 1 tablet (15 mg total) by mouth 3 (three) times daily. (Patient not taking: Reported on 3/23/2023) 90 tablet 3    [DISCONTINUED] clonazePAM (KLONOPIN) 1 MG tablet TAKE 1 TABLET BY MOUTH ONCE DAILY AS NEEDED FOR ANXIETY 30 tablet 1    [DISCONTINUED] diltiaZEM (CARDIZEM) 30 MG tablet Take 1 tablet (30 mg total) by mouth every 12 (twelve) hours. 180 tablet 3    [DISCONTINUED] hydrOXYzine (ATARAX) 50 MG tablet Take 1 to 2 tablets at bedtime as needed for sleep (Patient not taking: Reported on 3/23/2023) 60 tablet 1    [DISCONTINUED] levothyroxine (SYNTHROID) 50 MCG tablet Take 1 tablet (50 mcg total) by mouth once daily. 90 tablet 1    [DISCONTINUED] meloxicam (MOBIC) 7.5 MG tablet Take 1 tablet (7.5 mg total) by mouth 2 (two) times daily as needed for Pain (pain). 60 tablet 0     No facility-administered encounter medications on file as of 2023.     Plan:       Requested Prescriptions     Signed  Prescriptions Disp Refills    albuterol (PROVENTIL/VENTOLIN HFA) 90 mcg/actuation inhaler 18 g 11     Sig: Inhale 2 puffs into the lungs every 6 (six) hours.    fluticasone furoate-vilanteroL (BREO ELLIPTA) 200-25 mcg/dose DsDv diskus inhaler 60 each 11     Sig: Inhale 1 puff into the lungs once daily. Controller    fluticasone propionate (FLONASE) 50 mcg/actuation nasal spray 48 mL 3     Sig: SPRAY 1 SPRAY INTO EACH NOSTRIL TWO TIMES DAILY    albuterol (PROVENTIL) 2.5 mg /3 mL (0.083 %) nebulizer solution 360 mL 11     Sig: Take 3 mLs (2.5 mg total) by nebulization every 4 to 6 hours as needed for Wheezing or Shortness of Breath.     Problem List Items Addressed This Visit       Airway hyperreactivity    Relevant Medications    albuterol (PROVENTIL/VENTOLIN HFA) 90 mcg/actuation inhaler    fluticasone furoate-vilanteroL (BREO ELLIPTA) 200-25 mcg/dose DsDv diskus inhaler    albuterol (PROVENTIL) 2.5 mg /3 mL (0.083 %) nebulizer solution    Allergic rhinitis    Relevant Medications    fluticasone propionate (FLONASE) 50 mcg/actuation nasal spray    Chronic fatigue    Mild intermittent asthma without complication    CHLOE (obstructive sleep apnea) - Primary    Current Assessment & Plan     Start auto CPAP           Relevant Orders    CPAP/BIPAP SUPPLIES    CPAP FOR HOME USE    Severe obesity (BMI 35.0-39.9) with comorbidity        Follow up in about 7 weeks (around 6/1/2023) for CPAP download - review on day of visit, Videotelemedicine visit.    MEDICAL DECISION MAKING: Moderate to high complexity.  Overall, the multiple problems listed are of moderate to high severity that may impact quality of life and activities of daily living. Side effects of medications, treatment plan as well as options and alternatives reviewed and discussed with patient. There was counseling of patient concerning these issues.    Total time spent in counseling and coordination of care - 30  minutes of total time spent on the encounter, which  includes face to face time and non-face to face time preparing to see the patient (eg, review of tests), Obtaining and/or reviewing separately obtained history, Documenting clinical information in the electronic or other health record, Independently interpreting results (not separately reported) and communicating results to the patient/family/caregiver, or Care coordination (not separately reported).    Time was used in discussion of prognosis, risks, benefits of treatment, instructions and compliance with regimen . Discussion with other physicians and/or health care providers - home health or for use of durable medical equipment (oxygen, nebulizers, CPAP, BiPAP) occurred.

## 2023-04-14 ENCOUNTER — TELEPHONE (OUTPATIENT)
Dept: HEMATOLOGY/ONCOLOGY | Facility: CLINIC | Age: 53
End: 2023-04-14
Payer: MEDICARE

## 2023-04-14 DIAGNOSIS — D75.1 POLYCYTHEMIA: Primary | ICD-10-CM

## 2023-04-14 NOTE — TELEPHONE ENCOUNTER
----- Message from Sheree Stover NP sent at 4/14/2023  9:41 AM CDT -----  Regarding: RE: Polycythemia - borderline  Can we please schedule her for f/u in 6 months with labs - cbc and bmp prior.    Sheree Rasheed'  ----- Message -----  From: Everette John MD  Sent: 4/13/2023   2:33 PM CDT  To: Sheree Stover NP  Subject: Polycythemia - borderline                        Home sleep study only showed borderline Obstructive Sleep Apnea and no significant nocturnal hypoxemia  I will start her on Continuous Positive Airway Pressure I do not think it will significantly affect the hgb/hct.  If you want to wait an additional 6 months to see if her hgb/hct responds while on Continuous Positive Airway Pressure that would be acceptable plan

## 2023-04-18 LAB
BRPFT: ABNORMAL
FEF 25 75 CHG: 64.8 %
FEF 25 75 LLN: 1.52
FEF 25 75 POST REF: 67.7 %
FEF 25 75 PRE REF: 41.1 %
FEF 25 75 REF: 2.73
FET100 CHG: -26.9 %
FEV1 CHG: 5.5 %
FEV1 FVC CHG: 8.1 %
FEV1 FVC LLN: 69
FEV1 FVC POST REF: 89.4 %
FEV1 FVC PRE REF: 82.7 %
FEV1 FVC REF: 80
FEV1 LLN: 2.19
FEV1 POST REF: 92.1 %
FEV1 PRE REF: 87.2 %
FEV1 REF: 2.82
FVC CHG: -2.3 %
FVC LLN: 2.76
FVC POST REF: 102.4 %
FVC PRE REF: 104.8 %
FVC REF: 3.54
PEF CHG: 14 %
PEF LLN: 5.09
PEF POST REF: 82.8 %
PEF PRE REF: 72.7 %
PEF REF: 6.85
POST FEF 25 75: 1.85 L/S (ref 1.52–3.94)
POST FET 100: 9.36 SEC
POST FEV1 FVC: 71.73 % (ref 69.05–91.46)
POST FEV1: 2.6 L (ref 2.19–3.45)
POST FVC: 3.62 L (ref 2.76–4.32)
POST PEF: 5.67 L/S (ref 5.09–8.61)
PRE FEF 25 75: 1.12 L/S (ref 1.52–3.94)
PRE FET 100: 12.81 SEC
PRE FEV1 FVC: 66.38 % (ref 69.05–91.46)
PRE FEV1: 2.46 L (ref 2.19–3.45)
PRE FVC: 3.71 L (ref 2.76–4.32)
PRE PEF: 4.98 L/S (ref 5.09–8.61)

## 2023-04-27 ENCOUNTER — PATIENT MESSAGE (OUTPATIENT)
Dept: ADMINISTRATIVE | Facility: OTHER | Age: 53
End: 2023-04-27
Payer: MEDICARE

## 2023-05-01 ENCOUNTER — TELEPHONE (OUTPATIENT)
Dept: HEMATOLOGY/ONCOLOGY | Facility: CLINIC | Age: 53
End: 2023-05-01
Payer: MEDICARE

## 2023-05-01 NOTE — TELEPHONE ENCOUNTER
" Spoke with pt in regards to her appt that was scheduled incorrectly. Per provider "Can we please schedule her for f/u in 6 months with labs - cbc and bmp prior" pt was notified . Pt verbalized understanding that her appt will be rescheduled. Call ended well   "

## 2023-05-05 NOTE — TELEPHONE ENCOUNTER
----- Message from Altagracia Orozco sent at 2/15/2023 10:20 AM CST -----  Contact: [phone]  Patient would like to consult with a nurse a nurse in regards to testing positive for covid. Please call back at [phone]. Thanks lucero qureshi      Spoke with patient regarding mammogram results. She voiced understanding.

## 2023-05-23 RX ORDER — LAMOTRIGINE 100 MG/1
TABLET ORAL
Qty: 30 TABLET | Refills: 2 | Status: SHIPPED | OUTPATIENT
Start: 2023-05-23 | End: 2023-07-05

## 2023-06-11 DIAGNOSIS — E03.9 HYPOTHYROIDISM, UNSPECIFIED TYPE: ICD-10-CM

## 2023-06-12 RX ORDER — LEVOTHYROXINE SODIUM 50 UG/1
TABLET ORAL
Qty: 90 TABLET | Refills: 0 | Status: SHIPPED | OUTPATIENT
Start: 2023-06-12 | End: 2023-09-28 | Stop reason: SDUPTHER

## 2023-06-13 ENCOUNTER — PATIENT MESSAGE (OUTPATIENT)
Dept: PSYCHIATRY | Facility: CLINIC | Age: 53
End: 2023-06-13
Payer: MEDICARE

## 2023-06-20 ENCOUNTER — PATIENT MESSAGE (OUTPATIENT)
Dept: PULMONOLOGY | Facility: CLINIC | Age: 53
End: 2023-06-20
Payer: MEDICARE

## 2023-06-20 ENCOUNTER — OFFICE VISIT (OUTPATIENT)
Dept: PULMONOLOGY | Facility: CLINIC | Age: 53
End: 2023-06-20
Payer: MEDICARE

## 2023-06-20 DIAGNOSIS — G47.33 OSA (OBSTRUCTIVE SLEEP APNEA): Primary | ICD-10-CM

## 2023-06-20 DIAGNOSIS — J45.20 MILD INTERMITTENT ASTHMA, UNSPECIFIED WHETHER COMPLICATED: ICD-10-CM

## 2023-06-20 DIAGNOSIS — G47.33 OSA ON CPAP: ICD-10-CM

## 2023-06-20 PROCEDURE — 99214 OFFICE O/P EST MOD 30 MIN: CPT | Mod: 95,,, | Performed by: INTERNAL MEDICINE

## 2023-06-20 PROCEDURE — 99214 PR OFFICE/OUTPT VISIT, EST, LEVL IV, 30-39 MIN: ICD-10-PCS | Mod: 95,,, | Performed by: INTERNAL MEDICINE

## 2023-06-20 NOTE — PROGRESS NOTES
Subjective:      The patient location is:  [address] [place] [address]  [address] [address]    The chief complaint leading to consultation is: review of Continuous Positive Airway Pressure download  Visit type: Virtual visit with synchronous audio and video     Each patient to whom he or she provides medical services by telemedicine is:  (1) informed of the relationship between the physician and patient and the respective role of any other health care provider with respect to management of the patient; and (2) notified that he or she may decline to receive medical services by telemedicine and may withdraw from such care at any time.    Notes: Doing well , using Continuous Positive Airway Pressure machine 4- 6 hours a night       Patient ID: Daniela Costa is a 53 y.o. female.    Chief Complaint:        HPI She presents for review of CPAP compliance. Information from CPAP machine downloaded and reviewed. Summary of compliance report is as follows:  Doing well , using for more thatn 4 hours, has had machine for about  3 weeks  APNEA HYPOPNEA INDEX 0.8 , on auto 6- 20   Patient has complaints of some nasal irritation   Patient is using CPAP as prescribed and benefiting from therapy.      Past Medical History:   Diagnosis Date    Allergy     Anxiety     Arthritis     Asthma     Breast cancer     june 2012; BRCA 1 and 2 negative    Breast cancer genetic susceptibility     Cancer     Depression     Hypothyroidism     Lung disease     Pneumonia      Past Surgical History:   Procedure Laterality Date    ADENOIDECTOMY      BACK SURGERY      SI joint fusion    Breast Reconstruction  Bilateral     BREAST SURGERY      CERVICAL FUSION      CERVICAL FUSION      COLONOSCOPY N/A 10/31/2022    Procedure: COLONOSCOPY;  Surgeon: Lexy Irizarry MD;  Location: [facility];  Service: Endoscopy;  Laterality: N/A;    COSMETIC SURGERY  Several    HYSTERECTOMY      lumbar fusion      MASTECTOMY Right 2013    due to breast cancer,  radiation done after surgery    MASTECTOMY Left 2015    propholytic due to breast cancer in right breast    ROBOT-ASSISTED LAPAROSCOPIC ABDOMINAL HYSTERECTOMY USING DA HEBERT XI N/A 12/05/2018    Procedure: XI ROBOTIC HYSTERECTOMY;  Surgeon: Meka Reich MD;  Location: [facility] OR;  Service: OB/GYN;  Laterality: N/A;    ROBOT-ASSISTED LAPAROSCOPIC LYSIS OF ADHESIONS USING DA HEBERT XI N/A 12/05/2018    Procedure: XI ROBOTIC LYSIS, ADHESIONS;  Surgeon: Meka Reich MD;  Location: [facility] OR;  Service: OB/GYN;  Laterality: N/A;    ROBOT-ASSISTED LAPAROSCOPIC SALPINGO-OOPHORECTOMY USING DA HEBERT XI Bilateral 12/05/2018    Procedure: XI ROBOTIC SALPINGO-OOPHORECTOMY;  Surgeon: Meka Reich MD;  Location: [facility] OR;  Service: OB/GYN;  Laterality: Bilateral;    SI joint fusion   03/26/2018    SPINE SURGERY  Several    TONSILLECTOMY      TUBAL LIGATION       Review of patient's allergies indicates:   Allergen Reactions    Cefdinir      Radiation Recall, everywhere she had radiation it looked like blisters and very hot to touch    Levofloxacin      Went into deep depression. Messed with Pych meds    Sodium benzoate Hives    Strawberry Hives       Current Outpatient Medications on File Prior to Visit   Medication Sig Dispense Refill    albuterol (PROVENTIL) 2.5 mg /3 mL (0.083 %) nebulizer solution Take 3 mLs (2.5 mg total) by nebulization every 4 to 6 hours as needed for Wheezing or Shortness of Breath. 360 mL 11    albuterol (PROVENTIL/VENTOLIN HFA) 90 mcg/actuation inhaler Inhale 2 puffs into the lungs every 6 (six) hours. 18 g 11    ASCORBATE CALCIUM (VITAMIN C ORAL) Take by mouth.      blood pressure monitor ([email or web address] EASE) ST size by Other route as needed for High Blood Pressure. 1 each 0    blood sugar diagnostic Strp To check BG 2 times daily, to use with insurance preferred meter 200 each 11    blood-glucose meter kit To check BG 2 times daily, to use with insurance preferred meter 1 each 0    clonazePAM  (KLONOPIN) 1 MG tablet TAKE 1 TABLET BY MOUTH ONCE DAILY AS NEEDED FOR ANXIETY 30 tablet 1    cyclobenzaprine (FLEXERIL) 10 MG tablet Take by mouth.      diltiaZEM (CARDIZEM) 30 MG tablet Take 1 tablet (30 mg total) by mouth every 8 (eight) hours. 180 tablet 3    famotidine (PEPCID) 20 MG tablet Take 1 tablet (20 mg total) by mouth 2 (two) times daily. 60 tablet 0    fluticasone furoate-vilanteroL (BREO ELLIPTA) 200-25 mcg/dose DsDv diskus inhaler Inhale 1 puff into the lungs once daily. Controller 60 each 11    fluticasone propionate (FLONASE) 50 mcg/actuation nasal spray SPRAY 1 SPRAY INTO EACH NOSTRIL TWO TIMES DAILY 48 mL 3    hydrocortisone 2.5 % lotion Apply topically 2 (two) times daily. 59 mL 0    inhalation spacing device (BREATHERITE VALVED MDI CHAMBER) Use as directed for inhalation. 1 Device prn    lamoTRIgine (LAMICTAL) 100 MG tablet Take 1 tablet by mouth once daily 30 tablet 2    lancets Misc To check BG 2 times daily, to use with insurance preferred meter 200 each 11    levothyroxine (SYNTHROID) 50 MCG tablet Take 1 tablet by mouth once daily 90 tablet 0    loratadine (CLARITIN) 10 mg tablet Take 1 tablet (10 mg total) by mouth once daily. 30 tablet 0    melatonin 10 mg Cap Take by mouth.      meloxicam (MOBIC) 15 MG tablet Take 15 mg by mouth.      montelukast (SINGULAIR) 10 mg tablet Take 10 mg by mouth.      ondansetron (ZOFRAN-ODT) 4 MG TbDL Take 4 mg by mouth every 6 (six) hours as needed.      promethazine-dextromethorphan (PROMETHAZINE-DM) 6.25-15 mg/5 mL Syrp Take 5 mLs by mouth nightly as needed (Cough). 120 mL 0    traZODone (DESYREL) 50 MG tablet Take 1 tablet (50 mg total) by mouth nightly as needed for Insomnia. 30 tablet 3     No current facility-administered medications on file prior to visit.     Social History     Socioeconomic History    Marital status:     Number of children: 6   Occupational History    Occupation: stay home mom    Tobacco Use    Smoking status: Former      "Packs/day: 0.50     Years: 16.00     Pack years: 8.00     Types: Cigarettes     Start date: 1990     Quit date: 2014     Years since quittin.4    Smokeless tobacco: Never   Substance and Sexual Activity    Alcohol use: Not Currently    Drug use: No    Sexual activity: Not Currently     Partners: Male     Birth control/protection: See Surgical Hx   Social History Narrative    Patient has 4 biological children and 2 step     @FAM@  Review of Systems   Constitutional:  Negative for fever and fatigue.   HENT:  Positive for postnasal drip and rhinorrhea.    Eyes:  Negative for redness and itching.   Respiratory:  Negative for cough, shortness of breath, wheezing, dyspnea on extertion and Paroxysmal Nocturnal Dyspnea.    Cardiovascular:  Negative for chest pain.   Genitourinary:  Negative for difficulty urinating and hematuria.   Endocrine:  Negative for polyphagia, cold intolerance and heat intolerance.    Musculoskeletal:  Negative for arthralgias.   Skin:  Negative for rash.   Gastrointestinal:  Negative for nausea, vomiting, abdominal pain and abdominal distention.   Neurological:  Negative for dizziness and headaches.   Hematological:  Negative for adenopathy. Does not bruise/bleed easily and no excessive bruising.   Psychiatric/Behavioral:  The patient is not nervous/anxious.      Objective:        LMP  (LMP Unknown) Comment: no cycle 2012    The patient has been recording vital signs at home and the following data was reviewed to make a evaluation and management decision:  Review of data:  No flowsheet data found.  No flowsheet data found.  Wt Readings from Last 1 Encounters:   23 87.3 kg (192 lb 7.4 oz)     Ht Readings from Last 1 Encounters:   23 5' 5" (1.651 m)       Constitutional: Patient is oriented to person, place and time.  He appears well developed and well nourished.  Neurologic: He is alert and orientated.  Psychiatric: He has appropriate mood and affect. His behavior during " the interview as normal. Judgement and thought content - normal.      Personal Diagnostic Review  PSG: significant for mild/moderate Obstructive Sleep Apnea  Procedure Notes    Author Status Last  Updated Created   Chon Akers MD Signed Chon Akers MD 4/3/2023  8:45 AM 4/3/2023  8:45 AM          Assoc. Orders Procedures   HOME SLEEP STUDIES HOME SLEEP STUDIES       Pre-Op Dx Post-Op Dx   CHLOE (obstructive sleep apnea); Nocturnal hypoxemia; Polycythemia CHLOE (obstructive sleep apnea)            2 night study  MILD/BORDERLINE OBSTRUCTIVE SLEEP APNEA with overall AHI 6.8/hr (49 events): night # 2  Oxygen desaturation: 90%. SpO2 between 90% to 94% for 2 hr 59 min.  Patient snored 95% time above 50 .  Heart rate range: 61 bpm - 98 bpm  REC's:  Therapy with APAP at 4-20 cm WP using mask of choice with heated humidification is an option.  Please refer to sleep disorders clinic  Weight loss/management. with regular exercise per direction of physician.  Avoid drowsy driving.  Follow up in sleep clinic to maximize adherence and ensure resolution of symptoms     Dear Everette John MD  [id number] [facility]  [place] [id number]/Lucía Terrell MD          X-Ray Hip 2 or 3 views Right (with Pelvis when performed)  Narrative: EXAMINATION:  XR HIP WITH PELVIS WHEN PERFORMED, 2 OR 3  VIEWS RIGHT    CLINICAL HISTORY:  Pain in right hip    TECHNIQUE:  AP view of the pelvis and frog leg lateral view of the right hip were performed.    COMPARISON:  None    FINDINGS:  There is no radiographic evidence of acute osseous, articular, or soft tissue abnormality.  Joint spaces are preserved.Postoperative changes from multilevel fusion are seen in the lumbosacral spine as well as the left SI joint.  Impression: No acute findings    Electronically signed by: Anibal Mcdermott MD  Date:    10/13/2022  Time:    15:22      Office Spirometry Results:     No flowsheet data found.  Pulmonary Studies Review 4/13/2023   SpO2  98   Height 65   Weight 3079.39   BMI (Calculated) 32   Predicted Distance 380.99   Predicted Distance Meters (Calculated) 520.66   Some encounter information is confidential and restricted. Go to Review Flowsheets activity to see all data.         Assessment:       CHLOE on CPAP  Reduce pressure to AUTO 6 - 16 cm   Try HYBRID mask    CHLOE (obstructive sleep apnea)  -     CPAP/BIPAP SUPPLIES  -     CPAP FOR HOME USE    Mild intermittent asthma, unspecified whether complicated  -     Spirometry with/without bronchodilator; Future; Expected date: 12/21/2023    CHLOE on CPAP          Outpatient Encounter Medications as of 6/20/2023   Medication Sig Dispense Refill    albuterol (PROVENTIL) 2.5 mg /3 mL (0.083 %) nebulizer solution Take 3 mLs (2.5 mg total) by nebulization every 4 to 6 hours as needed for Wheezing or Shortness of Breath. 360 mL 11    albuterol (PROVENTIL/VENTOLIN HFA) 90 mcg/actuation inhaler Inhale 2 puffs into the lungs every 6 (six) hours. 18 g 11    ASCORBATE CALCIUM (VITAMIN C ORAL) Take by mouth.      blood pressure monitor ([employer] EASE) ST size by Other route as needed for High Blood Pressure. 1 each 0    blood sugar diagnostic Strp To check BG 2 times daily, to use with insurance preferred meter 200 each 11    blood-glucose meter kit To check BG 2 times daily, to use with insurance preferred meter 1 each 0    clonazePAM (KLONOPIN) 1 MG tablet TAKE 1 TABLET BY MOUTH ONCE DAILY AS NEEDED FOR ANXIETY 30 tablet 1    cyclobenzaprine (FLEXERIL) 10 MG tablet Take by mouth.      diltiaZEM (CARDIZEM) 30 MG tablet Take 1 tablet (30 mg total) by mouth every 8 (eight) hours. 180 tablet 3    famotidine (PEPCID) 20 MG tablet Take 1 tablet (20 mg total) by mouth 2 (two) times daily. 60 tablet 0    fluticasone furoate-vilanteroL (BREO ELLIPTA) 200-25 mcg/dose DsDv diskus inhaler Inhale 1 puff into the lungs once daily. Controller 60 each 11    fluticasone propionate (FLONASE) 50 mcg/actuation nasal spray SPRAY 1  SPRAY INTO EACH NOSTRIL TWO TIMES DAILY 48 mL 3    hydrocortisone 2.5 % lotion Apply topically 2 (two) times daily. 59 mL 0    inhalation spacing device (BREATHERITE VALVED MDI CHAMBER) Use as directed for inhalation. 1 Device prn    lamoTRIgine (LAMICTAL) 100 MG tablet Take 1 tablet by mouth once daily 30 tablet 2    lancets Misc To check BG 2 times daily, to use with insurance preferred meter 200 each 11    levothyroxine (SYNTHROID) 50 MCG tablet Take 1 tablet by mouth once daily 90 tablet 0    loratadine (CLARITIN) 10 mg tablet Take 1 tablet (10 mg total) by mouth once daily. 30 tablet 0    melatonin 10 mg Cap Take by mouth.      meloxicam (MOBIC) 15 MG tablet Take 15 mg by mouth.      montelukast (SINGULAIR) 10 mg tablet Take 10 mg by mouth.      ondansetron (ZOFRAN-ODT) 4 MG TbDL Take 4 mg by mouth every 6 (six) hours as needed.      promethazine-dextromethorphan (PROMETHAZINE-DM) 6.25-15 mg/5 mL Syrp Take 5 mLs by mouth nightly as needed (Cough). 120 mL 0    traZODone (DESYREL) 50 MG tablet Take 1 tablet (50 mg total) by mouth nightly as needed for Insomnia. 30 tablet 3    [DISCONTINUED] lamoTRIgine (LAMICTAL) 100 MG tablet Take 1 tablet (100 mg total) by mouth once daily. 30 tablet 3    [DISCONTINUED] levothyroxine (SYNTHROID) 50 MCG tablet Take 1 tablet by mouth once daily 90 tablet 0     No facility-administered encounter medications on file as of 6/20/2023.     Plan:       Requested Prescriptions      No prescriptions requested or ordered in this encounter     Problem List Items Addressed This Visit       Airway hyperreactivity    Relevant Orders    Spirometry with/without bronchodilator    CHLOE on CPAP - Primary    Current Assessment & Plan     Reduce pressure to AUTO 6 - 16 cm   Try HYBRID mask               Follow up in about 7 months (around 1/20/2024) for Review progress, CPAP download - annual review, Review ria - on return.    MEDICAL DECISION MAKING: Moderate to high complexity.  Overall, the  multiple problems listed are of moderate to high severity that may impact quality of life and activities of daily living. Side effects of medications, treatment plan as well as options and alternatives reviewed and discussed with patient. There was counseling of patient concerning these issues.    Total time spent in counseling and coordination of care - 30  minutes of total time spent on the encounter, which includes face to face time and non-face to face time preparing to see the patient (eg, review of tests), Obtaining and/or reviewing separately obtained history, Documenting clinical information in the electronic or other health record, Independently interpreting results (not separately reported) and communicating results to the patient/family/caregiver, or Care coordination (not separately reported).    Time was used in discussion of prognosis, risks, benefits of treatment, instructions and compliance with regimen . Discussion with other physicians and/or health care providers - home health or for use of durable medical equipment (oxygen, nebulizers, CPAP, BiPAP) occurred.        This service was not originating from a related E/M service provided within the previous 7 days nor will  to an E/M service or procedure within the next 24 hours or my soonest available appointment.  Prevailing standard of care was able to be met in this visit.

## 2023-07-05 ENCOUNTER — OFFICE VISIT (OUTPATIENT)
Dept: PSYCHIATRY | Facility: CLINIC | Age: 53
End: 2023-07-05
Payer: COMMERCIAL

## 2023-07-05 VITALS
DIASTOLIC BLOOD PRESSURE: 83 MMHG | HEART RATE: 98 BPM | BODY MASS INDEX: 31.15 KG/M2 | SYSTOLIC BLOOD PRESSURE: 118 MMHG | WEIGHT: 187.19 LBS

## 2023-07-05 DIAGNOSIS — F41.9 ANXIETY: ICD-10-CM

## 2023-07-05 DIAGNOSIS — F31.75 BIPOLAR DISORDER, IN PARTIAL REMISSION, MOST RECENT EPISODE DEPRESSED: Primary | ICD-10-CM

## 2023-07-05 PROCEDURE — 99999 PR PBB SHADOW E&M-EST. PATIENT-LVL II: CPT | Mod: PBBFAC,,, | Performed by: PSYCHIATRY & NEUROLOGY

## 2023-07-05 PROCEDURE — 99214 PR OFFICE/OUTPT VISIT, EST, LEVL IV, 30-39 MIN: ICD-10-PCS | Mod: S$GLB,,, | Performed by: PSYCHIATRY & NEUROLOGY

## 2023-07-05 PROCEDURE — 99214 OFFICE O/P EST MOD 30 MIN: CPT | Mod: S$GLB,,, | Performed by: PSYCHIATRY & NEUROLOGY

## 2023-07-05 PROCEDURE — 3008F BODY MASS INDEX DOCD: CPT | Mod: CPTII,S$GLB,, | Performed by: PSYCHIATRY & NEUROLOGY

## 2023-07-05 PROCEDURE — 3079F PR MOST RECENT DIASTOLIC BLOOD PRESSURE 80-89 MM HG: ICD-10-PCS | Mod: CPTII,S$GLB,, | Performed by: PSYCHIATRY & NEUROLOGY

## 2023-07-05 PROCEDURE — 3074F PR MOST RECENT SYSTOLIC BLOOD PRESSURE < 130 MM HG: ICD-10-PCS | Mod: CPTII,S$GLB,, | Performed by: PSYCHIATRY & NEUROLOGY

## 2023-07-05 PROCEDURE — 99999 PR PBB SHADOW E&M-EST. PATIENT-LVL II: ICD-10-PCS | Mod: PBBFAC,,, | Performed by: PSYCHIATRY & NEUROLOGY

## 2023-07-05 PROCEDURE — 3074F SYST BP LT 130 MM HG: CPT | Mod: CPTII,S$GLB,, | Performed by: PSYCHIATRY & NEUROLOGY

## 2023-07-05 PROCEDURE — 3008F PR BODY MASS INDEX (BMI) DOCUMENTED: ICD-10-PCS | Mod: CPTII,S$GLB,, | Performed by: PSYCHIATRY & NEUROLOGY

## 2023-07-05 PROCEDURE — 3079F DIAST BP 80-89 MM HG: CPT | Mod: CPTII,S$GLB,, | Performed by: PSYCHIATRY & NEUROLOGY

## 2023-07-05 RX ORDER — LURASIDONE HYDROCHLORIDE 40 MG/1
40 TABLET, FILM COATED ORAL DAILY
Qty: 30 TABLET | Refills: 2 | Status: SHIPPED | OUTPATIENT
Start: 2023-07-05 | End: 2023-11-07 | Stop reason: SDUPTHER

## 2023-07-05 RX ORDER — CLONAZEPAM 1 MG/1
TABLET ORAL
Qty: 30 TABLET | Refills: 2 | Status: SHIPPED | OUTPATIENT
Start: 2023-07-05 | End: 2023-11-07 | Stop reason: SDUPTHER

## 2023-07-05 RX ORDER — TRAZODONE HYDROCHLORIDE 50 MG/1
50 TABLET ORAL NIGHTLY PRN
Qty: 30 TABLET | Refills: 3 | Status: SHIPPED | OUTPATIENT
Start: 2023-07-05 | End: 2023-11-07 | Stop reason: SDUPTHER
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
## 2023-09-12 ENCOUNTER — PATIENT OUTREACH (OUTPATIENT)
Dept: ADMINISTRATIVE | Facility: HOSPITAL | Age: 53
End: 2023-09-12
Payer: MEDICARE

## 2023-09-12 NOTE — PROGRESS NOTES
PAYOR PANEL CLEAN UP: per chart review pt attributed to Dr Terrell, needs MD visit, spoke to pt she requested visit at [facility] location she states she loves Dr Terrell but does not want to go to [facility] location, scheduled at [facility] with Dr Xiong.

## 2023-09-13 ENCOUNTER — TELEPHONE (OUTPATIENT)
Dept: INTERNAL MEDICINE | Facility: CLINIC | Age: 53
End: 2023-09-13
Payer: MEDICARE

## 2023-09-13 NOTE — TELEPHONE ENCOUNTER
Called sharee back to tell them the labs for 03/2023 was non-reactive. Sharee with the health unit states after the pt see's Dr Xiong he will call back for another update so he can close the pt's case.

## 2023-09-13 NOTE — TELEPHONE ENCOUNTER
----- Message from Rea Rahman sent at 9/13/2023 10:02 AM CDT -----  Contact: Trino/[facility] health unit  Trino would like a call back at [phone], in regards to needing to speak with nurse about patient's appointment from 3/28/23. They would like to verify that she tested negative for syphilis on that date.  Thanks   Am

## 2023-09-26 ENCOUNTER — OFFICE VISIT (OUTPATIENT)
Dept: INTERNAL MEDICINE | Facility: CLINIC | Age: 53
End: 2023-09-26
Payer: MEDICARE

## 2023-09-26 ENCOUNTER — LAB VISIT (OUTPATIENT)
Dept: LAB | Facility: HOSPITAL | Age: 53
End: 2023-09-26
Attending: FAMILY MEDICINE
Payer: MEDICAID

## 2023-09-26 VITALS
TEMPERATURE: 99 F | WEIGHT: 180.31 LBS | HEIGHT: 65 IN | BODY MASS INDEX: 30.04 KG/M2 | SYSTOLIC BLOOD PRESSURE: 126 MMHG | DIASTOLIC BLOOD PRESSURE: 80 MMHG | HEART RATE: 92 BPM | OXYGEN SATURATION: 97 %

## 2023-09-26 DIAGNOSIS — R73.9 HYPERGLYCEMIA: ICD-10-CM

## 2023-09-26 DIAGNOSIS — E03.9 ACQUIRED HYPOTHYROIDISM: ICD-10-CM

## 2023-09-26 DIAGNOSIS — Z23 NEED FOR VACCINATION: ICD-10-CM

## 2023-09-26 DIAGNOSIS — Z20.2 STD EXPOSURE: ICD-10-CM

## 2023-09-26 DIAGNOSIS — I10 PRIMARY HYPERTENSION: Primary | Chronic | ICD-10-CM

## 2023-09-26 DIAGNOSIS — G47.33 OSA ON CPAP: Chronic | ICD-10-CM

## 2023-09-26 DIAGNOSIS — Z13.1 DIABETES MELLITUS SCREENING: ICD-10-CM

## 2023-09-26 DIAGNOSIS — E03.9 ACQUIRED HYPOTHYROIDISM: Chronic | ICD-10-CM

## 2023-09-26 DIAGNOSIS — I10 PRIMARY HYPERTENSION: ICD-10-CM

## 2023-09-26 PROCEDURE — 99204 PR OFFICE/OUTPT VISIT, NEW, LEVL IV, 45-59 MIN: ICD-10-PCS | Mod: S$GLB,,, | Performed by: FAMILY MEDICINE

## 2023-09-26 PROCEDURE — 87389 HIV-1 AG W/HIV-1&-2 AB AG IA: CPT | Performed by: FAMILY MEDICINE

## 2023-09-26 PROCEDURE — 99204 OFFICE O/P NEW MOD 45 MIN: CPT | Mod: S$GLB,,, | Performed by: FAMILY MEDICINE

## 2023-09-26 PROCEDURE — 86592 SYPHILIS TEST NON-TREP QUAL: CPT | Performed by: FAMILY MEDICINE

## 2023-09-26 PROCEDURE — 80061 LIPID PANEL: CPT | Performed by: FAMILY MEDICINE

## 2023-09-26 PROCEDURE — G0008 ADMIN INFLUENZA VIRUS VAC: HCPCS | Mod: S$GLB,,, | Performed by: FAMILY MEDICINE

## 2023-09-26 PROCEDURE — 1159F PR MEDICATION LIST DOCUMENTED IN MEDICAL RECORD: ICD-10-PCS | Mod: CPTII,S$GLB,, | Performed by: FAMILY MEDICINE

## 2023-09-26 PROCEDURE — 3008F PR BODY MASS INDEX (BMI) DOCUMENTED: ICD-10-PCS | Mod: CPTII,S$GLB,, | Performed by: FAMILY MEDICINE

## 2023-09-26 PROCEDURE — 84439 ASSAY OF FREE THYROXINE: CPT | Performed by: FAMILY MEDICINE

## 2023-09-26 PROCEDURE — 3079F DIAST BP 80-89 MM HG: CPT | Mod: CPTII,S$GLB,, | Performed by: FAMILY MEDICINE

## 2023-09-26 PROCEDURE — 3008F BODY MASS INDEX DOCD: CPT | Mod: CPTII,S$GLB,, | Performed by: FAMILY MEDICINE

## 2023-09-26 PROCEDURE — 80053 COMPREHEN METABOLIC PANEL: CPT | Performed by: FAMILY MEDICINE

## 2023-09-26 PROCEDURE — 90686 FLU VACCINE (QUAD) GREATER THAN OR EQUAL TO 3YO PRESERVATIVE FREE IM: ICD-10-PCS | Mod: S$GLB,,, | Performed by: FAMILY MEDICINE

## 2023-09-26 PROCEDURE — 84443 ASSAY THYROID STIM HORMONE: CPT | Performed by: FAMILY MEDICINE

## 2023-09-26 PROCEDURE — 99999 PR PBB SHADOW E&M-EST. PATIENT-LVL IV: ICD-10-PCS | Mod: PBBFAC,,, | Performed by: FAMILY MEDICINE

## 2023-09-26 PROCEDURE — 1160F RVW MEDS BY RX/DR IN RCRD: CPT | Mod: CPTII,S$GLB,, | Performed by: FAMILY MEDICINE

## 2023-09-26 PROCEDURE — 85025 COMPLETE CBC W/AUTO DIFF WBC: CPT | Performed by: FAMILY MEDICINE

## 2023-09-26 PROCEDURE — 36415 COLL VENOUS BLD VENIPUNCTURE: CPT | Mod: PO | Performed by: FAMILY MEDICINE

## 2023-09-26 PROCEDURE — 1159F MED LIST DOCD IN RCRD: CPT | Mod: CPTII,S$GLB,, | Performed by: FAMILY MEDICINE

## 2023-09-26 PROCEDURE — G0008 FLU VACCINE (QUAD) GREATER THAN OR EQUAL TO 3YO PRESERVATIVE FREE IM: ICD-10-PCS | Mod: S$GLB,,, | Performed by: FAMILY MEDICINE

## 2023-09-26 PROCEDURE — 83036 HEMOGLOBIN GLYCOSYLATED A1C: CPT | Performed by: FAMILY MEDICINE

## 2023-09-26 PROCEDURE — 1160F PR REVIEW ALL MEDS BY PRESCRIBER/CLIN PHARMACIST DOCUMENTED: ICD-10-PCS | Mod: CPTII,S$GLB,, | Performed by: FAMILY MEDICINE

## 2023-09-26 PROCEDURE — 3074F PR MOST RECENT SYSTOLIC BLOOD PRESSURE < 130 MM HG: ICD-10-PCS | Mod: CPTII,S$GLB,, | Performed by: FAMILY MEDICINE

## 2023-09-26 PROCEDURE — 3074F SYST BP LT 130 MM HG: CPT | Mod: CPTII,S$GLB,, | Performed by: FAMILY MEDICINE

## 2023-09-26 PROCEDURE — 3079F PR MOST RECENT DIASTOLIC BLOOD PRESSURE 80-89 MM HG: ICD-10-PCS | Mod: CPTII,S$GLB,, | Performed by: FAMILY MEDICINE

## 2023-09-26 PROCEDURE — 99999 PR PBB SHADOW E&M-EST. PATIENT-LVL IV: CPT | Mod: PBBFAC,,, | Performed by: FAMILY MEDICINE

## 2023-09-26 PROCEDURE — 90686 IIV4 VACC NO PRSV 0.5 ML IM: CPT | Mod: S$GLB,,, | Performed by: FAMILY MEDICINE

## 2023-09-26 PROCEDURE — 86803 HEPATITIS C AB TEST: CPT | Performed by: FAMILY MEDICINE

## 2023-09-26 RX ORDER — SODIUM FLUORIDE 6.1 MG/ML
GEL, DENTIFRICE DENTAL
COMMUNITY
Start: 2023-09-08

## 2023-09-26 RX ORDER — DICLOFENAC SODIUM 75 MG/1
75 TABLET, DELAYED RELEASE ORAL 2 TIMES DAILY
COMMUNITY
Start: 2023-04-26

## 2023-09-26 NOTE — PROGRESS NOTES
Vis given. Pt instructed to wait 15 min before leaving facility. Pt states understanding. Administered in  left deltoid. Pt tolerated well. No complaints or concerns noted at this time

## 2023-09-26 NOTE — PROGRESS NOTES
Subjective     Patient ID: Daniela Costa is a 53 y.o. female.    Chief Complaint: [facility]    54 yo female presents to [facility]. Patient has history of hypertension, hypothyroidism, bipolar disorder, obstructive sleep apnea on CPAP, and chronic back pain. Patient reports a history of breast cancer s/p prophylactic left mastectomy. She follows with Dr Salazar. Patient reports she no longer does mammograms. She reports c-scope last year with repeat in 3 years.      Review of Systems   Constitutional: Negative.    HENT: Negative.     Eyes:  Negative for discharge and redness.   Respiratory: Negative.     Cardiovascular: Negative.  Negative for chest pain, palpitations and leg swelling.   Gastrointestinal: Negative.  Negative for constipation and diarrhea.   Musculoskeletal:  Positive for back pain. Negative for arthralgias and gait problem.   Neurological: Negative.  Negative for coordination difficulties.   Psychiatric/Behavioral: Negative.            Objective     Physical Exam  Vitals and nursing note reviewed.   Constitutional:       Appearance: Normal appearance. She is obese.   HENT:      Head: Normocephalic and atraumatic.      Right Ear: Tympanic membrane, ear canal and external ear normal.      Left Ear: Tympanic membrane, ear canal and external ear normal.      Nose: Nose normal.      Mouth/Throat:      Mouth: Mucous membranes are moist.      Pharynx: Oropharynx is clear.   Eyes:      Extraocular Movements: Extraocular movements intact.      Conjunctiva/sclera: Conjunctivae normal.      Pupils: Pupils are equal, round, and reactive to light.   Cardiovascular:      Rate and Rhythm: Normal rate and regular rhythm.      Pulses: Normal pulses.      Heart sounds: Normal heart sounds.   Pulmonary:      Effort: Pulmonary effort is normal.      Breath sounds: Normal breath sounds.   Abdominal:      General: Abdomen is flat. Bowel sounds are normal.      Palpations: Abdomen is soft.   Musculoskeletal:       Cervical back: Normal range of motion and neck supple.      Right lower leg: No edema.      Left lower leg: No edema.   Skin:     General: Skin is warm and dry.   Neurological:      General: No focal deficit present.      Mental Status: She is alert and oriented to person, place, and time.   Psychiatric:         Mood and Affect: Mood normal.         Behavior: Behavior normal.            Assessment and Plan     1. Primary hypertension  Comments:  bp well controlled on cardizem. cont current tx  Orders:  -     CBC W/ AUTO DIFFERENTIAL; Future; Expected date: 09/26/2023  -     COMPREHENSIVE METABOLIC PANEL; Future; Expected date: 09/26/2023  -     LIPID PANEL; Future; Expected date: 09/26/2023    2. Acquired hypothyroidism  Comments:  on synthroid therapy will need a refill on medication, will check levels today  Orders:  -     TSH; Future; Expected date: 09/26/2023  -     T4, free; Future; Expected date: 09/26/2023    3. STD exposure  Comments:  will check labs again today  Orders:  -     HIV 1/2 Ag/Ab (4th Gen); Future; Expected date: 09/26/2023  -     RPR; Future; Expected date: 09/26/2023  -     HEPATITIS C ANTIBODY; Future; Expected date: 09/26/2023    4. Hyperglycemia  Comments:  will screen for diabetes with a1c  Orders:  -     HEMOGLOBIN A1C; Future; Expected date: 09/26/2023    5. CHLOE on CPAP  Comments:  patient reports unable to wear for 2 weeks due to facial rash but has resumed treatment and wears nightly. she rest well. pt follows with pulmonology    6. Need for vaccination  -     Influenza - Quadrivalent (PF)                 Follow up in about 1 year (around 9/26/2024).

## 2023-09-27 LAB
ALBUMIN SERPL BCP-MCNC: 4.6 G/DL (ref 3.5–5.2)
ALP SERPL-CCNC: 78 U/L (ref 55–135)
ALT SERPL W/O P-5'-P-CCNC: 20 U/L (ref 10–44)
ANION GAP SERPL CALC-SCNC: 10 MMOL/L (ref 8–16)
AST SERPL-CCNC: 18 U/L (ref 10–40)
BASOPHILS # BLD AUTO: 0.04 K/UL (ref 0–0.2)
BASOPHILS NFR BLD: 0.8 % (ref 0–1.9)
BILIRUB SERPL-MCNC: 0.7 MG/DL (ref 0.1–1)
BUN SERPL-MCNC: 10 MG/DL (ref 6–20)
CALCIUM SERPL-MCNC: 9.8 MG/DL (ref 8.7–10.5)
CHLORIDE SERPL-SCNC: 105 MMOL/L (ref 95–110)
CHOLEST SERPL-MCNC: 197 MG/DL (ref 120–199)
CHOLEST/HDLC SERPL: 3.5 {RATIO} (ref 2–5)
CO2 SERPL-SCNC: 28 MMOL/L (ref 23–29)
CREAT SERPL-MCNC: 0.9 MG/DL (ref 0.5–1.4)
DIFFERENTIAL METHOD: ABNORMAL
EOSINOPHIL # BLD AUTO: 0.1 K/UL (ref 0–0.5)
EOSINOPHIL NFR BLD: 2 % (ref 0–8)
ERYTHROCYTE [DISTWIDTH] IN BLOOD BY AUTOMATED COUNT: 12.7 % (ref 11.5–14.5)
EST. GFR  (NO RACE VARIABLE): >60 ML/MIN/1.73 M^2
ESTIMATED AVG GLUCOSE: 100 MG/DL (ref 68–131)
GLUCOSE SERPL-MCNC: 95 MG/DL (ref 70–110)
HBA1C MFR BLD: 5.1 % (ref 4–5.6)
HCT VFR BLD AUTO: 52 % (ref 37–48.5)
HCV AB SERPL QL IA: NORMAL
HDLC SERPL-MCNC: 57 MG/DL (ref 40–75)
HDLC SERPL: 28.9 % (ref 20–50)
HGB BLD-MCNC: 17.1 G/DL (ref 12–16)
HIV 1+2 AB+HIV1 P24 AG SERPL QL IA: NORMAL
IMM GRANULOCYTES # BLD AUTO: 0.01 K/UL (ref 0–0.04)
IMM GRANULOCYTES NFR BLD AUTO: 0.2 % (ref 0–0.5)
LDLC SERPL CALC-MCNC: 124.8 MG/DL (ref 63–159)
LYMPHOCYTES # BLD AUTO: 1 K/UL (ref 1–4.8)
LYMPHOCYTES NFR BLD: 19.4 % (ref 18–48)
MCH RBC QN AUTO: 30.2 PG (ref 27–31)
MCHC RBC AUTO-ENTMCNC: 32.9 G/DL (ref 32–36)
MCV RBC AUTO: 92 FL (ref 82–98)
MONOCYTES # BLD AUTO: 0.2 K/UL (ref 0.3–1)
MONOCYTES NFR BLD: 4.5 % (ref 4–15)
NEUTROPHILS # BLD AUTO: 3.7 K/UL (ref 1.8–7.7)
NEUTROPHILS NFR BLD: 73.1 % (ref 38–73)
NONHDLC SERPL-MCNC: 140 MG/DL
NRBC BLD-RTO: 0 /100 WBC
PLATELET # BLD AUTO: 81 K/UL (ref 150–450)
PMV BLD AUTO: 12.8 FL (ref 9.2–12.9)
POTASSIUM SERPL-SCNC: 4.5 MMOL/L (ref 3.5–5.1)
PROT SERPL-MCNC: 7.7 G/DL (ref 6–8.4)
RBC # BLD AUTO: 5.67 M/UL (ref 4–5.4)
RPR SER QL: NORMAL
SODIUM SERPL-SCNC: 143 MMOL/L (ref 136–145)
T4 FREE SERPL-MCNC: 0.94 NG/DL (ref 0.71–1.51)
TRIGL SERPL-MCNC: 76 MG/DL (ref 30–150)
TSH SERPL DL<=0.005 MIU/L-ACNC: 0.85 UIU/ML (ref 0.4–4)
WBC # BLD AUTO: 5.06 K/UL (ref 3.9–12.7)

## 2023-09-28 ENCOUNTER — PATIENT MESSAGE (OUTPATIENT)
Dept: INTERNAL MEDICINE | Facility: CLINIC | Age: 53
End: 2023-09-28
Payer: MEDICARE

## 2023-09-28 DIAGNOSIS — D58.2 ELEVATED HEMOGLOBIN: ICD-10-CM

## 2023-09-28 DIAGNOSIS — D69.6 LOW PLATELET COUNT: Primary | ICD-10-CM

## 2023-09-28 DIAGNOSIS — E03.9 HYPOTHYROIDISM, UNSPECIFIED TYPE: ICD-10-CM

## 2023-09-28 RX ORDER — LEVOTHYROXINE SODIUM 50 UG/1
50 TABLET ORAL DAILY
Qty: 90 TABLET | Refills: 2 | Status: SHIPPED | OUTPATIENT
Start: 2023-09-28

## 2023-10-05 PROBLEM — Z17.0 MALIGNANT NEOPLASM OF LOWER-OUTER QUADRANT OF RIGHT BREAST OF FEMALE, ESTROGEN RECEPTOR POSITIVE: Status: ACTIVE | Noted: 2023-10-05

## 2023-10-05 PROBLEM — C50.511 MALIGNANT NEOPLASM OF LOWER-OUTER QUADRANT OF RIGHT BREAST OF FEMALE, ESTROGEN RECEPTOR POSITIVE: Status: ACTIVE | Noted: 2023-10-05

## 2023-10-05 NOTE — ASSESSMENT & PLAN NOTE
Mrs. Igor (Brown) was diagnosed with right multifocal breast cancer in May 2012 at the age of 42.  MRI showed the second area of cancer in the right breast and worrisome LNs that were positive on core biopsy.  There was a left breast nodule that was consistent with fibrocystic changes only at core biopsy. She completed kamila-adjuvant chemotherapy (Dr. Larios).  Right MRM showed a residual 6. 1 mm Grade III IDC with negative margins and 1 of 7 positive LNs.    BRCA NEGATIVE. ER/MO Positive and HER 2 negative by FISH. She completed adjvuant Radiation with Dr. Longo.  She started Tamoxifen in April 2013 and switched to Aromasin in November 2015. In January 2016 she elected prophylactic left mastectomy that showed NEM.  She was lost to followup in January 2017 but presented back in October 2022 to re-establish care.  MD:::Anya Souza NP; Alexandru Main NP

## 2023-10-05 NOTE — PROGRESS NOTES
[facility]  Bernabe Salazar MD, FACS  Moi Villanueva NP-C      Date of Service: 10/17/2023    Chief Complaint:   Daniela Costa is a 53 y.o. female presenting today for her annual follow up due to her breast cancer diagnosis.  She is due for her CXR.  She reports no interval changes on her self-breast examination.     History of Present Illness:   Mrs. Igor (Brown) was diagnosed with right multifocal breast cancer in May 2012 at the age of 42.  MRI showed the second area of cancer in the right breast and worrisome LNs that were positive on core biopsy.  There was a left breast nodule that was consistent with fibrocystic changes only at core biopsy. She completed kamila-adjuvant chemotherapy (Dr. Larios).  Right MRM showed a residual 6. 1 mm Grade III IDC with negative margins and 1 of 7 positive LNs.    BRCA NEGATIVE. ER/ND Positive and HER 2 negative by FISH. She completed adjvuant Radiation with Dr. Longo.  She started Tamoxifin in April 2013 and switched to Aromasin in November 2015. In January 2016 she elected prophylactic left mastectomy that showed NEM.  She was lost to followup in January 2017 but presents back in October 2022 to re-establish care.  MD:::Anya Souza NP    Past Medical History:   Diagnosis Date    Allergy     Anxiety     Arthritis     Asthma     Breast cancer     june 2012; BRCA 1 and 2 negative    Breast cancer genetic susceptibility     Cancer     Depression     Hypothyroidism     Lung disease     Malignant neoplasm of lower-outer quadrant of right breast of female, estrogen receptor positive 10/5/2023    Pneumonia       Past Surgical History:   Procedure Laterality Date    ADENOIDECTOMY      BACK SURGERY      SI joint fusion    Breast Reconstruction  Bilateral     BREAST SURGERY      CERVICAL FUSION      CERVICAL FUSION      COLONOSCOPY N/A 10/31/2022    Procedure: COLONOSCOPY;  Surgeon: Lexy Irizarry MD;  Location: [facility];  Service:  Endoscopy;  Laterality: N/A;    COSMETIC SURGERY  Several    HYSTERECTOMY      lumbar fusion      MASTECTOMY Right 2013    due to breast cancer, radiation done after surgery    MASTECTOMY Left 2015    propholytic due to breast cancer in right breast    ROBOT-ASSISTED LAPAROSCOPIC ABDOMINAL HYSTERECTOMY USING DA HEBERT XI N/A 12/05/2018    Procedure: XI ROBOTIC HYSTERECTOMY;  Surgeon: Meka Reich MD;  Location: [facility] OR;  Service: OB/GYN;  Laterality: N/A;    ROBOT-ASSISTED LAPAROSCOPIC LYSIS OF ADHESIONS USING DA HEBERT XI N/A 12/05/2018    Procedure: XI ROBOTIC LYSIS, ADHESIONS;  Surgeon: Meka Reich MD;  Location: [facility] OR;  Service: OB/GYN;  Laterality: N/A;    ROBOT-ASSISTED LAPAROSCOPIC SALPINGO-OOPHORECTOMY USING DA HEBERT XI Bilateral 12/05/2018    Procedure: XI ROBOTIC SALPINGO-OOPHORECTOMY;  Surgeon: Meka Reich MD;  Location: [facility] OR;  Service: OB/GYN;  Laterality: Bilateral;    SI joint fusion   03/26/2018    SPINE SURGERY  Several    TONSILLECTOMY      TUBAL LIGATION          Current Outpatient Medications:     albuterol (PROVENTIL) 2.5 mg /3 mL (0.083 %) nebulizer solution, Take 3 mLs (2.5 mg total) by nebulization every 4 to 6 hours as needed for Wheezing or Shortness of Breath., Disp: 360 mL, Rfl: 11    albuterol (PROVENTIL/VENTOLIN HFA) 90 mcg/actuation inhaler, Inhale 2 puffs into the lungs every 6 (six) hours., Disp: 18 g, Rfl: 11    ASCORBATE CALCIUM (VITAMIN C ORAL), Take by mouth., Disp: , Rfl:     blood pressure monitor ([facility] EASE) ST size, by Other route as needed for High Blood Pressure., Disp: 1 each, Rfl: 0    clonazePAM (KLONOPIN) 1 MG tablet, TAKE 1 TABLET BY MOUTH ONCE DAILY AS NEEDED FOR ANXIETY, Disp: 30 tablet, Rfl: 2    cyclobenzaprine (FLEXERIL) 10 MG tablet, Take by mouth., Disp: , Rfl:     diclofenac (VOLTAREN) 75 MG EC tablet, Take 75 mg by mouth 2 (two) times daily., Disp: , Rfl:     diltiaZEM (CARDIZEM) 30 MG tablet, Take 1 tablet (30 mg total) by mouth every  8 (eight) hours., Disp: 180 tablet, Rfl: 3    fluticasone furoate-vilanteroL (BREO ELLIPTA) 200-25 mcg/dose DsDv diskus inhaler, Inhale 1 puff into the lungs once daily. Controller, Disp: 60 each, Rfl: 11    fluticasone propionate (FLONASE) 50 mcg/actuation nasal spray, SPRAY 1 SPRAY INTO EACH NOSTRIL TWO TIMES DAILY, Disp: 48 mL, Rfl: 3    hydrocortisone 2.5 % lotion, Apply topically 2 (two) times daily., Disp: 59 mL, Rfl: 0    inhalation spacing device (BREATHERITE VALVED MDI CHAMBER), Use as directed for inhalation., Disp: 1 Device, Rfl: prn    levothyroxine (SYNTHROID) 50 MCG tablet, Take 1 tablet (50 mcg total) by mouth once daily., Disp: 90 tablet, Rfl: 2    loratadine (CLARITIN) 10 mg tablet, Take 1 tablet (10 mg total) by mouth once daily., Disp: 30 tablet, Rfl: 0    lurasidone (LATUDA) 40 mg Tab tablet, Take 1 tablet (40 mg total) by mouth once daily., Disp: 30 tablet, Rfl: 2    meloxicam (MOBIC) 15 MG tablet, Take 15 mg by mouth., Disp: , Rfl:     montelukast (SINGULAIR) 10 mg tablet, Take 10 mg by mouth., Disp: , Rfl:     ondansetron (ZOFRAN-ODT) 4 MG TbDL, Take 4 mg by mouth every 6 (six) hours as needed., Disp: , Rfl:     PREVIDENT 5000 DRY MOUTH 1.1 % Pste, , Disp: , Rfl:     traZODone (DESYREL) 50 MG tablet, Take 1 tablet (50 mg total) by mouth nightly as needed for Insomnia., Disp: 30 tablet, Rfl: 3   Review of patient's allergies indicates:   Allergen Reactions    Cefdinir      Radiation Recall, everywhere she had radiation it looked like blisters and very hot to touch    Levofloxacin      Went into deep depression. Messed with Pych meds    Sodium benzoate Hives    Strawberry Hives      Social History     Tobacco Use    Smoking status: Former     Current packs/day: 0.00     Average packs/day: 0.5 packs/day for 23.2 years (11.6 ttl pk-yrs)     Types: Cigarettes     Start date: 1990     Quit date: 2014     Years since quittin.7    Smokeless tobacco: Never   Substance Use Topics    Alcohol  "use: Not Currently      Family History   Problem Relation Age of Onset    Diabetes Mother     Hyperlipidemia Mother     Breast cancer Mother 53        lumpectomy, chemo, radiation    Arthritis Mother     Cancer Mother     Hypertension Mother     Diabetes Father     Hyperlipidemia Father     Other Father         non-Hogdkin's lymphoma    Arthritis Father     COPD Father     Hypertension Father     Other Sister         prophylactic BL mastectomy    Other Sister         prophylactic BL mastecomy    Down syndrome Other     Cancer Maternal Grandfather     Bipolar disorder Maternal Grandmother     Breast cancer Maternal Grandmother     Melanoma Maternal Grandmother         on scalp with brain mets    Arthritis Maternal Grandmother     Cancer Maternal Grandmother     Hearing loss Maternal Grandmother     Liver cancer Paternal Grandfather     Ovarian cancer Paternal Grandmother         metastatic    Melanoma Maternal Aunt     Breast cancer Maternal Aunt         DCIS    Bipolar disorder Maternal Aunt     Bipolar disorder Maternal Aunt     Cancer Paternal Aunt         "in lymphatic csystem"    Pancreatic cancer Paternal Uncle         Review of Systems   Integumentary:  Negative for color change, rash, mole/lesion, breast mass, breast discharge and breast tenderness.   Breast: Negative for mass and tenderness       Physical Exam   Constitutional: She is cooperative.   HENT:   Head: Normocephalic.   Pulmonary/Chest: She exhibits no mass. Right breast exhibits no mass, no skin change and no tenderness. Left breast exhibits no mass, no skin change and no tenderness. No breast swelling.   Bilateral breasts are surgically absent and are without signs of malignancy/recurrence.   Abdominal: Normal appearance.   Genitourinary: No breast swelling.   Musculoskeletal: Lymphadenopathy:      Upper Body:      Right upper body: No supraclavicular or axillary adenopathy.      Left upper body: No supraclavicular or axillary adenopathy. "     Neurological: She is alert.   Skin: No rash noted.       CXR REPORT: Her lungs are well aerated and without worrisome masses; no pleural effusion noted; NEM      ASSESSMENT and PLAN OF CARE     1. Malignant neoplasm of lower-outer quadrant of right breast of female, estrogen receptor positive  Assessment & Plan:  Mrs. Igor (Brown) was diagnosed with right multifocal breast cancer in May 2012 at the age of 42.  MRI showed the second area of cancer in the right breast and worrisome LNs that were positive on core biopsy.  There was a left breast nodule that was consistent with fibrocystic changes only at core biopsy. She completed kamila-adjuvant chemotherapy (Dr. Larios).  Right MRM showed a residual 6. 1 mm Grade III IDC with negative margins and 1 of 7 positive LNs.    BRCA NEGATIVE. ER/MD Positive and HER 2 negative by FISH. She completed adjvuant Radiation with Dr. Longo.  She started Tamoxifen in April 2013 and switched to Aromasin in November 2015. In January 2016 she elected prophylactic left mastectomy that showed NEM.  She was lost to followup in January 2017 but presented back in October 2022 to re-establish care.  MD:::Anya Souza NP; Alexandru Main NP    Orders:  -     Lactate Dehydrogenase  -     Comprehensive Metabolic Panel  -     CEA  -     CBC Auto Differential  -     Cancer Antigen 27-29  -     X-Ray Chest PA And Lateral; Future; Expected date: 10/17/2023    2. Lymphedema  Assessment & Plan:  We discussed the natural course of Lymphedema and the preventative steps that can help decrease the incidence or amount of lymphedema realized.  We discussed treatment of lymphedema and the impact that Physical Therapy can have.  We talked about physiological lymphatic massage techniques, etc.        Medical Decision Making: It is my impression that this patient suffers all conditions contained in this medical document.  Each of these conditions did affect our plan of care and my medical decision making  today.  It is my opinion that the medical decision making concerning this patient was of moderate difficulty based on the aforementioned conditions.  Any further recommendations will be communicated to the patient by me.  I have reviewed and verified her allergies, list of medications, medical and surgical histories, social history, and a pertinent review of symptoms.     Follow up:  1 year and prn    For:  Physical Examination, CXR, and LABS

## 2023-10-06 ENCOUNTER — TELEPHONE (OUTPATIENT)
Dept: INTERNAL MEDICINE | Facility: CLINIC | Age: 53
End: 2023-10-06
Payer: MEDICARE

## 2023-10-06 NOTE — TELEPHONE ENCOUNTER
----- Message from Juanis Bundy sent at 10/6/2023  8:33 AM CDT -----  Trino Iglesias - [phone] [facility] calling to get update syphilis  test.Please call back Thanks ,if unavailable just leave a note he will reach back .

## 2023-10-12 ENCOUNTER — LAB VISIT (OUTPATIENT)
Dept: LAB | Facility: HOSPITAL | Age: 53
End: 2023-10-12
Attending: FAMILY MEDICINE
Payer: MEDICAID

## 2023-10-12 DIAGNOSIS — D69.6 LOW PLATELET COUNT: ICD-10-CM

## 2023-10-12 DIAGNOSIS — D58.2 ELEVATED HEMOGLOBIN: ICD-10-CM

## 2023-10-12 PROCEDURE — 85025 COMPLETE CBC W/AUTO DIFF WBC: CPT | Performed by: FAMILY MEDICINE

## 2023-10-12 PROCEDURE — 36415 COLL VENOUS BLD VENIPUNCTURE: CPT | Mod: PO | Performed by: FAMILY MEDICINE

## 2023-10-13 LAB
BASOPHILS # BLD AUTO: 0.05 K/UL (ref 0–0.2)
BASOPHILS NFR BLD: 1.3 % (ref 0–1.9)
DIFFERENTIAL METHOD: ABNORMAL
EOSINOPHIL # BLD AUTO: 0.2 K/UL (ref 0–0.5)
EOSINOPHIL NFR BLD: 3.9 % (ref 0–8)
ERYTHROCYTE [DISTWIDTH] IN BLOOD BY AUTOMATED COUNT: 13.1 % (ref 11.5–14.5)
HCT VFR BLD AUTO: 50.5 % (ref 37–48.5)
HGB BLD-MCNC: 16.6 G/DL (ref 12–16)
IMM GRANULOCYTES # BLD AUTO: 0.03 K/UL (ref 0–0.04)
IMM GRANULOCYTES NFR BLD AUTO: 0.8 % (ref 0–0.5)
LYMPHOCYTES # BLD AUTO: 1.1 K/UL (ref 1–4.8)
LYMPHOCYTES NFR BLD: 28.3 % (ref 18–48)
MCH RBC QN AUTO: 29.6 PG (ref 27–31)
MCHC RBC AUTO-ENTMCNC: 32.9 G/DL (ref 32–36)
MCV RBC AUTO: 90 FL (ref 82–98)
MONOCYTES # BLD AUTO: 0.3 K/UL (ref 0.3–1)
MONOCYTES NFR BLD: 7.2 % (ref 4–15)
NEUTROPHILS # BLD AUTO: 2.3 K/UL (ref 1.8–7.7)
NEUTROPHILS NFR BLD: 58.5 % (ref 38–73)
NRBC BLD-RTO: 0 /100 WBC
PLATELET # BLD AUTO: ABNORMAL K/UL (ref 150–450)
PLATELET BLD QL SMEAR: ABNORMAL
PMV BLD AUTO: ABNORMAL FL (ref 9.2–12.9)
RBC # BLD AUTO: 5.6 M/UL (ref 4–5.4)
WBC # BLD AUTO: 3.89 K/UL (ref 3.9–12.7)

## 2023-10-16 ENCOUNTER — TELEPHONE (OUTPATIENT)
Dept: INTERNAL MEDICINE | Facility: CLINIC | Age: 53
End: 2023-10-16
Payer: MEDICARE

## 2023-10-16 NOTE — TELEPHONE ENCOUNTER
----- Message from Shea Dorman sent at 10/16/2023  3:44 PM CDT -----  Contact: Trino @ [facility] [phone]  Would like to receive medical advice.      Would they like a call back or a response via [employer]:  call back    Additional information:Trino Iglesias [facility] calling to get update syphilis  test. Trino states someone called before but missed the call. Please call Trino Iglesias back for advice      Trino Iglesias's Hours    Monday, Wednesday to Friday (NO Tuesday)  7:30am to 6pm

## 2023-10-17 ENCOUNTER — OFFICE VISIT (OUTPATIENT)
Dept: SURGERY | Facility: CLINIC | Age: 53
End: 2023-10-17
Payer: MEDICARE

## 2023-10-17 DIAGNOSIS — C50.511 MALIGNANT NEOPLASM OF LOWER-OUTER QUADRANT OF RIGHT BREAST OF FEMALE, ESTROGEN RECEPTOR POSITIVE: ICD-10-CM

## 2023-10-17 DIAGNOSIS — Z17.0 MALIGNANT NEOPLASM OF LOWER-OUTER QUADRANT OF RIGHT BREAST OF FEMALE, ESTROGEN RECEPTOR POSITIVE: ICD-10-CM

## 2023-10-17 DIAGNOSIS — I89.0 LYMPHEDEMA: ICD-10-CM

## 2023-10-17 LAB
ALBUMIN SERPL BCP-MCNC: 4.3 G/DL (ref 3.5–5.2)
ALP SERPL-CCNC: 81 U/L (ref 55–135)
ALT SERPL W/O P-5'-P-CCNC: 16 U/L (ref 10–44)
ANION GAP SERPL CALC-SCNC: 12 MMOL/L (ref 8–16)
AST SERPL-CCNC: 16 U/L (ref 10–40)
BILIRUB SERPL-MCNC: 0.6 MG/DL (ref 0.1–1)
BUN SERPL-MCNC: 10 MG/DL (ref 6–20)
CALCIUM SERPL-MCNC: 9.9 MG/DL (ref 8.7–10.5)
CEA SERPL-MCNC: <1.7 NG/ML (ref 0–5)
CHLORIDE SERPL-SCNC: 105 MMOL/L (ref 95–110)
CO2 SERPL-SCNC: 26 MMOL/L (ref 23–29)
CREAT SERPL-MCNC: 0.8 MG/DL (ref 0.5–1.4)
EST. GFR  (NO RACE VARIABLE): >60 ML/MIN/1.73 M^2
GLUCOSE SERPL-MCNC: 92 MG/DL (ref 70–110)
LDH SERPL L TO P-CCNC: 180 U/L (ref 110–260)
POTASSIUM SERPL-SCNC: 4.1 MMOL/L (ref 3.5–5.1)
PROT SERPL-MCNC: 7.5 G/DL (ref 6–8.4)
SODIUM SERPL-SCNC: 143 MMOL/L (ref 136–145)

## 2023-10-17 PROCEDURE — 1160F RVW MEDS BY RX/DR IN RCRD: CPT | Mod: CPTII,S$GLB,, | Performed by: SPECIALIST

## 2023-10-17 PROCEDURE — 83615 LACTATE (LD) (LDH) ENZYME: CPT | Performed by: SPECIALIST

## 2023-10-17 PROCEDURE — 99214 OFFICE O/P EST MOD 30 MIN: CPT | Mod: S$GLB,,, | Performed by: SPECIALIST

## 2023-10-17 PROCEDURE — 99999 PR PBB SHADOW E&M-EST. PATIENT-LVL III: CPT | Mod: PBBFAC,,, | Performed by: SPECIALIST

## 2023-10-17 PROCEDURE — 1159F MED LIST DOCD IN RCRD: CPT | Mod: CPTII,S$GLB,, | Performed by: SPECIALIST

## 2023-10-17 PROCEDURE — 86300 IMMUNOASSAY TUMOR CA 15-3: CPT | Performed by: SPECIALIST

## 2023-10-17 PROCEDURE — 3044F PR MOST RECENT HEMOGLOBIN A1C LEVEL <7.0%: ICD-10-PCS | Mod: CPTII,S$GLB,, | Performed by: SPECIALIST

## 2023-10-17 PROCEDURE — 3044F HG A1C LEVEL LT 7.0%: CPT | Mod: CPTII,S$GLB,, | Performed by: SPECIALIST

## 2023-10-17 PROCEDURE — 99999 PR PBB SHADOW E&M-EST. PATIENT-LVL III: ICD-10-PCS | Mod: PBBFAC,,, | Performed by: SPECIALIST

## 2023-10-17 PROCEDURE — 80053 COMPREHEN METABOLIC PANEL: CPT | Performed by: SPECIALIST

## 2023-10-17 PROCEDURE — 99214 PR OFFICE/OUTPT VISIT, EST, LEVL IV, 30-39 MIN: ICD-10-PCS | Mod: S$GLB,,, | Performed by: SPECIALIST

## 2023-10-17 PROCEDURE — 1160F PR REVIEW ALL MEDS BY PRESCRIBER/CLIN PHARMACIST DOCUMENTED: ICD-10-PCS | Mod: CPTII,S$GLB,, | Performed by: SPECIALIST

## 2023-10-17 PROCEDURE — 1159F PR MEDICATION LIST DOCUMENTED IN MEDICAL RECORD: ICD-10-PCS | Mod: CPTII,S$GLB,, | Performed by: SPECIALIST

## 2023-10-17 PROCEDURE — 82378 CARCINOEMBRYONIC ANTIGEN: CPT | Performed by: SPECIALIST

## 2023-10-19 LAB — CANCER AG27-29 SERPL-ACNC: <12 U/ML

## 2023-10-23 ENCOUNTER — LAB VISIT (OUTPATIENT)
Dept: LAB | Facility: HOSPITAL | Age: 53
End: 2023-10-23
Attending: FAMILY MEDICINE
Payer: MEDICAID

## 2023-10-23 ENCOUNTER — PATIENT MESSAGE (OUTPATIENT)
Dept: OBSTETRICS AND GYNECOLOGY | Facility: CLINIC | Age: 53
End: 2023-10-23
Payer: MEDICARE

## 2023-10-23 ENCOUNTER — OFFICE VISIT (OUTPATIENT)
Dept: OBSTETRICS AND GYNECOLOGY | Facility: CLINIC | Age: 53
End: 2023-10-23
Payer: MEDICARE

## 2023-10-23 VITALS
SYSTOLIC BLOOD PRESSURE: 124 MMHG | BODY MASS INDEX: 29.61 KG/M2 | HEIGHT: 65 IN | WEIGHT: 177.69 LBS | DIASTOLIC BLOOD PRESSURE: 82 MMHG

## 2023-10-23 DIAGNOSIS — N87.1 DYSPLASIA OF CERVIX, HIGH GRADE CIN 2: ICD-10-CM

## 2023-10-23 DIAGNOSIS — Z11.3 SCREENING FOR STD (SEXUALLY TRANSMITTED DISEASE): ICD-10-CM

## 2023-10-23 DIAGNOSIS — Z01.419 ENCOUNTER FOR GYNECOLOGICAL EXAMINATION WITHOUT ABNORMAL FINDING: Primary | ICD-10-CM

## 2023-10-23 DIAGNOSIS — Z91.89 GYN EXAM FOR HIGH-RISK MEDICARE PATIENT: ICD-10-CM

## 2023-10-23 DIAGNOSIS — Z90.13 H/O BILATERAL MASTECTOMY: ICD-10-CM

## 2023-10-23 DIAGNOSIS — Z90.710 S/P HYSTERECTOMY: ICD-10-CM

## 2023-10-23 DIAGNOSIS — Z85.3 HISTORY OF BREAST CANCER: ICD-10-CM

## 2023-10-23 PROCEDURE — 99999 PR PBB SHADOW E&M-EST. PATIENT-LVL IV: CPT | Mod: PBBFAC,,, | Performed by: NURSE PRACTITIONER

## 2023-10-23 PROCEDURE — 99999 PR PBB SHADOW E&M-EST. PATIENT-LVL IV: ICD-10-PCS | Mod: PBBFAC,,, | Performed by: NURSE PRACTITIONER

## 2023-10-23 PROCEDURE — 36415 COLL VENOUS BLD VENIPUNCTURE: CPT | Performed by: NURSE PRACTITIONER

## 2023-10-23 PROCEDURE — 3074F PR MOST RECENT SYSTOLIC BLOOD PRESSURE < 130 MM HG: ICD-10-PCS | Mod: CPTII,S$GLB,, | Performed by: NURSE PRACTITIONER

## 2023-10-23 PROCEDURE — 80074 ACUTE HEPATITIS PANEL: CPT | Performed by: NURSE PRACTITIONER

## 2023-10-23 PROCEDURE — 87591 N.GONORRHOEAE DNA AMP PROB: CPT | Performed by: NURSE PRACTITIONER

## 2023-10-23 PROCEDURE — 87624 HPV HI-RISK TYP POOLED RSLT: CPT | Performed by: NURSE PRACTITIONER

## 2023-10-23 PROCEDURE — 3079F DIAST BP 80-89 MM HG: CPT | Mod: CPTII,S$GLB,, | Performed by: NURSE PRACTITIONER

## 2023-10-23 PROCEDURE — 1159F PR MEDICATION LIST DOCUMENTED IN MEDICAL RECORD: ICD-10-PCS | Mod: CPTII,S$GLB,, | Performed by: NURSE PRACTITIONER

## 2023-10-23 PROCEDURE — 3044F PR MOST RECENT HEMOGLOBIN A1C LEVEL <7.0%: ICD-10-PCS | Mod: CPTII,S$GLB,, | Performed by: NURSE PRACTITIONER

## 2023-10-23 PROCEDURE — 88175 CYTOPATH C/V AUTO FLUID REDO: CPT | Performed by: PATHOLOGY

## 2023-10-23 PROCEDURE — 3044F HG A1C LEVEL LT 7.0%: CPT | Mod: CPTII,S$GLB,, | Performed by: NURSE PRACTITIONER

## 2023-10-23 PROCEDURE — 1159F MED LIST DOCD IN RCRD: CPT | Mod: CPTII,S$GLB,, | Performed by: NURSE PRACTITIONER

## 2023-10-23 PROCEDURE — 1160F RVW MEDS BY RX/DR IN RCRD: CPT | Mod: CPTII,S$GLB,, | Performed by: NURSE PRACTITIONER

## 2023-10-23 PROCEDURE — 3074F SYST BP LT 130 MM HG: CPT | Mod: CPTII,S$GLB,, | Performed by: NURSE PRACTITIONER

## 2023-10-23 PROCEDURE — 88141 CYTOPATH C/V INTERPRET: CPT | Mod: ,,, | Performed by: PATHOLOGY

## 2023-10-23 PROCEDURE — 87491 CHLMYD TRACH DNA AMP PROBE: CPT | Performed by: NURSE PRACTITIONER

## 2023-10-23 PROCEDURE — 3079F PR MOST RECENT DIASTOLIC BLOOD PRESSURE 80-89 MM HG: ICD-10-PCS | Mod: CPTII,S$GLB,, | Performed by: NURSE PRACTITIONER

## 2023-10-23 PROCEDURE — 88141 PR  CYTOPATH CERV/VAG INTERPRET: ICD-10-PCS | Mod: ,,, | Performed by: PATHOLOGY

## 2023-10-23 PROCEDURE — 86592 SYPHILIS TEST NON-TREP QUAL: CPT | Performed by: NURSE PRACTITIONER

## 2023-10-23 PROCEDURE — 87389 HIV-1 AG W/HIV-1&-2 AB AG IA: CPT | Performed by: NURSE PRACTITIONER

## 2023-10-23 PROCEDURE — 1160F PR REVIEW ALL MEDS BY PRESCRIBER/CLIN PHARMACIST DOCUMENTED: ICD-10-PCS | Mod: CPTII,S$GLB,, | Performed by: NURSE PRACTITIONER

## 2023-10-23 PROCEDURE — 81514 NFCT DS BV&VAGINITIS DNA ALG: CPT | Performed by: NURSE PRACTITIONER

## 2023-10-23 NOTE — PROGRESS NOTES
CC: Well woman exam    Daniela Costa is a 53 y.o. female  presents for a well woman exam wants STD testing.    Pt with bilateral mastectomy due to breast cancer on right breast - Saw Dr. Love last week    Hysterectomy with BSO on  - history of DAV 2 and HGSIL on cervix - will pap       Past Medical History:   Diagnosis Date    Allergy     Anxiety     Arthritis     Asthma     Breast cancer     2012; BRCA 1 and 2 negative    Breast cancer genetic susceptibility     Cancer     Depression     Hypothyroidism     Lung disease     Malignant neoplasm of lower-outer quadrant of right breast of female, estrogen receptor positive 10/5/2023    Pneumonia      Past Surgical History:   Procedure Laterality Date    ADENOIDECTOMY      BACK SURGERY      SI joint fusion    Breast Reconstruction  Bilateral     BREAST SURGERY      CERVICAL FUSION      CERVICAL FUSION      COLONOSCOPY N/A 10/31/2022    Procedure: COLONOSCOPY;  Surgeon: Lexy Irizarry MD;  Location: [facility];  Service: Endoscopy;  Laterality: N/A;    COSMETIC SURGERY  Several    HYSTERECTOMY      lumbar fusion      MASTECTOMY Right     due to breast cancer, radiation done after surgery    MASTECTOMY Left     propholytic due to breast cancer in right breast    ROBOT-ASSISTED LAPAROSCOPIC ABDOMINAL HYSTERECTOMY USING DA HEBERT XI N/A 2018    Procedure: XI ROBOTIC HYSTERECTOMY;  Surgeon: Meka Reich MD;  Location: [facility] OR;  Service: OB/GYN;  Laterality: N/A;    ROBOT-ASSISTED LAPAROSCOPIC LYSIS OF ADHESIONS USING DA HEBERT XI N/A 2018    Procedure: XI ROBOTIC LYSIS, ADHESIONS;  Surgeon: Meka Reich MD;  Location: [facility] OR;  Service: OB/GYN;  Laterality: N/A;    ROBOT-ASSISTED LAPAROSCOPIC SALPINGO-OOPHORECTOMY USING DA HEBERT XI Bilateral 2018    Procedure: XI ROBOTIC SALPINGO-OOPHORECTOMY;  Surgeon: Meka Reich MD;  Location: [facility] OR;  Service: OB/GYN;  Laterality: Bilateral;    SI joint fusion   2018     "SPINE SURGERY  Several    TONSILLECTOMY      TUBAL LIGATION       Family History   Problem Relation Age of Onset    Diabetes Mother     Hyperlipidemia Mother     Breast cancer Mother 53        lumpectomy, chemo, radiation    Arthritis Mother     Cancer Mother     Hypertension Mother     Diabetes Father     Hyperlipidemia Father     Other Father         non-Hogdkin's lymphoma    Arthritis Father     COPD Father     Hypertension Father     Other Sister         prophylactic BL mastectomy    Other Sister         prophylactic BL mastecomy    Down syndrome Other     Cancer Maternal Grandfather     Bipolar disorder Maternal Grandmother     Breast cancer Maternal Grandmother     Melanoma Maternal Grandmother         on scalp with brain mets    Arthritis Maternal Grandmother     Cancer Maternal Grandmother     Hearing loss Maternal Grandmother     Liver cancer Paternal Grandfather     Ovarian cancer Paternal Grandmother         metastatic    Melanoma Maternal Aunt     Breast cancer Maternal Aunt         DCIS    Bipolar disorder Maternal Aunt     Bipolar disorder Maternal Aunt     Cancer Paternal Aunt         "in lymphatic csystem"    Pancreatic cancer Paternal Uncle      Social History     Tobacco Use    Smoking status: Former     Current packs/day: 0.00     Average packs/day: 0.5 packs/day for 23.2 years (11.6 ttl pk-yrs)     Types: Cigarettes     Start date: 1990     Quit date: 2014     Years since quittin.8    Smokeless tobacco: Never   Substance Use Topics    Alcohol use: Not Currently    Drug use: No     OB History          7    Para   4    Term   4            AB   3    Living   4         SAB        IAB        Ectopic        Multiple        Live Births                     /82 (BP Location: Left arm, Patient Position: Sitting, BP Method: Medium (Manual))   Ht 5' 5" (1.651 m)   Wt 80.6 kg (177 lb 11.1 oz)   LMP  (LMP Unknown) Comment: no cycle 2012  BMI 29.57 kg/m²     ROS:  GENERAL: " Denies weight gain or weight loss. Feeling well overall.   SKIN: Denies rash or lesions.   HEAD: Denies head injury or headache.   NODES: Denies enlarged lymph nodes.   CHEST: Denies chest pain or shortness of breath.   CARDIOVASCULAR: Denies palpitations or left sided chest pain.   ABDOMEN: No abdominal pain, constipation, diarrhea, nausea, vomiting or rectal bleeding.   URINARY: No frequency, dysuria, hematuria, or burning on urination.  REPRODUCTIVE: See HPI.   BREASTS: The patient performs breast self-examination and denies pain, lumps, or nipple discharge.   HEMATOLOGIC: No easy bruisability or excessive bleeding.   MUSCULOSKELETAL: Denies joint pain or swelling.   NEUROLOGIC: Denies syncope or weakness.   PSYCHIATRIC: Denies depression, anxiety or mood swings.    PE:   APPEARANCE: Well nourished, well developed, in no acute distress.  AFFECT: WNL, alert and oriented x 3.  SKIN: No acne or hirsutism.  NECK: Neck symmetric without masses or thyromegaly.  NODES: No inguinal, cervical, axillary or femoral lymph node enlargement.  CHEST: Good respiratory effort.   ABDOMEN: Soft. No tenderness or masses. No hepatosplenomegaly. No hernias.  BREASTS: deferred  PELVIC: Normal external female genitalia without lesions. Normal hair distribution. Adequate perineal body, normal urethral meatus. Vagina atrophic without lesions or discharge. No significant cystocele or rectocele. Bimanual exam shows uterus and cervix to be surgically absent. Adnexa absent    Physical Exam     1. Encounter for gynecological examination without abnormal finding  Liquid-Based Pap Smear, Screening    HPV High Risk Genotypes, PCR      2. S/P hysterectomy        3. History of breast cancer        4. H/O bilateral mastectomy        5. Screening for STD (sexually transmitted disease)  C. trachomatis/N. gonorrhoeae by AMP DNA    HIV 1/2 Ag/Ab (4th Gen)    Hepatitis Panel, Acute    RPR    Vaginosis Screen by DNA Probe      6. Dysplasia of cervix, high  grade DAV 2  Liquid-Based Pap Smear, Screening    HPV High Risk Genotypes, PCR      7. GYN exam for high-risk Medicare patient  Liquid-Based Pap Smear, Screening    HPV High Risk Genotypes, PCR       and PLAN:    Daniela was seen today for well woman.    Diagnoses and all orders for this visit:    Encounter for gynecological examination without abnormal finding  -     Liquid-Based Pap Smear, Screening  -     HPV High Risk Genotypes, PCR    S/P hysterectomy    History of breast cancer    H/O bilateral mastectomy    Screening for STD (sexually transmitted disease)  -     C. trachomatis/N. gonorrhoeae by AMP DNA  -     HIV 1/2 Ag/Ab (4th Gen); Future  -     Hepatitis Panel, Acute; Future  -     RPR; Future  -     Vaginosis Screen by DNA Probe    Dysplasia of cervix, high grade DAV 2  -     Liquid-Based Pap Smear, Screening  -     HPV High Risk Genotypes, PCR    GYN exam for high-risk Medicare patient  -     Liquid-Based Pap Smear, Screening  -     HPV High Risk Genotypes, PCR       Patient was counseled today on A.C.S. Pap guidelines and recommendations for yearly pelvic exams, mammograms and monthly self breast exams; to see her PCP for other health maintenance.

## 2023-10-24 LAB
BACTERIAL VAGINOSIS DNA: POSITIVE
C TRACH DNA SPEC QL NAA+PROBE: NOT DETECTED
CANDIDA GLABRATA DNA: NEGATIVE
CANDIDA KRUSEI DNA: NEGATIVE
CANDIDA RRNA VAG QL PROBE: NEGATIVE
HAV IGM SERPL QL IA: NORMAL
HBV CORE IGM SERPL QL IA: NORMAL
HBV SURFACE AG SERPL QL IA: NORMAL
HCV AB SERPL QL IA: NORMAL
HIV 1+2 AB+HIV1 P24 AG SERPL QL IA: NORMAL
N GONORRHOEA DNA SPEC QL NAA+PROBE: NOT DETECTED
RPR SER QL: NORMAL
T VAGINALIS RRNA GENITAL QL PROBE: NEGATIVE

## 2023-10-25 RX ORDER — METRONIDAZOLE 500 MG/1
500 TABLET ORAL EVERY 12 HOURS
Qty: 14 TABLET | Refills: 0 | Status: SHIPPED | OUTPATIENT
Start: 2023-10-25 | End: 2023-11-01

## 2023-10-30 LAB
FINAL PATHOLOGIC DIAGNOSIS: ABNORMAL
Lab: ABNORMAL

## 2023-11-07 ENCOUNTER — OFFICE VISIT (OUTPATIENT)
Dept: PSYCHIATRY | Facility: CLINIC | Age: 53
End: 2023-11-07
Payer: COMMERCIAL

## 2023-11-07 DIAGNOSIS — G47.00 INSOMNIA, UNSPECIFIED TYPE: ICD-10-CM

## 2023-11-07 DIAGNOSIS — F41.9 ANXIETY: ICD-10-CM

## 2023-11-07 DIAGNOSIS — F31.9 BIPOLAR I DISORDER, CURRENT EPISODE DEPRESSED: Primary | ICD-10-CM

## 2023-11-07 PROCEDURE — 99214 OFFICE O/P EST MOD 30 MIN: CPT | Mod: 95,,, | Performed by: PSYCHIATRY & NEUROLOGY

## 2023-11-07 PROCEDURE — 99214 PR OFFICE/OUTPT VISIT, EST, LEVL IV, 30-39 MIN: ICD-10-PCS | Mod: 95,,, | Performed by: PSYCHIATRY & NEUROLOGY

## 2023-11-07 PROCEDURE — 3044F PR MOST RECENT HEMOGLOBIN A1C LEVEL <7.0%: ICD-10-PCS | Mod: CPTII,95,, | Performed by: PSYCHIATRY & NEUROLOGY

## 2023-11-07 PROCEDURE — 3044F HG A1C LEVEL LT 7.0%: CPT | Mod: CPTII,95,, | Performed by: PSYCHIATRY & NEUROLOGY

## 2023-11-07 RX ORDER — LURASIDONE HYDROCHLORIDE 40 MG/1
40 TABLET, FILM COATED ORAL DAILY
Qty: 30 TABLET | Refills: 3 | Status: SHIPPED | OUTPATIENT
Start: 2023-11-07 | End: 2024-03-04

## 2023-11-07 RX ORDER — TRAZODONE HYDROCHLORIDE 50 MG/1
50 TABLET ORAL NIGHTLY PRN
Qty: 30 TABLET | Refills: 3 | Status: SHIPPED | OUTPATIENT
Start: 2023-11-07 | End: 2024-11-06

## 2023-11-07 RX ORDER — CLONAZEPAM 1 MG/1
TABLET ORAL
Qty: 30 TABLET | Refills: 3 | Status: SHIPPED | OUTPATIENT
Start: 2023-11-07 | End: 2024-03-20
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
## 2023-11-14 ENCOUNTER — LAB VISIT (OUTPATIENT)
Dept: LAB | Facility: HOSPITAL | Age: 53
End: 2023-11-14
Attending: NURSE PRACTITIONER
Payer: MEDICAID

## 2023-11-14 DIAGNOSIS — D75.1 POLYCYTHEMIA: ICD-10-CM

## 2023-11-14 LAB
ANION GAP SERPL CALC-SCNC: 12 MMOL/L (ref 8–16)
BASOPHILS # BLD AUTO: 0.05 K/UL (ref 0–0.2)
BASOPHILS NFR BLD: 1 % (ref 0–1.9)
BUN SERPL-MCNC: 11 MG/DL (ref 6–20)
CALCIUM SERPL-MCNC: 9.7 MG/DL (ref 8.7–10.5)
CHLORIDE SERPL-SCNC: 102 MMOL/L (ref 95–110)
CO2 SERPL-SCNC: 28 MMOL/L (ref 23–29)
CREAT SERPL-MCNC: 1.1 MG/DL (ref 0.5–1.4)
DIFFERENTIAL METHOD: ABNORMAL
EOSINOPHIL # BLD AUTO: 0.1 K/UL (ref 0–0.5)
EOSINOPHIL NFR BLD: 2.2 % (ref 0–8)
ERYTHROCYTE [DISTWIDTH] IN BLOOD BY AUTOMATED COUNT: 13 % (ref 11.5–14.5)
EST. GFR  (NO RACE VARIABLE): >60 ML/MIN/1.73 M^2
GLUCOSE SERPL-MCNC: 109 MG/DL (ref 70–110)
HCT VFR BLD AUTO: 51.2 % (ref 37–48.5)
HGB BLD-MCNC: 16.9 G/DL (ref 12–16)
IMM GRANULOCYTES # BLD AUTO: 0.01 K/UL (ref 0–0.04)
IMM GRANULOCYTES NFR BLD AUTO: 0.2 % (ref 0–0.5)
LYMPHOCYTES # BLD AUTO: 1.1 K/UL (ref 1–4.8)
LYMPHOCYTES NFR BLD: 21.2 % (ref 18–48)
MCH RBC QN AUTO: 29 PG (ref 27–31)
MCHC RBC AUTO-ENTMCNC: 33 G/DL (ref 32–36)
MCV RBC AUTO: 88 FL (ref 82–98)
MONOCYTES # BLD AUTO: 0.3 K/UL (ref 0.3–1)
MONOCYTES NFR BLD: 5.9 % (ref 4–15)
NEUTROPHILS # BLD AUTO: 3.5 K/UL (ref 1.8–7.7)
NEUTROPHILS NFR BLD: 69.5 % (ref 38–73)
NRBC BLD-RTO: 0 /100 WBC
PLATELET # BLD AUTO: 109 K/UL (ref 150–450)
PMV BLD AUTO: 11.4 FL (ref 9.2–12.9)
POTASSIUM SERPL-SCNC: 4.4 MMOL/L (ref 3.5–5.1)
RBC # BLD AUTO: 5.83 M/UL (ref 4–5.4)
SODIUM SERPL-SCNC: 142 MMOL/L (ref 136–145)
WBC # BLD AUTO: 5.09 K/UL (ref 3.9–12.7)

## 2023-11-14 PROCEDURE — 85025 COMPLETE CBC W/AUTO DIFF WBC: CPT | Performed by: NURSE PRACTITIONER

## 2023-11-14 PROCEDURE — 80048 BASIC METABOLIC PNL TOTAL CA: CPT | Performed by: NURSE PRACTITIONER

## 2023-11-14 PROCEDURE — 36415 COLL VENOUS BLD VENIPUNCTURE: CPT | Mod: PO | Performed by: NURSE PRACTITIONER

## 2023-11-16 ENCOUNTER — OFFICE VISIT (OUTPATIENT)
Dept: HEMATOLOGY/ONCOLOGY | Facility: CLINIC | Age: 53
End: 2023-11-16
Payer: MEDICARE

## 2023-11-16 ENCOUNTER — LAB VISIT (OUTPATIENT)
Dept: LAB | Facility: HOSPITAL | Age: 53
End: 2023-11-16
Attending: NURSE PRACTITIONER
Payer: MEDICAID

## 2023-11-16 VITALS
OXYGEN SATURATION: 98 % | SYSTOLIC BLOOD PRESSURE: 91 MMHG | DIASTOLIC BLOOD PRESSURE: 68 MMHG | WEIGHT: 171.06 LBS | HEART RATE: 107 BPM | HEIGHT: 65 IN | BODY MASS INDEX: 28.5 KG/M2

## 2023-11-16 DIAGNOSIS — D75.1 POLYCYTHEMIA: Primary | ICD-10-CM

## 2023-11-16 DIAGNOSIS — J45.909 ASTHMA, UNSPECIFIED ASTHMA SEVERITY, UNSPECIFIED WHETHER COMPLICATED, UNSPECIFIED WHETHER PERSISTENT: ICD-10-CM

## 2023-11-16 DIAGNOSIS — Z85.3 HISTORY OF BREAST CANCER: ICD-10-CM

## 2023-11-16 DIAGNOSIS — R06.02 SHORTNESS OF BREATH: ICD-10-CM

## 2023-11-16 DIAGNOSIS — D69.6 THROMBOCYTOPENIA: ICD-10-CM

## 2023-11-16 DIAGNOSIS — J98.9 REACTIVE AIRWAY DISEASE WITHOUT ASTHMA: ICD-10-CM

## 2023-11-16 LAB
MPV, BLUE TOP: 10.6 FL (ref 9.2–12.9)
PLATELET, BLUE TOP: 102 K/UL (ref 150–450)

## 2023-11-16 PROCEDURE — 3008F BODY MASS INDEX DOCD: CPT | Mod: CPTII,S$GLB,, | Performed by: NURSE PRACTITIONER

## 2023-11-16 PROCEDURE — 99215 OFFICE O/P EST HI 40 MIN: CPT | Mod: S$GLB,,, | Performed by: NURSE PRACTITIONER

## 2023-11-16 PROCEDURE — 1160F RVW MEDS BY RX/DR IN RCRD: CPT | Mod: CPTII,S$GLB,, | Performed by: NURSE PRACTITIONER

## 2023-11-16 PROCEDURE — 36415 COLL VENOUS BLD VENIPUNCTURE: CPT | Mod: PO | Performed by: NURSE PRACTITIONER

## 2023-11-16 PROCEDURE — 3044F PR MOST RECENT HEMOGLOBIN A1C LEVEL <7.0%: ICD-10-PCS | Mod: CPTII,S$GLB,, | Performed by: NURSE PRACTITIONER

## 2023-11-16 PROCEDURE — 3078F PR MOST RECENT DIASTOLIC BLOOD PRESSURE < 80 MM HG: ICD-10-PCS | Mod: CPTII,S$GLB,, | Performed by: NURSE PRACTITIONER

## 2023-11-16 PROCEDURE — 85049 AUTOMATED PLATELET COUNT: CPT | Performed by: NURSE PRACTITIONER

## 2023-11-16 PROCEDURE — 1160F PR REVIEW ALL MEDS BY PRESCRIBER/CLIN PHARMACIST DOCUMENTED: ICD-10-PCS | Mod: CPTII,S$GLB,, | Performed by: NURSE PRACTITIONER

## 2023-11-16 PROCEDURE — 3044F HG A1C LEVEL LT 7.0%: CPT | Mod: CPTII,S$GLB,, | Performed by: NURSE PRACTITIONER

## 2023-11-16 PROCEDURE — 3074F SYST BP LT 130 MM HG: CPT | Mod: CPTII,S$GLB,, | Performed by: NURSE PRACTITIONER

## 2023-11-16 PROCEDURE — 3074F PR MOST RECENT SYSTOLIC BLOOD PRESSURE < 130 MM HG: ICD-10-PCS | Mod: CPTII,S$GLB,, | Performed by: NURSE PRACTITIONER

## 2023-11-16 PROCEDURE — 99215 PR OFFICE/OUTPT VISIT, EST, LEVL V, 40-54 MIN: ICD-10-PCS | Mod: S$GLB,,, | Performed by: NURSE PRACTITIONER

## 2023-11-16 PROCEDURE — 99999 PR PBB SHADOW E&M-EST. PATIENT-LVL IV: CPT | Mod: PBBFAC,,, | Performed by: NURSE PRACTITIONER

## 2023-11-16 PROCEDURE — 1159F PR MEDICATION LIST DOCUMENTED IN MEDICAL RECORD: ICD-10-PCS | Mod: CPTII,S$GLB,, | Performed by: NURSE PRACTITIONER

## 2023-11-16 PROCEDURE — 1159F MED LIST DOCD IN RCRD: CPT | Mod: CPTII,S$GLB,, | Performed by: NURSE PRACTITIONER

## 2023-11-16 PROCEDURE — 3008F PR BODY MASS INDEX (BMI) DOCUMENTED: ICD-10-PCS | Mod: CPTII,S$GLB,, | Performed by: NURSE PRACTITIONER

## 2023-11-16 PROCEDURE — 99999 PR PBB SHADOW E&M-EST. PATIENT-LVL IV: ICD-10-PCS | Mod: PBBFAC,,, | Performed by: NURSE PRACTITIONER

## 2023-11-16 PROCEDURE — 3078F DIAST BP <80 MM HG: CPT | Mod: CPTII,S$GLB,, | Performed by: NURSE PRACTITIONER

## 2023-11-16 NOTE — PROGRESS NOTES
Subjective:      Patient ID: Daniela Costa is a 53 y.o. female.    Chief Complaint: lower back pain    HPI:   53 year old female who presents today as a new patient for further evaluation and recommendation of elevated hemoglobin.       She has a history of breast cancer diagnosed at age 40 y/o  Right side- 6.1 cm overall size  Abnormal mammo after palpated mass  Palpable mass- pt palpated  Breast biopsy with core or excision- invasive ductal carcinoma     Pathology- per pt initially stage 2 - then bumped to stage 3     Treatment               Lumpectomy or mastectomy              Reconstruction- immediate vs delayed              Lymph node biopsy- sentinel node              Genetic testing- negative for only BRCA 1 & 2              Radiation- right chest wall 4 months              Curtis-Adjuvant therapy- AC X4 and taxol X 12.   endocrine - estrogen positive- tamoxifen first - 1 year- changed to exemestane for the last 4 years. Completed May 2019 - followed by Anna Main, NP     Has a significant pulmonary history including reactive airway disease, mild intermittent asthma, and airway hyperactivity.  Followed by pulmonology/Dr. John     States that her father has polycythemia requiring phlebotomy.      Family hx of cancer:  Father NHL, ALL as a child, no polycythemia  Mother: Breast cancer  Self: Breast Cancer     Is trying to lose weight and has lost 30 lbs since September 2022.       Denies smoking currently.  States that she quit in 1/2014.  Smoked on/off for 20 years (about total 10 years) 1/2 to 1 pack/day.  Drinks a glass a wine maybe once per week.  Denies illicit drug use.      Currently disabled - non working at this time     10/31/2022 colonoscopyThe examined portion of the ileum was normal.  Two 8 to 10 mm polyps in the sigmoid colon,    removed with a hot snare. Resected and retrieved. Pathology of Tubular adenoma - Repeat colonoscopy in 3 years.   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  States that she has been diagnosed with sleep apnea and is trying to use it nightly; however her current mask is irritating her skin so she has to take a break sometimes from using to let her skin heal.  C/o shortness of breath at times and chronic lower back pain being [facility].         Social History     Socioeconomic History    Marital status:     Number of children: 6   Occupational History    Occupation: stay home mom    Tobacco Use    Smoking status: Former     Current packs/day: 0.00     Average packs/day: 0.5 packs/day for 23.2 years (11.6 ttl pk-yrs)     Types: Cigarettes     Start date: 1990     Quit date: 2014     Years since quittin.8    Smokeless tobacco: Never   Substance and Sexual Activity    Alcohol use: Not Currently    Drug use: No    Sexual activity: Not Currently     Partners: Male     Birth control/protection: See Surgical Hx   Social History Narrative    Patient has 4 biological children and 2 step     Social Determinants of Health     Financial Resource Strain: Medium Risk (2021)    Overall Financial Resource Strain (CARDIA)     Difficulty of Paying Living Expenses: Somewhat hard   Food Insecurity: Food Insecurity Present (2021)    Hunger Vital Sign     Worried About Running Out of Food in the Last Year: Sometimes true     Ran Out of Food in the Last Year: Sometimes true   Transportation Needs: No Transportation Needs (2021)    PRAPARE - Transportation     Lack of Transportation (Medical): No     Lack of Transportation (Non-Medical): No   Social Connections: Unknown (2021)    Social Connection and Isolation Panel [NHANES]     Frequency of Communication with Friends and Family: More than three times a week  "      Family History   Problem Relation Age of Onset    Diabetes Mother     Hyperlipidemia Mother     Breast cancer Mother 53        lumpectomy, chemo, radiation    Arthritis Mother     Cancer Mother     Hypertension Mother     Diabetes Father     Hyperlipidemia Father     Other Father         non-Hogdkin's lymphoma    Arthritis Father     COPD Father     Hypertension Father     Other Sister         prophylactic BL mastectomy    Other Sister         prophylactic BL mastecomy    Down syndrome Other     Cancer Maternal Grandfather     Bipolar disorder Maternal Grandmother     Breast cancer Maternal Grandmother     Melanoma Maternal Grandmother         on scalp with brain mets    Arthritis Maternal Grandmother     Cancer Maternal Grandmother     Hearing loss Maternal Grandmother     Liver cancer Paternal Grandfather     Ovarian cancer Paternal Grandmother         metastatic    Melanoma Maternal Aunt     Breast cancer Maternal Aunt         DCIS    Bipolar disorder Maternal Aunt     Bipolar disorder Maternal Aunt     Cancer Paternal Aunt         "in lymphatic csystem"    Pancreatic cancer Paternal Uncle        Past Surgical History:   Procedure Laterality Date    ADENOIDECTOMY      BACK SURGERY      SI joint fusion    Breast Reconstruction  Bilateral     BREAST SURGERY      CERVICAL FUSION      CERVICAL FUSION      COLONOSCOPY N/A 10/31/2022    Procedure: COLONOSCOPY;  Surgeon: Lexy Irizarry MD;  Location: [facility] ENDO;  Service: Endoscopy;  Laterality: N/A;    COSMETIC SURGERY  Several    HYSTERECTOMY      lumbar fusion      MASTECTOMY Right 2013    due to breast cancer, radiation done after surgery    MASTECTOMY Left 2015    propholytic due to breast cancer in right breast    ROBOT-ASSISTED LAPAROSCOPIC ABDOMINAL HYSTERECTOMY USING DA HEBERT XI N/A 12/05/2018    Procedure: XI ROBOTIC HYSTERECTOMY;  Surgeon: Meka Reich MD;  Location: [facility] OR;  Service: OB/GYN;  Laterality: N/A;    ROBOT-ASSISTED LAPAROSCOPIC " LYSIS OF ADHESIONS USING DA HEBERT XI N/A 12/05/2018    Procedure: XI ROBOTIC LYSIS, ADHESIONS;  Surgeon: Meka Reich MD;  Location: [facility] OR;  Service: OB/GYN;  Laterality: N/A;    ROBOT-ASSISTED LAPAROSCOPIC SALPINGO-OOPHORECTOMY USING DA HEBERT XI Bilateral 12/05/2018    Procedure: XI ROBOTIC SALPINGO-OOPHORECTOMY;  Surgeon: Meka Reich MD;  Location: [facility] OR;  Service: OB/GYN;  Laterality: Bilateral;    SI joint fusion   03/26/2018    SPINE SURGERY  Several    TONSILLECTOMY      TUBAL LIGATION         Past Medical History:   Diagnosis Date    Allergy     Anxiety     Arthritis     Asthma     Breast cancer     june 2012; BRCA 1 and 2 negative    Breast cancer genetic susceptibility     Cancer     Depression     Hypothyroidism     Lung disease     Malignant neoplasm of lower-outer quadrant of right breast of female, estrogen receptor positive 10/5/2023    Pneumonia        Review of Systems   Constitutional:  Negative for appetite change and unexpected weight change.   HENT:  Negative for mouth sores.    Eyes:  Negative for visual disturbance.   Respiratory:  Positive for shortness of breath. Negative for cough.    Cardiovascular:  Negative for chest pain.   Gastrointestinal:  Negative for abdominal pain and diarrhea.   Endocrine: Negative.    Genitourinary:  Negative for frequency.   Musculoskeletal:  Positive for back pain.   Skin:  Negative for rash.   Allergic/Immunologic: Negative.    Neurological:  Negative for headaches.   Hematological:  Negative for adenopathy.   Psychiatric/Behavioral: Negative.            Medication List with Changes/Refills   Current Medications    ALBUTEROL (PROVENTIL) 2.5 MG /3 ML (0.083 %) NEBULIZER SOLUTION    Take 3 mLs (2.5 mg total) by nebulization every 4 to 6 hours as needed for Wheezing or Shortness of Breath.    ALBUTEROL (PROVENTIL/VENTOLIN HFA) 90 MCG/ACTUATION INHALER    Inhale 2 puffs into the lungs every 6 (six) hours.    ASCORBATE CALCIUM (VITAMIN C ORAL)     Take by mouth.    BLOOD PRESSURE MONITOR (EALTH EASE) ST SIZE    by Other route as needed for High Blood Pressure.    CLONAZEPAM (KLONOPIN) 1 MG TABLET    TAKE 1 TABLET BY MOUTH ONCE DAILY AS NEEDED FOR ANXIETY    CYCLOBENZAPRINE (FLEXERIL) 10 MG TABLET    Take by mouth.    DICLOFENAC (VOLTAREN) 75 MG EC TABLET    Take 75 mg by mouth 2 (two) times daily.    DILTIAZEM (CARDIZEM) 30 MG TABLET    Take 1 tablet (30 mg total) by mouth every 8 (eight) hours.    FLUTICASONE FUROATE-VILANTEROL (BREO ELLIPTA) 200-25 MCG/DOSE DSDV DISKUS INHALER    Inhale 1 puff into the lungs once daily. Controller    FLUTICASONE PROPIONATE (FLONASE) 50 MCG/ACTUATION NASAL SPRAY    SPRAY 1 SPRAY INTO EACH NOSTRIL TWO TIMES DAILY    HYDROCORTISONE 2.5 % LOTION    Apply topically 2 (two) times daily.    INHALATION SPACING DEVICE (BREATHERITE VALVED MDI CHAMBER)    Use as directed for inhalation.    LEVOTHYROXINE (SYNTHROID) 50 MCG TABLET    Take 1 tablet (50 mcg total) by mouth once daily.    LORATADINE (CLARITIN) 10 MG TABLET    Take 1 tablet (10 mg total) by mouth once daily.    LURASIDONE (LATUDA) 40 MG TAB TABLET    Take 1 tablet (40 mg total) by mouth once daily.    MELOXICAM (MOBIC) 15 MG TABLET    Take 15 mg by mouth.    MONTELUKAST (SINGULAIR) 10 MG TABLET    Take 10 mg by mouth.    ONDANSETRON (ZOFRAN-ODT) 4 MG TBDL    Take 4 mg by mouth every 6 (six) hours as needed.    PREVIDENT 5000 DRY MOUTH 1.1 % PSTE        TRAZODONE (DESYREL) 50 MG TABLET    Take 1 tablet (50 mg total) by mouth nightly as needed for Insomnia.        Objective:     Vitals:    11/16/23 0902   BP: 91/68   Pulse: 107       Physical Exam  Vitals reviewed.   Constitutional:       Appearance: Normal appearance.   HENT:      Head: Normocephalic and atraumatic.      Right Ear: External ear normal.      Left Ear: External ear normal.   Cardiovascular:      Rate and Rhythm: Regular rhythm. Tachycardia present.      Heart sounds: Normal heart sounds, S1 normal and S2  normal.   Pulmonary:      Effort: Pulmonary effort is normal. Prolonged expiration present.      Breath sounds: Normal breath sounds.      Comments: Prolonged expiratory sounds on expiration  Abdominal:      General: There is no distension.   Musculoskeletal:         General: Normal range of motion.      Cervical back: Normal range of motion. Tenderness present.        Back:    Skin:     General: Skin is warm and dry.   Neurological:      General: No focal deficit present.      Mental Status: She is alert and oriented to person, place, and time.   Psychiatric:         Attention and Perception: Attention and perception normal.         Mood and Affect: Mood and affect normal.         Speech: Speech normal.         Behavior: Behavior normal. Behavior is cooperative.         Thought Content: Thought content normal.         Cognition and Memory: Cognition and memory normal.         Judgment: Judgment normal.         Assessment:     Problem List Items Addressed This Visit          Pulmonary    Reactive airway disease that is not asthma    Asthma     Other Visit Diagnoses       Polycythemia    -  Primary    Relevant Orders    CT Chest With Contrast    History of breast cancer        Relevant Orders    CT Chest With Contrast    Thrombocytopenia        Relevant Orders    Platelet Count, Blue Top    Shortness of breath        Relevant Orders    CT Chest With Contrast            Lab Results   Component Value Date    WBC 5.09 11/14/2023    RBC 5.83 (H) 11/14/2023    HGB 16.9 (H) 11/14/2023    HCT 51.2 (H) 11/14/2023    MCV 88 11/14/2023    MCH 29.0 11/14/2023    MCHC 33.0 11/14/2023    RDW 13.0 11/14/2023     (L) 11/14/2023    MPV 11.4 11/14/2023    GRAN 3.5 11/14/2023    GRAN 69.5 11/14/2023    LYMPH 1.1 11/14/2023    LYMPH 21.2 11/14/2023    MONO 0.3 11/14/2023    MONO 5.9 11/14/2023    EOS 0.1 11/14/2023    BASO 0.05 11/14/2023    EOSINOPHIL 2.2 11/14/2023    BASOPHIL 1.0 11/14/2023      Lab Results   Component Value  Date     11/14/2023    K 4.4 11/14/2023     11/14/2023    CO2 28 11/14/2023    BUN 11 11/14/2023    CREATININE 1.1 11/14/2023    CALCIUM 9.7 11/14/2023    ANIONGAP 12 11/14/2023    ESTGFRAFRICA >60 01/09/2019    EGFRNONAA >60 01/09/2019     Lab Results   Component Value Date    ALT 16 10/17/2023    AST 16 10/17/2023    ALKPHOS 81 10/17/2023    BILITOT 0.6 10/17/2023       Plan:   Polycythemia  -     CT Chest With Contrast; Future; Expected date: 11/16/2023    History of breast cancer  -     CT Chest With Contrast; Future; Expected date: 11/16/2023    Asthma, unspecified asthma severity, unspecified whether complicated, unspecified whether persistent    Reactive airway disease that is not asthma    Thrombocytopenia  -     Platelet Count, Blue Top; Future; Expected date: 11/16/2023    Shortness of breath  -     CT Chest With Contrast; Future; Expected date: 11/16/2023      Med Onc Chart Routing      Follow up with physician    Follow up with BREANNA . F/u after CT chest to discuss results with VV   Infusion scheduling note   n/a   Injection scheduling note n/a   Labs   Scheduling:  Preferred lab: [facility]  Lab interval:  blue top today.   Imaging   Ct chest with contrast   Pharmacy appointment No pharmacy appointment needed      Other referrals       No additional referrals needed  n/a          Total time spent on encounter 45 minutes    Collaborating Provider:  Dr. Darren Felix    Thank You,  Yamil Stover, BONNIEP-C  Hematology Oncology

## 2023-11-20 RX ORDER — MONTELUKAST SODIUM 10 MG/1
10 TABLET ORAL NIGHTLY
Qty: 30 TABLET | Refills: 11 | Status: SHIPPED | OUTPATIENT
Start: 2023-11-20

## 2023-11-22 DIAGNOSIS — J45.20 MILD INTERMITTENT ASTHMA, UNSPECIFIED WHETHER COMPLICATED: ICD-10-CM

## 2023-11-22 RX ORDER — ALBUTEROL SULFATE 90 UG/1
2 AEROSOL, METERED RESPIRATORY (INHALATION) EVERY 6 HOURS
Qty: 18 G | Refills: 11 | Status: SHIPPED | OUTPATIENT
Start: 2023-11-22 | End: 2024-11-21

## 2023-11-22 NOTE — TELEPHONE ENCOUNTER
Rx sent to [facility]      albuterol  MDI     Dose, Route, Frequency: 2 puff, Inhalation, Every 6 hoursDx Associated: Ordered Date & Time: 11/22/2023 0946Start: 11/22/2023End: 11/21/2024[facility] [id number]:

## 2023-12-05 ENCOUNTER — HOSPITAL ENCOUNTER (OUTPATIENT)
Dept: RADIOLOGY | Facility: HOSPITAL | Age: 53
Discharge: HOME | End: 2023-12-05
Attending: NURSE PRACTITIONER
Payer: MEDICARE

## 2023-12-05 DIAGNOSIS — Z85.3 HISTORY OF BREAST CANCER: ICD-10-CM

## 2023-12-05 DIAGNOSIS — D75.1 POLYCYTHEMIA: ICD-10-CM

## 2023-12-05 DIAGNOSIS — R06.02 SHORTNESS OF BREATH: ICD-10-CM

## 2023-12-05 PROCEDURE — 25500020 PHARM REV CODE 255: Performed by: NURSE PRACTITIONER

## 2023-12-05 PROCEDURE — 71260 CT THORAX DX C+: CPT | Mod: 26,,, | Performed by: RADIOLOGY

## 2023-12-05 PROCEDURE — 71260 CT THORAX DX C+: CPT | Mod: TC

## 2023-12-05 PROCEDURE — 71260 CT CHEST WITH CONTRAST: ICD-10-PCS | Mod: 26,,, | Performed by: RADIOLOGY

## 2023-12-05 RX ADMIN — IOHEXOL 75 ML: 350 INJECTION, SOLUTION INTRAVENOUS at 02:12

## 2023-12-11 ENCOUNTER — OFFICE VISIT (OUTPATIENT)
Dept: HEMATOLOGY/ONCOLOGY | Facility: CLINIC | Age: 53
End: 2023-12-11
Payer: MEDICARE

## 2023-12-11 DIAGNOSIS — D75.1 SECONDARY POLYCYTHEMIA: ICD-10-CM

## 2023-12-11 DIAGNOSIS — G47.33 OSA (OBSTRUCTIVE SLEEP APNEA): ICD-10-CM

## 2023-12-11 DIAGNOSIS — K76.9 LIVER DISEASE, UNSPECIFIED: Primary | ICD-10-CM

## 2023-12-11 DIAGNOSIS — D69.6 THROMBOCYTOPENIA: ICD-10-CM

## 2023-12-11 DIAGNOSIS — J45.909 ASTHMA, UNSPECIFIED ASTHMA SEVERITY, UNSPECIFIED WHETHER COMPLICATED, UNSPECIFIED WHETHER PERSISTENT: ICD-10-CM

## 2023-12-11 PROCEDURE — 3044F HG A1C LEVEL LT 7.0%: CPT | Mod: CPTII,95,, | Performed by: NURSE PRACTITIONER

## 2023-12-11 PROCEDURE — 99215 OFFICE O/P EST HI 40 MIN: CPT | Mod: 95,,, | Performed by: NURSE PRACTITIONER

## 2023-12-11 PROCEDURE — 1159F PR MEDICATION LIST DOCUMENTED IN MEDICAL RECORD: ICD-10-PCS | Mod: CPTII,95,, | Performed by: NURSE PRACTITIONER

## 2023-12-11 PROCEDURE — 1159F MED LIST DOCD IN RCRD: CPT | Mod: CPTII,95,, | Performed by: NURSE PRACTITIONER

## 2023-12-11 PROCEDURE — 1160F RVW MEDS BY RX/DR IN RCRD: CPT | Mod: CPTII,95,, | Performed by: NURSE PRACTITIONER

## 2023-12-11 PROCEDURE — 1160F PR REVIEW ALL MEDS BY PRESCRIBER/CLIN PHARMACIST DOCUMENTED: ICD-10-PCS | Mod: CPTII,95,, | Performed by: NURSE PRACTITIONER

## 2023-12-11 PROCEDURE — 99215 PR OFFICE/OUTPT VISIT, EST, LEVL V, 40-54 MIN: ICD-10-PCS | Mod: 95,,, | Performed by: NURSE PRACTITIONER

## 2023-12-11 PROCEDURE — 3044F PR MOST RECENT HEMOGLOBIN A1C LEVEL <7.0%: ICD-10-PCS | Mod: CPTII,95,, | Performed by: NURSE PRACTITIONER

## 2023-12-11 NOTE — PROGRESS NOTES
The patient location is: home  The chief complaint leading to consultation is: no complaints    Visit type: audiovisual    Face to Face time with patient: 20  40 minutes of total time spent on the encounter, which includes face to face time and non-face to face time preparing to see the patient (eg, review of tests), Obtaining and/or reviewing separately obtained history, Documenting clinical information in the electronic or other health record, Independently interpreting results (not separately reported) and communicating results to the patient/family/caregiver, or Care coordination (not separately reported).       Each patient to whom he or she provides medical services by telemedicine is:  (1) informed of the relationship between the physician and patient and the respective role of any other health care provider with respect to management of the patient; and (2) notified that he or she may decline to receive medical services by telemedicine and may withdraw from such care at any time.    Patient ID: Daniela Costa is a 53 y.o. female.    Chief Complaint: no complaints    HPI:  53 year old female who presents today as a new patient for further evaluation and recommendation of elevated hemoglobin.       She has a history of breast cancer diagnosed at age 40 y/o  Right side- 6.1 cm overall size  Abnormal mammo after palpated mass  Palpable mass- pt palpated  Breast biopsy with core or excision- invasive ductal carcinoma     Pathology- per pt initially stage 2 - then bumped to stage 3     Treatment               Lumpectomy or mastectomy              Reconstruction- immediate vs delayed              Lymph node biopsy- sentinel node              Genetic testing- negative for only BRCA 1 & 2              Radiation- right chest wall 4 months              Curtis-Adjuvant therapy- AC X4 and taxol X 12.   endocrine - estrogen positive- tamoxifen first - 1 year- changed to exemestane for the last 4 years. Completed May 2019 -  followed by Anna Main, NP     Has a significant pulmonary history including reactive airway disease, mild intermittent asthma, and airway hyperactivity.  Followed by pulmonology/Dr. John     States that her father has polycythemia requiring phlebotomy.      Family hx of cancer:  Father NHL, ALL as a child, no polycythemia  Mother: Breast cancer  Self: Breast Cancer     Is trying to lose weight and has lost 30 lbs since September 2022.       Denies smoking currently.  States that she quit in 1/2014.  Smoked on/off for 20 years (about total 10 years) 1/2 to 1 pack/day.  Drinks a glass a wine maybe once per week.  Denies illicit drug use.      Currently disabled - non working at this time     10/31/2022 colonoscopyThe examined portion of the ileum was normal.  Two 8 to 10 mm polyps in the sigmoid colon,    removed with a hot snare. Resected and retrieved. Pathology of Tubular adenoma - Repeat colonoscopy in 3 years.   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  States that she has been diagnosed with sleep apnea and is trying to use it nightly; however her current mask is irritating her skin so she has to take a break sometimes from using to let her skin heal.  C/o shortness of breath at times and chronic lower back pain being followed [facility] [place].     Interval History:  12/11/2023  Presents today for evaluation of labs- found with secondary polycythemia with reactive airway disease and mild intermittent asthma along with sleep apnea.  Has not received the new CPAP mask as of yet so has been using about 2-3 nights/week.  States has skin irritation after using and does not sleep as well with it on.     Also noted new thrombocytopenia - Latuda can cause thrombocytopenia.   Denies any abnormal bleeding or bruising and states that the medication is helping her.     CT chest with contrast 2023 Impression:     1. Peripherally enhancing 2.1 cm lesion in the right hepatic lobe as above, possibly representing a hemangioma.  Further characterization could be obtained with contrast enhanced MRI or multiphase CT.  2. No acute findings demonstrated in the thorax.                Social History     Socioeconomic History    Marital status:     Number of children: 6   Occupational History    Occupation: stay home mom    Tobacco Use    Smoking status: Former     Current packs/day: 0.00     Average packs/day: 0.5 packs/day for 23.2 years (11.6 ttl pk-yrs)     Types: Cigarettes     Start date: 1990     Quit date: 2014     Years since quittin.9    Smokeless tobacco: Never   Substance and Sexual Activity    Alcohol use: Not Currently    Drug use: No    Sexual activity: Not Currently     Partners: Male     Birth control/protection: See Surgical Hx   Social History Narrative    Patient has 4 biological children and 2 step     Social Determinants of Health     Financial Resource Strain: Medium Risk (2021)    Overall Financial Resource Strain (CARDIA)     Difficulty of Paying Living Expenses: Somewhat hard   Food Insecurity: Food Insecurity Present (2021)    Hunger Vital Sign     Worried About Running Out of Food in the Last Year: Sometimes true     Ran Out of Food in the Last Year: Sometimes true   Transportation Needs: No Transportation Needs (2021)    PRAPARE - Transportation     Lack of Transportation (Medical): No     Lack of Transportation (Non-Medical): No   Social Connections: Unknown (2021)    Social Connection and Isolation Panel [NHANES]     Frequency of Communication with Friends and Family: More than three times a week       Family History   Problem Relation Age of Onset    Diabetes Mother     Hyperlipidemia Mother     Breast cancer Mother 53         "lumpectomy, chemo, radiation    Arthritis Mother     Cancer Mother     Hypertension Mother     Diabetes Father     Hyperlipidemia Father     Other Father         non-Hogdkin's lymphoma    Arthritis Father     COPD Father     Hypertension Father     Other Sister         prophylactic BL mastectomy    Other Sister         prophylactic BL mastecomy    Down syndrome Other     Cancer Maternal Grandfather     Bipolar disorder Maternal Grandmother     Breast cancer Maternal Grandmother     Melanoma Maternal Grandmother         on scalp with brain mets    Arthritis Maternal Grandmother     Cancer Maternal Grandmother     Hearing loss Maternal Grandmother     Liver cancer Paternal Grandfather     Ovarian cancer Paternal Grandmother         metastatic    Melanoma Maternal Aunt     Breast cancer Maternal Aunt         DCIS    Bipolar disorder Maternal Aunt     Bipolar disorder Maternal Aunt     Cancer Paternal Aunt         "in lymphatic csystem"    Pancreatic cancer Paternal Uncle        Past Surgical History:   Procedure Laterality Date    ADENOIDECTOMY      BACK SURGERY      SI joint fusion    Breast Reconstruction  Bilateral     BREAST SURGERY      CERVICAL FUSION      CERVICAL FUSION      COLONOSCOPY N/A 10/31/2022    Procedure: COLONOSCOPY;  Surgeon: Lexy Irizarry MD;  Location: [facility];  Service: Endoscopy;  Laterality: N/A;    COSMETIC SURGERY  Several    HYSTERECTOMY      lumbar fusion      MASTECTOMY Right 2013    due to breast cancer, radiation done after surgery    MASTECTOMY Left 2015    propholytic due to breast cancer in right breast    ROBOT-ASSISTED LAPAROSCOPIC ABDOMINAL HYSTERECTOMY USING DA HEBERT XI N/A 12/05/2018    Procedure: XI ROBOTIC HYSTERECTOMY;  Surgeon: Meka Reich MD;  Location: [facility] OR;  Service: OB/GYN;  Laterality: N/A;    ROBOT-ASSISTED LAPAROSCOPIC LYSIS OF ADHESIONS USING DA HEBERT XI N/A 12/05/2018    Procedure: XI ROBOTIC LYSIS, ADHESIONS;  Surgeon: Meka Reich MD;  " Location: [facility];  Service: OB/GYN;  Laterality: N/A;    ROBOT-ASSISTED LAPAROSCOPIC SALPINGO-OOPHORECTOMY USING DA HEBERT XI Bilateral 12/05/2018    Procedure: XI ROBOTIC SALPINGO-OOPHORECTOMY;  Surgeon: Meka Reich MD;  Location: [facility] OR;  Service: OB/GYN;  Laterality: Bilateral;    SI joint fusion   03/26/2018    SPINE SURGERY  Several    TONSILLECTOMY      TUBAL LIGATION         Past Medical History:   Diagnosis Date    Allergy     Anxiety     Arthritis     Asthma     Breast cancer     june 2012; BRCA 1 and 2 negative    Breast cancer genetic susceptibility     Cancer     Depression     Hypothyroidism     Lung disease     Malignant neoplasm of lower-outer quadrant of right breast of female, estrogen receptor positive 10/5/2023    Pneumonia        Review of Systems   Constitutional: Negative.    HENT: Negative.     Eyes: Negative.    Respiratory: Negative.     Cardiovascular: Negative.    Gastrointestinal: Negative.    Endocrine: Negative.    Genitourinary: Negative.    Musculoskeletal: Negative.    Skin: Negative.    Allergic/Immunologic: Negative.    Neurological: Negative.    Hematological: Negative.    Psychiatric/Behavioral: Negative.            Medication List with Changes/Refills   Current Medications    ALBUTEROL (PROVENTIL) 2.5 MG /3 ML (0.083 %) NEBULIZER SOLUTION    Take 3 mLs (2.5 mg total) by nebulization every 4 to 6 hours as needed for Wheezing or Shortness of Breath.    ALBUTEROL (PROVENTIL/VENTOLIN HFA) 90 MCG/ACTUATION INHALER    Inhale 2 puffs into the lungs every 6 (six) hours.    ASCORBATE CALCIUM (VITAMIN C ORAL)    Take by mouth.    BLOOD PRESSURE MONITOR (EAL EASE) ST SIZE    by Other route as needed for High Blood Pressure.    CLONAZEPAM (KLONOPIN) 1 MG TABLET    TAKE 1 TABLET BY MOUTH ONCE DAILY AS NEEDED FOR ANXIETY    CYCLOBENZAPRINE (FLEXERIL) 10 MG TABLET    Take by mouth.    DICLOFENAC (VOLTAREN) 75 MG EC TABLET    Take 75 mg by mouth 2 (two) times daily.    DILTIAZEM  (CARDIZEM) 30 MG TABLET    Take 1 tablet (30 mg total) by mouth every 8 (eight) hours.    FLUTICASONE FUROATE-VILANTEROL (BREO ELLIPTA) 200-25 MCG/DOSE DSDV DISKUS INHALER    Inhale 1 puff into the lungs once daily. Controller    FLUTICASONE PROPIONATE (FLONASE) 50 MCG/ACTUATION NASAL SPRAY    SPRAY 1 SPRAY INTO EACH NOSTRIL TWO TIMES DAILY    HYDROCORTISONE 2.5 % LOTION    Apply topically 2 (two) times daily.    INHALATION SPACING DEVICE (BREATHERITE VALVED MDI CHAMBER)    Use as directed for inhalation.    LEVOTHYROXINE (SYNTHROID) 50 MCG TABLET    Take 1 tablet (50 mcg total) by mouth once daily.    LORATADINE (CLARITIN) 10 MG TABLET    Take 1 tablet (10 mg total) by mouth once daily.    LURASIDONE (LATUDA) 40 MG TAB TABLET    Take 1 tablet (40 mg total) by mouth once daily.    MELOXICAM (MOBIC) 15 MG TABLET    Take 15 mg by mouth.    MONTELUKAST (SINGULAIR) 10 MG TABLET    TAKE 1 TABLET BY MOUTH ONCE DAILY IN THE EVENING    ONDANSETRON (ZOFRAN-ODT) 4 MG TBDL    Take 4 mg by mouth every 6 (six) hours as needed.    PREVIDENT 5000 DRY MOUTH 1.1 % PSTE        TRAZODONE (DESYREL) 50 MG TABLET    Take 1 tablet (50 mg total) by mouth nightly as needed for Insomnia.        Objective:   There were no vitals filed for this visit.    Physical Exam  Pulmonary:      Effort: Pulmonary effort is normal.   Neurological:      Mental Status: She is alert and oriented to person, place, and time.   Psychiatric:         Mood and Affect: Mood normal.         Behavior: Behavior normal.         Thought Content: Thought content normal.         Judgment: Judgment normal.         Assessment:     Problem List Items Addressed This Visit          Pulmonary    Asthma    Relevant Orders    CBC Auto Differential     Other Visit Diagnoses       Liver disease, unspecified    -  Primary    Relevant Orders    CT Abdomen With IV Contrast Routine Oral Contrast    CBC Auto Differential    Comprehensive Metabolic Panel    Thrombocytopenia         Relevant Orders    CBC Auto Differential    Comprehensive Metabolic Panel    Secondary polycythemia        Relevant Orders    CBC Auto Differential    Comprehensive Metabolic Panel    CHLOE (obstructive sleep apnea)        Relevant Orders    CBC Auto Differential            Lab Results   Component Value Date    WBC 5.09 11/14/2023    RBC 5.83 (H) 11/14/2023    HGB 16.9 (H) 11/14/2023    HCT 51.2 (H) 11/14/2023    MCV 88 11/14/2023    MCH 29.0 11/14/2023    MCHC 33.0 11/14/2023    RDW 13.0 11/14/2023     (L) 11/14/2023    MPV 11.4 11/14/2023    GRAN 3.5 11/14/2023    GRAN 69.5 11/14/2023    LYMPH 1.1 11/14/2023    LYMPH 21.2 11/14/2023    MONO 0.3 11/14/2023    MONO 5.9 11/14/2023    EOS 0.1 11/14/2023    BASO 0.05 11/14/2023    EOSINOPHIL 2.2 11/14/2023    BASOPHIL 1.0 11/14/2023      Lab Results   Component Value Date     11/14/2023    K 4.4 11/14/2023     11/14/2023    CO2 28 11/14/2023    BUN 11 11/14/2023    CREATININE 1.1 11/14/2023    CALCIUM 9.7 11/14/2023    ANIONGAP 12 11/14/2023    ESTGFRAFRICA >60 01/09/2019    EGFRNONAA >60 01/09/2019     Lab Results   Component Value Date    ALT 16 10/17/2023    AST 16 10/17/2023    ALKPHOS 81 10/17/2023    BILITOT 0.6 10/17/2023       Plan:   Liver disease, unspecified  -     CT Abdomen With IV Contrast Routine Oral Contrast; Future; Expected date: 12/11/2023  -     CBC Auto Differential; Standing  -     Comprehensive Metabolic Panel; Future; Expected date: 12/11/2023    Thrombocytopenia  -     CBC Auto Differential; Standing  -     Comprehensive Metabolic Panel; Future; Expected date: 12/11/2023    Secondary polycythemia  -     CBC Auto Differential; Standing  -     Comprehensive Metabolic Panel; Future; Expected date: 12/11/2023    Asthma, unspecified asthma severity, unspecified whether complicated, unspecified whether persistent  -     CBC Auto Differential; Standing    CHLOE (obstructive sleep apnea)  -     CBC Auto Differential; Standing      Med  Onc Chart Routing      Follow up with physician    Follow up with BREANNA 3 months. VV - possible phlebotomy   Infusion scheduling note   n/a   Injection scheduling note n/a   Labs   Scheduling:  Preferred lab: [facility]  Lab interval:  monthly cbc x 3 and cmp on the 3rd month prior to VV   Imaging   Ct abdomen with contrast - triple phase study   Pharmacy appointment    Other referrals               Communicate result of CT via portal.     Collaborating Provider:  Dr. Darren Felix    Thank You,  Yamil Stover, FNP-C  Hematology Oncology

## 2024-01-29 ENCOUNTER — PATIENT MESSAGE (OUTPATIENT)
Dept: OBSTETRICS AND GYNECOLOGY | Facility: CLINIC | Age: 54
End: 2024-01-29
Payer: MEDICARE

## 2024-01-29 ENCOUNTER — PATIENT MESSAGE (OUTPATIENT)
Dept: PSYCHIATRY | Facility: CLINIC | Age: 54
End: 2024-01-29
Payer: MEDICARE

## 2024-01-31 ENCOUNTER — OFFICE VISIT (OUTPATIENT)
Dept: PSYCHIATRY | Facility: CLINIC | Age: 54
End: 2024-01-31
Payer: COMMERCIAL

## 2024-01-31 DIAGNOSIS — G47.00 INSOMNIA, UNSPECIFIED TYPE: ICD-10-CM

## 2024-01-31 DIAGNOSIS — F41.9 ANXIETY: ICD-10-CM

## 2024-01-31 DIAGNOSIS — F31.9 BIPOLAR I DISORDER, CURRENT EPISODE DEPRESSED: Primary | ICD-10-CM

## 2024-01-31 PROCEDURE — 99214 OFFICE O/P EST MOD 30 MIN: CPT | Mod: AF,HB,95, | Performed by: PSYCHIATRY & NEUROLOGY

## 2024-01-31 RX ORDER — LAMOTRIGINE 25 MG/1
TABLET ORAL
Qty: 84 TABLET | Refills: 0 | OUTPATIENT
Start: 2024-01-31 | End: 2024-03-07

## 2024-01-31 RX ORDER — ESCITALOPRAM OXALATE 10 MG/1
10 TABLET ORAL DAILY
Qty: 30 TABLET | Refills: 3 | Status: SHIPPED | OUTPATIENT
Start: 2024-01-31 | End: 2024-06-03 | Stop reason: SDUPTHER

## 2024-01-31 RX ORDER — LAMOTRIGINE 100 MG/1
100 TABLET ORAL DAILY
Qty: 30 TABLET | Refills: 1 | OUTPATIENT
Start: 2024-03-13 | End: 2024-03-07
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
## 2024-02-09 ENCOUNTER — LAB VISIT (OUTPATIENT)
Dept: LAB | Facility: HOSPITAL | Age: 54
End: 2024-02-09
Attending: NURSE PRACTITIONER
Payer: MEDICARE

## 2024-02-09 DIAGNOSIS — D69.6 THROMBOCYTOPENIA: ICD-10-CM

## 2024-02-09 DIAGNOSIS — K76.9 LIVER DISEASE, UNSPECIFIED: ICD-10-CM

## 2024-02-09 DIAGNOSIS — D75.1 SECONDARY POLYCYTHEMIA: ICD-10-CM

## 2024-02-09 DIAGNOSIS — J45.909 ASTHMA, UNSPECIFIED ASTHMA SEVERITY, UNSPECIFIED WHETHER COMPLICATED, UNSPECIFIED WHETHER PERSISTENT: ICD-10-CM

## 2024-02-09 DIAGNOSIS — G47.33 OSA (OBSTRUCTIVE SLEEP APNEA): ICD-10-CM

## 2024-02-09 LAB
BASOPHILS # BLD AUTO: 0.03 K/UL (ref 0–0.2)
BASOPHILS NFR BLD: 0.9 % (ref 0–1.9)
DIFFERENTIAL METHOD BLD: ABNORMAL
EOSINOPHIL # BLD AUTO: 0.1 K/UL (ref 0–0.5)
EOSINOPHIL NFR BLD: 3.2 % (ref 0–8)
ERYTHROCYTE [DISTWIDTH] IN BLOOD BY AUTOMATED COUNT: 13.2 % (ref 11.5–14.5)
HCT VFR BLD AUTO: 51.1 % (ref 37–48.5)
HGB BLD-MCNC: 16.6 G/DL (ref 12–16)
IMM GRANULOCYTES # BLD AUTO: 0 K/UL (ref 0–0.04)
IMM GRANULOCYTES NFR BLD AUTO: 0 % (ref 0–0.5)
LYMPHOCYTES # BLD AUTO: 1.1 K/UL (ref 1–4.8)
LYMPHOCYTES NFR BLD: 31.5 % (ref 18–48)
MCH RBC QN AUTO: 29.4 PG (ref 27–31)
MCHC RBC AUTO-ENTMCNC: 32.5 G/DL (ref 32–36)
MCV RBC AUTO: 91 FL (ref 82–98)
MONOCYTES # BLD AUTO: 0.3 K/UL (ref 0.3–1)
MONOCYTES NFR BLD: 7.8 % (ref 4–15)
NEUTROPHILS # BLD AUTO: 2 K/UL (ref 1.8–7.7)
NEUTROPHILS NFR BLD: 56.6 % (ref 38–73)
NRBC BLD-RTO: 0 /100 WBC
PLATELET # BLD AUTO: 67 K/UL (ref 150–450)
PMV BLD AUTO: 11.4 FL (ref 9.2–12.9)
RBC # BLD AUTO: 5.64 M/UL (ref 4–5.4)
WBC # BLD AUTO: 3.46 K/UL (ref 3.9–12.7)

## 2024-02-09 PROCEDURE — 85025 COMPLETE CBC W/AUTO DIFF WBC: CPT | Performed by: NURSE PRACTITIONER

## 2024-02-09 PROCEDURE — 36415 COLL VENOUS BLD VENIPUNCTURE: CPT | Mod: PO | Performed by: NURSE PRACTITIONER

## 2024-02-20 ENCOUNTER — PATIENT MESSAGE (OUTPATIENT)
Dept: HEMATOLOGY/ONCOLOGY | Facility: CLINIC | Age: 54
End: 2024-02-20
Payer: MEDICARE

## 2024-02-20 PROBLEM — D69.6 THROMBOCYTOPENIA: Status: ACTIVE | Noted: 2024-02-20

## 2024-02-26 ENCOUNTER — PATIENT MESSAGE (OUTPATIENT)
Dept: HEMATOLOGY/ONCOLOGY | Facility: CLINIC | Age: 54
End: 2024-02-26
Payer: MEDICARE

## 2024-02-26 ENCOUNTER — HOSPITAL ENCOUNTER (OUTPATIENT)
Dept: RADIOLOGY | Facility: HOSPITAL | Age: 54
Discharge: HOME | End: 2024-02-26
Attending: NURSE PRACTITIONER
Payer: MEDICARE

## 2024-02-26 DIAGNOSIS — K76.9 LIVER DISEASE, UNSPECIFIED: ICD-10-CM

## 2024-02-26 PROCEDURE — 74160 CT ABDOMEN W/CONTRAST: CPT | Mod: 26,,, | Performed by: RADIOLOGY

## 2024-02-26 PROCEDURE — 74160 CT ABDOMEN W/CONTRAST: CPT | Mod: TC

## 2024-02-26 PROCEDURE — 25500020 PHARM REV CODE 255: Performed by: NURSE PRACTITIONER

## 2024-02-26 RX ADMIN — IOHEXOL 100 ML: 350 INJECTION, SOLUTION INTRAVENOUS at 09:02

## 2024-03-04 ENCOUNTER — OFFICE VISIT (OUTPATIENT)
Dept: INTERNAL MEDICINE | Facility: CLINIC | Age: 54
End: 2024-03-04
Payer: MEDICARE

## 2024-03-04 ENCOUNTER — PATIENT MESSAGE (OUTPATIENT)
Dept: PSYCHIATRY | Facility: CLINIC | Age: 54
End: 2024-03-04
Payer: MEDICARE

## 2024-03-04 VITALS
BODY MASS INDEX: 28.47 KG/M2 | HEIGHT: 65 IN | RESPIRATION RATE: 20 BRPM | DIASTOLIC BLOOD PRESSURE: 70 MMHG | HEART RATE: 98 BPM | SYSTOLIC BLOOD PRESSURE: 110 MMHG

## 2024-03-04 DIAGNOSIS — E66.01 SEVERE OBESITY (BMI 35.0-39.9) WITH COMORBIDITY: ICD-10-CM

## 2024-03-04 DIAGNOSIS — D69.6 THROMBOCYTOPENIA: ICD-10-CM

## 2024-03-04 DIAGNOSIS — Z14.8 CARRIER OF HIGH RISK CANCER GENE MUTATION: ICD-10-CM

## 2024-03-04 DIAGNOSIS — I49.3 PVC'S (PREMATURE VENTRICULAR CONTRACTIONS): ICD-10-CM

## 2024-03-04 DIAGNOSIS — K08.89 TOOTH ACHE: ICD-10-CM

## 2024-03-04 DIAGNOSIS — F13.10: ICD-10-CM

## 2024-03-04 DIAGNOSIS — C50.511 MALIGNANT NEOPLASM OF LOWER-OUTER QUADRANT OF RIGHT BREAST OF FEMALE, ESTROGEN RECEPTOR POSITIVE: ICD-10-CM

## 2024-03-04 DIAGNOSIS — J45.20 MILD INTERMITTENT ASTHMA WITHOUT COMPLICATION: ICD-10-CM

## 2024-03-04 DIAGNOSIS — F41.9 ANXIETY: ICD-10-CM

## 2024-03-04 DIAGNOSIS — E03.9 HYPOTHYROIDISM, UNSPECIFIED TYPE: ICD-10-CM

## 2024-03-04 DIAGNOSIS — F31.75 BIPOLAR DISORDER, IN PARTIAL REMISSION, MOST RECENT EPISODE DEPRESSED: ICD-10-CM

## 2024-03-04 DIAGNOSIS — R53.82 CHRONIC FATIGUE: ICD-10-CM

## 2024-03-04 DIAGNOSIS — Z86.010 HISTORY OF COLON POLYPS: ICD-10-CM

## 2024-03-04 DIAGNOSIS — R00.0 TACHYCARDIA: ICD-10-CM

## 2024-03-04 DIAGNOSIS — M25.612 IMPAIRED RANGE OF MOTION OF LEFT SHOULDER: ICD-10-CM

## 2024-03-04 DIAGNOSIS — G47.00 INSOMNIA, UNSPECIFIED TYPE: ICD-10-CM

## 2024-03-04 DIAGNOSIS — R00.2 PALPITATIONS: ICD-10-CM

## 2024-03-04 DIAGNOSIS — Z90.11 H/O TOTAL MASTECTOMY OF RIGHT BREAST: ICD-10-CM

## 2024-03-04 DIAGNOSIS — Z00.00 ENCOUNTER FOR PREVENTATIVE ADULT HEALTH CARE EXAMINATION: Primary | ICD-10-CM

## 2024-03-04 DIAGNOSIS — G47.33 OSA ON CPAP: ICD-10-CM

## 2024-03-04 DIAGNOSIS — I89.0 LYMPHEDEMA: ICD-10-CM

## 2024-03-04 DIAGNOSIS — J30.9 ALLERGIC RHINITIS, UNSPECIFIED SEASONALITY, UNSPECIFIED TRIGGER: ICD-10-CM

## 2024-03-04 DIAGNOSIS — I10 PRIMARY HYPERTENSION: ICD-10-CM

## 2024-03-04 DIAGNOSIS — Z17.0 MALIGNANT NEOPLASM OF LOWER-OUTER QUADRANT OF RIGHT BREAST OF FEMALE, ESTROGEN RECEPTOR POSITIVE: ICD-10-CM

## 2024-03-04 PROBLEM — Z86.0100 HISTORY OF COLON POLYPS: Status: ACTIVE | Noted: 2024-03-04

## 2024-03-04 PROCEDURE — 99999 PR PBB SHADOW E&M-EST. PATIENT-LVL V: CPT | Mod: PBBFAC,,, | Performed by: NURSE PRACTITIONER

## 2024-03-04 PROCEDURE — G0439 PPPS, SUBSEQ VISIT: HCPCS | Mod: S$GLB,,, | Performed by: NURSE PRACTITIONER

## 2024-03-04 NOTE — PATIENT INSTRUCTIONS
Counseling and Referral of Other Preventative  (Italic type indicates deductible and co-insurance are waived)    Patient Name: Daniela Costa  Today's Date: 3/4/2024    Health Maintenance       Date Due Completion Date    COVID-19 Vaccine (1) Never done ---    Shingles Vaccine (1 of 2) Never done ---    Hemoglobin A1c (Prediabetes) 2024    Colorectal Cancer Screening 10/31/2025 10/31/2022    Lipid Panel 2028    Override on 2013: Done ( EVERYWHERE REPORT)    TETANUS VACCINE 2032    Override on 2015: Done        Orders Placed This Encounter   Procedures    Ambulatory referral/consult to Psychology     The following information is provided to all patients.  This information is to help you find resources for any of the problems found today that may be affecting your health:                  Living healthy guide: www.[facility].[place].[facility]      Understanding Diabetes: www.diabetes.org      Eating healthy: www.cdc.gov/healthyweight      CDC home safety checklist: www.cdc.gov/steadi/patient.html      Agency on Aging: www.goea.[place]      Alcoholics anonymous (AA): www.aa.org      Physical Activity: www.[place].nih.gov/et4bvku      Tobacco use: www.quitwithusla.org

## 2024-03-04 NOTE — PROGRESS NOTES
"  Daniela Costa presented for a  Medicare AWV and comprehensive Health Risk Assessment today. The following components were reviewed and updated:    Medical history  Family History  Social history  Allergies and Current Medications  Health Risk Assessment  Health Maintenance  Care Team         ** See Completed Assessments for Annual Wellness Visit within the encounter summary.**         The following assessments were completed:  Living Situation  CAGE  Depression Screening  Timed Get Up and Go  Whisper Test  Cognitive Function Screening  Nutrition Screening  ADL Screening  PAQ Screening      Opioid documentation:      Patient does not have a current opioid prescription.        Vitals:    03/04/24 1117   BP: 110/70   BP Location: Left arm   Patient Position: Sitting   Pulse: 98   Resp: 20   Height:    Pain scale 5' 5" (1.651 m)    1/5 tooth ache     Body mass index is 28.47 kg/m².  Physical Exam  Constitutional:       General: She is not in acute distress.     Appearance: She is not ill-appearing or diaphoretic.   HENT:      Mouth/Throat:      Mouth: Mucous membranes are moist.   Eyes:      General:         Right eye: No discharge.         Left eye: No discharge.      Conjunctiva/sclera: Conjunctivae normal.   Cardiovascular:      Rate and Rhythm: Normal rate and regular rhythm.   Pulmonary:      Effort: Pulmonary effort is normal. No respiratory distress.      Breath sounds: Normal breath sounds.   Skin:     General: Skin is warm and dry.   Neurological:      Mental Status: She is alert and oriented to person, place, and time. Mental status is at baseline.   Psychiatric:         Mood and Affect: Mood normal.         Behavior: Behavior normal.         Thought Content: Thought content normal.         Judgment: Judgment normal.       Diagnoses and health risks identified today and associated recommendations/orders:    1. Encounter for preventative adult health care examination  Review for opioid screening: Patient does " not have Rx for Opioids  Review for substance use disorder: Patient does not use substance per chart    Patient states she rarely drinks alcohol- about  1 drinks every few months      I did not offer to discuss  advanced care planning today       X  Patient is unable to engage in a discussion regarding advanced directives  today    - Ambulatory referral/consult to Psychology; Future- counseling    2. Mild intermittent asthma without complication- Radiation induced asthma, CHLOE on CPAP, Allergic rhinitis, unspecified seasonality, unspecified trigger  Monitor  Follow up with Pulmonology as directed  Continue home albuterol, breo-ellipta, flonase, claritin, singulair    3. Malignant neoplasm of lower-outer quadrant of right breast of female, estrogen receptor positive, H/O total mastectomy of right breast S/p Chemotherapy, Carrier of high risk cancer gene mutation,  Lymphedema  Monitor  Follow up as directed    4. Severe obesity (BMI 35.0-39.9) with comorbidity  Monitor  Stable  Follow up with PCP  Patient has been actively losing weight- on a diet     5. Bipolar disorder, in partial remission, most recent episode depressed, Anxiety, Sedative, hypnotic, or anxiolytic abuse, daily use - klonopin,  Insomnia, unspecified type  Monitor  Stable  Continue home klonopin, lexapro, lamictal, trazadone  - Ambulatory referral/consult to Psychology; Future- Patient states she is interested in counseling    6. Impaired range of motion of left shoulder   Monitor  Continue home flexeril, voltaren gel, mobic    7. Hypothyroidism, unspecified type  Monitor  Follow up with PCP  Continue home synthroid    8. Thrombocytopenia  Monitor  Follow up with PCP, Oncology/Hematology    9. Chronic fatigue  Monitor  Follow up as directed      10. Palpitations, Tachycardia,  PVC's (premature ventricular contractions)  Monitor  Follow up with Cardiology as directed  Continue home cardizem    11. Primary hypertension  Monitor  Stable  Continue home  cardizem    12. History of colon polyps  Monitor  Follow up with GI as directed     13. Tooth ache  Monitor  Patient reports tooth ache and states she has an appointment tomorrow with oral surgeon                             Provided Daniela with a 5-10 year written screening schedule and personal prevention plan. Recommendations were developed using the USPSTF age appropriate recommendations. Education, counseling, and referrals were provided as needed. After Visit Summary printed and given to patient which includes a list of additional screenings\tests needed.    Follow up in about 1 year (around 3/4/2025) for Annual wellness visit.    Justine Rico, ROSALIE

## 2024-03-07 ENCOUNTER — HOSPITAL ENCOUNTER (EMERGENCY)
Facility: HOSPITAL | Age: 54
Discharge: HOME | End: 2024-03-07
Attending: EMERGENCY MEDICINE
Payer: MEDICARE

## 2024-03-07 VITALS
RESPIRATION RATE: 16 BRPM | SYSTOLIC BLOOD PRESSURE: 106 MMHG | HEART RATE: 93 BPM | DIASTOLIC BLOOD PRESSURE: 69 MMHG | BODY MASS INDEX: 27.59 KG/M2 | WEIGHT: 165.81 LBS | OXYGEN SATURATION: 98 % | TEMPERATURE: 99 F

## 2024-03-07 DIAGNOSIS — B00.1 HERPES LABIALIS: ICD-10-CM

## 2024-03-07 DIAGNOSIS — K12.1 STOMATITIS: Primary | ICD-10-CM

## 2024-03-07 DIAGNOSIS — T50.905A ADVERSE EFFECT OF DRUG, INITIAL ENCOUNTER: ICD-10-CM

## 2024-03-07 LAB
ALBUMIN SERPL BCP-MCNC: 4.3 G/DL (ref 3.5–5.2)
ALP SERPL-CCNC: 78 U/L (ref 55–135)
ALT SERPL W/O P-5'-P-CCNC: 18 U/L (ref 10–44)
ANION GAP SERPL CALC-SCNC: 14 MMOL/L (ref 8–16)
AST SERPL-CCNC: 20 U/L (ref 10–40)
BASOPHILS # BLD AUTO: 0.03 K/UL (ref 0–0.2)
BASOPHILS NFR BLD: 0.5 % (ref 0–1.9)
BILIRUB SERPL-MCNC: 0.6 MG/DL (ref 0.1–1)
BUN SERPL-MCNC: 8 MG/DL (ref 6–20)
CALCIUM SERPL-MCNC: 9.3 MG/DL (ref 8.7–10.5)
CHLORIDE SERPL-SCNC: 99 MMOL/L (ref 95–110)
CO2 SERPL-SCNC: 23 MMOL/L (ref 23–29)
CREAT SERPL-MCNC: 0.8 MG/DL (ref 0.5–1.4)
DIFFERENTIAL METHOD BLD: ABNORMAL
EOSINOPHIL # BLD AUTO: 0 K/UL (ref 0–0.5)
EOSINOPHIL NFR BLD: 0.2 % (ref 0–8)
ERYTHROCYTE [DISTWIDTH] IN BLOOD BY AUTOMATED COUNT: 13.2 % (ref 11.5–14.5)
EST. GFR  (NO RACE VARIABLE): >60 ML/MIN/1.73 M^2
GLUCOSE SERPL-MCNC: 90 MG/DL (ref 70–110)
HCT VFR BLD AUTO: 47.5 % (ref 37–48.5)
HGB BLD-MCNC: 15.9 G/DL (ref 12–16)
IMM GRANULOCYTES # BLD AUTO: 0.01 K/UL (ref 0–0.04)
IMM GRANULOCYTES NFR BLD AUTO: 0.2 % (ref 0–0.5)
LYMPHOCYTES # BLD AUTO: 0.5 K/UL (ref 1–4.8)
LYMPHOCYTES NFR BLD: 8.7 % (ref 18–48)
MCH RBC QN AUTO: 29.2 PG (ref 27–31)
MCHC RBC AUTO-ENTMCNC: 33.5 G/DL (ref 32–36)
MCV RBC AUTO: 87 FL (ref 82–98)
MONOCYTES # BLD AUTO: 0.4 K/UL (ref 0.3–1)
MONOCYTES NFR BLD: 6.2 % (ref 4–15)
NEUTROPHILS # BLD AUTO: 5 K/UL (ref 1.8–7.7)
NEUTROPHILS NFR BLD: 84.2 % (ref 38–73)
NRBC BLD-RTO: 0 /100 WBC
PLATELET # BLD AUTO: 139 K/UL (ref 150–450)
PMV BLD AUTO: 10.1 FL (ref 9.2–12.9)
POTASSIUM SERPL-SCNC: 4.4 MMOL/L (ref 3.5–5.1)
PROT SERPL-MCNC: 7.7 G/DL (ref 6–8.4)
RBC # BLD AUTO: 5.44 M/UL (ref 4–5.4)
SODIUM SERPL-SCNC: 136 MMOL/L (ref 136–145)
WBC # BLD AUTO: 5.98 K/UL (ref 3.9–12.7)

## 2024-03-07 PROCEDURE — 25000003 PHARM REV CODE 250: Performed by: NURSE PRACTITIONER

## 2024-03-07 PROCEDURE — 85025 COMPLETE CBC W/AUTO DIFF WBC: CPT | Performed by: NURSE PRACTITIONER

## 2024-03-07 PROCEDURE — 96360 HYDRATION IV INFUSION INIT: CPT

## 2024-03-07 PROCEDURE — 80053 COMPREHEN METABOLIC PANEL: CPT | Performed by: NURSE PRACTITIONER

## 2024-03-07 PROCEDURE — 99284 EMERGENCY DEPT VISIT MOD MDM: CPT | Mod: 25

## 2024-03-07 RX ORDER — HYDROCODONE BITARTRATE AND ACETAMINOPHEN 5; 325 MG/1; MG/1
1 TABLET ORAL EVERY 6 HOURS PRN
Qty: 12 TABLET | Refills: 0 | Status: SHIPPED | OUTPATIENT
Start: 2024-03-07

## 2024-03-07 RX ORDER — ACYCLOVIR 800 MG/1
800 TABLET ORAL
Qty: 25 TABLET | Refills: 0 | Status: SHIPPED | OUTPATIENT
Start: 2024-03-07 | End: 2024-03-12

## 2024-03-07 RX ORDER — ARIPIPRAZOLE 15 MG/1
TABLET ORAL
Qty: 45 TABLET | Refills: 1 | Status: SHIPPED | OUTPATIENT
Start: 2024-03-07

## 2024-03-07 RX ADMIN — SODIUM CHLORIDE 1000 ML: 9 INJECTION, SOLUTION INTRAVENOUS at 01:03

## 2024-03-07 NOTE — FIRST PROVIDER EVALUATION
Medical screening examination initiated.  I have conducted a focused provider triage encounter, findings are as follows:    Brief history of present illness:  Patient believes she has having a reaction to Lamictal    Vitals:    03/07/24 1208   BP: 118/75   BP Location: Right arm   Patient Position: Sitting   Pulse: (!) 130   Resp: 16   Temp: 99.3 °F (37.4 °C)   TempSrc: Oral   SpO2: 97%   Weight: 75.2 kg (165 lb 12.6 oz)       Pertinent physical exam:  Tachycardic    Brief workup plan:  Labs, further eval    Preliminary workup initiated; this workup will be continued and followed by the physician or advanced practice provider that is assigned to the patient when roomed.

## 2024-03-07 NOTE — ED PROVIDER NOTES
SCRIBE #1 NOTE: I, Dewayne Jordan, am scribing for, and in the presence of, Flo Cruz MD. I have scribed the entire note.       History     Chief Complaint   Patient presents with    Medication Reaction     Pt states she believes she is having a reaction to a medication she takes.      Review of patient's allergies indicates:   Allergen Reactions    Cefdinir      Radiation Recall, everywhere she had radiation it looked like blisters and very hot to touch    Levofloxacin      Went into deep depression. Messed with [employer] meds    Sodium benzoate Hives    Strawberry Hives         History of Present Illness     HPI    3/7/2024, 1:39 PM  History obtained from the patient      History of Present Illness: Daniela Costa is a 54 y.o. female patient with a PMHx of anxiety, breast CA, bipolar disorder, and PNA who presents to the Emergency Department for evaluation of skin sores which onset 2 days ago. Pt states that she is having sores develop around her lips, on her chin, and inside her mouth. She recently increased her Lamictal dose from 50 mg to 75 mg. Symptoms are constant and moderate in severity. No mitigating or exacerbating factors reported. Associated sxs include sore throat and mouth swelling. Patient denies any CP, SOB, HA, N/V, and all other sxs at this time. No prior Tx. She has not seen her PCP and is a former smoker. She is concerned about [employer]. No further complaints or concerns at this time.       Arrival mode: Personal vehicle      PCP: Deb Gil MD        Past Medical History:  Past Medical History:   Diagnosis Date    Allergy     Anxiety     Arthritis     Asthma     Breast cancer     june 2012; BRCA 1 and 2 negative    Breast cancer genetic susceptibility     Cancer     Depression     Hypothyroidism     Lung disease     Malignant neoplasm of lower-outer quadrant of right breast of female, estrogen receptor positive 10/5/2023    Pneumonia        Past Surgical  History:  Past Surgical History:   Procedure Laterality Date    ADENOIDECTOMY      BACK SURGERY      SI joint fusion    Breast Reconstruction  Bilateral     BREAST SURGERY      CERVICAL FUSION      CERVICAL FUSION      COLONOSCOPY N/A 10/31/2022    Procedure: COLONOSCOPY;  Surgeon: Lexy Irizarry MD;  Location: [facility] ENDO;  Service: Endoscopy;  Laterality: N/A;    COSMETIC SURGERY  Several    HYSTERECTOMY      lumbar fusion      MASTECTOMY Right 2013    due to breast cancer, radiation done after surgery    MASTECTOMY Left 2015    propholytic due to breast cancer in right breast    ROBOT-ASSISTED LAPAROSCOPIC ABDOMINAL HYSTERECTOMY USING DA HEBERT XI N/A 12/05/2018    Procedure: XI ROBOTIC HYSTERECTOMY;  Surgeon: Meka Reich MD;  Location: [facility] OR;  Service: OB/GYN;  Laterality: N/A;    ROBOT-ASSISTED LAPAROSCOPIC LYSIS OF ADHESIONS USING DA HEBERT XI N/A 12/05/2018    Procedure: XI ROBOTIC LYSIS, ADHESIONS;  Surgeon: Meka Reich MD;  Location: [facility] OR;  Service: OB/GYN;  Laterality: N/A;    ROBOT-ASSISTED LAPAROSCOPIC SALPINGO-OOPHORECTOMY USING DA HEBERT XI Bilateral 12/05/2018    Procedure: XI ROBOTIC SALPINGO-OOPHORECTOMY;  Surgeon: Meka Reich MD;  Location: [facility] OR;  Service: OB/GYN;  Laterality: Bilateral;    SI joint fusion   03/26/2018    SPINE SURGERY  Several    TONSILLECTOMY      TUBAL LIGATION           Family History:  Family History   Problem Relation Age of Onset    Diabetes Mother     Hyperlipidemia Mother     Breast cancer Mother 53        lumpectomy, chemo, radiation    Arthritis Mother     Cancer Mother     Hypertension Mother     Diabetes Father     Hyperlipidemia Father     Other Father         non-Hogdkin's lymphoma    Arthritis Father     COPD Father     Hypertension Father     Other Sister         prophylactic BL mastectomy    Other Sister         prophylactic BL mastecomy    Down syndrome Other     Cancer Maternal Grandfather     Bipolar disorder Maternal Grandmother      "Breast cancer Maternal Grandmother     Melanoma Maternal Grandmother         on scalp with brain mets    Arthritis Maternal Grandmother     Cancer Maternal Grandmother     Hearing loss Maternal Grandmother     Liver cancer Paternal Grandfather     Ovarian cancer Paternal Grandmother         metastatic    Melanoma Maternal Aunt     Breast cancer Maternal Aunt         DCIS    Bipolar disorder Maternal Aunt     Bipolar disorder Maternal Aunt     Cancer Paternal Aunt         "in lymphatic csystem"    Pancreatic cancer Paternal Uncle        Social History:  Social History     Tobacco Use    Smoking status: Former     Current packs/day: 0.00     Average packs/day: 0.5 packs/day for 23.2 years (11.6 ttl pk-yrs)     Types: Cigarettes     Start date: 11/1/1990     Quit date: 1/1/2014     Years since quitting: 10.1    Smokeless tobacco: Never   Substance and Sexual Activity    Alcohol use: Not Currently    Drug use: No    Sexual activity: Not Currently     Partners: Male     Birth control/protection: See Surgical Hx        Review of Systems     Review of Systems   Constitutional:  Negative for fever.   HENT:  Positive for mouth sores and sore throat.         (+) Lip sores  (+) Mouth swelling   Respiratory:  Negative for shortness of breath.    Cardiovascular:  Negative for chest pain.   Gastrointestinal:  Negative for nausea and vomiting.   Genitourinary:  Negative for dysuria.   Musculoskeletal:  Negative for back pain.   Skin:  Negative for rash.        (+) Sores (chin)   Neurological:  Negative for weakness and headaches.   Hematological:  Does not bruise/bleed easily.   All other systems reviewed and are negative.     Physical Exam     Initial Vitals [03/07/24 1208]   BP Pulse Resp Temp SpO2   118/75 (!) 130 16 99.3 °F (37.4 °C) 97 %      MAP       --          Physical Exam  Nursing Notes and Vital Signs Reviewed.  Constitutional: Patient is in no acute distress. Well-developed and well-nourished.  Head: Atraumatic. " Normocephalic.  Eyes: PERRL. EOM intact. Conjunctivae are not pale. No scleral icterus.  ENT: Mucous membranes are moist. Erythematous oropharynx  with multiple ulcerations. No bullous/blistering lesions or signs of sloughing.   Neck: Supple. Full ROM. No lymphadenopathy.  Cardiovascular: Regular rate. Regular rhythm. No murmurs, rubs, or gallops. Distal pulses are 2+ and symmetric.  Pulmonary/Chest: No respiratory distress. Clear to auscultation bilaterally. No wheezing or rales.  Abdominal: Soft and non-distended.  There is no tenderness.  No rebound, guarding, or rigidity. Good bowel sounds.  Genitourinary: No CVA tenderness  Musculoskeletal: Moves all extremities. No obvious deformities. No edema. No calf tenderness.  Skin: Ulcerations on chin and lips. No bullous/blistering lesions. No signs of sloughing.  Neurological:  Alert, awake, and appropriate.  Normal speech.  No acute focal neurological deficits are appreciated.  Psychiatric: Normal affect. Good eye contact. Appropriate in content.             ED Course   Procedures  ED Vital Signs:  Vitals:    03/07/24 1208 03/07/24 1337 03/07/24 1338 03/07/24 1402   BP: 118/75  113/60 113/61   Pulse: (!) 130 103 104 88   Resp: 16  16 16   Temp: 99.3 °F (37.4 °C)      TempSrc: Oral      SpO2: 97% 99% 99% 98%   Weight: 75.2 kg (165 lb 12.6 oz)       03/07/24 1517   BP: 106/69   Pulse: 93   Resp: 16   Temp:    TempSrc:    SpO2: 98%   Weight:        Abnormal Lab Results:  Labs Reviewed   CBC W/ AUTO DIFFERENTIAL - Abnormal; Notable for the following components:       Result Value    RBC 5.44 (*)     Platelets 139 (*)     Lymph # 0.5 (*)     Gran % 84.2 (*)     Lymph % 8.7 (*)     All other components within normal limits   COMPREHENSIVE METABOLIC PANEL        All Lab Results:  Results for orders placed or performed during the hospital encounter of 03/07/24   CBC auto differential   Result Value Ref Range    WBC 5.98 3.90 - 12.70 K/uL    RBC 5.44 (H) 4.00 - 5.40 M/uL     Hemoglobin 15.9 12.0 - 16.0 g/dL    Hematocrit 47.5 37.0 - 48.5 %    MCV 87 82 - 98 fL    MCH 29.2 27.0 - 31.0 pg    MCHC 33.5 32.0 - 36.0 g/dL    RDW 13.2 11.5 - 14.5 %    Platelets 139 (L) 150 - 450 K/uL    MPV 10.1 9.2 - 12.9 fL    Immature Granulocytes 0.2 0.0 - 0.5 %    Gran # (ANC) 5.0 1.8 - 7.7 K/uL    Immature Grans (Abs) 0.01 0.00 - 0.04 K/uL    Lymph # 0.5 (L) 1.0 - 4.8 K/uL    Mono # 0.4 0.3 - 1.0 K/uL    Eos # 0.0 0.0 - 0.5 K/uL    Baso # 0.03 0.00 - 0.20 K/uL    nRBC 0 0 /100 WBC    Gran % 84.2 (H) 38.0 - 73.0 %    Lymph % 8.7 (L) 18.0 - 48.0 %    Mono % 6.2 4.0 - 15.0 %    Eosinophil % 0.2 0.0 - 8.0 %    Basophil % 0.5 0.0 - 1.9 %    Differential Method Automated    Comprehensive metabolic panel   Result Value Ref Range    Sodium 136 136 - 145 mmol/L    Potassium 4.4 3.5 - 5.1 mmol/L    Chloride 99 95 - 110 mmol/L    CO2 23 23 - 29 mmol/L    Glucose 90 70 - 110 mg/dL    BUN 8 6 - 20 mg/dL    Creatinine 0.8 0.5 - 1.4 mg/dL    Calcium 9.3 8.7 - 10.5 mg/dL    Total Protein 7.7 6.0 - 8.4 g/dL    Albumin 4.3 3.5 - 5.2 g/dL    Total Bilirubin 0.6 0.1 - 1.0 mg/dL    Alkaline Phosphatase 78 55 - 135 U/L    AST 20 10 - 40 U/L    ALT 18 10 - 44 U/L    eGFR >60 >60 mL/min/1.73 m^2    Anion Gap 14 8 - 16 mmol/L        Imaging Results:  Imaging Results    None              The Emergency Provider reviewed the vital signs and test results, which are outlined above.     ED Discussion       3:03 PM: Reassessed pt at this time. Advised pt to stop taking Lamictal. Discussed with pt all pertinent ED information and results. Discussed pt dx and plan of tx. Gave pt all f/u and return to the ED instructions. All questions and concerns were addressed at this time. Pt expresses understanding of information and instructions, and is comfortable with plan to discharge. Pt is stable for discharge.    I discussed with patient and/or family/caretaker that evaluation in the ED does not suggest any emergent or life threatening medical  conditions requiring immediate intervention beyond what was provided in the ED, and I believe patient is safe for discharge.  Regardless, an unremarkable evaluation in the ED does not preclude the development or presence of a serious of life threatening condition. As such, patient was instructed to return immediately for any worsening or change in current symptoms.         Medical Decision Making  Problems Addressed:  Adverse effect of drug, initial encounter: acute illness or injury that poses a threat to life or bodily functions  Herpes labialis: undiagnosed new problem with uncertain prognosis  Stomatitis: acute illness or injury that poses a threat to life or bodily functions    Amount and/or Complexity of Data Reviewed  Independent Historian: caregiver     Details: Friend states that the patient's rash has been somewhat stable for the last couple of days  Labs: ordered. Decision-making details documented in ED Course.  Discussion of management or test interpretation with external provider(s): 2:02 PM: Discussed pt's case with Dr. Yuen (Hospital Medicine) who agrees with tx of plan stopping Lamictal, antiviral, and f/u with psychiatry and PCP.    5:10 PM: Discussed pt's case with Dr. Brito (Psychiatry) who spoke to pt and will adjust her medication to abilify.          Risk  Prescription drug management.  Decision regarding hospitalization.  Risk Details: Differential diagnosis includes stomatitis, herpes labialis, Nair Wilfrid syndrome, allergic reaction, TEN, abscess, etc.    Dictation #1  MRN:[id number]  [facility]:[id number]            ED Medication(s):  Medications   sodium chloride 0.9% bolus 1,000 mL 1,000 mL (0 mLs Intravenous Stopped 3/7/24 1415)       Discharge Medication List as of 3/7/2024  3:24 PM        START taking these medications    Details   (Magic mouthwash) 1:1:1 diphenhydrAMINE(Benadryl) 12.5mg/5ml liq, aluminum & magnesium hydroxide-simethicone (Maalox), LIDOcaine viscous 2% Swish and  spit 15 mLs every 4 (four) hours as needed (oral discomfort)., Starting Thu 3/7/2024, Until Thu 3/14/2024 at 2359, Normal      acyclovir (ZOVIRAX) 800 MG Tab Take 1 tablet (800 mg total) by mouth 5 (five) times daily. for 5 days, Starting Thu 3/7/2024, Until Tue 3/12/2024, Normal      HYDROcodone-acetaminophen (NORCO) 5-325 mg per tablet Take 1 tablet by mouth every 6 (six) hours as needed., Starting Thu 3/7/2024, Normal              Follow-up Information       Deb Gil MD. Schedule an appointment as soon as possible for a visit in 3 days.    Specialty: Family Medicine  Contact information:  [address] [place] [address] [phone]               Tom Brito MD In 1 week.    Specialty: Psychiatry  Contact information:  [address]  [place] [address] [phone]                                 Scribe Attestation:   Scribe #1: I performed the above scribed service and the documentation accurately describes the services I performed. I attest to the accuracy of the note.     Attending:   Physician Attestation Statement for Scribe #1: I, Flo Cruz MD, personally performed the services described in this documentation, as scribed by Dewayne Jordan, in my presence, and it is both accurate and complete.           Clinical Impression       ICD-10-CM ICD-9-CM   1. Stomatitis  K12.1 528.00   2. Adverse effect of drug, initial encounter  T50.905A E947.9   3. Herpes labialis  B00.1 054.9       Disposition:   Disposition: Discharged  Condition: Stable    ss     Flo Cruz MD  03/08/24 2034

## 2024-03-07 NOTE — ED NOTES
Bed: 13  Expected date:   Expected time:   Means of arrival: Personal Transportation  Comments:     Carlos Aranda, NP  03/07/24 3492

## 2024-03-08 ENCOUNTER — TELEPHONE (OUTPATIENT)
Dept: OBSTETRICS AND GYNECOLOGY | Facility: CLINIC | Age: 54
End: 2024-03-08

## 2024-03-08 ENCOUNTER — OFFICE VISIT (OUTPATIENT)
Dept: OBSTETRICS AND GYNECOLOGY | Facility: CLINIC | Age: 54
End: 2024-03-08
Payer: MEDICARE

## 2024-03-08 VITALS — WEIGHT: 147.69 LBS | BODY MASS INDEX: 24.58 KG/M2

## 2024-03-08 DIAGNOSIS — K12.1 STOMATITIS: ICD-10-CM

## 2024-03-08 DIAGNOSIS — N76.5 ULCER OF VAGINA: Primary | ICD-10-CM

## 2024-03-08 PROCEDURE — 87529 HSV DNA AMP PROBE: CPT | Mod: 59 | Performed by: NURSE PRACTITIONER

## 2024-03-08 PROCEDURE — 99213 OFFICE O/P EST LOW 20 MIN: CPT | Mod: S$GLB,,, | Performed by: NURSE PRACTITIONER

## 2024-03-08 PROCEDURE — 99999 PR PBB SHADOW E&M-EST. PATIENT-LVL III: CPT | Mod: PBBFAC,,, | Performed by: NURSE PRACTITIONER

## 2024-03-08 RX ORDER — LIDOCAINE HYDROCHLORIDE 20 MG/ML
SOLUTION OROPHARYNGEAL
Qty: 100 ML | Refills: 0 | Status: SHIPPED | OUTPATIENT
Start: 2024-03-08 | End: 2024-03-08 | Stop reason: SDUPTHER

## 2024-03-08 RX ORDER — LIDOCAINE HYDROCHLORIDE 20 MG/ML
JELLY TOPICAL
Qty: 30 ML | Refills: 2 | Status: SHIPPED | OUTPATIENT
Start: 2024-03-08

## 2024-03-08 RX ORDER — ACYCLOVIR 50 MG/G
OINTMENT TOPICAL
Qty: 30 G | Refills: 2 | Status: SHIPPED | OUTPATIENT
Start: 2024-03-08 | End: 2025-03-08

## 2024-03-08 RX ORDER — LIDOCAINE HYDROCHLORIDE 20 MG/ML
SOLUTION OROPHARYNGEAL
Qty: 100 ML | Refills: 0 | Status: SHIPPED | OUTPATIENT
Start: 2024-03-08

## 2024-03-08 NOTE — PROGRESS NOTES
Daniela Costa is a 54 y.o. female  presents for ulcerations to vaginal area.  Was in ER last night due to ulcerations of mouth and lips - pt was started on acyclovir and magic mouth wash - could not get it filled as [facility] would not compound it for her. Now has ulcerations to vaginal area that are painful  ER MD felt could be from her lamictal and she was changed over to abilify as of today by psych.     LMP: No LMP recorded (lmp unknown). Patient has had a hysterectomy..  .      Past Medical History:   Diagnosis Date    Allergy     Anxiety     Arthritis     Asthma     Breast cancer     2012; BRCA 1 and 2 negative    Breast cancer genetic susceptibility     Cancer     Depression     Hypothyroidism     Lung disease     Malignant neoplasm of lower-outer quadrant of right breast of female, estrogen receptor positive 10/5/2023    Pneumonia      Past Surgical History:   Procedure Laterality Date    ADENOIDECTOMY      BACK SURGERY      SI joint fusion    Breast Reconstruction  Bilateral     BREAST SURGERY      CERVICAL FUSION      CERVICAL FUSION      COLONOSCOPY N/A 10/31/2022    Procedure: COLONOSCOPY;  Surgeon: Lexy Irizarry MD;  Location: [facility];  Service: Endoscopy;  Laterality: N/A;    COSMETIC SURGERY  Several    HYSTERECTOMY      lumbar fusion      MASTECTOMY Right     due to breast cancer, radiation done after surgery    MASTECTOMY Left     propholytic due to breast cancer in right breast    ROBOT-ASSISTED LAPAROSCOPIC ABDOMINAL HYSTERECTOMY USING DA HEBERT XI N/A 2018    Procedure: XI ROBOTIC HYSTERECTOMY;  Surgeon: Meka Reich MD;  Location: [facility] OR;  Service: OB/GYN;  Laterality: N/A;    ROBOT-ASSISTED LAPAROSCOPIC LYSIS OF ADHESIONS USING DA HEBERT XI N/A 2018    Procedure: XI ROBOTIC LYSIS, ADHESIONS;  Surgeon: Meka Reich MD;  Location: [facility] OR;  Service: OB/GYN;  Laterality: N/A;    ROBOT-ASSISTED LAPAROSCOPIC SALPINGO-OOPHORECTOMY USING DA HEBERT XI  Bilateral 12/05/2018    Procedure: XI ROBOTIC SALPINGO-OOPHORECTOMY;  Surgeon: Meka Reich MD;  Location: [facility];  Service: OB/GYN;  Laterality: Bilateral;    SI joint fusion   03/26/2018    SPINE SURGERY  Several    TONSILLECTOMY      TUBAL LIGATION       Social History     Socioeconomic History    Marital status:     Number of children: 6   Occupational History    Occupation: stay home mom    Tobacco Use    Smoking status: Former     Current packs/day: 0.00     Average packs/day: 0.5 packs/day for 23.2 years (11.6 ttl pk-yrs)     Types: Cigarettes     Start date: 11/1/1990     Quit date: 1/1/2014     Years since quitting: 10.1    Smokeless tobacco: Never   Substance and Sexual Activity    Alcohol use: Not Currently    Drug use: No    Sexual activity: Not Currently     Partners: Male     Birth control/protection: See Surgical Hx   Social History Narrative    Patient has 4 biological children and 2 step     Social Determinants of Health     Financial Resource Strain: Medium Risk (3/4/2024)    Overall Financial Resource Strain (CARDIA)     Difficulty of Paying Living Expenses: Somewhat hard   Food Insecurity: Food Insecurity Present (3/4/2024)    Hunger Vital Sign     Worried About Running Out of Food in the Last Year: Sometimes true     Ran Out of Food in the Last Year: Never true   Transportation Needs: No Transportation Needs (3/4/2024)    PRAPARE - Transportation     Lack of Transportation (Medical): No     Lack of Transportation (Non-Medical): No   Physical Activity: Insufficiently Active (3/4/2024)    Exercise Vital Sign     Days of Exercise per Week: 3 days     Minutes of Exercise per Session: 30 min   Stress: Stress Concern Present (3/4/2024)    [employer] - Occupational Stress Questionnaire     Feeling of Stress : To some extent   Social Connections: Socially Isolated (3/4/2024)    Social Connection and Isolation Panel [NHANES]     Frequency of Communication with  "Friends and Family: More than three times a week     Frequency of Social Gatherings with Friends and Family: Three times a week     Attends Hinduism Services: Never     Active Member of Clubs or Organizations: No     Attends Club or Organization Meetings: Never     Marital Status:    Housing Stability: Low Risk  (3/4/2024)    Housing Stability Vital Sign     Unable to Pay for Housing in the Last Year: No     Number of Places Lived in the Last Year: 1     Unstable Housing in the Last Year: No     Family History   Problem Relation Age of Onset    Diabetes Mother     Hyperlipidemia Mother     Breast cancer Mother 53        lumpectomy, chemo, radiation    Arthritis Mother     Cancer Mother     Hypertension Mother     Diabetes Father     Hyperlipidemia Father     Other Father         non-Hogdkin's lymphoma    Arthritis Father     COPD Father     Hypertension Father     Other Sister         prophylactic BL mastectomy    Other Sister         prophylactic BL mastecomy    Down syndrome Other     Cancer Maternal Grandfather     Bipolar disorder Maternal Grandmother     Breast cancer Maternal Grandmother     Melanoma Maternal Grandmother         on scalp with brain mets    Arthritis Maternal Grandmother     Cancer Maternal Grandmother     Hearing loss Maternal Grandmother     Liver cancer Paternal Grandfather     Ovarian cancer Paternal Grandmother         metastatic    Melanoma Maternal Aunt     Breast cancer Maternal Aunt         DCIS    Bipolar disorder Maternal Aunt     Bipolar disorder Maternal Aunt     Cancer Paternal Aunt         "in lymphatic csystem"    Pancreatic cancer Paternal Uncle      OB History          7    Para   4    Term   4            AB   3    Living   4         SAB        IAB        Ectopic        Multiple        Live Births                     Wt 67 kg (147 lb 11.3 oz)   LMP  (LMP Unknown) Comment: no cycle 2012  BMI 24.58 kg/m²       ROS:  Per hpi    PHYSICAL " EXAM:  APPEARANCE: Well nourished, well developed, in no acute distress. - severe ulcerations to lips and mouth  - seen for in ER last night  AFFECT: WNL, alert and oriented x 3  S  PELVIC: large clusters of scattered ulcerated lesion to labia, vaginal area at introitus and perineal area  HSV culture taken   Physical Exam    1. Ulcer of vagina  HSV by Rapid PCR, Non-Blood [facility]; Vagina    LIDOcaine HCL 2% (XYLOCAINE) 2 % jelly    acyclovir 5% (ZOVIRAX) 5 % ointment      2. Stomatitis  LIDOcaine viscous HCl 2% (LIDOCAINE VISCOUS) 2 % Soln       AND PLAN:      Daniela was seen today for vaginal discharge.    Diagnoses and all orders for this visit:    Ulcer of vagina  -     HSV by Rapid PCR, Non-Blood [facility]; Vagina  -     LIDOcaine HCL 2% (XYLOCAINE) 2 % jelly; Apply topically as needed (vaginal discomfort).  -     acyclovir 5% (ZOVIRAX) 5 % ointment; Apply thin layer to affected area    Stomatitis  -     LIDOcaine viscous HCl 2% (LIDOCAINE VISCOUS) 2 % Soln; by Mucous Membrane route every 3 (three) hours.     Pt on antiviral   Medication was changed by psych  Zovirax ointment   Lidocaine jelly topically to vaginal area if needed  Viscous lidocaine so pt can make the magic mouth wash herself with benadryl and Maalox   Patient was counseled today on A.C.S. Pap guidelines and recommendations for yearly pelvic exams, mammograms and monthly self breast exams; to see her PCP for other health maintenance.                      Answers submitted by the patient for this visit:  Vaginal Discharge Questionnaire  (Submitted on 3/8/2024)  Chief Complaint: Vaginal discharge  Chronicity: new  Onset: yesterday  Frequency: constantly  Progression since onset: gradually worsening  Pain severity: moderate  Affected side: both  Pregnant now?: No  abdominal pain: No  anorexia: No  back pain: No  chills: No  constipation: No  diarrhea: No  discolored urine: No  dysuria: Yes  fever: No  frequency: No  headaches: No  hematuria:  No  nausea: No  painful intercourse: Yes  rash: Yes  urgency: No  vomiting: No  Please select the characteristics of your discharge: : brown  Vaginal bleeding: spotting  Passing clots?: No  Passing tissue?: No  Aggravated by: urinating  treatments tried: NSAIDs  Improvement on treatment: no relief  Sexual activity: sexually active  Partner with STD symptoms: no  Birth control: nothing  Menstrual history: postmenopausal  STD: No  abdominal surgery: No   section: No  Ectopic pregnancy: No  Endometriosis: No  herpes simplex: Yes  gynecological surgery: Yes  menorrhagia: No  metrorrhagia: No  miscarriage: Yes  ovarian cysts: No  perineal abscess: No  PID: No  terminated pregnancy: No  vaginosis: No

## 2024-03-08 NOTE — TELEPHONE ENCOUNTER
----- Message from Queenie Blevins sent at 3/8/2024  3:25 PM CST -----  Contact: [facility]  Type:  Pharmacy Calling to Clarify an RX    Name of Caller:Jesica   [facility]   Prescription Name: LIDOcaine viscous HCl 2% (LIDOCAINE VISCOUS) 2 % Soln  What do they need to clarify?:Directions   Best Call Back Number:[phone]  Additional Information:

## 2024-03-12 ENCOUNTER — LAB VISIT (OUTPATIENT)
Dept: LAB | Facility: HOSPITAL | Age: 54
End: 2024-03-12
Attending: NURSE PRACTITIONER
Payer: MEDICARE

## 2024-03-12 DIAGNOSIS — D69.6 THROMBOCYTOPENIA: ICD-10-CM

## 2024-03-12 DIAGNOSIS — G47.33 OSA (OBSTRUCTIVE SLEEP APNEA): ICD-10-CM

## 2024-03-12 DIAGNOSIS — D75.1 SECONDARY POLYCYTHEMIA: ICD-10-CM

## 2024-03-12 DIAGNOSIS — J45.909 ASTHMA, UNSPECIFIED ASTHMA SEVERITY, UNSPECIFIED WHETHER COMPLICATED, UNSPECIFIED WHETHER PERSISTENT: ICD-10-CM

## 2024-03-12 DIAGNOSIS — K76.9 LIVER DISEASE, UNSPECIFIED: ICD-10-CM

## 2024-03-12 LAB
ALBUMIN SERPL BCP-MCNC: 4.1 G/DL (ref 3.5–5.2)
ALP SERPL-CCNC: 78 U/L (ref 55–135)
ALT SERPL W/O P-5'-P-CCNC: 15 U/L (ref 10–44)
ANION GAP SERPL CALC-SCNC: 14 MMOL/L (ref 8–16)
AST SERPL-CCNC: 20 U/L (ref 10–40)
BASOPHILS # BLD AUTO: 0.03 K/UL (ref 0–0.2)
BASOPHILS NFR BLD: 0.8 % (ref 0–1.9)
BILIRUB SERPL-MCNC: 0.6 MG/DL (ref 0.1–1)
BUN SERPL-MCNC: 10 MG/DL (ref 6–20)
CALCIUM SERPL-MCNC: 9.9 MG/DL (ref 8.7–10.5)
CHLORIDE SERPL-SCNC: 101 MMOL/L (ref 95–110)
CO2 SERPL-SCNC: 28 MMOL/L (ref 23–29)
CREAT SERPL-MCNC: 0.8 MG/DL (ref 0.5–1.4)
DIFFERENTIAL METHOD BLD: ABNORMAL
EOSINOPHIL # BLD AUTO: 0.1 K/UL (ref 0–0.5)
EOSINOPHIL NFR BLD: 2.5 % (ref 0–8)
ERYTHROCYTE [DISTWIDTH] IN BLOOD BY AUTOMATED COUNT: 12.6 % (ref 11.5–14.5)
EST. GFR  (NO RACE VARIABLE): >60 ML/MIN/1.73 M^2
GLUCOSE SERPL-MCNC: 81 MG/DL (ref 70–110)
HCT VFR BLD AUTO: 49.1 % (ref 37–48.5)
HGB BLD-MCNC: 16 G/DL (ref 12–16)
HSV1 DNA SPEC QL NAA+PROBE: POSITIVE
HSV2 DNA SPEC QL NAA+PROBE: NEGATIVE
IMM GRANULOCYTES # BLD AUTO: 0.02 K/UL (ref 0–0.04)
IMM GRANULOCYTES NFR BLD AUTO: 0.6 % (ref 0–0.5)
LYMPHOCYTES # BLD AUTO: 1.2 K/UL (ref 1–4.8)
LYMPHOCYTES NFR BLD: 34.8 % (ref 18–48)
MCH RBC QN AUTO: 29.1 PG (ref 27–31)
MCHC RBC AUTO-ENTMCNC: 32.6 G/DL (ref 32–36)
MCV RBC AUTO: 89 FL (ref 82–98)
MONOCYTES # BLD AUTO: 0.3 K/UL (ref 0.3–1)
MONOCYTES NFR BLD: 9.3 % (ref 4–15)
NEUTROPHILS # BLD AUTO: 1.8 K/UL (ref 1.8–7.7)
NEUTROPHILS NFR BLD: 52 % (ref 38–73)
NRBC BLD-RTO: 0 /100 WBC
PLATELET # BLD AUTO: 110 K/UL (ref 150–450)
PLATELET BLD QL SMEAR: ABNORMAL
PMV BLD AUTO: 11.9 FL (ref 9.2–12.9)
POTASSIUM SERPL-SCNC: 4.3 MMOL/L (ref 3.5–5.1)
PROT SERPL-MCNC: 7.7 G/DL (ref 6–8.4)
RBC # BLD AUTO: 5.49 M/UL (ref 4–5.4)
SODIUM SERPL-SCNC: 143 MMOL/L (ref 136–145)
SPECIMEN SOURCE: ABNORMAL
WBC # BLD AUTO: 3.53 K/UL (ref 3.9–12.7)

## 2024-03-12 PROCEDURE — 80053 COMPREHEN METABOLIC PANEL: CPT | Performed by: NURSE PRACTITIONER

## 2024-03-12 PROCEDURE — 36415 COLL VENOUS BLD VENIPUNCTURE: CPT | Mod: PO | Performed by: NURSE PRACTITIONER

## 2024-03-12 PROCEDURE — 85025 COMPLETE CBC W/AUTO DIFF WBC: CPT | Performed by: NURSE PRACTITIONER

## 2024-03-14 ENCOUNTER — OFFICE VISIT (OUTPATIENT)
Dept: HEMATOLOGY/ONCOLOGY | Facility: CLINIC | Age: 54
End: 2024-03-14
Payer: MEDICARE

## 2024-03-14 DIAGNOSIS — D75.1 SECONDARY POLYCYTHEMIA: ICD-10-CM

## 2024-03-14 DIAGNOSIS — D72.818 OTHER DECREASED WHITE BLOOD CELL (WBC) COUNT: ICD-10-CM

## 2024-03-14 DIAGNOSIS — G47.33 OSA (OBSTRUCTIVE SLEEP APNEA): ICD-10-CM

## 2024-03-14 DIAGNOSIS — Z85.3 HISTORY OF BREAST CANCER: ICD-10-CM

## 2024-03-14 DIAGNOSIS — D69.6 THROMBOCYTOPENIA: Primary | ICD-10-CM

## 2024-03-14 PROCEDURE — 99214 OFFICE O/P EST MOD 30 MIN: CPT | Mod: 95,,, | Performed by: NURSE PRACTITIONER

## 2024-03-14 NOTE — PROGRESS NOTES
The patient location is: home  The chief complaint leading to consultation is: fatigue    Visit type: audiovisual    Face to Face time with patient: 20  35 minutes of total time spent on the encounter, which includes face to face time and non-face to face time preparing to see the patient (eg, review of tests), Obtaining and/or reviewing separately obtained history, Documenting clinical information in the electronic or other health record, Independently interpreting results (not separately reported) and communicating results to the patient/family/caregiver, or Care coordination (not separately reported).     Each patient to whom he or she provides medical services by telemedicine is:  (1) informed of the relationship between the physician and patient and the respective role of any other health care provider with respect to management of the patient; and (2) notified that he or she may decline to receive medical services by telemedicine and may withdraw from such care at any time.    Patient ID: Daniela Costa is a 54 y.o. female.    Chief Complaint: fatigue    HPI:  54 year old female who presents today as a new patient for further evaluation and recommendation of elevated hemoglobin.       She has a history of breast cancer diagnosed at age 42 y/o  Right side- 6.1 cm overall size  Abnormal mammo after palpated mass  Palpable mass- pt palpated  Breast biopsy with core or excision- invasive ductal carcinoma     Pathology- per pt initially stage 2 - then bumped to stage 3     Treatment Lumpectomy or mastectomy Reconstruction- immediate vs delayed Lymph node biopsy- sentinel node Genetic testing- negative for only BRCA 1 & 2 Radiation- right chest wall 4 months  [facility]-Adjuvant therapy- AC X4 and taxol X 12.   endocrine - estrogen positive- tamoxifen first - 1 year- changed to exemestane for the last 4 years. Completed May 2019 - followed by Anna Main NP     Has a significant pulmonary history including reactive  airway disease, mild intermittent asthma, and airway hyperactivity.  Followed by pulmonology/Dr. John     States that her father has polycythemia requiring phlebotomy.      Family hx of cancer:  Father NHL, ALL as a child, no polycythemia  Mother: Breast cancer  Self: Breast Cancer     Is trying to lose weight and has lost 30 lbs since September 2022.       Denies smoking currently.  States that she quit in 1/2014.  Smoked on/off for 20 years (about total 10 years) 1/2 to 1 pack/day.  Drinks a glass a wine maybe once per week.  Denies illicit drug use.      Currently disabled - non working at this time     10/31/2022 colonoscopyThe examined portion of the ileum was normal.  Two 8 to 10 mm polyps in the sigmoid colon,    removed with a hot snare. Resected and retrieved. Pathology of Tubular adenoma - Repeat colonoscopy in 3 years.   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  States that she has been diagnosed with sleep apnea and is trying to use it nightly; however her current mask is irritating her skin so she has to take a break sometimes from using to let her skin heal.  C/o shortness of breath at times and chronic lower back pain being followed [facility] in [place].      Interval History:  12/11/2023  Presents today for evaluation of labs- found with secondary polycythemia with reactive airway disease and mild intermittent asthma along with sleep apnea.  Has not received the new CPAP mask as of yet so has been using about 2-3 nights/week.  States has skin irritation after using and does not sleep as well with it on.      Also noted new thrombocytopenia - Latuda can cause thrombocytopenia.  Denies any abnormal bleeding or bruising and states that the medication is helping her.       CT chest with contrast 12/5/2023 Impression:     1. Peripherally enhancing 2.1 cm lesion in the right hepatic lobe as above, possibly representing a hemangioma.  Further characterization could be obtained with contrast enhanced MRI or multiphase CT.  2. No acute findings demonstrated in the thorax.    Interval History:  3/14/2024  States that she is feeling tired.  Had a dental extraction a couple of days ago.  CT abdomen with contrast 2/26/2024 showed 2.2 cm hemangioma of liver.  Patient is asymptomatic.  Hct 49.1 - does not wear her CPAP at night.  Has had intentional weight loss and would like to repeat sleep study.  Discontinued Latuda almost 2 months ago.  Currently taking Abilify for the past week.  Prior to this was on Lamictal for 6 weeks.  Hct < 55%.  No phlebotomy needed. I have reviewed all of the patient's relevant lab work available in the medical record and have utilized this in my evaluation and management recommendations today.      Social History     Socioeconomic History    Marital status:     Number of children: 6   Occupational History    Occupation: stay home mom    Tobacco Use    Smoking status: Former     Current packs/day: 0.00     Average packs/day: 0.5 packs/day for 23.2 years (11.6 ttl pk-yrs)     Types: Cigarettes     Start date: 11/1/1990     Quit date: 1/1/2014     Years since quitting: 10.2    Smokeless tobacco: Never   Substance and Sexual Activity    Alcohol use: Not Currently    Drug use: No    Sexual activity: Not Currently     Partners: Male     Birth control/protection: See Surgical Hx   Social History Narrative    Patient has 4 biological children and 2 step     Social Determinants of Health     Financial Resource Strain: Medium Risk (3/4/2024)    Overall Financial Resource Strain (CARDIA)     Difficulty of Paying Living Expenses: Somewhat hard   Food Insecurity: Food Insecurity Present (3/4/2024)    Hunger Vital Sign     Worried About Running Out of Food in the Last  Year: Sometimes true     Ran Out of Food in the Last Year: Never true   Transportation Needs: No Transportation Needs (3/4/2024)    PRAPARE - Transportation     Lack of Transportation (Medical): No     Lack of Transportation (Non-Medical): No   Physical Activity: Insufficiently Active (3/4/2024)    Exercise Vital Sign     Days of Exercise per Week: 3 days     Minutes of Exercise per Session: 30 min   Stress: Stress Concern Present (3/4/2024)    [employer] - Occupational Stress Questionnaire     Feeling of Stress : To some extent   Social Connections: Socially Isolated (3/4/2024)    Social Connection and Isolation Panel [NHANES]     Frequency of Communication with Friends and Family: More than three times a week     Frequency of Social Gatherings with Friends and Family: Three times a week     Attends Baptism Services: Never     Active Member of Clubs or Organizations: No     Attends Club or Organization Meetings: Never     Marital Status:    Housing Stability: Low Risk  (3/4/2024)    Housing Stability Vital Sign     Unable to Pay for Housing in the Last Year: No     Number of Places Lived in the Last Year: 1     Unstable Housing in the Last Year: No       Family History   Problem Relation Age of Onset    Diabetes Mother     Hyperlipidemia Mother     Breast cancer Mother 53        lumpectomy, chemo, radiation    Arthritis Mother     Cancer Mother     Hypertension Mother     Diabetes Father     Hyperlipidemia Father     Other Father         non-Hogdkin's lymphoma    Arthritis Father     COPD Father     Hypertension Father     Other Sister         prophylactic BL mastectomy    Other Sister         prophylactic BL mastecomy    Down syndrome Other     Cancer Maternal Grandfather     Bipolar disorder Maternal Grandmother     Breast cancer Maternal Grandmother     Melanoma Maternal Grandmother         on scalp with brain mets    Arthritis Maternal Grandmother     Cancer Maternal  "Grandmother     Hearing loss Maternal Grandmother     Liver cancer Paternal Grandfather     Ovarian cancer Paternal Grandmother         metastatic    Melanoma Maternal Aunt     Breast cancer Maternal Aunt         DCIS    Bipolar disorder Maternal Aunt     Bipolar disorder Maternal Aunt     Cancer Paternal Aunt         "in lymphatic csystem"    Pancreatic cancer Paternal Uncle        Past Surgical History:   Procedure Laterality Date    ADENOIDECTOMY      BACK SURGERY      SI joint fusion    Breast Reconstruction  Bilateral     BREAST SURGERY      CERVICAL FUSION      CERVICAL FUSION      COLONOSCOPY N/A 10/31/2022    Procedure: COLONOSCOPY;  Surgeon: eLxy Irizarry MD;  Location: [facility] ENDO;  Service: Endoscopy;  Laterality: N/A;    COSMETIC SURGERY  Several    HYSTERECTOMY      lumbar fusion      MASTECTOMY Right 2013    due to breast cancer, radiation done after surgery    MASTECTOMY Left 2015    propholytic due to breast cancer in right breast    ROBOT-ASSISTED LAPAROSCOPIC ABDOMINAL HYSTERECTOMY USING DA HEBERT XI N/A 12/05/2018    Procedure: XI ROBOTIC HYSTERECTOMY;  Surgeon: Meka Reich MD;  Location: [facility] OR;  Service: OB/GYN;  Laterality: N/A;    ROBOT-ASSISTED LAPAROSCOPIC LYSIS OF ADHESIONS USING DA HEBERT XI N/A 12/05/2018    Procedure: XI ROBOTIC LYSIS, ADHESIONS;  Surgeon: Meka Reich MD;  Location: [facility] OR;  Service: OB/GYN;  Laterality: N/A;    ROBOT-ASSISTED LAPAROSCOPIC SALPINGO-OOPHORECTOMY USING DA HEBERT XI Bilateral 12/05/2018    Procedure: XI ROBOTIC SALPINGO-OOPHORECTOMY;  Surgeon: Meka Reich MD;  Location: [facility];  Service: OB/GYN;  Laterality: Bilateral;    SI joint fusion   03/26/2018    SPINE SURGERY  Several    TONSILLECTOMY      TUBAL LIGATION         Past Medical History:   Diagnosis Date    Allergy     Anxiety     Arthritis     Asthma     Breast cancer     june 2012; BRCA 1 and 2 negative    Breast cancer genetic susceptibility     Cancer     Depression     " Hypothyroidism     Lung disease     Malignant neoplasm of lower-outer quadrant of right breast of female, estrogen receptor positive 10/5/2023    Pneumonia        Review of Systems   Constitutional:  Positive for fatigue. Negative for appetite change and unexpected weight change.   HENT:  Positive for mouth sores.    Eyes:  Negative for visual disturbance.   Respiratory:  Negative for cough and shortness of breath.    Cardiovascular:  Negative for chest pain.   Gastrointestinal:  Negative for abdominal pain and diarrhea.   Endocrine: Negative.    Genitourinary:  Negative for frequency.   Musculoskeletal:  Positive for arthralgias and back pain.   Skin:  Negative for rash.   Allergic/Immunologic: Negative.    Neurological:  Negative for headaches.   Hematological:  Negative for adenopathy.   Psychiatric/Behavioral:  The patient is nervous/anxious.       Medication List with Changes/Refills   Current Medications    (MAGIC MOUTHWASH) 1:1:1 DIPHENHYDRAMINE(BENADRYL) 12.5MG/5ML LIQ, ALUMINUM & MAGNESIUM HYDROXIDE-SIMETHICONE (MAALOX), LIDOCAINE VISCOUS 2%    Swish and spit 15 mLs every 4 (four) hours as needed (oral discomfort).    ACYCLOVIR (ZOVIRAX) 800 MG TAB    Take 1 tablet (800 mg total) by mouth 5 (five) times daily. for 5 days    ACYCLOVIR 5% (ZOVIRAX) 5 % OINTMENT    Apply thin layer to affected area    ALBUTEROL (PROVENTIL) 2.5 MG /3 ML (0.083 %) NEBULIZER SOLUTION    Take 3 mLs (2.5 mg total) by nebulization every 4 to 6 hours as needed for Wheezing or Shortness of Breath.    ALBUTEROL (PROVENTIL/VENTOLIN HFA) 90 MCG/ACTUATION INHALER    Inhale 2 puffs into the lungs every 6 (six) hours.    ARIPIPRAZOLE (ABILIFY) 15 MG TAB    Take 1/2 tablet daily.    ASCORBATE CALCIUM (VITAMIN C ORAL)    Take by mouth.    BLOOD PRESSURE MONITOR (IHEALTH EASE) ST SIZE    by Other route as needed for High Blood Pressure.    CLONAZEPAM (KLONOPIN) 1 MG TABLET    TAKE 1 TABLET BY MOUTH ONCE DAILY AS NEEDED FOR ANXIETY     CYCLOBENZAPRINE (FLEXERIL) 10 MG TABLET    Take by mouth.    DICLOFENAC (VOLTAREN) 75 MG EC TABLET    Take 75 mg by mouth 2 (two) times daily.    DILTIAZEM (CARDIZEM) 30 MG TABLET    Take 1 tablet (30 mg total) by mouth every 8 (eight) hours.    ESCITALOPRAM OXALATE (LEXAPRO) 10 MG TABLET    Take 1 tablet (10 mg total) by mouth once daily.    FLUTICASONE FUROATE-VILANTEROL (BREO ELLIPTA) 200-25 MCG/DOSE DSDV DISKUS INHALER    Inhale 1 puff into the lungs once daily. Controller    FLUTICASONE PROPIONATE (FLONASE) 50 MCG/ACTUATION NASAL SPRAY    SPRAY 1 SPRAY INTO EACH NOSTRIL TWO TIMES DAILY    HYDROCODONE-ACETAMINOPHEN (NORCO) 5-325 MG PER TABLET    Take 1 tablet by mouth every 6 (six) hours as needed.    HYDROCORTISONE 2.5 % LOTION    Apply topically 2 (two) times daily.    INHALATION SPACING DEVICE (BREATHERITE VALVED MDI CHAMBER)    Use as directed for inhalation.    LEVOTHYROXINE (SYNTHROID) 50 MCG TABLET    Take 1 tablet (50 mcg total) by mouth once daily.    LIDOCAINE HCL 2% (XYLOCAINE) 2 % JELLY    Apply topically as needed (vaginal discomfort).    LIDOCAINE VISCOUS HCL 2% (LIDOCAINE VISCOUS) 2 % SOLN    Mix two teaspoons with benadryl and maalox every 3 hours and swish in mouth and then spit out do not swallow    LORATADINE (CLARITIN) 10 MG TABLET    Take 1 tablet (10 mg total) by mouth once daily.    MELOXICAM (MOBIC) 15 MG TABLET    Take 15 mg by mouth.    MONTELUKAST (SINGULAIR) 10 MG TABLET    TAKE 1 TABLET BY MOUTH ONCE DAILY IN THE EVENING    ONDANSETRON (ZOFRAN-ODT) 4 MG TBDL    Take 4 mg by mouth every 6 (six) hours as needed.    PREVIDENT 5000 DRY MOUTH 1.1 % PSTE        TRAZODONE (DESYREL) 50 MG TABLET    Take 1 tablet (50 mg total) by mouth nightly as needed for Insomnia.        Objective:   There were no vitals filed for this visit.    Physical Exam  Constitutional:       Appearance: Normal appearance.   Pulmonary:      Effort: Pulmonary effort is normal.   Neurological:      Mental Status: She  "is alert and oriented to person, place, and time.   Psychiatric:         Mood and Affect: Mood normal.         Behavior: Behavior normal.         Thought Content: Thought content normal.         Judgment: Judgment normal.         Assessment:     Problem List Items Addressed This Visit          Hematology    Thrombocytopenia - Primary    Relevant Orders    [place] Screen w/Reflex    Folate    CBC Auto Differential    Comprehensive Metabolic Panel    Pathologist Interpretation Differential     Other Visit Diagnoses       Other decreased white blood cell (WBC) count        Relevant Orders    CBC Auto Differential    Secondary polycythemia        CHLOE (obstructive sleep apnea)        History of breast cancer                Lab Results   Component Value Date    WBC 3.53 (L) 03/12/2024    RBC 5.49 (H) 03/12/2024    HGB 16.0 03/12/2024    HCT 49.1 (H) 03/12/2024    MCV 89 03/12/2024    MCH 29.1 03/12/2024    MCHC 32.6 03/12/2024    RDW 12.6 03/12/2024     (L) 03/12/2024    MPV 11.9 03/12/2024    GRAN 1.8 03/12/2024    GRAN 52.0 03/12/2024    LYMPH 1.2 03/12/2024    LYMPH 34.8 03/12/2024    MONO 0.3 03/12/2024    MONO 9.3 03/12/2024    EOS 0.1 03/12/2024    BASO 0.03 03/12/2024    EOSINOPHIL 2.5 03/12/2024    BASOPHIL 0.8 03/12/2024      Lab Results   Component Value Date     03/12/2024    K 4.3 03/12/2024     03/12/2024    CO2 28 03/12/2024    BUN 10 03/12/2024    CREATININE 0.8 03/12/2024    CALCIUM 9.9 03/12/2024    ANIONGAP 14 03/12/2024    ESTGFRAFRICA >60 01/09/2019    EGFRNONAA >60 01/09/2019     Lab Results   Component Value Date    ALT 15 03/12/2024    AST 20 03/12/2024    ALKPHOS 78 03/12/2024    BILITOT 0.6 03/12/2024     Lab Results   Component Value Date    IRON 83 01/12/2023    TRANSFERRIN 241 01/12/2023    TIBC 357 01/12/2023    FESATURATED 23 01/12/2023    FERRITIN 79 01/12/2023      Lab Results   Component Value Date    IXBSJDKY19 321 10/11/2022     No results found for: "FOLATE"  Lab Results "   Component Value Date    TSH 0.854 09/26/2023         Plan:   Thrombocytopenia  -     [place] Screen w/Reflex; Future; Expected date: 03/14/2024  -     Folate; Future; Expected date: 03/14/2024  -     CBC Auto Differential; Future; Expected date: 03/14/2024  -     Comprehensive Metabolic Panel; Future; Expected date: 03/14/2024  -     Pathologist Interpretation Differential; Future; Expected date: 03/14/2024    Other decreased white blood cell (WBC) count  -     CBC Auto Differential; Future; Expected date: 03/14/2024    Secondary polycythemia    CHLOE (obstructive sleep apnea)    History of breast cancer      Med Onc Chart Routing      Follow up with physician    Follow up with BREANNA 3 months. vv labs prior   Infusion scheduling note   n/a   Injection scheduling note n/a   Labs   Scheduling:  Preferred lab: [facility]  Lab interval:  FLORENCE and folate on Monday in [place].  Labs prior to next visit cbc, cmp, path review   Imaging   N/a   Pharmacy appointment No pharmacy appointment needed      Other referrals       No additional referrals needed  n/a              Collaborating Provider:  Dr. Darren Felix    Thank You,  Yamil Stover, FNP-C  Benign Hematology

## 2024-03-18 ENCOUNTER — LAB VISIT (OUTPATIENT)
Dept: LAB | Facility: HOSPITAL | Age: 54
End: 2024-03-18
Attending: NURSE PRACTITIONER
Payer: MEDICARE

## 2024-03-18 DIAGNOSIS — D69.6 THROMBOCYTOPENIA: ICD-10-CM

## 2024-03-18 PROCEDURE — 86235 NUCLEAR ANTIGEN ANTIBODY: CPT | Mod: 59 | Performed by: NURSE PRACTITIONER

## 2024-03-18 PROCEDURE — 82746 ASSAY OF FOLIC ACID SERUM: CPT | Performed by: NURSE PRACTITIONER

## 2024-03-18 PROCEDURE — 86039 ANTINUCLEAR ANTIBODIES (ANA): CPT | Performed by: NURSE PRACTITIONER

## 2024-03-18 PROCEDURE — 86038 ANTINUCLEAR ANTIBODIES: CPT | Performed by: NURSE PRACTITIONER

## 2024-03-18 PROCEDURE — 36415 COLL VENOUS BLD VENIPUNCTURE: CPT | Mod: PO | Performed by: NURSE PRACTITIONER

## 2024-03-19 LAB
ANA PATTERN 1: NORMAL
ANA PATTERN 2: NORMAL
ANA SER QL IF: POSITIVE
ANA TITER 2: NORMAL
ANA TITR SER IF: NORMAL {TITER}
FOLATE SERPL-MCNC: 12 NG/ML (ref 4–24)

## 2024-03-20 LAB
ANTI SM ANTIBODY: 0.09 RATIO (ref 0–0.99)
ANTI SM/RNP ANTIBODY: 0.11 RATIO (ref 0–0.99)
ANTI-SM INTERPRETATION: NEGATIVE
ANTI-SM/RNP INTERPRETATION: NEGATIVE
ANTI-SSA ANTIBODY: 0.09 RATIO (ref 0–0.99)
ANTI-SSA INTERPRETATION: NEGATIVE
ANTI-SSB ANTIBODY: 0.07 RATIO (ref 0–0.99)
ANTI-SSB INTERPRETATION: NEGATIVE
DSDNA AB SER-ACNC: NORMAL [IU]/ML

## 2024-03-20 RX ORDER — CLONAZEPAM 1 MG/1
TABLET ORAL
Qty: 30 TABLET | Refills: 2 | Status: SHIPPED | OUTPATIENT
Start: 2024-03-20

## 2024-04-08 ENCOUNTER — PROCEDURE VISIT (OUTPATIENT)
Dept: OBSTETRICS AND GYNECOLOGY | Facility: CLINIC | Age: 54
End: 2024-04-08
Payer: MEDICARE

## 2024-04-08 VITALS
DIASTOLIC BLOOD PRESSURE: 72 MMHG | HEIGHT: 65 IN | BODY MASS INDEX: 23.66 KG/M2 | SYSTOLIC BLOOD PRESSURE: 97 MMHG | WEIGHT: 142 LBS

## 2024-04-08 DIAGNOSIS — R35.0 FREQUENCY OF URINATION: ICD-10-CM

## 2024-04-08 DIAGNOSIS — R87.612 LOW GRADE SQUAMOUS INTRAEPITHELIAL LESION (LGSIL) AT RISK FOR HIGH GRADE SQUAMOUS INTRAEPITHELIAL LESION (HGSIL) ON CYTOLOGIC SMEAR OF CERVIX: Primary | ICD-10-CM

## 2024-04-08 LAB
B-HCG UR QL: NEGATIVE
BILIRUBIN, UA POC OHS: NEGATIVE
BLOOD, UA POC OHS: NEGATIVE
CLARITY, UA POC OHS: CLEAR
COLOR, UA POC OHS: YELLOW
CTP QC/QA: YES
GLUCOSE, UA POC OHS: NEGATIVE
KETONES, UA POC OHS: NEGATIVE
LEUKOCYTES, UA POC OHS: NEGATIVE
NITRITE, UA POC OHS: NEGATIVE
PH, UA POC OHS: 7
PROTEIN, UA POC OHS: NEGATIVE
SPECIFIC GRAVITY, UA POC OHS: 1.01
UROBILINOGEN, UA POC OHS: 0.2

## 2024-04-08 PROCEDURE — 81002 URINALYSIS NONAUTO W/O SCOPE: CPT | Mod: S$GLB,,, | Performed by: OBSTETRICS & GYNECOLOGY

## 2024-04-08 PROCEDURE — 57420 EXAM OF VAGINA W/SCOPE: CPT | Mod: S$GLB,,, | Performed by: OBSTETRICS & GYNECOLOGY

## 2024-04-08 PROCEDURE — 81025 URINE PREGNANCY TEST: CPT | Mod: S$GLB,,, | Performed by: OBSTETRICS & GYNECOLOGY

## 2024-04-08 NOTE — PROCEDURES
Colposcopy-Today    Date/Time: 2024 4:30 PM    Performed by: Howard Jimenez MD  Authorized by: Howard Jimenez MD    Timeout:Immediately prior to procedure a time out was called to verify the correct patient, procedure, equipment, support staff and site/side marked as required  Assistants?: No      Colposcopy Site:  Vagina  Position:  Supine  Acrowhite Lesion: No    Atypical Vessels: No    Biopsy?: No    ECC Performed?: No    LEEP Performed?: No    Estimated blood loss (cc):  0   Patient tolerated the procedure well with no immediate complications.   Post-operative instructions were provided for the patient.   Patient was discharged and will follow up if any complications occur     COLPOSCOPY:    Daniela Costa is a 54 y.o. female   presents for colposcopy.  No LMP recorded (lmp unknown). Patient has had a hysterectomy in 2018 for cervical dysplasia.  Her most recent pap smear shows LSIL cannot rule out HSIL.      Pt requests UA as she has been feeling frequency.    The abnormal test findings were discussed, as well as HPV infection, need for colposcopy and possible biopsies to determine the plan of care, treatments available, the minimal risk of bleeding and infection with colposcopy, and alternatives to colposcopy.  Pt voiced understanding and desires to proceed.      UPT is negative    COLPOSCOPY EXAM:   TIME OUT PERFORMED.     No gross vulvovaginal or cervix lesions noted.  On colpo: no visible lesions, no mosaicism, no punctation, and no abnormal vasculature    No biopsies.  ECC was not performed.   Hemostasis was adequate.  The speculum was removed.  The patient did tolerate the procedure well.    No specimen.    UA today negative    Post-colposcopy counseling:  The patient was instructed to manage post-colposcopy cramping with NSAIDs or Tylenol, or with a prescription per the medication card.  Avoid intercourse, douching, or tampons in the vagina for at least 2-3 days.  Expect a clumpy  blackish discharge due to Monsel's solution application for several days.  Report heavy bleeding, worsening pain or pain that does not respond to above medications, or foul-smelling vaginal discharge. HPV vaccine recommended according to FDA age guidelines.  Importance of follow-up stressed.      Follow up based on colposcopy results.  Impression:  Negative colpo.  Recommend follow-up pap in 6-12 months.

## 2024-05-16 ENCOUNTER — PATIENT MESSAGE (OUTPATIENT)
Dept: PSYCHIATRY | Facility: CLINIC | Age: 54
End: 2024-05-16
Payer: MEDICARE

## 2024-05-27 DIAGNOSIS — J45.20 MILD INTERMITTENT ASTHMA, UNSPECIFIED WHETHER COMPLICATED: ICD-10-CM

## 2024-05-27 DIAGNOSIS — J30.89 SEASONAL ALLERGIC RHINITIS DUE TO OTHER ALLERGIC TRIGGER: ICD-10-CM

## 2024-05-27 RX ORDER — FLUTICASONE PROPIONATE 50 MCG
SPRAY, SUSPENSION (ML) NASAL
Qty: 48 G | Refills: 3 | Status: SHIPPED | OUTPATIENT
Start: 2024-05-27

## 2024-05-27 RX ORDER — ALBUTEROL SULFATE 0.83 MG/ML
SOLUTION RESPIRATORY (INHALATION)
Qty: 360 ML | Refills: 11 | Status: SHIPPED | OUTPATIENT
Start: 2024-05-27

## 2024-05-27 RX ORDER — FLUTICASONE FUROATE AND VILANTEROL TRIFENATATE 200; 25 UG/1; UG/1
POWDER RESPIRATORY (INHALATION)
Qty: 60 EACH | Refills: 11 | Status: SHIPPED | OUTPATIENT
Start: 2024-05-27

## 2024-06-04 RX ORDER — ESCITALOPRAM OXALATE 10 MG/1
10 TABLET ORAL DAILY
Qty: 30 TABLET | Refills: 0 | Status: SHIPPED | OUTPATIENT
Start: 2024-06-04 | End: 2025-06-04

## 2024-06-13 ENCOUNTER — PATIENT MESSAGE (OUTPATIENT)
Dept: ADMINISTRATIVE | Facility: OTHER | Age: 54
End: 2024-06-13
Payer: MEDICARE

## 2024-06-18 DIAGNOSIS — E03.9 HYPOTHYROIDISM, UNSPECIFIED TYPE: ICD-10-CM

## 2024-06-19 ENCOUNTER — LAB VISIT (OUTPATIENT)
Dept: LAB | Facility: HOSPITAL | Age: 54
End: 2024-06-19
Attending: NURSE PRACTITIONER
Payer: MEDICARE

## 2024-06-19 ENCOUNTER — TELEPHONE (OUTPATIENT)
Dept: PSYCHIATRY | Facility: CLINIC | Age: 54
End: 2024-06-19
Payer: MEDICARE

## 2024-06-19 DIAGNOSIS — D72.818 OTHER DECREASED WHITE BLOOD CELL (WBC) COUNT: ICD-10-CM

## 2024-06-19 DIAGNOSIS — D69.6 THROMBOCYTOPENIA: ICD-10-CM

## 2024-06-19 LAB
ALBUMIN SERPL BCP-MCNC: 4.1 G/DL (ref 3.5–5.2)
ALP SERPL-CCNC: 69 U/L (ref 55–135)
ALT SERPL W/O P-5'-P-CCNC: 19 U/L (ref 10–44)
ANION GAP SERPL CALC-SCNC: 12 MMOL/L (ref 8–16)
AST SERPL-CCNC: 20 U/L (ref 10–40)
BASOPHILS # BLD AUTO: 0.06 K/UL (ref 0–0.2)
BASOPHILS NFR BLD: 1.6 % (ref 0–1.9)
BILIRUB SERPL-MCNC: 0.4 MG/DL (ref 0.1–1)
BUN SERPL-MCNC: 12 MG/DL (ref 6–20)
CALCIUM SERPL-MCNC: 9.5 MG/DL (ref 8.7–10.5)
CHLORIDE SERPL-SCNC: 104 MMOL/L (ref 95–110)
CO2 SERPL-SCNC: 24 MMOL/L (ref 23–29)
CREAT SERPL-MCNC: 0.8 MG/DL (ref 0.5–1.4)
DIFFERENTIAL METHOD BLD: ABNORMAL
EOSINOPHIL # BLD AUTO: 0.2 K/UL (ref 0–0.5)
EOSINOPHIL NFR BLD: 6 % (ref 0–8)
ERYTHROCYTE [DISTWIDTH] IN BLOOD BY AUTOMATED COUNT: 12.8 % (ref 11.5–14.5)
EST. GFR  (NO RACE VARIABLE): >60 ML/MIN/1.73 M^2
GLUCOSE SERPL-MCNC: 99 MG/DL (ref 70–110)
HCT VFR BLD AUTO: 48.3 % (ref 37–48.5)
HGB BLD-MCNC: 15.5 G/DL (ref 12–16)
IMM GRANULOCYTES # BLD AUTO: 0.01 K/UL (ref 0–0.04)
IMM GRANULOCYTES NFR BLD AUTO: 0.3 % (ref 0–0.5)
LYMPHOCYTES # BLD AUTO: 1.1 K/UL (ref 1–4.8)
LYMPHOCYTES NFR BLD: 28.1 % (ref 18–48)
MCH RBC QN AUTO: 30.6 PG (ref 27–31)
MCHC RBC AUTO-ENTMCNC: 32.1 G/DL (ref 32–36)
MCV RBC AUTO: 95 FL (ref 82–98)
MONOCYTES # BLD AUTO: 0.2 K/UL (ref 0.3–1)
MONOCYTES NFR BLD: 6 % (ref 4–15)
NEUTROPHILS # BLD AUTO: 2.2 K/UL (ref 1.8–7.7)
NEUTROPHILS NFR BLD: 58 % (ref 38–73)
NRBC BLD-RTO: 0 /100 WBC
PLATELET # BLD AUTO: 156 K/UL (ref 150–450)
PMV BLD AUTO: 10.5 FL (ref 9.2–12.9)
POTASSIUM SERPL-SCNC: 4.2 MMOL/L (ref 3.5–5.1)
PROT SERPL-MCNC: 7.2 G/DL (ref 6–8.4)
RBC # BLD AUTO: 5.07 M/UL (ref 4–5.4)
SODIUM SERPL-SCNC: 140 MMOL/L (ref 136–145)
WBC # BLD AUTO: 3.85 K/UL (ref 3.9–12.7)

## 2024-06-19 PROCEDURE — 80053 COMPREHEN METABOLIC PANEL: CPT | Performed by: NURSE PRACTITIONER

## 2024-06-19 PROCEDURE — 36415 COLL VENOUS BLD VENIPUNCTURE: CPT | Mod: PO | Performed by: NURSE PRACTITIONER

## 2024-06-19 PROCEDURE — 85025 COMPLETE CBC W/AUTO DIFF WBC: CPT | Performed by: NURSE PRACTITIONER

## 2024-06-19 PROCEDURE — 85060 BLOOD SMEAR INTERPRETATION: CPT | Mod: ,,, | Performed by: PATHOLOGY

## 2024-06-19 RX ORDER — LEVOTHYROXINE SODIUM 50 UG/1
50 TABLET ORAL
Qty: 90 TABLET | Refills: 0 | Status: SHIPPED | OUTPATIENT
Start: 2024-06-19

## 2024-06-19 NOTE — TELEPHONE ENCOUNTER
No care due was identified.  [facility] Embedded Care Due Messages. Reference number: [id number].   6/18/2024 11:24:34 PM CDT

## 2024-06-19 NOTE — TELEPHONE ENCOUNTER
Please see the attached refill request.   Lv 9/26/23  Fu 9/26/24   Last ordered 9/26/23   [facility] [place]

## 2024-06-21 ENCOUNTER — OFFICE VISIT (OUTPATIENT)
Dept: PSYCHIATRY | Facility: CLINIC | Age: 54
End: 2024-06-21
Payer: MEDICARE

## 2024-06-21 VITALS
HEART RATE: 88 BPM | WEIGHT: 142.19 LBS | SYSTOLIC BLOOD PRESSURE: 112 MMHG | DIASTOLIC BLOOD PRESSURE: 73 MMHG | BODY MASS INDEX: 23.66 KG/M2

## 2024-06-21 DIAGNOSIS — G47.00 INSOMNIA, UNSPECIFIED TYPE: ICD-10-CM

## 2024-06-21 DIAGNOSIS — F31.9 BIPOLAR I DISORDER, CURRENT EPISODE DEPRESSED: Primary | ICD-10-CM

## 2024-06-21 DIAGNOSIS — F41.9 ANXIETY: ICD-10-CM

## 2024-06-21 PROCEDURE — 99999 PR PBB SHADOW E&M-EST. PATIENT-LVL II: CPT | Mod: PBBFAC,,, | Performed by: PSYCHIATRY & NEUROLOGY

## 2024-06-21 PROCEDURE — 3078F DIAST BP <80 MM HG: CPT | Mod: CPTII,S$GLB,, | Performed by: PSYCHIATRY & NEUROLOGY

## 2024-06-21 PROCEDURE — 99214 OFFICE O/P EST MOD 30 MIN: CPT | Mod: S$GLB,,, | Performed by: PSYCHIATRY & NEUROLOGY

## 2024-06-21 PROCEDURE — 3008F BODY MASS INDEX DOCD: CPT | Mod: CPTII,S$GLB,, | Performed by: PSYCHIATRY & NEUROLOGY

## 2024-06-21 PROCEDURE — 99212 OFFICE O/P EST SF 10 MIN: CPT | Mod: PBBFAC | Performed by: PSYCHIATRY & NEUROLOGY

## 2024-06-21 PROCEDURE — 3074F SYST BP LT 130 MM HG: CPT | Mod: CPTII,S$GLB,, | Performed by: PSYCHIATRY & NEUROLOGY

## 2024-06-21 RX ORDER — CLONAZEPAM 1 MG/1
TABLET ORAL
Qty: 30 TABLET | Refills: 2 | Status: SHIPPED | OUTPATIENT
Start: 2024-06-21

## 2024-06-21 RX ORDER — ESCITALOPRAM OXALATE 10 MG/1
10 TABLET ORAL DAILY
Qty: 30 TABLET | Refills: 3 | Status: SHIPPED | OUTPATIENT
Start: 2024-06-21 | End: 2025-06-21

## 2024-06-21 RX ORDER — TRAZODONE HYDROCHLORIDE 50 MG/1
50 TABLET ORAL NIGHTLY PRN
Qty: 30 TABLET | Refills: 3 | Status: SHIPPED | OUTPATIENT
Start: 2024-06-21 | End: 2025-06-21

## 2024-06-21 RX ORDER — ARIPIPRAZOLE 15 MG/1
TABLET ORAL
Qty: 45 TABLET | Refills: 3 | Status: SHIPPED | OUTPATIENT
Start: 2024-06-21
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
## 2024-06-24 ENCOUNTER — PATIENT MESSAGE (OUTPATIENT)
Dept: ADMINISTRATIVE | Facility: OTHER | Age: 54
End: 2024-06-24
Payer: MEDICARE

## 2024-06-24 LAB — PATH REV BLD -IMP: NORMAL

## 2024-07-01 ENCOUNTER — CLINICAL SUPPORT (OUTPATIENT)
Dept: PULMONOLOGY | Facility: CLINIC | Age: 54
End: 2024-07-01
Payer: MEDICARE

## 2024-07-01 ENCOUNTER — OFFICE VISIT (OUTPATIENT)
Dept: PULMONOLOGY | Facility: CLINIC | Age: 54
End: 2024-07-01
Payer: MEDICARE

## 2024-07-01 DIAGNOSIS — G47.33 OSA ON CPAP: Primary | ICD-10-CM

## 2024-07-01 DIAGNOSIS — J44.9 CHRONIC OBSTRUCTIVE PULMONARY DISEASE, UNSPECIFIED COPD TYPE: ICD-10-CM

## 2024-07-01 DIAGNOSIS — J45.20 MILD INTERMITTENT ASTHMA, UNSPECIFIED WHETHER COMPLICATED: ICD-10-CM

## 2024-07-01 LAB
BRPFT: NORMAL
FEF 25 75 LLN: 1.47
FEF 25 75 PRE REF: 58.9 %
FEF 25 75 REF: 2.67
FEV1 FVC LLN: 69
FEV1 FVC PRE REF: 85.3 %
FEV1 FVC REF: 80
FEV1 LLN: 2.16
FEV1 PRE REF: 85.2 %
FEV1 REF: 2.79
FVC LLN: 2.72
FVC PRE REF: 99.3 %
FVC REF: 3.51
PEF LLN: 5.05
PEF PRE REF: 72.6 %
PEF REF: 6.81
PRE FEF 25 75: 1.58 L/S
PRE FET 100: 6.68 SEC
PRE FEV1 FVC: 68.32 %
PRE FEV1: 2.38 L
PRE FVC: 3.48 L
PRE PEF: 4.95 L/S

## 2024-07-01 RX ORDER — FLUTICASONE FUROATE AND VILANTEROL TRIFENATATE 200; 25 UG/1; UG/1
1 POWDER RESPIRATORY (INHALATION) DAILY
Qty: 60 EACH | Refills: 11 | Status: SHIPPED | OUTPATIENT
Start: 2024-07-01

## 2024-07-01 RX ORDER — MONTELUKAST SODIUM 10 MG/1
10 TABLET ORAL NIGHTLY
Qty: 30 TABLET | Refills: 11 | Status: SHIPPED | OUTPATIENT
Start: 2024-07-01

## 2024-07-05 PROBLEM — J98.9 REACTIVE AIRWAY DISEASE WITHOUT ASTHMA: Status: RESOLVED | Noted: 2017-04-06 | Resolved: 2024-07-05

## 2024-07-05 PROBLEM — J44.9 CHRONIC OBSTRUCTIVE PULMONARY DISEASE, UNSPECIFIED COPD TYPE: Status: ACTIVE | Noted: 2024-07-05

## 2024-07-30 ENCOUNTER — PATIENT MESSAGE (OUTPATIENT)
Dept: PSYCHIATRY | Facility: CLINIC | Age: 54
End: 2024-07-30
Payer: MEDICARE

## 2024-07-30 RX ORDER — ARIPIPRAZOLE 15 MG/1
15 TABLET ORAL DAILY
Qty: 90 TABLET | Refills: 1 | Status: SHIPPED | OUTPATIENT
Start: 2024-07-30 | End: 2025-07-30

## 2024-09-18 DIAGNOSIS — E03.9 HYPOTHYROIDISM, UNSPECIFIED TYPE: ICD-10-CM

## 2024-09-18 NOTE — TELEPHONE ENCOUNTER
Care Due:                  Date            Visit Type   Department     Provider  --------------------------------------------------------------------------------                                EP -                              PRIMARY      [facility] INTER[facility]  Deb  Last Visit: 09-      CARE ([facility])   MEDICINE       Xiong-Pedescleaux                              EP -                              PRIMARY      [facility] INTER[facility]  Deb  Next Visit: 09-      CARE ([facility])   MEDICINE       Xiong-Pedescleaux                                                            Last  Test          Frequency    Reason                     Performed    Due Date  --------------------------------------------------------------------------------    TSH.........  12 months..  levothyroxine............  09- 09-    [facility] Embedded Care Due Messages. Reference number: [id number].   9/18/2024 6:05:25 AM CDT

## 2024-09-20 RX ORDER — LEVOTHYROXINE SODIUM 50 UG/1
50 TABLET ORAL
Qty: 90 TABLET | Refills: 0 | Status: SHIPPED | OUTPATIENT
Start: 2024-09-20

## 2024-09-26 ENCOUNTER — LAB VISIT (OUTPATIENT)
Dept: LAB | Facility: HOSPITAL | Age: 54
End: 2024-09-26
Attending: FAMILY MEDICINE
Payer: MEDICARE

## 2024-09-26 ENCOUNTER — OFFICE VISIT (OUTPATIENT)
Dept: INTERNAL MEDICINE | Facility: CLINIC | Age: 54
End: 2024-09-26
Payer: MEDICARE

## 2024-09-26 VITALS
SYSTOLIC BLOOD PRESSURE: 110 MMHG | RESPIRATION RATE: 15 BRPM | HEIGHT: 65 IN | DIASTOLIC BLOOD PRESSURE: 80 MMHG | OXYGEN SATURATION: 97 % | TEMPERATURE: 97 F | HEART RATE: 126 BPM | BODY MASS INDEX: 21.34 KG/M2 | WEIGHT: 128.06 LBS

## 2024-09-26 DIAGNOSIS — R73.09 ABNORMAL GLUCOSE: ICD-10-CM

## 2024-09-26 DIAGNOSIS — E03.9 ACQUIRED HYPOTHYROIDISM: Chronic | ICD-10-CM

## 2024-09-26 DIAGNOSIS — K59.09 CHRONIC CONSTIPATION: ICD-10-CM

## 2024-09-26 DIAGNOSIS — G47.33 OSA ON CPAP: ICD-10-CM

## 2024-09-26 DIAGNOSIS — Z23 NEED FOR VACCINATION: ICD-10-CM

## 2024-09-26 DIAGNOSIS — E03.9 HYPOTHYROIDISM, UNSPECIFIED TYPE: ICD-10-CM

## 2024-09-26 DIAGNOSIS — Z00.00 ROUTINE GENERAL MEDICAL EXAMINATION AT A HEALTH CARE FACILITY: Primary | ICD-10-CM

## 2024-09-26 LAB
ALBUMIN SERPL BCP-MCNC: 4.4 G/DL (ref 3.5–5.2)
ALP SERPL-CCNC: 50 U/L (ref 55–135)
ALT SERPL W/O P-5'-P-CCNC: 22 U/L (ref 10–44)
ANION GAP SERPL CALC-SCNC: 11 MMOL/L (ref 8–16)
AST SERPL-CCNC: 16 U/L (ref 10–40)
BASOPHILS # BLD AUTO: 0.04 K/UL (ref 0–0.2)
BASOPHILS NFR BLD: 0.6 % (ref 0–1.9)
BILIRUB SERPL-MCNC: 0.6 MG/DL (ref 0.1–1)
BUN SERPL-MCNC: 14 MG/DL (ref 6–20)
CALCIUM SERPL-MCNC: 9.8 MG/DL (ref 8.7–10.5)
CHLORIDE SERPL-SCNC: 104 MMOL/L (ref 95–110)
CHOLEST SERPL-MCNC: 176 MG/DL (ref 120–199)
CHOLEST/HDLC SERPL: 2.6 {RATIO} (ref 2–5)
CO2 SERPL-SCNC: 26 MMOL/L (ref 23–29)
CREAT SERPL-MCNC: 1 MG/DL (ref 0.5–1.4)
DIFFERENTIAL METHOD BLD: ABNORMAL
EOSINOPHIL # BLD AUTO: 0 K/UL (ref 0–0.5)
EOSINOPHIL NFR BLD: 0.3 % (ref 0–8)
ERYTHROCYTE [DISTWIDTH] IN BLOOD BY AUTOMATED COUNT: 13.9 % (ref 11.5–14.5)
EST. GFR  (NO RACE VARIABLE): >60 ML/MIN/1.73 M^2
ESTIMATED AVG GLUCOSE: 94 MG/DL (ref 68–131)
GLUCOSE SERPL-MCNC: 117 MG/DL (ref 70–110)
HBA1C MFR BLD: 4.9 % (ref 4–5.6)
HCT VFR BLD AUTO: 50.8 % (ref 37–48.5)
HDLC SERPL-MCNC: 68 MG/DL (ref 40–75)
HDLC SERPL: 38.6 % (ref 20–50)
HGB BLD-MCNC: 16.3 G/DL (ref 12–16)
IMM GRANULOCYTES # BLD AUTO: 0.02 K/UL (ref 0–0.04)
IMM GRANULOCYTES NFR BLD AUTO: 0.3 % (ref 0–0.5)
LDLC SERPL CALC-MCNC: 91.2 MG/DL (ref 63–159)
LYMPHOCYTES # BLD AUTO: 0.8 K/UL (ref 1–4.8)
LYMPHOCYTES NFR BLD: 12.4 % (ref 18–48)
MCH RBC QN AUTO: 29.7 PG (ref 27–31)
MCHC RBC AUTO-ENTMCNC: 32.1 G/DL (ref 32–36)
MCV RBC AUTO: 93 FL (ref 82–98)
MONOCYTES # BLD AUTO: 0.3 K/UL (ref 0.3–1)
MONOCYTES NFR BLD: 4.9 % (ref 4–15)
NEUTROPHILS # BLD AUTO: 5.2 K/UL (ref 1.8–7.7)
NEUTROPHILS NFR BLD: 81.5 % (ref 38–73)
NONHDLC SERPL-MCNC: 108 MG/DL
NRBC BLD-RTO: 0 /100 WBC
PLATELET # BLD AUTO: 152 K/UL (ref 150–450)
PMV BLD AUTO: 11 FL (ref 9.2–12.9)
POTASSIUM SERPL-SCNC: 4.1 MMOL/L (ref 3.5–5.1)
PROT SERPL-MCNC: 7.2 G/DL (ref 6–8.4)
RBC # BLD AUTO: 5.48 M/UL (ref 4–5.4)
SODIUM SERPL-SCNC: 141 MMOL/L (ref 136–145)
T4 FREE SERPL-MCNC: 1.1 NG/DL (ref 0.71–1.51)
TRIGL SERPL-MCNC: 84 MG/DL (ref 30–150)
TSH SERPL DL<=0.005 MIU/L-ACNC: 1.89 UIU/ML (ref 0.4–4)
WBC # BLD AUTO: 6.35 K/UL (ref 3.9–12.7)

## 2024-09-26 PROCEDURE — 3074F SYST BP LT 130 MM HG: CPT | Mod: CPTII,S$GLB,, | Performed by: FAMILY MEDICINE

## 2024-09-26 PROCEDURE — G0008 ADMIN INFLUENZA VIRUS VAC: HCPCS | Mod: S$GLB,,, | Performed by: FAMILY MEDICINE

## 2024-09-26 PROCEDURE — 85025 COMPLETE CBC W/AUTO DIFF WBC: CPT | Performed by: FAMILY MEDICINE

## 2024-09-26 PROCEDURE — 1159F MED LIST DOCD IN RCRD: CPT | Mod: CPTII,S$GLB,, | Performed by: FAMILY MEDICINE

## 2024-09-26 PROCEDURE — 84439 ASSAY OF FREE THYROXINE: CPT | Performed by: FAMILY MEDICINE

## 2024-09-26 PROCEDURE — 80061 LIPID PANEL: CPT | Performed by: FAMILY MEDICINE

## 2024-09-26 PROCEDURE — 3008F BODY MASS INDEX DOCD: CPT | Mod: CPTII,S$GLB,, | Performed by: FAMILY MEDICINE

## 2024-09-26 PROCEDURE — 3044F HG A1C LEVEL LT 7.0%: CPT | Mod: CPTII,S$GLB,, | Performed by: FAMILY MEDICINE

## 2024-09-26 PROCEDURE — 36415 COLL VENOUS BLD VENIPUNCTURE: CPT | Mod: PO | Performed by: FAMILY MEDICINE

## 2024-09-26 PROCEDURE — 3079F DIAST BP 80-89 MM HG: CPT | Mod: CPTII,S$GLB,, | Performed by: FAMILY MEDICINE

## 2024-09-26 PROCEDURE — 99396 PREV VISIT EST AGE 40-64: CPT | Mod: S$GLB,,, | Performed by: FAMILY MEDICINE

## 2024-09-26 PROCEDURE — 99999 PR PBB SHADOW E&M-EST. PATIENT-LVL V: CPT | Mod: PBBFAC,,, | Performed by: FAMILY MEDICINE

## 2024-09-26 PROCEDURE — 90656 IIV3 VACC NO PRSV 0.5 ML IM: CPT | Mod: S$GLB,,, | Performed by: FAMILY MEDICINE

## 2024-09-26 PROCEDURE — 80053 COMPREHEN METABOLIC PANEL: CPT | Performed by: FAMILY MEDICINE

## 2024-09-26 PROCEDURE — 83036 HEMOGLOBIN GLYCOSYLATED A1C: CPT | Performed by: FAMILY MEDICINE

## 2024-09-26 PROCEDURE — 1160F RVW MEDS BY RX/DR IN RCRD: CPT | Mod: CPTII,S$GLB,, | Performed by: FAMILY MEDICINE

## 2024-09-26 PROCEDURE — 84443 ASSAY THYROID STIM HORMONE: CPT | Performed by: FAMILY MEDICINE

## 2024-09-26 RX ORDER — LEVOTHYROXINE SODIUM 50 UG/1
50 TABLET ORAL
Qty: 90 TABLET | Refills: 3 | Status: SHIPPED | OUTPATIENT
Start: 2024-09-26

## 2024-09-26 RX ORDER — POLYETHYLENE GLYCOL 3350 17 G/17G
17 POWDER, FOR SOLUTION ORAL DAILY PRN
Qty: 510 G | Refills: 3 | Status: SHIPPED | OUTPATIENT
Start: 2024-09-26

## 2024-09-26 NOTE — PROGRESS NOTES
Subjective     Patient ID: Daniela Costa is a 54 y.o. female.    Chief Complaint: Annual Exam    History of Present Illness    CHIEF COMPLAINT:  Daniela presents today for follow up.    MEDICATIONS:  She feels her current medications are not working optimally and expresses the need to contact Dr. Hernandez regarding this concern. She requests a refill for Levothyroxine for one year. She confirms having sufficient refills for her inhalers.    WEIGHT MANAGEMENT:  She reports weight loss from 147 lbs in March to 142 lbs in June. She is currently trying to maintain her weight and denies any desire for further weight reduction.    RESPIRATORY HEALTH:  She reports her breathing has been good. She is no longer using the CPAP machine due to weight loss and needs to return the device.    GASTROINTESTINAL:  She reports occasional bowel issues. She tries to eat leafy greens to manage her bowel movements, but sometimes needs to use laxatives for assistance. She denies needing regular help with bowel management, stating it is generally manageable.    PREVENTIVE CARE:  She has not received her flu vaccine or shingles vaccine, but agreed to receive the flu vaccine during the current visit. She denies any recent fractures and is up to date with her colonoscopy.    LAB WORK:  She reports her thyroid level has not been checked since last year. She recently had a CBC and CMP completed.      ROS:  General: -fever, -chills, -fatigue, -weight gain, +weight loss  Eyes: -vision changes, -redness, -discharge  ENT: -ear pain, -nasal congestion, -sore throat  Cardiovascular: -chest pain, -palpitations, -lower extremity edema  Respiratory: -cough, -shortness of breath, -difficulty breathing  Gastrointestinal: -abdominal pain, -nausea, -vomiting, -diarrhea, -constipation, -blood in stool, +change in bowel habits  Genitourinary: -dysuria, -hematuria, -frequency  Musculoskeletal: -joint pain, -muscle pain  Skin: -rash, -lesion  Neurological:  -headache, -dizziness, -numbness, -tingling  Psychiatric: -anxiety, -depression, -sleep difficulty            Objective     Physical Exam  Vitals and nursing note reviewed.   Constitutional:       Appearance: Normal appearance. She is normal weight.   HENT:      Head: Normocephalic and atraumatic.      Right Ear: Tympanic membrane, ear canal and external ear normal.      Left Ear: Tympanic membrane, ear canal and external ear normal.      Nose: Nose normal.      Mouth/Throat:      Mouth: Mucous membranes are moist.      Pharynx: Oropharynx is clear.   Eyes:      Extraocular Movements: Extraocular movements intact.      Conjunctiva/sclera: Conjunctivae normal.   Cardiovascular:      Rate and Rhythm: Normal rate and regular rhythm.      Pulses: Normal pulses.      Heart sounds: Normal heart sounds.   Pulmonary:      Effort: Pulmonary effort is normal.      Breath sounds: Normal breath sounds.   Abdominal:      General: Abdomen is flat. Bowel sounds are normal.      Palpations: Abdomen is soft.   Musculoskeletal:      Cervical back: Normal range of motion and neck supple.      Right lower leg: No edema.      Left lower leg: No edema.   Lymphadenopathy:      Cervical: No cervical adenopathy.   Skin:     General: Skin is warm and dry.   Neurological:      General: No focal deficit present.      Mental Status: She is alert and oriented to person, place, and time.   Psychiatric:         Mood and Affect: Mood normal.         Behavior: Behavior normal.                Assessment and Plan     1. Routine general medical examination at a health care facility  Comments:  reviewed age appropriate screenings and immunizations    2. Acquired hypothyroidism  -     levothyroxine (SYNTHROID) 50 MCG tablet; Take 1 tablet (50 mcg total) by mouth before breakfast.  Dispense: 90 tablet; Refill: 3  -     TSH; Future; Expected date: 09/26/2024  -     T4, FREE; Future; Expected date: 09/26/2024  -     LIPID PANEL; Future; Expected date:  09/26/2024  -     CBC W/ AUTO DIFFERENTIAL; Future; Expected date: 09/26/2024  -     COMPREHENSIVE METABOLIC PANEL; Future; Expected date: 09/26/2024    3. Abnormal glucose  -     HEMOGLOBIN A1C; Future; Expected date: 09/26/2024    4. Need for vaccination  -     Influenza - Trivalent - PF (ADULT)    5. CHLOE on CPAP    6. Chronic constipation    Other orders  -     polyethylene glycol (GLYCOLAX) 17 gram/dose powder; Take 17 g by mouth daily as needed for Constipation.  Dispense: 510 g; Refill: 3        Assessment & Plan    Assessed weight loss progress, noting decrease from 147 lbs in March to current 128 lbs  Evaluated need for thyroid level screening due to weight loss and time since last check  Considered CPAP usage in light of weight loss, suggesting possible discontinuation  Reviewed bowel management, recommending MiraLax for occasional constipation  Determined need for comprehensive lab work    PATIENT EDUCATION:   Explained MiraLax as a powder that helps bring water into the stomach to loosen stool   Discussed availability of shingles vaccine for future consideration    ACTION ITEMS/LIFESTYLE:   Daniela to return CPAP machine as it may no longer be needed due to weight loss    MEDICATIONS:   Started MiraLax, to be used daily as needed for constipation   Continued Levothyroxine with additional refills for 1 year    ORDERS:   Flu shot administered during visit   CBC, chemistry panel, thyroid function tests (TSH and free T4), lipid panel, Hemoglobin A1c, and folate level ordered    FOLLOW UP:   Follow up in 1 year for yearly appointment              Follow up in about 1 year (around 9/26/2025) for Annual Exam.    This note was generated with the assistance of ambient listening technology. Verbal consent was obtained by the patient and accompanying visitor(s) for the recording of patient appointment to facilitate this note. I attest to having reviewed and edited the generated note for accuracy, though some syntax  or spelling errors may persist. Please contact the author of this note for any clarification.

## 2024-09-30 DIAGNOSIS — Z17.0 MALIGNANT NEOPLASM OF LOWER-OUTER QUADRANT OF RIGHT BREAST OF FEMALE, ESTROGEN RECEPTOR POSITIVE: Primary | ICD-10-CM

## 2024-09-30 DIAGNOSIS — C50.511 MALIGNANT NEOPLASM OF LOWER-OUTER QUADRANT OF RIGHT BREAST OF FEMALE, ESTROGEN RECEPTOR POSITIVE: Primary | ICD-10-CM

## 2024-10-18 ENCOUNTER — TELEPHONE (OUTPATIENT)
Dept: PSYCHIATRY | Facility: CLINIC | Age: 54
End: 2024-10-18
Payer: MEDICARE

## 2024-10-22 ENCOUNTER — OFFICE VISIT (OUTPATIENT)
Dept: PSYCHIATRY | Facility: CLINIC | Age: 54
End: 2024-10-22
Payer: MEDICARE

## 2024-10-22 DIAGNOSIS — G47.00 INSOMNIA, UNSPECIFIED TYPE: ICD-10-CM

## 2024-10-22 DIAGNOSIS — F31.9 BIPOLAR I DISORDER, CURRENT EPISODE DEPRESSED: Primary | ICD-10-CM

## 2024-10-22 DIAGNOSIS — F41.9 ANXIETY: ICD-10-CM

## 2024-10-22 PROCEDURE — 99214 OFFICE O/P EST MOD 30 MIN: CPT | Mod: 95,,, | Performed by: PSYCHIATRY & NEUROLOGY

## 2024-10-22 PROCEDURE — 3044F HG A1C LEVEL LT 7.0%: CPT | Mod: CPTII,95,, | Performed by: PSYCHIATRY & NEUROLOGY

## 2024-10-22 RX ORDER — ASENAPINE 5 MG/1
5 TABLET SUBLINGUAL 2 TIMES DAILY
Qty: 60 TABLET | Refills: 3 | Status: SHIPPED | OUTPATIENT
Start: 2024-10-22 | End: 2025-10-22

## 2024-10-22 RX ORDER — CLONAZEPAM 1 MG/1
TABLET ORAL
Qty: 30 TABLET | Refills: 3 | Status: SHIPPED | OUTPATIENT
Start: 2024-10-22

## 2024-10-22 RX ORDER — TRAZODONE HYDROCHLORIDE 100 MG/1
TABLET ORAL
Qty: 30 TABLET | Refills: 3 | Status: SHIPPED | OUTPATIENT
Start: 2024-10-22
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
## 2024-10-24 NOTE — TELEPHONE ENCOUNTER
Received message this morning. When looking in chart it shows pt is or went to ED today.    2 person assist

## 2024-10-29 RX ORDER — ESCITALOPRAM OXALATE 10 MG/1
10 TABLET ORAL
Qty: 30 TABLET | Refills: 3 | Status: SHIPPED | OUTPATIENT
Start: 2024-10-29

## 2024-12-21 DIAGNOSIS — E03.9 ACQUIRED HYPOTHYROIDISM: Chronic | ICD-10-CM

## 2024-12-21 NOTE — TELEPHONE ENCOUNTER
No care due was identified.  [facility] Embedded Care Due Messages. Reference number: [id number].   12/21/2024 4:56:05 PM CST

## 2024-12-22 RX ORDER — LEVOTHYROXINE SODIUM 50 UG/1
50 TABLET ORAL
Qty: 90 TABLET | Refills: 3 | Status: SHIPPED | OUTPATIENT
Start: 2024-12-22

## 2024-12-22 NOTE — TELEPHONE ENCOUNTER
Refill Decision Note   Daniela Igor  is requesting a refill authorization.  Brief Assessment and Rationale for Refill:  Approve     Medication Therapy Plan:         Comments:     Note composed:2:42 PM 12/22/2024

## 2025-03-03 RX ORDER — TRAZODONE HYDROCHLORIDE 100 MG/1
TABLET ORAL
Qty: 30 TABLET | Refills: 0 | Status: SHIPPED | OUTPATIENT
Start: 2025-03-03

## 2025-06-26 ENCOUNTER — OFFICE VISIT (OUTPATIENT)
Dept: HEMATOLOGY/ONCOLOGY | Facility: CLINIC | Age: 55
End: 2025-06-26
Payer: MEDICARE

## 2025-06-26 ENCOUNTER — LAB VISIT (OUTPATIENT)
Dept: LAB | Facility: HOSPITAL | Age: 55
End: 2025-06-26
Attending: NURSE PRACTITIONER
Payer: MEDICARE

## 2025-06-26 VITALS
RESPIRATION RATE: 18 BRPM | SYSTOLIC BLOOD PRESSURE: 133 MMHG | BODY MASS INDEX: 21.01 KG/M2 | TEMPERATURE: 98 F | OXYGEN SATURATION: 98 % | DIASTOLIC BLOOD PRESSURE: 86 MMHG | WEIGHT: 126.13 LBS | HEART RATE: 111 BPM | HEIGHT: 65 IN

## 2025-06-26 DIAGNOSIS — D69.6 THROMBOCYTOPENIA: ICD-10-CM

## 2025-06-26 DIAGNOSIS — D69.6 THROMBOCYTOPENIA: Primary | ICD-10-CM

## 2025-06-26 DIAGNOSIS — R53.82 CHRONIC FATIGUE: ICD-10-CM

## 2025-06-26 DIAGNOSIS — D75.1 SECONDARY POLYCYTHEMIA: ICD-10-CM

## 2025-06-26 LAB
ABSOLUTE EOSINOPHIL (OHS): 0.09 K/UL
ABSOLUTE MONOCYTE (OHS): 0.3 K/UL (ref 0.3–1)
ABSOLUTE NEUTROPHIL COUNT (OHS): 4.16 K/UL (ref 1.8–7.7)
ALBUMIN SERPL BCP-MCNC: 4.7 G/DL (ref 3.5–5.2)
ALP SERPL-CCNC: 64 UNIT/L (ref 40–150)
ALT SERPL W/O P-5'-P-CCNC: 28 UNIT/L (ref 10–44)
ANION GAP (OHS): 16 MMOL/L (ref 8–16)
AST SERPL-CCNC: 25 UNIT/L (ref 11–45)
BASOPHILS # BLD AUTO: 0.01 K/UL
BASOPHILS NFR BLD AUTO: 0.2 %
BILIRUB SERPL-MCNC: 0.6 MG/DL (ref 0.1–1)
BUN SERPL-MCNC: 12 MG/DL (ref 6–20)
CALCIUM SERPL-MCNC: 9.9 MG/DL (ref 8.7–10.5)
CHLORIDE SERPL-SCNC: 100 MMOL/L (ref 95–110)
CO2 SERPL-SCNC: 23 MMOL/L (ref 23–29)
CREAT SERPL-MCNC: 1 MG/DL (ref 0.5–1.4)
ERYTHROCYTE [DISTWIDTH] IN BLOOD BY AUTOMATED COUNT: 12.3 % (ref 11.5–14.5)
GFR SERPLBLD CREATININE-BSD FMLA CKD-EPI: >60 ML/MIN/1.73/M2
GLUCOSE SERPL-MCNC: 104 MG/DL (ref 70–110)
HCT VFR BLD AUTO: 50.3 % (ref 37–48.5)
HGB BLD-MCNC: 16.4 GM/DL (ref 12–16)
IMM GRANULOCYTES # BLD AUTO: 0.02 K/UL (ref 0–0.04)
IMM GRANULOCYTES NFR BLD AUTO: 0.3 % (ref 0–0.5)
LYMPHOCYTES # BLD AUTO: 1.44 K/UL (ref 1–4.8)
MCH RBC QN AUTO: 29.4 PG (ref 27–31)
MCHC RBC AUTO-ENTMCNC: 32.6 G/DL (ref 32–36)
MCV RBC AUTO: 90 FL (ref 82–98)
NUCLEATED RBC (/100WBC) (OHS): 0 /100 WBC
PLATELET # BLD AUTO: 144 K/UL (ref 150–450)
PMV BLD AUTO: 10.4 FL (ref 9.2–12.9)
POTASSIUM SERPL-SCNC: 5.3 MMOL/L (ref 3.5–5.1)
PROT SERPL-MCNC: 8.2 GM/DL (ref 6–8.4)
RBC # BLD AUTO: 5.58 M/UL (ref 4–5.4)
RELATIVE EOSINOPHIL (OHS): 1.5 %
RELATIVE LYMPHOCYTE (OHS): 23.9 % (ref 18–48)
RELATIVE MONOCYTE (OHS): 5 % (ref 4–15)
RELATIVE NEUTROPHIL (OHS): 69.1 % (ref 38–73)
SODIUM SERPL-SCNC: 139 MMOL/L (ref 136–145)
WBC # BLD AUTO: 6.02 K/UL (ref 3.9–12.7)

## 2025-06-26 PROCEDURE — 99999 PR PBB SHADOW E&M-EST. PATIENT-LVL IV: CPT | Mod: PBBFAC,,, | Performed by: NURSE PRACTITIONER

## 2025-06-26 PROCEDURE — 3008F BODY MASS INDEX DOCD: CPT | Mod: CPTII,S$GLB,, | Performed by: NURSE PRACTITIONER

## 2025-06-26 PROCEDURE — 1160F RVW MEDS BY RX/DR IN RCRD: CPT | Mod: CPTII,S$GLB,, | Performed by: NURSE PRACTITIONER

## 2025-06-26 PROCEDURE — 82607 VITAMIN B-12: CPT

## 2025-06-26 PROCEDURE — 80053 COMPREHEN METABOLIC PANEL: CPT

## 2025-06-26 PROCEDURE — 3044F HG A1C LEVEL LT 7.0%: CPT | Mod: CPTII,S$GLB,, | Performed by: NURSE PRACTITIONER

## 2025-06-26 PROCEDURE — 1159F MED LIST DOCD IN RCRD: CPT | Mod: CPTII,S$GLB,, | Performed by: NURSE PRACTITIONER

## 2025-06-26 PROCEDURE — 99214 OFFICE O/P EST MOD 30 MIN: CPT | Mod: S$GLB,,, | Performed by: NURSE PRACTITIONER

## 2025-06-26 PROCEDURE — 85025 COMPLETE CBC W/AUTO DIFF WBC: CPT

## 2025-06-26 PROCEDURE — 36415 COLL VENOUS BLD VENIPUNCTURE: CPT | Mod: PO

## 2025-06-26 PROCEDURE — 82746 ASSAY OF FOLIC ACID SERUM: CPT

## 2025-06-26 PROCEDURE — 3075F SYST BP GE 130 - 139MM HG: CPT | Mod: CPTII,S$GLB,, | Performed by: NURSE PRACTITIONER

## 2025-06-26 PROCEDURE — 3079F DIAST BP 80-89 MM HG: CPT | Mod: CPTII,S$GLB,, | Performed by: NURSE PRACTITIONER

## 2025-06-26 RX ORDER — BENZTROPINE MESYLATE 0.5 MG/1
0.5 TABLET ORAL DAILY
COMMUNITY
Start: 2025-06-24

## 2025-06-26 RX ORDER — BUSPIRONE HYDROCHLORIDE 7.5 MG/1
TABLET ORAL
COMMUNITY
Start: 2025-06-24

## 2025-06-26 RX ORDER — QUETIAPINE FUMARATE 25 MG/1
25 TABLET, FILM COATED ORAL NIGHTLY
COMMUNITY
Start: 2025-06-24

## 2025-06-26 RX ORDER — QUETIAPINE FUMARATE 50 MG/1
50 TABLET, FILM COATED ORAL NIGHTLY
COMMUNITY
Start: 2025-06-24

## 2025-06-26 RX ORDER — OXCARBAZEPINE 300 MG/1
300 TABLET, FILM COATED ORAL 2 TIMES DAILY
COMMUNITY
Start: 2025-06-24

## 2025-06-26 NOTE — PROGRESS NOTES
Patient ID: Daniela Costa is a 55 y.o. female.    Chief Complaint: fatigue    HPI:  55 year old female who presents today as a new patient for further evaluation and recommendation of elevated hemoglobin.       She has a history of breast cancer diagnosed at age 40 y/o  Right side- 6.1 cm overall size  Abnormal mammo after palpated mass  Palpable mass- pt palpated  Breast biopsy with core or excision- invasive ductal carcinoma     Pathology- per pt initially stage 2 - then bumped to stage 3     Treatment Lumpectomy or mastectomy Reconstruction- immediate vs delayed Lymph node biopsy- sentinel node Genetic testing- negative for only BRCA 1 & 2 Radiation- right chest wall 4 months  Curtis-Adjuvant therapy- AC X4 and taxol X 12.   endocrine - estrogen positive- tamoxifen first - 1 year- changed to exemestane for the last 4 years. Completed May 2019 - followed by Anna Main, NP     Has a significant pulmonary history including reactive airway disease, mild intermittent asthma, and airway hyperactivity.  Followed by pulmonology/Dr. John     States that her father has polycythemia requiring phlebotomy.      Family hx of cancer:  Father NHL, ALL as a child, no polycythemia  Mother: Breast cancer  Self: Breast Cancer     Is trying to lose weight and has lost 30 lbs since September 2022.       Denies smoking currently.  States that she quit in 1/2014.  Smoked on/off for 20 years (about total 10 years) 1/2 to 1 pack/day.  Drinks a glass a wine maybe once per week.  Denies illicit drug use.      Currently disabled - non working at this time     10/31/2022 colonoscopyThe examined portion of the ileum was normal.  Two 8 to 10 mm polyps in the sigmoid colon,    removed with a hot snare. Resected and retrieved. Pathology of Tubular adenoma - Repeat colonoscopy in 3 years.   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  States that she has been diagnosed with sleep apnea and is trying to use it nightly; however her current mask is irritating her skin so she has to take a break sometimes from using to let her skin heal.  C/o shortness of breath at times and chronic lower back pain being followed [facility] currently in [place].      Interval History:  12/11/2023  Presents today for evaluation of labs- found with secondary polycythemia with reactive airway disease and mild intermittent asthma along with sleep apnea.  Has not received the new CPAP mask as of yet so has been using about 2-3 nights/week.  States has skin irritation after using and does not sleep as well with it on.      Also noted new thrombocytopenia - Latuda can cause thrombocytopenia.  Denies any abnormal bleeding or bruising and states that the medication is helping her.      CT chest with contrast 12/5/2023 Impression:     1. Peripherally enhancing 2.1 cm lesion in the right hepatic lobe as above, possibly representing a hemangioma.  Further characterization could be obtained with contrast enhanced MRI or multiphase CT.  2. No acute findings demonstrated in the thorax.    Interval History:  3/14/2024  States that she is feeling tired.  Had a dental extraction a couple of days ago.  CT abdomen with contrast 2/26/2024 showed 2.2 cm hemangioma of liver.  Patient is asymptomatic.  Hct 49.1 - does not wear her CPAP at night.  Has had intentional weight loss and would like to repeat sleep study.  Discontinued Latuda almost 2 months ago.  Currently taking Abilify for the past week.  Prior to this was on Lamictal for 6 weeks.  Hct < 55%.  No phlebotomy needed.    Interval History:  6/26/2025  Currently no longer with sleep apnea requiring cpap.  Lost a total of 120 lbs intentionally over the past 1-1/2 years.   Needs current labs for eval secondary  polycythemia due to lung disease.     Labs from 2025 show hct 50.3.  Platelet count 144 K.  Folate/b12 normal.  I have reviewed all of the patient's relevant lab work available in the medical record and have utilized this in my evaluation and management recommendations today.      Social History     Socioeconomic History    Marital status:     Number of children: 6   Occupational History    Occupation: stay home mom    Tobacco Use    Smoking status: Former     Current packs/day: 0.00     Average packs/day: 0.5 packs/day for 23.2 years (11.6 ttl pk-yrs)     Types: Cigarettes     Start date: 1990     Quit date: 2014     Years since quittin.4    Smokeless tobacco: Never   Substance and Sexual Activity    Alcohol use: Not Currently    Drug use: No    Sexual activity: Not Currently     Partners: Male     Birth control/protection: See Surgical Hx   Social History Narrative    Patient has 4 biological children and 2 step     Social Drivers of Health     Financial Resource Strain: Medium Risk (3/4/2024)    Overall Financial Resource Strain (CARDIA)     Difficulty of Paying Living Expenses: Somewhat hard   Food Insecurity: Patient Declined (2025)    Received from [employer] [facility] and Its Subsidiaries and Affiliates    Hunger Vital Sign     Worried About Running Out of Food in the Last Year: Patient declined     Ran Out of Food in the Last Year: Patient declined   Transportation Needs: Unknown (2025)    [facility] [employer] [facility]    PRAPARE - Transportation     Lack of Transportation (Medical): No     Lack of Transportation (Non-Medical): Patient declined   Physical Activity: Insufficiently Active (3/4/2024)    Exercise Vital Sign     Days of Exercise per Week: 3 days     Minutes of Exercise per Session: 30 min   Stress: Stress Concern Present (3/4/2024)    [employer]  "Occupational Health - Occupational Stress Questionnaire     Feeling of Stress : To some extent   Housing Stability: Patient Declined (2/26/2025)    Received from [facility] and Its Subsidiaries and Affiliates    Housing Stability Vital Sign     Unable to Pay for Housing in the Last Year: Patient declined     Number of Times Moved in the Last Year: 0     Homeless in the Last Year: Patient declined       Family History   Problem Relation Name Age of Onset    Diabetes Mother Fátima     Hyperlipidemia Mother Fátima     Breast cancer Mother Fátima 53        lumpectomy, chemo, radiation    Arthritis Mother Fátima     Cancer Mother Fátima     Hypertension Mother Fátima     Diabetes Father Noe     Hyperlipidemia Father Noe     Other Father Noe         non-Hogdkin's lymphoma    Arthritis Father Noe     COPD Father Noe     Hypertension Father Noe     Other Sister Alka         prophylactic BL mastectomy    Other Sister Anabela         prophylactic BL mastecomy    Down syndrome Other Vivian     Cancer Maternal Grandfather Sacha     Bipolar disorder Maternal Grandmother Estefania     Breast cancer Maternal Grandmother Estefania     Melanoma Maternal Grandmother Estefania         on scalp with brain mets    Arthritis Maternal Grandmother Estefania     Cancer Maternal Grandmother Estefania     Hearing loss Maternal Grandmother Estefania     Liver cancer Paternal Grandfather Joe     Ovarian cancer Paternal Grandmother Sherine Dumont         metastatic    Melanoma Maternal Aunt Madeleine     Breast cancer Maternal Aunt Madeleine         DCIS    Bipolar disorder Maternal Aunt Christianne     Bipolar disorder Maternal Aunt Lory     Cancer Paternal Aunt Deanne         "in lymphatic csystem"    Pancreatic cancer Paternal Uncle Vamsi        Past Surgical History:   Procedure Laterality Date    ADENOIDECTOMY      BACK SURGERY      SI joint fusion    Breast Reconstruction  Bilateral     BREAST SURGERY      CERVICAL FUSION      " CERVICAL FUSION      COLONOSCOPY N/A 10/31/2022    Procedure: COLONOSCOPY;  Surgeon: Lexy Irizarry MD;  Location: [facility] ENDO;  Service: Endoscopy;  Laterality: N/A;    COSMETIC SURGERY  Several    HYSTERECTOMY      lumbar fusion      MASTECTOMY Right 2013    due to breast cancer, radiation done after surgery    MASTECTOMY Left 2015    propholytic due to breast cancer in right breast    ROBOT-ASSISTED LAPAROSCOPIC ABDOMINAL HYSTERECTOMY USING DA HEBERT XI N/A 12/05/2018    Procedure: XI ROBOTIC HYSTERECTOMY;  Surgeon: Meka Reich MD;  Location: [facility] OR;  Service: OB/GYN;  Laterality: N/A;    ROBOT-ASSISTED LAPAROSCOPIC LYSIS OF ADHESIONS USING DA HEBERT XI N/A 12/05/2018    Procedure: XI ROBOTIC LYSIS, ADHESIONS;  Surgeon: Meka Reich MD;  Location: [facility] OR;  Service: OB/GYN;  Laterality: N/A;    ROBOT-ASSISTED LAPAROSCOPIC SALPINGO-OOPHORECTOMY USING DA HEBERT XI Bilateral 12/05/2018    Procedure: XI ROBOTIC SALPINGO-OOPHORECTOMY;  Surgeon: Meka Reich MD;  Location: [facility] OR;  Service: OB/GYN;  Laterality: Bilateral;    SI joint fusion   03/26/2018    SPINE SURGERY  Several    TONSILLECTOMY      TUBAL LIGATION         Past Medical History:   Diagnosis Date    Allergy     Anxiety     Arthritis     Asthma     Breast cancer     june 2012; BRCA 1 and 2 negative    Breast cancer genetic susceptibility     Cancer     Depression     Hypothyroidism     Lung disease     Malignant neoplasm of lower-outer quadrant of right breast of female, estrogen receptor positive 10/05/2023    Pneumonia     Reactive airway disease that is not asthma 04/06/2017 2021 IMO Regulatory Import         Review of Systems   Constitutional:  Positive for fatigue. Negative for appetite change and unexpected weight change.   HENT:  Positive for mouth sores.    Eyes:  Negative for visual disturbance.   Respiratory:  Negative for cough and shortness of breath.    Cardiovascular:  Negative for chest pain.   Gastrointestinal:   Negative for abdominal pain and diarrhea.   Endocrine: Negative.    Genitourinary:  Negative for frequency.   Musculoskeletal:  Positive for arthralgias and back pain.   Skin:  Negative for rash.   Allergic/Immunologic: Negative.    Neurological:  Negative for headaches.   Hematological:  Negative for adenopathy.   Psychiatric/Behavioral:  The patient is nervous/anxious.       Medication List with Changes/Refills   Current Medications    ALBUTEROL (PROVENTIL) 2.5 MG /3 ML (0.083 %) NEBULIZER SOLUTION    USE 1 VIAL IN NEBULIZER EVERY 4 TO 6 HOURS AS NEEDED FOR WHEEZING FOR SHORTNESS OF BREATH    ASCORBATE CALCIUM (VITAMIN C ORAL)    Take by mouth.    ASENAPINE MALEATE (SAPHRIS) 5 MG SUBL    Place 1 tablet (5 mg total) under the tongue 2 (two) times daily.    BENZTROPINE (COGENTIN) 0.5 MG TABLET    Take 0.5 mg by mouth once daily.    BLOOD PRESSURE MONITOR (Agitar) ST SIZE    by Other route as needed for High Blood Pressure.    BREO ELLIPTA 200-25 MCG/DOSE DSDV DISKUS INHALER    Inhale 1 puff into the lungs once daily. Controller. Wash out mouth after use    BUSPIRONE (BUSPAR) 7.5 MG TABLET    Take by mouth.    CLONAZEPAM (KLONOPIN) 1 MG TABLET    TAKE 1 TABLET BY MOUTH ONCE DAILY AS NEEDED FOR ANXIETY    CYCLOBENZAPRINE (FLEXERIL) 10 MG TABLET    Take by mouth.    ESCITALOPRAM OXALATE (LEXAPRO) 10 MG TABLET    Take 1 tablet by mouth once daily    FLUTICASONE PROPIONATE (FLONASE) 50 MCG/ACTUATION NASAL SPRAY    Use 1 spray(s) in each nostril twice daily    HYDROCORTISONE 2.5 % LOTION    Apply topically 2 (two) times daily.    INHALATION SPACING DEVICE (BREATHERITE VALVED MDI CHAMBER)    Use as directed for inhalation.    LEVOTHYROXINE (SYNTHROID) 50 MCG TABLET    Take 1 tablet by mouth once daily    LORATADINE (CLARITIN) 10 MG TABLET    Take 1 tablet (10 mg total) by mouth once daily.    MELOXICAM (MOBIC) 15 MG TABLET    Take 15 mg by mouth.    MONTELUKAST (SINGULAIR) 10 MG TABLET    Take 1 tablet (10 mg total)  by mouth every evening.    ONDANSETRON (ZOFRAN-ODT) 4 MG TBDL    Take 4 mg by mouth every 6 (six) hours as needed.    OXCARBAZEPINE (TRILEPTAL) 300 MG TAB    Take 300 mg by mouth 2 (two) times daily.    POLYETHYLENE GLYCOL (GLYCOLAX) 17 GRAM/DOSE POWDER    Take 17 g by mouth daily as needed for Constipation.    PREVIDENT 5000 DRY MOUTH 1.1 % PSTE        QUETIAPINE (SEROQUEL) 25 MG TAB    Take 25 mg by mouth every evening.    QUETIAPINE (SEROQUEL) 50 MG TABLET    Take 50 mg by mouth every evening.    TRAZODONE (DESYREL) 100 MG TABLET    TAKE 1/2 TO 1 (ONE-HALF TO ONE) TABLET BY MOUTH AT BEDTIME AS NEEDED FOR SLEEP        Objective:     Vitals:    06/26/25 1610   BP: 133/86   Pulse: (!) 111   Resp: 18   Temp: 97.7 °F (36.5 °C)       Physical Exam  Constitutional:       Appearance: Normal appearance.   Pulmonary:      Effort: Pulmonary effort is normal.   Neurological:      Mental Status: She is alert and oriented to person, place, and time.   Psychiatric:         Mood and Affect: Mood normal.         Behavior: Behavior normal.         Thought Content: Thought content normal.         Judgment: Judgment normal.         Assessment:     Problem List Items Addressed This Visit       Chronic fatigue    Relevant Orders    CBC Auto Differential    Comprehensive Metabolic Panel    Vitamin B12    Folate    [place] Screen w/Reflex    Thrombocytopenia - Primary    Relevant Orders    CBC Auto Differential    Comprehensive Metabolic Panel    Vitamin B12    Folate    [place] Screen w/Reflex             Plan:   Thrombocytopenia  -     CBC Auto Differential; Future; Expected date: 06/26/2025  -     Comprehensive Metabolic Panel; Future; Expected date: 06/26/2025  -     Vitamin B12; Future; Expected date: 06/26/2025  -     Folate; Future; Expected date: 06/26/2025  -     [place] Screen w/Reflex; Future; Expected date: 06/26/2025    Chronic fatigue  -     CBC Auto Differential; Future; Expected date: 06/26/2025  -     Comprehensive Metabolic Panel;  Future; Expected date: 06/26/2025  -     Vitamin B12; Future; Expected date: 06/26/2025  -     Folate; Future; Expected date: 06/26/2025  -     [place] Screen w/Reflex; Future; Expected date: 06/26/2025        Med Onc Chart Routing      Follow up with physician    Follow up with BREANNA 3 months. VV - labs prior - possible phlebtomy for hct >55%   Infusion scheduling note   n/a   Injection scheduling note n/a   Labs   Scheduling:  Preferred lab: [facility]  Lab interval:  cbc, cmp, erythropoetin prior to visit   Imaging   N/a   Pharmacy appointment No pharmacy appointment needed      Other referrals No nutrition appointment needed -        No additional referrals needed  n/a          Collaborating Provider:  Dr. Justine Serrano    Thank You,  Yamil Stover, BONNIEP-C  Benign Hematology

## 2025-06-27 LAB
FOLATE SERPL-MCNC: 14 NG/ML (ref 4–24)
VIT B12 SERPL-MCNC: 656 PG/ML (ref 210–950)

## 2025-07-09 DIAGNOSIS — D69.6 THROMBOCYTOPENIA: Primary | ICD-10-CM

## 2025-07-16 ENCOUNTER — PATIENT MESSAGE (OUTPATIENT)
Dept: HEMATOLOGY/ONCOLOGY | Facility: CLINIC | Age: 55
End: 2025-07-16
Payer: MEDICARE

## 2025-07-17 ENCOUNTER — PATIENT MESSAGE (OUTPATIENT)
Dept: INTERNAL MEDICINE | Facility: CLINIC | Age: 55
End: 2025-07-17

## 2025-07-17 ENCOUNTER — OFFICE VISIT (OUTPATIENT)
Dept: INTERNAL MEDICINE | Facility: CLINIC | Age: 55
End: 2025-07-17
Payer: MEDICARE

## 2025-07-17 DIAGNOSIS — J45.20 MILD INTERMITTENT ASTHMA WITHOUT COMPLICATION: ICD-10-CM

## 2025-07-17 DIAGNOSIS — R00.0 TACHYCARDIA: ICD-10-CM

## 2025-07-17 DIAGNOSIS — R25.1 TREMOR OF BOTH HANDS: ICD-10-CM

## 2025-07-17 DIAGNOSIS — R41.3 MEMORY LOSS: Primary | ICD-10-CM

## 2025-07-17 DIAGNOSIS — I10 PRIMARY HYPERTENSION: ICD-10-CM

## 2025-07-17 DIAGNOSIS — Z11.3 ENCOUNTER FOR SCREENING FOR INFECTIONS WITH A PREDOMINANTLY SEXUAL MODE OF TRANSMISSION: ICD-10-CM

## 2025-07-17 DIAGNOSIS — G47.33 OSA ON CPAP: ICD-10-CM

## 2025-07-17 DIAGNOSIS — M25.50 ARTHRALGIA, UNSPECIFIED JOINT: ICD-10-CM

## 2025-07-17 PROCEDURE — 98006 SYNCH AUDIO-VIDEO EST MOD 30: CPT | Mod: 95,,, | Performed by: FAMILY MEDICINE

## 2025-07-17 PROCEDURE — 3044F HG A1C LEVEL LT 7.0%: CPT | Mod: CPTII,95,, | Performed by: FAMILY MEDICINE

## 2025-07-17 PROCEDURE — 1160F RVW MEDS BY RX/DR IN RCRD: CPT | Mod: CPTII,95,, | Performed by: FAMILY MEDICINE

## 2025-07-17 PROCEDURE — 1159F MED LIST DOCD IN RCRD: CPT | Mod: CPTII,95,, | Performed by: FAMILY MEDICINE

## 2025-07-17 NOTE — PROGRESS NOTES
Subjective     Patient ID: Daniela Costa is a 55 y.o. female.        The patient location is: [place]  The chief complaint leading to consultation is: multiple complaints    Visit type: audiovisual    Face to Face time with patient: 16  35 minutes of total time spent on the encounter, which includes face to face time and non-face to face time preparing to see the patient (eg, review of tests), Obtaining and/or reviewing separately obtained history, Documenting clinical information in the electronic or other health record, Independently interpreting results (not separately reported) and communicating results to the patient/family/caregiver, or Care coordination (not separately reported).         Each patient to whom he or she provides medical services by telemedicine is:  (1) informed of the relationship between the physician and patient and the respective role of any other health care provider with respect to management of the patient; and (2) notified that he or she may decline to receive medical services by telemedicine and may withdraw from such care at any time.      History of Present Illness    PRESENTATION:  Daniela presents with multiple concerns including elevated heart rate, elevated blood pressures, weight loss, hand tremors, frequent use of albuterol inhaler, and potential memory issues.    HPI:  Daniela states she has been having declining memory problems since 2017/2018. Patient states she feels the symptoms are worsening as she could not remember her alarm code on yesterday.     Patient states in February this she awoke and could not get out of bed. Her legs were very heavy, she had trouble breathing and went into panic mode. She eventually recovered. Several days later she developed tremors in her hands. Patient states she began thinking of family member who developed Parkinsons and was thinking that is what she had. She took an overdose of multiple medications. She was taken to the ED via  ambulance treated and transferred to [facility].     Since this event she has had multiple ER visit for different symptoms including elevated heart rate and blood pressure, vomiting, and continuous post nasal drip.  Daniela reports an elevated heart rate and has not yet been evaluated by a cardiologist for this issue. She is using her albuterol inhaler 3-4 times daily, suggesting poorly controlled asthma but has not followed up with pulmonology. She reports low O2 sats at night but is not wearing her CPAP machine for sleep. She reports dry mouth and eyes, ringing in her ears, dizziness, loss of appetite, no urge to urinate or defecate, difficulty regulating her temperature and problems swallowing. Her current weight is 126 lbs. There are indications of potential cognitive issues, as evidenced by the need for a memory evaluation and cognitive testing. She also has some neurological symptoms, as indicated by the suggestion for a neurology referral.        IMAGING:  Daniela underwent a CT of the head in February, which was negative for acute pathology.      ROS:  General: -fever, -chills, -fatigue, -weight gain, -weight loss  Eyes: -vision changes, -redness, -discharge  ENT: -ear pain, -nasal congestion, -sore throat  Cardiovascular: -chest pain, -palpitations, -lower extremity edema, +feelings of fast heart rate  Respiratory: -cough, -shortness of breath, +intermittent breathing while sleeping  Gastrointestinal: -abdominal pain, -nausea, +vomiting, -diarrhea, -constipation, -blood in stool  Genitourinary: -dysuria, -hematuria, -frequency  Musculoskeletal: +joint pain, -muscle pain  Skin: -rash, -lesion  Neurological: -headache, +dizziness, -numbness, -tingling, +memory loss  Psychiatric: +anxiety, +depression, -sleep difficulty            Objective     Physical Exam  Vitals and nursing note reviewed.   Constitutional:       Appearance: Normal appearance.   HENT:      Head: Normocephalic and atraumatic.    Pulmonary:      Effort: Pulmonary effort is normal.   Neurological:      General: No focal deficit present.      Mental Status: She is alert and oriented to person, place, and time.   Psychiatric:         Mood and Affect: Mood normal.         Behavior: Behavior normal.              Assessment and Plan     1. Memory loss  -     T4, Free; Future; Expected date: 07/18/2025  -     TSH; Future; Expected date: 07/18/2025  -     Treponema Pallidium Antibodies IgG, IgM; Future; Expected date: 07/18/2025  -     MRI Brain Without Contrast; Future; Expected date: 07/17/2025    2. Arthralgia, unspecified joint  -     Uric Acid; Future; Expected date: 07/18/2025  -     Cyclic Citrullinated Peptide Antibody, IgG; Future; Expected date: 07/18/2025  -     Rheumatoid Factor; Future; Expected date: 07/18/2025    3. Tremor of both hands    4. Mild intermittent asthma without complication- Radiation induced asthma    5. Tachycardia  -     T4, Free; Future; Expected date: 07/18/2025  -     TSH; Future; Expected date: 07/18/2025    6. Primary hypertension  -     T4, Free; Future; Expected date: 07/18/2025  -     TSH; Future; Expected date: 07/18/2025  -     Comprehensive Metabolic Panel; Future; Expected date: 07/18/2025  -     CBC Auto Differential; Future; Expected date: 07/18/2025    7. CHLOE on CPAP    8. Encounter for screening for infections with a predominantly sexual mode of transmission  -     Treponema Pallidium Antibodies IgG, IgM; Future; Expected date: 07/18/2025  -     HIV 1/2 Ag/Ab (4th Gen); Future; Expected date: 07/18/2025  -     Hepatitis C Antibody; Future; Expected date: 07/18/2025        Assessment & Plan    Evaluated need for comprehensive workup including labs, imaging, and in-person assessment.    ORDERS:   Ordered fasting labs.   Ordered MRI.   Ordered neuropsych testing for memory symptoms.    REFERRALS:   Consider referral to neurology and neuropsych      MD counseled patient on all conditions and answered  questions.   Evaluate mineral deficiencies to determine need for supplementation.        Follow up in about 2 weeks (around 7/31/2025).    This note was generated with the assistance of ambient listening technology. Verbal consent was obtained by the patient and accompanying visitor(s) for the recording of patient appointment to facilitate this note. I attest to having reviewed and edited the generated note for accuracy, though some syntax or spelling errors may persist. Please contact the author of this note for any clarification.

## 2025-07-18 ENCOUNTER — LAB VISIT (OUTPATIENT)
Dept: LAB | Facility: HOSPITAL | Age: 55
End: 2025-07-18
Attending: FAMILY MEDICINE
Payer: MEDICARE

## 2025-07-18 DIAGNOSIS — R00.0 TACHYCARDIA: ICD-10-CM

## 2025-07-18 DIAGNOSIS — R41.3 MEMORY LOSS: ICD-10-CM

## 2025-07-18 DIAGNOSIS — I10 PRIMARY HYPERTENSION: ICD-10-CM

## 2025-07-18 DIAGNOSIS — Z11.3 ENCOUNTER FOR SCREENING FOR INFECTIONS WITH A PREDOMINANTLY SEXUAL MODE OF TRANSMISSION: ICD-10-CM

## 2025-07-18 DIAGNOSIS — M25.50 ARTHRALGIA, UNSPECIFIED JOINT: ICD-10-CM

## 2025-07-18 LAB
ABSOLUTE EOSINOPHIL (OHS): 0 K/UL
ABSOLUTE MONOCYTE (OHS): 0.31 K/UL (ref 0.3–1)
ABSOLUTE NEUTROPHIL COUNT (OHS): 2.77 K/UL (ref 1.8–7.7)
ALBUMIN SERPL BCP-MCNC: 4.6 G/DL (ref 3.5–5.2)
ALP SERPL-CCNC: 57 UNIT/L (ref 40–150)
ALT SERPL W/O P-5'-P-CCNC: 34 UNIT/L (ref 10–44)
ANION GAP (OHS): 14 MMOL/L (ref 8–16)
AST SERPL-CCNC: 33 UNIT/L (ref 11–45)
BASOPHILS # BLD AUTO: 0.01 K/UL
BASOPHILS NFR BLD AUTO: 0.2 %
BILIRUB SERPL-MCNC: 1 MG/DL (ref 0.1–1)
BUN SERPL-MCNC: 11 MG/DL (ref 6–20)
CALCIUM SERPL-MCNC: 9.8 MG/DL (ref 8.7–10.5)
CHLORIDE SERPL-SCNC: 99 MMOL/L (ref 95–110)
CO2 SERPL-SCNC: 27 MMOL/L (ref 23–29)
CREAT SERPL-MCNC: 0.9 MG/DL (ref 0.5–1.4)
CYCLIC CITRULLINATED PEPTIDE (CCP) (OHS): <0.5 U/ML
ERYTHROCYTE [DISTWIDTH] IN BLOOD BY AUTOMATED COUNT: 12.9 % (ref 11.5–14.5)
GFR SERPLBLD CREATININE-BSD FMLA CKD-EPI: >60 ML/MIN/1.73/M2
GLUCOSE SERPL-MCNC: 75 MG/DL (ref 70–110)
HCT VFR BLD AUTO: 46.9 % (ref 37–48.5)
HCV AB SERPL QL IA: NORMAL
HGB BLD-MCNC: 15.3 GM/DL (ref 12–16)
HIV 1+2 AB+HIV1 P24 AG SERPL QL IA: NORMAL
IMM GRANULOCYTES # BLD AUTO: 0.01 K/UL (ref 0–0.04)
IMM GRANULOCYTES NFR BLD AUTO: 0.2 % (ref 0–0.5)
LYMPHOCYTES # BLD AUTO: 1.24 K/UL (ref 1–4.8)
MCH RBC QN AUTO: 29.5 PG (ref 27–31)
MCHC RBC AUTO-ENTMCNC: 32.6 G/DL (ref 32–36)
MCV RBC AUTO: 90 FL (ref 82–98)
NUCLEATED RBC (/100WBC) (OHS): 0 /100 WBC
PLATELET # BLD AUTO: 194 K/UL (ref 150–450)
PMV BLD AUTO: 10.1 FL (ref 9.2–12.9)
POTASSIUM SERPL-SCNC: 4.8 MMOL/L (ref 3.5–5.1)
PROT SERPL-MCNC: 7.3 GM/DL (ref 6–8.4)
RBC # BLD AUTO: 5.19 M/UL (ref 4–5.4)
RELATIVE EOSINOPHIL (OHS): 0 %
RELATIVE LYMPHOCYTE (OHS): 28.6 % (ref 18–48)
RELATIVE MONOCYTE (OHS): 7.1 % (ref 4–15)
RELATIVE NEUTROPHIL (OHS): 63.9 % (ref 38–73)
RHEUMATOID FACT SERPL-ACNC: <13 IU/ML
SODIUM SERPL-SCNC: 140 MMOL/L (ref 136–145)
T PALLIDUM IGG+IGM SER QL: NORMAL
T4 FREE SERPL-MCNC: 1.28 NG/DL (ref 0.71–1.51)
TSH SERPL-ACNC: 2.1 UIU/ML (ref 0.4–4)
URATE SERPL-MCNC: 6 MG/DL (ref 2.4–5.7)
WBC # BLD AUTO: 4.34 K/UL (ref 3.9–12.7)

## 2025-07-18 PROCEDURE — 86593 SYPHILIS TEST NON-TREP QUANT: CPT

## 2025-07-18 PROCEDURE — 87389 HIV-1 AG W/HIV-1&-2 AB AG IA: CPT

## 2025-07-18 PROCEDURE — 84439 ASSAY OF FREE THYROXINE: CPT

## 2025-07-18 PROCEDURE — 84550 ASSAY OF BLOOD/URIC ACID: CPT

## 2025-07-18 PROCEDURE — 36415 COLL VENOUS BLD VENIPUNCTURE: CPT | Mod: PO

## 2025-07-18 PROCEDURE — 85025 COMPLETE CBC W/AUTO DIFF WBC: CPT

## 2025-07-18 PROCEDURE — 86200 CCP ANTIBODY: CPT

## 2025-07-18 PROCEDURE — 84443 ASSAY THYROID STIM HORMONE: CPT

## 2025-07-18 PROCEDURE — 86803 HEPATITIS C AB TEST: CPT

## 2025-07-18 PROCEDURE — 86431 RHEUMATOID FACTOR QUANT: CPT

## 2025-07-18 PROCEDURE — 80053 COMPREHEN METABOLIC PANEL: CPT

## 2025-07-19 ENCOUNTER — PATIENT MESSAGE (OUTPATIENT)
Dept: INTERNAL MEDICINE | Facility: CLINIC | Age: 55
End: 2025-07-19
Payer: MEDICARE

## 2025-07-19 ENCOUNTER — PATIENT MESSAGE (OUTPATIENT)
Dept: PSYCHIATRY | Facility: CLINIC | Age: 55
End: 2025-07-19
Payer: MEDICARE

## 2025-07-19 DIAGNOSIS — L50.9 URTICARIA: ICD-10-CM

## 2025-07-21 ENCOUNTER — OFFICE VISIT (OUTPATIENT)
Dept: SURGERY | Facility: CLINIC | Age: 55
End: 2025-07-21
Payer: MEDICARE

## 2025-07-21 VITALS
BODY MASS INDEX: 22.34 KG/M2 | DIASTOLIC BLOOD PRESSURE: 94 MMHG | WEIGHT: 134.06 LBS | SYSTOLIC BLOOD PRESSURE: 135 MMHG | HEART RATE: 99 BPM | HEIGHT: 65 IN

## 2025-07-21 DIAGNOSIS — N63.21 MASS OF UPPER OUTER QUADRANT OF LEFT BREAST: Primary | ICD-10-CM

## 2025-07-21 DIAGNOSIS — Z71.89 COUNSELING AND COORDINATION OF CARE: ICD-10-CM

## 2025-07-21 DIAGNOSIS — Z85.3 HISTORY OF RIGHT BREAST CANCER: ICD-10-CM

## 2025-07-21 DIAGNOSIS — Z17.0 MALIGNANT NEOPLASM OF LOWER-OUTER QUADRANT OF RIGHT BREAST OF FEMALE, ESTROGEN RECEPTOR POSITIVE: ICD-10-CM

## 2025-07-21 DIAGNOSIS — C50.511 MALIGNANT NEOPLASM OF LOWER-OUTER QUADRANT OF RIGHT BREAST OF FEMALE, ESTROGEN RECEPTOR POSITIVE: ICD-10-CM

## 2025-07-21 DIAGNOSIS — Z80.3 FAMILY HISTORY OF BREAST CANCER: ICD-10-CM

## 2025-07-21 DIAGNOSIS — Z71.89 COUNSELING ON HEALTH PROMOTION AND DISEASE PREVENTION: ICD-10-CM

## 2025-07-21 PROCEDURE — 99214 OFFICE O/P EST MOD 30 MIN: CPT | Mod: S$GLB,,,

## 2025-07-21 PROCEDURE — 1159F MED LIST DOCD IN RCRD: CPT | Mod: CPTII,S$GLB,,

## 2025-07-21 PROCEDURE — 3044F HG A1C LEVEL LT 7.0%: CPT | Mod: CPTII,S$GLB,,

## 2025-07-21 PROCEDURE — 3075F SYST BP GE 130 - 139MM HG: CPT | Mod: CPTII,S$GLB,,

## 2025-07-21 PROCEDURE — 3080F DIAST BP >= 90 MM HG: CPT | Mod: CPTII,S$GLB,,

## 2025-07-21 PROCEDURE — 99999 PR PBB SHADOW E&M-EST. PATIENT-LVL IV: CPT | Mod: PBBFAC,,,

## 2025-07-21 PROCEDURE — 3008F BODY MASS INDEX DOCD: CPT | Mod: CPTII,S$GLB,,

## 2025-07-21 RX ORDER — LORATADINE 10 MG/1
10 TABLET ORAL DAILY
Qty: 30 TABLET | Refills: 6 | Status: SHIPPED | OUTPATIENT
Start: 2025-07-21

## 2025-07-21 NOTE — PROGRESS NOTES
[facility]    07/21/2025  CHIEF COMPLAINT: new left breast lumps     PMHx:   This is a 55 y.o. female who presents for evaluation of new L breast lumps.     Patient was diagnosed with right multifocal breast cancer in May 2012 at the age of 42. MRI showed the second area of cancer in the right breast and worrisome LNs that were positive on core biopsy. There was a left breast nodule that was consistent with fibrocystic changes only at core biopsy. She completed kamila-adjuvant chemotherapy (Dr. Larios). Right MRM (2013) showed a residual 6.1 mm Grade III IDC with negative margins and 1 of 7 positive LNs. BRCA NEGATIVE. ER/NV Positive and HER 2 negative by FISH. She completed adjvuant Radiation with Dr. Longo or R chest wall & axilla radiation. She started Tamoxifin in April 2013 and switched to Aromasin in November 2015 - completed 5 years of adjuvant endocrine therapy. In January 2016 she elected prophylactic left mastectomy that showed NEM.     bilateral right mastectomy with delayed reconstruction- prophylactic mastectomy left - latissimus flap reconstruction with implant- right- implant left- saline implants- had full axillary dissection     HISTORY OF PRESENT ILLNESS:   Daniela Costa is a 55 y.o. female comes in for evaluation of a change in her self breast exam. She states that she does monthly self-breast exams regularly.  About 1 week ago, noticed new lumps to left breasts.  Lumps located to lateral breast and described as BB pellet size, several that she has felt.  Lumps do not cause any pain, no drainage or skin changes.  Patient states that they were not present during her self-breast exam last month.  Located along her incision.  There have been no changes to family history. The patient denies constitutional symptoms of night sweats, chills, new headaches, visual changes, new back or bony pain, chest pain, or shortness of breath. Reports intentional weight loss.    States that she has new tremors and is undergoing workup for Parkinson's with PCP - has upcoming brain MRI.      Her breast cancer risk factors are as follows:  Menarche: 15 yo   Menopause: 40 yo. chemo- menopause- TAHBSO 2018   HRT: none  Pregnancies: , 4 miscarriages   Age at first live birth: 25yo  Breastfeeding: X4 - 6 mons, 2 years, 3.5 years   Smoking: Currently vapes. Hx of tobacco smoker, about total 10 pack years  ETOH: occasional   Previous radiation to neck or chest wall: right side- - Dr. Longo- 4 months radiation     Genetics: BRCA 1 and 2 negative- had extended testing 2022 that was negative     Family history:  -mother, breast cancer, age 47  -maternal grandmother, breast cancer & metastatic melanoma, age 65  -maternal grandfather, lung cancer, age 63  -father, non-hodgkins lymphoma, age 60    MEDICATIONS/ALLERGIES:     Current Outpatient Medications   Medication Sig    albuterol (PROVENTIL) 2.5 mg /3 mL (0.083 %) nebulizer solution USE 1 VIAL IN NEBULIZER EVERY 4 TO 6 HOURS AS NEEDED FOR WHEEZING FOR SHORTNESS OF BREATH    ASCORBATE CALCIUM (VITAMIN C ORAL) Take by mouth.    benztropine (COGENTIN) 0.5 MG tablet Take 0.5 mg by mouth once daily.    blood pressure monitor (IHEALEarthmill EASE) ST size by Other route as needed for High Blood Pressure.    BREO ELLIPTA 200-25 mcg/dose DsDv diskus inhaler Inhale 1 puff into the lungs once daily. Controller. Wash out mouth after use    busPIRone (BUSPAR) 7.5 MG tablet Take by mouth.    cyclobenzaprine (FLEXERIL) 10 MG tablet Take by mouth.    fluticasone propionate (FLONASE) 50 mcg/actuation nasal spray Use 1 spray(s) in each nostril twice daily    hydrocortisone 2.5 % lotion Apply topically 2 (two) times daily.    inhalation spacing device (BREATHERITE VALVED MDI CHAMBER) Use as directed for inhalation.    levothyroxine (SYNTHROID) 50 MCG tablet Take 1 tablet by mouth once daily    montelukast (SINGULAIR) 10 mg tablet Take 1 tablet (10 mg total) by  "mouth every evening.    ondansetron (ZOFRAN-ODT) 4 MG TbDL Take 4 mg by mouth every 6 (six) hours as needed.    polyethylene glycol (GLYCOLAX) 17 gram/dose powder Take 17 g by mouth daily as needed for Constipation.    PREVIDENT 5000 DRY MOUTH 1.1 % Pste     QUEtiapine (SEROQUEL) 25 MG Tab Take 25 mg by mouth every evening.    QUEtiapine (SEROQUEL) 50 MG tablet Take 50 mg by mouth every evening.    loratadine (CLARITIN) 10 mg tablet Take 1 tablet (10 mg total) by mouth once daily.    meloxicam (MOBIC) 15 MG tablet Take 15 mg by mouth. (Patient not taking: Reported on 6/26/2025)    OXcarbazepine (TRILEPTAL) 300 MG Tab Take 300 mg by mouth 2 (two) times daily.     No current facility-administered medications for this visit.      Review of patient's allergies indicates:   Allergen Reactions    Cefdinir      Radiation Recall, everywhere she had radiation it looked like blisters and very hot to touch    Levofloxacin      Went into deep depression. Messed with Pych meds    Sodium benzoate Hives    [place] Hives       REVIEW OF SYSTEMS:   I have reviewed 12 systems, including 2 points per system.   Review of Systems   Constitutional:  Negative for chills, fever and weight loss.   Eyes:  Negative for blurred vision, double vision and pain.   Respiratory:  Negative for cough, shortness of breath and wheezing.    Cardiovascular:  Negative for chest pain, palpitations and leg swelling.   Gastrointestinal:  Negative for abdominal pain and blood in stool.   Musculoskeletal:  Negative for back pain.   Neurological:  Positive for tremors. Negative for dizziness, weakness and headaches.   Breast: +breast masses (L breast). Pt denies any breast pain, nipple inversion, nipple discharge, skin changes, or axillary masses    PHYSICAL EXAM:   BP (!) 135/94   Pulse 99   Ht 5' 5" (1.651 m)   Wt 60.8 kg (134 lb 0.6 oz)   LMP  (LMP Unknown) Comment: no cycle 8/2012  BMI 22.31 kg/m²     Physical Exam  Vitals reviewed. Exam conducted " with a chaperone present.   Constitutional:       General: She is not in acute distress.     Appearance: Normal appearance. She is not ill-appearing or toxic-appearing.   HENT:      Head: Normocephalic and atraumatic.      Right Ear: External ear normal.      Left Ear: External ear normal.      Nose: Nose normal.   Cardiovascular:      Rate and Rhythm: Normal rate.   Pulmonary:      Effort: Pulmonary effort is normal. No respiratory distress.   Chest:   Breasts:     Right: Absent. No swelling, bleeding, inverted nipple, mass, nipple discharge, skin change or tenderness.      Left: Absent. No swelling, bleeding, inverted nipple, mass, nipple discharge, skin change or tenderness.          Comments: Bilateral breasts have well-healed surgical scars without evidence of skin changes, erythema, or retraction. No signs of lymphedema. Implant contours are symmetric without distortion or visible abnormalities.    Left breast: prophylactic mastectomy with saline implant  Right breast: latissimus dorsi flap with saline implant  Musculoskeletal:         General: Normal range of motion.      Cervical back: Normal range of motion and neck supple.   Lymphadenopathy:      Cervical: No cervical adenopathy.      Upper Body:      Right upper body: No supraclavicular or axillary adenopathy.      Left upper body: No supraclavicular or axillary adenopathy.   Skin:     General: Skin is warm and dry.   Neurological:      Mental Status: She is alert and oriented to person, place, and time.   Psychiatric:         Mood and Affect: Mood normal.         Behavior: Behavior normal.       IMAGING:   N/A    ASSESSMENT:   This is a 55 y.o. female with history of R breast cancer (2012) s/p neoadj chemo, right MRM, adjuvant radiation and endocrine therapy + prophylactic L breast mastectomy with bilateral reconstructions who presents for evaluation of new L breast palpable masses.       PLAN:   -Benign-appearing palpable nodules on the left  reconstructed breast. Differential includes fat necrosis, scar tissue, or implant-related changes. Recommend imaging for further characterization due to personal hx of breast cancer and new finding on breast self exam - ordered L breast diagnostic mammogram + US. Will f/u on results. R breast /chest wall exam WNL.     -We discussed the possible signs and symptoms of breast cancer as lump, masses, new asymmetries, skin changes and nipple changes. Continue monthly self breast exams. She is encouraged to contact me if any new breast concerns arise.      -Encouraged healthy diet and exercise to maintain healthy weight to reduce breast cancer risk. Discussed that the underlying cause of increased breast cancer risk in patients with excess body weight is excess estrogen produced and stored in excess adipose tissue. Also encouraged smoking cessation and limit alcohol use.     -The patient will return to clinic in 1 year for chest wall examination given personal hx of breast cancer.    -The patient is in agreement with the plan. Questions were encouraged and answered to patient's satisfaction.The patient will call with any questions or concerns.      Sherita See PA-C  Breast Surgery  Marysnilo Bowers I spent a total of 30 minutes on the day of the visit.  This includes face to face time and non-face to face time preparing to see the patient (eg, review of tests), obtaining and/or reviewing separately obtained history, documenting clinical information in the electronic or other health record, independently interpreting results and communicating results to the patient/family/caregiver, or care coordinator.

## 2025-07-23 ENCOUNTER — TELEPHONE (OUTPATIENT)
Dept: INTERNAL MEDICINE | Facility: CLINIC | Age: 55
End: 2025-07-23
Payer: MEDICARE

## 2025-07-23 NOTE — TELEPHONE ENCOUNTER
Pt has an appointment to see Dr Tyrese manzanares. Pt has nausea and is asking for a rx for Zofran. I did tell pt that Dr Xiong is out this afternoon and she may not see the request until tomorrow, but I will pend it to her

## 2025-07-23 NOTE — TELEPHONE ENCOUNTER
Copied from CRM #[id number]. Topic: General Inquiry - Patient Advice  >> Jul 23, 2025 12:00 PM Karla wrote:  .Type:  Needs Medical Advice    Who Called:  Daniela   Symptoms (please be specific):  nausea, dizziness  How long has patient had these symptoms:   for weeks     Pharmacy name and phone #:   [facility] [address] [address]  Phone: [phone] Fax: [phone]  Would the patient rather a call back or a response via [facility]?  Call back     Best Call Back Number:  [phone]    Additional Information:  Pt is calling in regard to getting a call back to discuss concerns pertaining to symptoms and states she would like to get something called in to help relive symptoms.  Pt also states she thinks she maybe possibly having mini strokes

## 2025-07-24 ENCOUNTER — OFFICE VISIT (OUTPATIENT)
Dept: INTERNAL MEDICINE | Facility: CLINIC | Age: 55
End: 2025-07-24
Payer: MEDICARE

## 2025-07-24 ENCOUNTER — TELEPHONE (OUTPATIENT)
Dept: PULMONOLOGY | Facility: CLINIC | Age: 55
End: 2025-07-24
Payer: MEDICARE

## 2025-07-24 ENCOUNTER — PATIENT MESSAGE (OUTPATIENT)
Dept: INTERNAL MEDICINE | Facility: CLINIC | Age: 55
End: 2025-07-24

## 2025-07-24 VITALS
HEIGHT: 65 IN | HEART RATE: 76 BPM | DIASTOLIC BLOOD PRESSURE: 76 MMHG | SYSTOLIC BLOOD PRESSURE: 120 MMHG | TEMPERATURE: 98 F | BODY MASS INDEX: 22.96 KG/M2 | WEIGHT: 137.81 LBS | OXYGEN SATURATION: 100 %

## 2025-07-24 DIAGNOSIS — R06.02 SOB (SHORTNESS OF BREATH): Primary | ICD-10-CM

## 2025-07-24 DIAGNOSIS — R56.9 SEIZURE-LIKE ACTIVITY: Primary | ICD-10-CM

## 2025-07-24 DIAGNOSIS — R11.0 NAUSEA: ICD-10-CM

## 2025-07-24 DIAGNOSIS — F31.4 BIPOLAR DISORDER, CURRENT EPISODE DEPRESSED, SEVERE, WITHOUT PSYCHOTIC FEATURES: ICD-10-CM

## 2025-07-24 PROCEDURE — G2211 COMPLEX E/M VISIT ADD ON: HCPCS | Mod: S$GLB,,, | Performed by: FAMILY MEDICINE

## 2025-07-24 PROCEDURE — 3044F HG A1C LEVEL LT 7.0%: CPT | Mod: CPTII,S$GLB,, | Performed by: FAMILY MEDICINE

## 2025-07-24 PROCEDURE — 3074F SYST BP LT 130 MM HG: CPT | Mod: CPTII,S$GLB,, | Performed by: FAMILY MEDICINE

## 2025-07-24 PROCEDURE — 3078F DIAST BP <80 MM HG: CPT | Mod: CPTII,S$GLB,, | Performed by: FAMILY MEDICINE

## 2025-07-24 PROCEDURE — 99999 PR PBB SHADOW E&M-EST. PATIENT-LVL V: CPT | Mod: PBBFAC,,, | Performed by: FAMILY MEDICINE

## 2025-07-24 PROCEDURE — 99214 OFFICE O/P EST MOD 30 MIN: CPT | Mod: S$GLB,,, | Performed by: FAMILY MEDICINE

## 2025-07-24 PROCEDURE — 3008F BODY MASS INDEX DOCD: CPT | Mod: CPTII,S$GLB,, | Performed by: FAMILY MEDICINE

## 2025-07-24 RX ORDER — ONDANSETRON 4 MG/1
4 TABLET, ORALLY DISINTEGRATING ORAL EVERY 6 HOURS PRN
Qty: 30 TABLET | Refills: 1 | Status: SHIPPED | OUTPATIENT
Start: 2025-07-24

## 2025-07-24 NOTE — TELEPHONE ENCOUNTER
Copied from CRM #[id number]. Topic: Appointments - Amb Referral  >> Jul 23, 2025  4:32 PM Mikki wrote:  Type:  orders     Who Called: Daniela   Symptoms (please be specific): orders    How long has patient had these symptoms:    Pharmacy name and phone #:    Would the patient rather a call back or a response via [facility]? Call back   Best Call Back Number: [phone]  Additional Information: requesting order for test to be place before appt

## 2025-07-25 ENCOUNTER — TELEPHONE (OUTPATIENT)
Dept: PSYCHIATRY | Facility: CLINIC | Age: 55
End: 2025-07-25
Payer: MEDICARE

## 2025-07-25 NOTE — TELEPHONE ENCOUNTER
Spoke with Nurse    Copied from CRM #[id number]. Topic: Medications - Medication Question  >> Jul 24, 2025  4:36 PM Josiah wrote:  Type:  Needs Medical Advice    Who Called: Opal with dr Xiong' office  Would the patient rather a call back or a response via [facility]? Call back  Best Call Back Number: [phone]  Additional Information: opal needing to confirm what medication dr Brito is currently prescribing pt

## 2025-08-04 ENCOUNTER — TELEPHONE (OUTPATIENT)
Dept: NEUROLOGY | Facility: CLINIC | Age: 55
End: 2025-08-04
Payer: MEDICARE

## 2025-08-04 ENCOUNTER — TELEPHONE (OUTPATIENT)
Dept: INTERNAL MEDICINE | Facility: CLINIC | Age: 55
End: 2025-08-04
Payer: MEDICARE

## 2025-08-04 NOTE — TELEPHONE ENCOUNTER
Pt is returning Madeleine call .     Copied from CRM #[id number]. Topic: General Inquiry - Return Call  >> Aug 4, 2025  3:00 PM Lexy wrote:  .Type:  Patient Returning Call    Who Called: pt   Who Left Message for Patient:  Does the patient know what this is regarding?:  Would the patient rather a call back or a response via [facility]? Call   Best Call Back Number: [phone] (home)  Additional Information:

## 2025-08-04 NOTE — TELEPHONE ENCOUNTER
Copied from CRM #[id number]. Topic: Appointments - Appointment Access  >> Aug 4, 2025 10:32 AM Bhakti wrote:  Type:  Sooner Apoointment Request    Caller is requesting a sooner appointment.  Caller declined first available appointment listed below.  Caller will not accept being placed on the waitlist and is requesting a message be sent to doctor.  Name of Caller: Linda  When is the first available appointment? 08/14  Symptoms: hospital follow   Would the patient rather a call back or a response via [facility]? Call back   Best Call Back Number: [phone]  Additional Information:

## 2025-08-06 ENCOUNTER — TELEPHONE (OUTPATIENT)
Dept: INTERNAL MEDICINE | Facility: CLINIC | Age: 55
End: 2025-08-06
Payer: MEDICARE

## 2025-08-06 NOTE — TELEPHONE ENCOUNTER
Called patient and after verifying name and date of birth, patient stated that she had been in the hospital recently and needed a follow up. Scheduled patient for a follow up tomorrow with ANNALISE Oliveros. Patient voiced understanding.    Radha Smith LPN      Copied from [facility] #[id number]. Topic: General Inquiry - Return Call  >> Aug 6, 2025  8:21 AM Jaylin wrote:  Type:  Patient Returning Call    Who Called:Daniela   Who Left Message for Patient:Lory   Does the patient know what this is regarding?:n/a  Would the patient rather a call back or a response via [facility]? Call back   Best Call Back Number:[phone] (M)    Additional Information: n/a      Thanks KB

## 2025-08-07 ENCOUNTER — OFFICE VISIT (OUTPATIENT)
Dept: INTERNAL MEDICINE | Facility: CLINIC | Age: 55
End: 2025-08-07
Payer: MEDICARE

## 2025-08-07 ENCOUNTER — HOSPITAL ENCOUNTER (EMERGENCY)
Facility: HOSPITAL | Age: 55
Discharge: ANOTHER HOSPITAL | End: 2025-08-07
Attending: EMERGENCY MEDICINE
Payer: MEDICARE

## 2025-08-07 VITALS
DIASTOLIC BLOOD PRESSURE: 74 MMHG | OXYGEN SATURATION: 97 % | SYSTOLIC BLOOD PRESSURE: 120 MMHG | HEIGHT: 65 IN | HEART RATE: 111 BPM | TEMPERATURE: 97 F | WEIGHT: 129.44 LBS | BODY MASS INDEX: 21.56 KG/M2 | RESPIRATION RATE: 16 BRPM

## 2025-08-07 DIAGNOSIS — R42 VERTIGO: ICD-10-CM

## 2025-08-07 DIAGNOSIS — Z79.899 LITHIUM USE: ICD-10-CM

## 2025-08-07 DIAGNOSIS — M79.644 THUMB PAIN, RIGHT: ICD-10-CM

## 2025-08-07 DIAGNOSIS — R56.9 OBSERVED SEIZURE-LIKE ACTIVITY: Primary | ICD-10-CM

## 2025-08-07 DIAGNOSIS — Z88.1 ALLERGY TO ANTIBIOTIC: ICD-10-CM

## 2025-08-07 DIAGNOSIS — R10.9 ACUTE RIGHT FLANK PAIN: ICD-10-CM

## 2025-08-07 DIAGNOSIS — M51.372 DEGENERATION OF INTERVERTEBRAL DISC OF LUMBOSACRAL REGION WITH DISCOGENIC BACK PAIN AND LOWER EXTREMITY PAIN: ICD-10-CM

## 2025-08-07 DIAGNOSIS — Z00.00 ENCOUNTER FOR SCREENING AND PREVENTATIVE CARE: ICD-10-CM

## 2025-08-07 PROBLEM — F13.10 SEDATIVE, HYPNOTIC, OR ANXIOLYTIC ABUSE, DAILY USE: Status: RESOLVED | Noted: 2024-03-04 | Resolved: 2025-08-07

## 2025-08-07 PROCEDURE — 99999 PR PBB SHADOW E&M-EST. PATIENT-LVL V: CPT | Mod: PBBFAC,,,

## 2025-08-07 RX ORDER — PROCHLORPERAZINE MALEATE 10 MG
10 TABLET ORAL 2 TIMES DAILY PRN
Status: ON HOLD | COMMUNITY

## 2025-08-07 RX ORDER — MULTIVITAMIN
1 TABLET ORAL EVERY MORNING
Status: ON HOLD | COMMUNITY

## 2025-08-07 RX ORDER — LUMATEPERONE 42 MG/1
1 CAPSULE ORAL DAILY
Status: ON HOLD | COMMUNITY
Start: 2025-08-04

## 2025-08-07 RX ORDER — PROMETHAZINE HYDROCHLORIDE AND DEXTROMETHORPHAN HYDROBROMIDE 6.25; 15 MG/5ML; MG/5ML
5 SYRUP ORAL EVERY 6 HOURS PRN
Status: ON HOLD | COMMUNITY
Start: 2025-07-11

## 2025-08-07 RX ORDER — EPINEPHRINE 0.3 MG/.3ML
1 INJECTION SUBCUTANEOUS ONCE
Qty: 0.3 ML | Refills: 0 | Status: ON HOLD | OUTPATIENT
Start: 2025-08-07 | End: 2025-08-07

## 2025-08-07 RX ORDER — MECLIZINE HCL 12.5 MG 12.5 MG/1
12.5 TABLET ORAL
COMMUNITY
Start: 2025-07-16 | End: 2025-08-07 | Stop reason: SDUPTHER

## 2025-08-07 RX ORDER — HYDROXYZINE HYDROCHLORIDE 25 MG/1
25 TABLET, FILM COATED ORAL 3 TIMES DAILY
Status: ON HOLD | COMMUNITY
Start: 2025-08-04

## 2025-08-07 RX ORDER — EPINEPHRINE 0.3 MG/.3ML
0.3 INJECTION SUBCUTANEOUS DAILY PRN
COMMUNITY
End: 2025-08-07 | Stop reason: SDUPTHER

## 2025-08-07 RX ORDER — TALC
1 POWDER (GRAM) TOPICAL NIGHTLY
Status: ON HOLD | COMMUNITY

## 2025-08-07 RX ORDER — MECLIZINE HCL 12.5 MG 12.5 MG/1
12.5 TABLET ORAL 2 TIMES DAILY PRN
Qty: 60 TABLET | Refills: 0 | Status: ON HOLD | OUTPATIENT
Start: 2025-08-07 | End: 2025-09-06

## 2025-08-07 RX ORDER — OXCARBAZEPINE 150 MG/1
150 TABLET, FILM COATED ORAL DAILY
Status: ON HOLD | COMMUNITY

## 2025-08-07 RX ORDER — METHOCARBAMOL 500 MG/1
500 TABLET, FILM COATED ORAL 3 TIMES DAILY
Status: ON HOLD | COMMUNITY
Start: 2025-08-04

## 2025-08-07 RX ORDER — PROMETHAZINE HYDROCHLORIDE 25 MG/1
25 TABLET ORAL EVERY 6 HOURS PRN
Status: ON HOLD | COMMUNITY
Start: 2025-07-16

## 2025-08-07 NOTE — PROGRESS NOTES
Subjective:       Patient ID: Daniela Costa is a 55 y.o. female.    Chief Complaint: Follow-up ([facility] F/U 7/25/25: Seizure-Like Activity)    History of Present Illness    CHIEF COMPLAINT:  Patient presents today for follow up of recent hospital visits for seizure-like activity.    NEUROLOGICAL SYMPTOMS:  Patient admitted to [facility] on 07/25 for seizure-like activity-likely nonepileptic spells, bipolar.  Found to have hyponatremia, which was corrected.  Negative head CT, MRI brain, and EEG.  Patient was evaluated by psych who was concerned about her bipolar with gretta and also suicidal ideations and was therefore placed under PEC with plans for transferring to inpatient psych.  Patient was transferred to [facility] on 07/27 for PEC and was discharged home on 8/4.    Today, she reports experiencing seizure-like activity occurring 3-4 episodes per hour. A recent notable episode occurred at [facility], where a bystander called 911 after observing her talking to herself and rolling eyes in the back of her head for approximately 30 minutes. She reports these episodes are exhausting. A neurologist appointment is scheduled for January 3rd.    CURRENT SYMPTOMS:  She reports right flank pain present for 4-5 hours with urinary urgency. She presents with flu-like symptoms including chills and elevated temperature of 2.9 degrees. She has lost sense of smell and is concerned about potential COVID-19 infection. She reports new onset of GERD symptoms, which she manages with OTC medications. She describes pain between shoulder blades, questioning if related to asthma or potential pneumonia.    RECENT HOSPITALIZATIONS:  She reports recent hospitalizations complicated by low sodium and potassium levels. She experienced a fall during her hospital stay.    MUSCULOSKELETAL HISTORY:  She has multiple spine conditions including fusion from L4 to S1, left-sided SI joint fusion, and a titanium cage in neck spanning C4-C6. She reports  "lower left leg pain and history of pinched sciatic nerve affecting cervical, lumbar, and sacral regions.    MENTAL HEALTH:  She receives psychiatric care every 3 months and is attempting to reinstate weekly therapy sessions. She denies current suicidal ideation.    MEDICATIONS:  She takes lithium 25 mg daily and OTC medication for GERD symptoms.    ALLERGIES:  She reports significant physical reaction to Cefdinir requiring EpiPen use.        /74 (BP Location: Left arm, Patient Position: Sitting)   Pulse (!) 111   Temp 97.4 °F (36.3 °C) (Tympanic)   Resp 16   Ht 5' 5" (1.651 m)   Wt 58.7 kg (129 lb 6.6 oz)   LMP  (LMP Unknown) Comment: no cycle 8/2012  SpO2 97%   BMI 21.53 kg/m²     Review of Systems   Constitutional:  Positive for chills. Negative for fever.   Eyes:  Negative for visual disturbance.   Respiratory:  Negative for chest tightness and shortness of breath.    Cardiovascular:  Negative for chest pain, palpitations and leg swelling.   Gastrointestinal:  Negative for constipation, diarrhea, nausea and vomiting.   Genitourinary:  Positive for flank pain and urgency. Negative for difficulty urinating, dysuria, frequency, hematuria and vaginal discharge.   Musculoskeletal:  Positive for arthralgias, back pain and myalgias.   Neurological:  Positive for seizures. Negative for headaches.   All other systems reviewed and are negative.      Objective:      Physical Exam  Vitals and nursing note reviewed.   Constitutional:       General: She is not in acute distress.     Appearance: Normal appearance. She is not ill-appearing or toxic-appearing.   HENT:      Head: Normocephalic and atraumatic.   Eyes:      Pupils: Pupils are equal, round, and reactive to light.   Cardiovascular:      Rate and Rhythm: Normal rate and regular rhythm.   Pulmonary:      Effort: Pulmonary effort is normal.      Breath sounds: Normal breath sounds.   Abdominal:      General: Bowel sounds are normal.      Palpations: Abdomen " is soft.   Musculoskeletal:         General: Normal range of motion.      Cervical back: Normal range of motion and neck supple.   Skin:     General: Skin is warm and dry.   Neurological:      General: No focal deficit present.      Mental Status: She is alert and oriented to person, place, and time.   Psychiatric:         Attention and Perception: Attention normal.         Mood and Affect: Mood is anxious.         Speech: Speech is tangential.      Comments: Witnesses pt have seizure like activity for about 60 seconds. Pt able to hold cup of water without spilling throughout episode. Pt with normal behavior and speech immediately after episode ended.           Assessment:       1. Observed seizure-like activity    2. Acute right flank pain    3. Thumb pain, right    4. Encounter for screening and preventative care    5. Degeneration of intervertebral disc of lumbosacral region with discogenic back pain and lower extremity pain    6. Lithium use    7. Allergy to antibiotic    8. Vertigo        Plan:       Daniela was seen today for follow-up.    Diagnoses and all orders for this visit:    Observed seizure-like activity  Observed seizure-like activity during visit where patient's eyes rolled back and head, had moved to 1 side, mouth opened.  Patient able to hold a cup of water without spilling throughout episode.  Lasted about 60 seconds.  Patient with normal behavior and speech immediately after episode ended.  During 2nd episode, same behaviors as noted above.  During this episode patient spoke and told me to call 911.  - Monitored seizure-like activity occurring 3-4 times per hour.  - Reviewed recent paramedic assessment following seizure activity reported by a concerned citizen.  - Patient has a scheduled neurologist appointment for January 3rd.  - Observed seizure-like episode during today's visit.  - Referred patient to the emergency room for further evaluation.    Acute right flank pain  -     POCT URINE  DIPSTICK WITHOUT MICROSCOPE  Not completed due to transfer to ER.  - Monitored right flank pain persisting for 4-5 hours, accompanied by urinary urgency, chills, and temperature elevation of 2.9 degrees.  - Ordered urinalysis to assess for urinary tract infection.    Thumb pain, right  -     Ambulatory referral/consult to Orthopedics; Future  - Monitored thumb pain resulting from a fall in the hospital.  - Evaluated the injury in relation to patient's spinal conditions.  - Referred to hand specialist for evaluation and treatment.    Encounter for screening and preventative care  -     CBC Auto Differential; Future  -     Comprehensive Metabolic Panel; Future  -     Lipid Panel; Future  -     TSH; Future  -     Hemoglobin A1C; Future  Not completed due to transfer to ER.  - Ordered lithium level and wellness panel including lipid profile to monitor both sodium and potassium levels.    Degeneration of intervertebral disc of lumbosacral region with discogenic back pain and lower extremity pain  -     Ambulatory referral/consult to Spine Care; Future  - Monitored spinal conditions including L4 to S1 fusion, left SI joint fusion, and cervical spine with titanium cage placement at C4-C6.  - Assessed lower left leg pain and pinched sciatic nerve.  - Referred patient to back and spine for comprehensive spine evaluation.    Lithium use  -     Lithium Level; Future  Not completed due to transferred to ER.  - Ordered lithium level and wellness panel including lipid profile to monitor both sodium and potassium levels.    Allergy to antibiotic  -     EPINEPHrine (EPIPEN) 0.3 mg/0.3 mL AtIn; Inject 0.3 mLs (0.3 mg total) into the muscle once. for 1 dose    Vertigo  -     meclizine (ANTIVERT) 12.5 mg tablet; Take 1 tablet (12.5 mg total) by mouth 2 (two) times daily as needed for Dizziness.      Follow up if symptoms worsen or fail to improve, for post ER follow up visit.    She experienced multiple seizure-like episodes during  clinic visit and while waiting for ambulance. During 1 of these episodes, she requested emergency medical services. These episodes are suspected to be psychogenic or medication-induced rather than true seizures, particularly given their frequency and the patient's ability to communicate during events. Discussed with the patient about the nature of these episodes and evaluated the appropriateness of emergency services utilization for this condition. She expresses desire to understand the underlying cause of these episodes and is seeking medical clarification about their origin and nature. She appears concerned about the frequency and potential medical significance of these events.

## 2025-08-08 ENCOUNTER — PATIENT MESSAGE (OUTPATIENT)
Dept: OTOLARYNGOLOGY | Facility: CLINIC | Age: 55
End: 2025-08-08
Payer: MEDICARE

## 2025-08-08 ENCOUNTER — TELEPHONE (OUTPATIENT)
Dept: OBSTETRICS AND GYNECOLOGY | Facility: CLINIC | Age: 55
End: 2025-08-08
Payer: MEDICARE

## 2025-08-08 PROBLEM — R42 VERTIGO: Status: ACTIVE | Noted: 2025-08-08

## 2025-08-08 PROBLEM — M79.644 CHRONIC PAIN OF RIGHT THUMB: Status: ACTIVE | Noted: 2025-08-08

## 2025-08-08 PROBLEM — R56.9 SEIZURE-LIKE ACTIVITY: Status: ACTIVE | Noted: 2025-08-08

## 2025-08-08 PROBLEM — M54.9 CHRONIC BACK PAIN: Status: ACTIVE | Noted: 2025-08-08

## 2025-08-08 PROBLEM — G89.29 CHRONIC PAIN OF RIGHT THUMB: Status: ACTIVE | Noted: 2025-08-08

## 2025-08-08 PROBLEM — E86.0 DEHYDRATION: Status: ACTIVE | Noted: 2025-08-08

## 2025-08-08 PROBLEM — G89.29 CHRONIC BACK PAIN: Status: ACTIVE | Noted: 2025-08-08

## 2025-08-08 PROBLEM — R94.31 PROLONGED Q-T INTERVAL ON ECG: Status: ACTIVE | Noted: 2025-08-08

## 2025-08-08 PROBLEM — Z13.9 ENCOUNTER FOR MEDICAL SCREENING EXAMINATION: Status: ACTIVE | Noted: 2022-09-15

## 2025-08-08 PROBLEM — Z91.018 FOOD ALLERGY: Status: ACTIVE | Noted: 2025-08-08

## 2025-08-08 NOTE — TELEPHONE ENCOUNTER
Attempted to contact pt to confirm appt today with SEDRICK Hooks MD. No answer, [facility] or [facility].

## 2025-08-11 PROBLEM — E44.1 MILD MALNUTRITION: Status: ACTIVE | Noted: 2025-08-11

## 2025-08-13 ENCOUNTER — TELEPHONE (OUTPATIENT)
Dept: OBSTETRICS AND GYNECOLOGY | Facility: CLINIC | Age: 55
End: 2025-08-13
Payer: MEDICARE

## 2025-08-19 ENCOUNTER — OFFICE VISIT (OUTPATIENT)
Dept: INTERNAL MEDICINE | Facility: CLINIC | Age: 55
End: 2025-08-19
Payer: MEDICARE

## 2025-08-19 VITALS
TEMPERATURE: 99 F | SYSTOLIC BLOOD PRESSURE: 124 MMHG | DIASTOLIC BLOOD PRESSURE: 80 MMHG | OXYGEN SATURATION: 97 % | HEIGHT: 64 IN | BODY MASS INDEX: 21.94 KG/M2 | WEIGHT: 128.5 LBS | HEART RATE: 101 BPM

## 2025-08-19 DIAGNOSIS — T07.XXXA MULTIPLE EXCORIATIONS: ICD-10-CM

## 2025-08-19 DIAGNOSIS — F17.200 NICOTINE DEPENDENCE, UNCOMPLICATED, UNSPECIFIED NICOTINE PRODUCT TYPE: ICD-10-CM

## 2025-08-19 DIAGNOSIS — Z80.8 FAMILY HISTORY OF SKIN CANCER: ICD-10-CM

## 2025-08-19 DIAGNOSIS — G89.29 CHRONIC BACK PAIN, UNSPECIFIED BACK LOCATION, UNSPECIFIED BACK PAIN LATERALITY: ICD-10-CM

## 2025-08-19 DIAGNOSIS — R42 VERTIGO: ICD-10-CM

## 2025-08-19 DIAGNOSIS — Z88.1 ALLERGY TO ANTIBIOTIC: ICD-10-CM

## 2025-08-19 DIAGNOSIS — F50.029 ANOREXIA NERVOSA WITH BULIMIA: ICD-10-CM

## 2025-08-19 DIAGNOSIS — F31.75 BIPOLAR DISORDER, IN PARTIAL REMISSION, MOST RECENT EPISODE DEPRESSED: ICD-10-CM

## 2025-08-19 DIAGNOSIS — E44.1 MILD PROTEIN-CALORIE MALNUTRITION: ICD-10-CM

## 2025-08-19 DIAGNOSIS — M54.9 CHRONIC BACK PAIN, UNSPECIFIED BACK LOCATION, UNSPECIFIED BACK PAIN LATERALITY: ICD-10-CM

## 2025-08-19 DIAGNOSIS — E03.9 ACQUIRED HYPOTHYROIDISM: Primary | Chronic | ICD-10-CM

## 2025-08-19 PROCEDURE — 1159F MED LIST DOCD IN RCRD: CPT | Mod: CPTII,S$GLB,, | Performed by: FAMILY MEDICINE

## 2025-08-19 PROCEDURE — 3044F HG A1C LEVEL LT 7.0%: CPT | Mod: CPTII,S$GLB,, | Performed by: FAMILY MEDICINE

## 2025-08-19 PROCEDURE — 1160F RVW MEDS BY RX/DR IN RCRD: CPT | Mod: CPTII,S$GLB,, | Performed by: FAMILY MEDICINE

## 2025-08-19 PROCEDURE — 1111F DSCHRG MED/CURRENT MED MERGE: CPT | Mod: CPTII,S$GLB,, | Performed by: FAMILY MEDICINE

## 2025-08-19 PROCEDURE — 3008F BODY MASS INDEX DOCD: CPT | Mod: CPTII,S$GLB,, | Performed by: FAMILY MEDICINE

## 2025-08-19 PROCEDURE — 99214 OFFICE O/P EST MOD 30 MIN: CPT | Mod: S$GLB,,, | Performed by: FAMILY MEDICINE

## 2025-08-19 PROCEDURE — 3074F SYST BP LT 130 MM HG: CPT | Mod: CPTII,S$GLB,, | Performed by: FAMILY MEDICINE

## 2025-08-19 PROCEDURE — G2211 COMPLEX E/M VISIT ADD ON: HCPCS | Mod: S$GLB,,, | Performed by: FAMILY MEDICINE

## 2025-08-19 PROCEDURE — 3079F DIAST BP 80-89 MM HG: CPT | Mod: CPTII,S$GLB,, | Performed by: FAMILY MEDICINE

## 2025-08-19 PROCEDURE — 99999 PR PBB SHADOW E&M-EST. PATIENT-LVL V: CPT | Mod: PBBFAC,,, | Performed by: FAMILY MEDICINE

## 2025-08-19 RX ORDER — HYDROXYZINE HYDROCHLORIDE 25 MG/1
25 TABLET, FILM COATED ORAL 3 TIMES DAILY
Qty: 90 TABLET | Refills: 1 | Status: SHIPPED | OUTPATIENT
Start: 2025-08-19 | End: 2025-08-26 | Stop reason: SDUPTHER

## 2025-08-19 RX ORDER — MECLIZINE HCL 12.5 MG 12.5 MG/1
12.5 TABLET ORAL 2 TIMES DAILY PRN
Qty: 60 TABLET | Refills: 1 | Status: SHIPPED | OUTPATIENT
Start: 2025-08-19

## 2025-08-19 RX ORDER — QUETIAPINE FUMARATE 50 MG/1
50 TABLET, FILM COATED ORAL NIGHTLY
Qty: 30 TABLET | Refills: 1 | Status: SHIPPED | OUTPATIENT
Start: 2025-08-19 | End: 2025-08-26 | Stop reason: SDUPTHER

## 2025-08-19 RX ORDER — METHOCARBAMOL 500 MG/1
500 TABLET, FILM COATED ORAL 3 TIMES DAILY PRN
Qty: 90 TABLET | Refills: 1 | Status: SHIPPED | OUTPATIENT
Start: 2025-08-19

## 2025-08-19 RX ORDER — EPINEPHRINE 0.3 MG/.3ML
1 INJECTION SUBCUTANEOUS ONCE
Qty: 0.3 ML | Refills: 0 | Status: SHIPPED | OUTPATIENT
Start: 2025-08-19 | End: 2025-08-23

## 2025-08-19 RX ORDER — BENZTROPINE MESYLATE 0.5 MG/1
0.5 TABLET ORAL DAILY
Qty: 30 TABLET | Refills: 1 | Status: SHIPPED | OUTPATIENT
Start: 2025-08-19

## 2025-08-19 RX ORDER — LEVOTHYROXINE SODIUM 50 UG/1
50 TABLET ORAL
Qty: 90 TABLET | Refills: 3 | Status: SHIPPED | OUTPATIENT
Start: 2025-08-19

## 2025-08-19 RX ORDER — HYDROCORTISONE 25 MG/ML
LOTION TOPICAL 2 TIMES DAILY
Qty: 59 ML | Refills: 0 | Status: SHIPPED | OUTPATIENT
Start: 2025-08-19

## 2025-08-21 DIAGNOSIS — M79.641 RIGHT HAND PAIN: Primary | ICD-10-CM

## 2025-08-26 ENCOUNTER — OFFICE VISIT (OUTPATIENT)
Dept: PSYCHIATRY | Facility: CLINIC | Age: 55
End: 2025-08-26
Payer: MEDICARE

## 2025-09-02 ENCOUNTER — TELEPHONE (OUTPATIENT)
Dept: SMOKING CESSATION | Facility: CLINIC | Age: 55
End: 2025-09-02
Payer: MEDICARE

## 2025-09-03 ENCOUNTER — CLINICAL SUPPORT (OUTPATIENT)
Dept: SMOKING CESSATION | Facility: CLINIC | Age: 55
End: 2025-09-03

## 2025-09-03 ENCOUNTER — TELEPHONE (OUTPATIENT)
Dept: SMOKING CESSATION | Facility: CLINIC | Age: 55
End: 2025-09-03
Payer: MEDICARE

## 2025-09-03 DIAGNOSIS — F17.200 NICOTINE DEPENDENCE: Primary | ICD-10-CM

## 2025-09-03 PROCEDURE — 99404 PREV MED CNSL INDIV APPRX 60: CPT | Mod: ,,, | Performed by: SPEECH-LANGUAGE PATHOLOGIST

## 2025-09-03 PROCEDURE — 99999 PR PBB SHADOW E&M-EST. PATIENT-LVL II: CPT | Mod: PBBFAC,,, | Performed by: SPEECH-LANGUAGE PATHOLOGIST

## 2025-09-03 RX ORDER — VARENICLINE TARTRATE 1 MG/1
TABLET, FILM COATED ORAL
Qty: 56 TABLET | Refills: 0 | Status: SHIPPED | OUTPATIENT
Start: 2025-09-03

## (undated) DEVICE — SEE MEDLINE ITEM 157027

## (undated) DEVICE — ADHESIVE DERMABOND ADVANCED

## (undated) DEVICE — SEE MEDLINE ITEM 157181

## (undated) DEVICE — COVER OVERHEAD SURG LT BLUE

## (undated) DEVICE — KIT ANTIFOG

## (undated) DEVICE — DRAPE ARM DAVINCI XI

## (undated) DEVICE — SEE MEDLINE ITEM 157117

## (undated) DEVICE — SYR 10CC LUER LOCK

## (undated) DEVICE — SEE MEDLINE ITEM 152622

## (undated) DEVICE — TROCAR ENDOPATH XCEL 5X100MM

## (undated) DEVICE — SUT CTD VICRYL PLUS 4/0

## (undated) DEVICE — SEAL UNIVERSAL 5MM-8MM XI

## (undated) DEVICE — SEE MEDLINE ITEM 146372

## (undated) DEVICE — Device

## (undated) DEVICE — SUT CTD VICRYL 0 UND BR SUT

## (undated) DEVICE — SUT ABS CLIP LAPRA-TY CTD

## (undated) DEVICE — GLOVE PROTEXIS HYDROGEL SZ6.5

## (undated) DEVICE — APPLICATOR CHLORAPREP ORN 26ML

## (undated) DEVICE — TUBING HEATED INSUFFLATOR

## (undated) DEVICE — DRAPE LAVH LAPAROSCOPY W/FLUID

## (undated) DEVICE — COVER TIP CURVED SCISSORS XI

## (undated) DEVICE — SOL NS 1000CC

## (undated) DEVICE — SUPPORT ULNA NERVE PROTECTOR

## (undated) DEVICE — DRAPE COLUMN DAVINCI XI

## (undated) DEVICE — EVACUATOR KIT SMOKE PLUME AWAY

## (undated) DEVICE — SOL 9P NACL IRR PIC IL

## (undated) DEVICE — SEE MEDLINE ITEM 154981

## (undated) DEVICE — TIP RUMI GREEN DISPOSABLE6.7MM

## (undated) DEVICE — IRRIGATOR ENDOSCOPY DISP.

## (undated) DEVICE — NDL PNEUMO INSUFFLATI 120MM

## (undated) DEVICE — OCCLUDER COLPO-PNEUMO STERILE

## (undated) DEVICE — SYR 3CC LUER LOC

## (undated) DEVICE — SEE MEDLINE ITEM 146292

## (undated) DEVICE — DRAPE STERI LONG

## (undated) DEVICE — POSITIONER HEAD DONUT 9IN FOAM

## (undated) DEVICE — SYR 50CC LL

## (undated) DEVICE — GLOVE PROTEXIS HYDROGEL SZ7

## (undated) DEVICE — ELECTRODE REM PLYHSV RETURN 9

## (undated) DEVICE — SOL ELECTROLUBE ANTI-STIC

## (undated) DEVICE — OBTURATOR BLADELESS 8MM XI CLR